# Patient Record
Sex: MALE | Race: WHITE | Employment: UNEMPLOYED | URBAN - METROPOLITAN AREA
[De-identification: names, ages, dates, MRNs, and addresses within clinical notes are randomized per-mention and may not be internally consistent; named-entity substitution may affect disease eponyms.]

---

## 2017-01-25 ENCOUNTER — HOSPITAL ENCOUNTER (OUTPATIENT)
Dept: CT IMAGING | Age: 57
Discharge: HOME | End: 2017-01-25
Attending: ORTHOPAEDIC SURGERY
Payer: COMMERCIAL

## 2017-01-25 DIAGNOSIS — S72.142A INTERTROCHANTERIC FRACTURE OF LEFT HIP (HCC): ICD-10-CM

## 2017-01-25 PROCEDURE — 72192 CT PELVIS W/O DYE: CPT

## 2018-05-03 ENCOUNTER — HOSPITAL ENCOUNTER (INPATIENT)
Age: 58
LOS: 1 days | Discharge: HOME | DRG: 280 | End: 2018-05-04
Attending: EMERGENCY MEDICINE | Admitting: INTERNAL MEDICINE
Payer: COMMERCIAL

## 2018-05-03 ENCOUNTER — APPOINTMENT (OUTPATIENT)
Dept: GENERAL RADIOLOGY | Age: 58
DRG: 280 | End: 2018-05-03
Attending: EMERGENCY MEDICINE
Payer: COMMERCIAL

## 2018-05-03 DIAGNOSIS — F19.10 SUBSTANCE ABUSE (HCC): ICD-10-CM

## 2018-05-03 DIAGNOSIS — I21.4 NSTEMI (NON-ST ELEVATED MYOCARDIAL INFARCTION) (HCC): Primary | ICD-10-CM

## 2018-05-03 PROBLEM — I10 HTN (HYPERTENSION): Status: ACTIVE | Noted: 2018-05-03

## 2018-05-03 PROBLEM — F14.90 COCAINE USE: Status: ACTIVE | Noted: 2018-05-03

## 2018-05-03 PROBLEM — Z72.0 TOBACCO USE: Status: ACTIVE | Noted: 2018-05-03

## 2018-05-03 PROBLEM — N17.9 AKI (ACUTE KIDNEY INJURY) (HCC): Status: ACTIVE | Noted: 2018-05-03

## 2018-05-03 LAB
ALBUMIN SERPL-MCNC: 2.8 G/DL (ref 3.5–5)
ALBUMIN/GLOB SERPL: 0.7 {RATIO} (ref 1.1–2.2)
ALP SERPL-CCNC: 60 U/L (ref 45–117)
ALT SERPL-CCNC: 16 U/L (ref 12–78)
AMPHET UR QL SCN: NEGATIVE
ANION GAP SERPL CALC-SCNC: 7 MMOL/L (ref 5–15)
APPEARANCE UR: CLEAR
APTT PPP: 27.4 SEC (ref 22.1–32)
AST SERPL-CCNC: 15 U/L (ref 15–37)
ATRIAL RATE: 91 BPM
BACTERIA URNS QL MICRO: NEGATIVE /HPF
BARBITURATES UR QL SCN: NEGATIVE
BASOPHILS # BLD: 0.1 K/UL (ref 0–0.1)
BASOPHILS NFR BLD: 1 % (ref 0–1)
BENZODIAZ UR QL: NEGATIVE
BILIRUB SERPL-MCNC: 0.8 MG/DL (ref 0.2–1)
BILIRUB UR QL: NEGATIVE
BNP SERPL-MCNC: 6036 PG/ML (ref 0–125)
BUN SERPL-MCNC: 9 MG/DL (ref 6–20)
BUN/CREAT SERPL: 7 (ref 12–20)
CALCIUM SERPL-MCNC: 8.2 MG/DL (ref 8.5–10.1)
CALCULATED P AXIS, ECG09: 32 DEGREES
CALCULATED R AXIS, ECG10: 18 DEGREES
CALCULATED T AXIS, ECG11: 166 DEGREES
CANNABINOIDS UR QL SCN: POSITIVE
CHLORIDE SERPL-SCNC: 106 MMOL/L (ref 97–108)
CHOLEST SERPL-MCNC: 204 MG/DL
CK SERPL-CCNC: 106 U/L (ref 39–308)
CO2 SERPL-SCNC: 26 MMOL/L (ref 21–32)
COCAINE UR QL SCN: POSITIVE
COLOR UR: NORMAL
CREAT SERPL-MCNC: 1.3 MG/DL (ref 0.7–1.3)
DIAGNOSIS, 93000: NORMAL
DIFFERENTIAL METHOD BLD: NORMAL
DRUG SCRN COMMENT,DRGCM: ABNORMAL
EOSINOPHIL # BLD: 0.4 K/UL (ref 0–0.4)
EOSINOPHIL NFR BLD: 5 % (ref 0–7)
EPITH CASTS URNS QL MICRO: NORMAL /LPF
ERYTHROCYTE [DISTWIDTH] IN BLOOD BY AUTOMATED COUNT: 13.3 % (ref 11.5–14.5)
EST. AVERAGE GLUCOSE BLD GHB EST-MCNC: NORMAL MG/DL
GLOBULIN SER CALC-MCNC: 4.1 G/DL (ref 2–4)
GLUCOSE SERPL-MCNC: 94 MG/DL (ref 65–100)
GLUCOSE UR STRIP.AUTO-MCNC: NEGATIVE MG/DL
HBA1C MFR BLD: 4.8 % (ref 4.2–6.3)
HCT VFR BLD AUTO: 38.8 % (ref 36.6–50.3)
HDLC SERPL-MCNC: 41 MG/DL
HDLC SERPL: 5 {RATIO} (ref 0–5)
HGB BLD-MCNC: 12.4 G/DL (ref 12.1–17)
HGB UR QL STRIP: NEGATIVE
HYALINE CASTS URNS QL MICRO: NORMAL /LPF (ref 0–5)
IMM GRANULOCYTES # BLD: 0 K/UL (ref 0–0.04)
IMM GRANULOCYTES NFR BLD AUTO: 0 % (ref 0–0.5)
INR PPP: 1.1 (ref 0.9–1.1)
KETONES UR QL STRIP.AUTO: NEGATIVE MG/DL
LDLC SERPL CALC-MCNC: 139.2 MG/DL (ref 0–100)
LEUKOCYTE ESTERASE UR QL STRIP.AUTO: NEGATIVE
LIPID PROFILE,FLP: ABNORMAL
LYMPHOCYTES # BLD: 1.3 K/UL (ref 0.8–3.5)
LYMPHOCYTES NFR BLD: 20 % (ref 12–49)
MCH RBC QN AUTO: 28.7 PG (ref 26–34)
MCHC RBC AUTO-ENTMCNC: 32 G/DL (ref 30–36.5)
MCV RBC AUTO: 89.8 FL (ref 80–99)
METHADONE UR QL: NEGATIVE
MONOCYTES # BLD: 0.6 K/UL (ref 0–1)
MONOCYTES NFR BLD: 9 % (ref 5–13)
NEUTS SEG # BLD: 4.5 K/UL (ref 1.8–8)
NEUTS SEG NFR BLD: 66 % (ref 32–75)
NITRITE UR QL STRIP.AUTO: NEGATIVE
NRBC # BLD: 0 K/UL (ref 0–0.01)
NRBC BLD-RTO: 0 PER 100 WBC
OPIATES UR QL: NEGATIVE
P-R INTERVAL, ECG05: 152 MS
PCP UR QL: NEGATIVE
PH UR STRIP: 5.5 [PH] (ref 5–8)
PLATELET # BLD AUTO: 243 K/UL (ref 150–400)
PMV BLD AUTO: 9.9 FL (ref 8.9–12.9)
POTASSIUM SERPL-SCNC: 4.1 MMOL/L (ref 3.5–5.1)
PROT SERPL-MCNC: 6.9 G/DL (ref 6.4–8.2)
PROT UR STRIP-MCNC: NEGATIVE MG/DL
PROTHROMBIN TIME: 10.6 SEC (ref 9–11.1)
Q-T INTERVAL, ECG07: 428 MS
QRS DURATION, ECG06: 102 MS
QTC CALCULATION (BEZET), ECG08: 500 MS
RBC # BLD AUTO: 4.32 M/UL (ref 4.1–5.7)
RBC #/AREA URNS HPF: NORMAL /HPF (ref 0–5)
SODIUM SERPL-SCNC: 139 MMOL/L (ref 136–145)
SP GR UR REFRACTOMETRY: 1.02 (ref 1–1.03)
THERAPEUTIC RANGE,PTTT: NORMAL SECS (ref 58–77)
TRIGL SERPL-MCNC: 119 MG/DL (ref ?–150)
TROPONIN I SERPL-MCNC: 0.37 NG/ML
TROPONIN I SERPL-MCNC: 0.71 NG/ML
UR CULT HOLD, URHOLD: NORMAL
UROBILINOGEN UR QL STRIP.AUTO: 1 EU/DL (ref 0.2–1)
VENTRICULAR RATE, ECG03: 82 BPM
VLDLC SERPL CALC-MCNC: 23.8 MG/DL
WBC # BLD AUTO: 6.9 K/UL (ref 4.1–11.1)
WBC URNS QL MICRO: NORMAL /HPF (ref 0–4)

## 2018-05-03 PROCEDURE — 93005 ELECTROCARDIOGRAM TRACING: CPT

## 2018-05-03 PROCEDURE — 65660000000 HC RM CCU STEPDOWN

## 2018-05-03 PROCEDURE — 71045 X-RAY EXAM CHEST 1 VIEW: CPT

## 2018-05-03 PROCEDURE — 80053 COMPREHEN METABOLIC PANEL: CPT | Performed by: EMERGENCY MEDICINE

## 2018-05-03 PROCEDURE — 96374 THER/PROPH/DIAG INJ IV PUSH: CPT

## 2018-05-03 PROCEDURE — 36415 COLL VENOUS BLD VENIPUNCTURE: CPT | Performed by: EMERGENCY MEDICINE

## 2018-05-03 PROCEDURE — 85730 THROMBOPLASTIN TIME PARTIAL: CPT | Performed by: EMERGENCY MEDICINE

## 2018-05-03 PROCEDURE — 85610 PROTHROMBIN TIME: CPT | Performed by: EMERGENCY MEDICINE

## 2018-05-03 PROCEDURE — 83880 ASSAY OF NATRIURETIC PEPTIDE: CPT | Performed by: EMERGENCY MEDICINE

## 2018-05-03 PROCEDURE — 96361 HYDRATE IV INFUSION ADD-ON: CPT

## 2018-05-03 PROCEDURE — 80307 DRUG TEST PRSMV CHEM ANLYZR: CPT | Performed by: EMERGENCY MEDICINE

## 2018-05-03 PROCEDURE — 84484 ASSAY OF TROPONIN QUANT: CPT | Performed by: EMERGENCY MEDICINE

## 2018-05-03 PROCEDURE — 83036 HEMOGLOBIN GLYCOSYLATED A1C: CPT | Performed by: INTERNAL MEDICINE

## 2018-05-03 PROCEDURE — 74011250637 HC RX REV CODE- 250/637: Performed by: INTERNAL MEDICINE

## 2018-05-03 PROCEDURE — 81001 URINALYSIS AUTO W/SCOPE: CPT | Performed by: EMERGENCY MEDICINE

## 2018-05-03 PROCEDURE — 74011250636 HC RX REV CODE- 250/636: Performed by: INTERNAL MEDICINE

## 2018-05-03 PROCEDURE — 80061 LIPID PANEL: CPT | Performed by: INTERNAL MEDICINE

## 2018-05-03 PROCEDURE — 85025 COMPLETE CBC W/AUTO DIFF WBC: CPT | Performed by: EMERGENCY MEDICINE

## 2018-05-03 PROCEDURE — 82550 ASSAY OF CK (CPK): CPT | Performed by: INTERNAL MEDICINE

## 2018-05-03 PROCEDURE — 93306 TTE W/DOPPLER COMPLETE: CPT

## 2018-05-03 PROCEDURE — 74011250636 HC RX REV CODE- 250/636: Performed by: EMERGENCY MEDICINE

## 2018-05-03 PROCEDURE — 99285 EMERGENCY DEPT VISIT HI MDM: CPT

## 2018-05-03 RX ORDER — SODIUM CHLORIDE 9 MG/ML
75 INJECTION, SOLUTION INTRAVENOUS CONTINUOUS
Status: DISCONTINUED | OUTPATIENT
Start: 2018-05-03 | End: 2018-05-04

## 2018-05-03 RX ORDER — AMLODIPINE BESYLATE 10 MG/1
10 TABLET ORAL DAILY
COMMUNITY
End: 2018-05-14

## 2018-05-03 RX ORDER — SODIUM CHLORIDE 0.9 % (FLUSH) 0.9 %
5-10 SYRINGE (ML) INJECTION AS NEEDED
Status: DISCONTINUED | OUTPATIENT
Start: 2018-05-03 | End: 2018-05-04 | Stop reason: HOSPADM

## 2018-05-03 RX ORDER — ACETAMINOPHEN 325 MG/1
650 TABLET ORAL
Status: DISCONTINUED | OUTPATIENT
Start: 2018-05-03 | End: 2018-05-04

## 2018-05-03 RX ORDER — ENOXAPARIN SODIUM 100 MG/ML
40 INJECTION SUBCUTANEOUS DAILY
Status: DISCONTINUED | OUTPATIENT
Start: 2018-05-03 | End: 2018-05-04

## 2018-05-03 RX ORDER — NICOTINE 7MG/24HR
1 PATCH, TRANSDERMAL 24 HOURS TRANSDERMAL
Status: DISCONTINUED | OUTPATIENT
Start: 2018-05-03 | End: 2018-05-04

## 2018-05-03 RX ORDER — ACETAMINOPHEN 325 MG/1
650 TABLET ORAL
Status: DISCONTINUED | OUTPATIENT
Start: 2018-05-03 | End: 2018-05-04 | Stop reason: HOSPADM

## 2018-05-03 RX ORDER — ONDANSETRON 2 MG/ML
4 INJECTION INTRAMUSCULAR; INTRAVENOUS
Status: DISCONTINUED | OUTPATIENT
Start: 2018-05-03 | End: 2018-05-04

## 2018-05-03 RX ORDER — GUAIFENESIN 100 MG/5ML
81 LIQUID (ML) ORAL DAILY
Status: DISCONTINUED | OUTPATIENT
Start: 2018-05-04 | End: 2018-05-04

## 2018-05-03 RX ORDER — LORAZEPAM 2 MG/ML
1 INJECTION INTRAMUSCULAR
Status: COMPLETED | OUTPATIENT
Start: 2018-05-03 | End: 2018-05-03

## 2018-05-03 RX ORDER — NALOXONE HYDROCHLORIDE 0.4 MG/ML
0.4 INJECTION, SOLUTION INTRAMUSCULAR; INTRAVENOUS; SUBCUTANEOUS AS NEEDED
Status: DISCONTINUED | OUTPATIENT
Start: 2018-05-03 | End: 2018-05-04 | Stop reason: HOSPADM

## 2018-05-03 RX ORDER — MORPHINE SULFATE 4 MG/ML
2 INJECTION INTRAVENOUS
Status: DISCONTINUED | OUTPATIENT
Start: 2018-05-03 | End: 2018-05-04

## 2018-05-03 RX ORDER — AMLODIPINE BESYLATE 5 MG/1
10 TABLET ORAL DAILY
Status: DISCONTINUED | OUTPATIENT
Start: 2018-05-04 | End: 2018-05-04

## 2018-05-03 RX ORDER — ZOLPIDEM TARTRATE 5 MG/1
5 TABLET ORAL
Status: DISCONTINUED | OUTPATIENT
Start: 2018-05-03 | End: 2018-05-04

## 2018-05-03 RX ORDER — SODIUM CHLORIDE 0.9 % (FLUSH) 0.9 %
5-10 SYRINGE (ML) INJECTION EVERY 8 HOURS
Status: DISCONTINUED | OUTPATIENT
Start: 2018-05-03 | End: 2018-05-04 | Stop reason: HOSPADM

## 2018-05-03 RX ORDER — ASPIRIN 325 MG
325 TABLET ORAL DAILY
Status: DISCONTINUED | OUTPATIENT
Start: 2018-05-04 | End: 2018-05-04 | Stop reason: HOSPADM

## 2018-05-03 RX ORDER — HYDROCODONE BITARTRATE AND ACETAMINOPHEN 5; 325 MG/1; MG/1
1 TABLET ORAL
Status: DISCONTINUED | OUTPATIENT
Start: 2018-05-03 | End: 2018-05-04 | Stop reason: SDUPTHER

## 2018-05-03 RX ORDER — BENZONATATE 100 MG/1
100 CAPSULE ORAL
Status: DISCONTINUED | OUTPATIENT
Start: 2018-05-03 | End: 2018-05-04

## 2018-05-03 RX ORDER — ASPIRIN 325 MG
325 TABLET ORAL
Status: ACTIVE | OUTPATIENT
Start: 2018-05-03 | End: 2018-05-04

## 2018-05-03 RX ADMIN — ZOLPIDEM TARTRATE 5 MG: 5 TABLET ORAL at 23:34

## 2018-05-03 RX ADMIN — NITROGLYCERIN 1 INCH: 20 OINTMENT TOPICAL at 23:40

## 2018-05-03 RX ADMIN — LORAZEPAM 1 MG: 2 INJECTION INTRAMUSCULAR; INTRAVENOUS at 13:32

## 2018-05-03 RX ADMIN — BENZONATATE 100 MG: 100 CAPSULE ORAL at 18:09

## 2018-05-03 RX ADMIN — SODIUM CHLORIDE 75 ML/HR: 900 INJECTION, SOLUTION INTRAVENOUS at 23:17

## 2018-05-03 RX ADMIN — Medication 10 ML: at 21:38

## 2018-05-03 RX ADMIN — SODIUM CHLORIDE 500 ML: 900 INJECTION, SOLUTION INTRAVENOUS at 13:29

## 2018-05-03 RX ADMIN — SODIUM CHLORIDE 75 ML/HR: 900 INJECTION, SOLUTION INTRAVENOUS at 15:45

## 2018-05-03 RX ADMIN — ENOXAPARIN SODIUM 40 MG: 40 INJECTION SUBCUTANEOUS at 16:26

## 2018-05-03 RX ADMIN — Medication 10 ML: at 15:45

## 2018-05-03 NOTE — CONSULTS
Connie Weinberg DO, [facility]  [address] [facility] [place], [address] [facility] [address]    Office [phone],W [phone]  Pager [phone]    Date of  Admission: 5/3/2018 12:49 PM  PCP- Latasha Segura MD    Luis Tse is a 61 y.o. male admitted for NSTEMI (non-ST elevated myocardial infarction) ([facility] [address] He is a 61 y.o.  [place]  male with past medical history significant for HTN and CHF who presents from home via EMS with chief complaint of CP. The pt reports that he started to have L sided CP that radiates to his LUE with SOB this morning at 0700 while driving. The pt reports that he used cocaine this morning prior to the onset of his CP. His daughter is unaware of his cocaine use. The pt has not been compliant with his blood pressure medications. The pt has a chronic cough. The pt denies prior cardiac intervention, hx of DM, fever, chills, and vomiting. There are no other acute medical concerns at this time. Consult requested by Rachelle Martínez MD    Assessment/Plan    1. Recent Non STEMI. 2. Severe LV systolic dysfunction (EF 90-68%) with apical akinesis and lateral hypokinesis. 3. Recent Cocaine use. 4. Tobacco abuse. 5. Hypertension. Plan:  1. Aspirin 325 mg po daily. 2. Atorvastatin 40 mg po daily. 3. Metoprolol 25 mg po BID. 4. Lisinopril 5 mg po daily. 5. Risk, benefits and alternatives to a cardiac catheterization and possible PTCA/stent have been discussed with Mr. [facility] and he agrees to proceed. This will be performed in the am.               I appreciate the opportunity to be involved in Mr. Thomas. See below note for details. Please do not hesitate to contact us with questions or concerns. Connie Weinberg DO    Cardiac Testing/ Procedures: A. Cardiac Cath/PCI:    B.ECHO/GHASSAN:  Left ventricle: Apical akinesis, Lateral hypokinesis, Normal septal motion. The ventricle was moderately to severely dilated.  Systolic function was  abnormal. Ejection fraction was estimated in the range of 30 % to 35 %. There was severe diffuse hypokinesis with regional variations. Left atrium: The atrium was moderately to severely dilated. Mitral valve: There was mild to moderate regurgitation. Tricuspid valve: There was moderate regurgitation. C.StressNuclear/Stress ECHO/Stress test:    D.Vascular:    E. EP:    F. Miscellaneous:    Care Plan discussed with: Patient, Family, Nursing Staff and Consultant/Specialist    Subjective:      History reviewed. No pertinent past medical history.    Past Surgical History:   Procedure Laterality Date    HX ORTHOPAEDIC      left hip surgery     No Known Allergies  Family History   Problem Relation Age of Onset    Diabetes Mother       Social History   Substance Use Topics    Smoking status: Former Smoker     Quit date: 5/3/2018    Smokeless tobacco: None    Alcohol use No          Medications:    (Not in a hospital admission)  Current Facility-Administered Medications   Medication Dose Route Frequency    sodium chloride (NS) flush 5-10 mL  5-10 mL IntraVENous Q8H    sodium chloride (NS) flush 5-10 mL  5-10 mL IntraVENous PRN    aspirin (ASPIRIN) tablet 325 mg  325 mg Oral NOW    sodium chloride (NS) flush 5-10 mL  5-10 mL IntraVENous Q8H    sodium chloride (NS) flush 5-10 mL  5-10 mL IntraVENous PRN    acetaminophen (TYLENOL) tablet 650 mg  650 mg Oral Q4H PRN    HYDROcodone-acetaminophen (NORCO) 5-325 mg per tablet 1 Tab  1 Tab Oral Q4H PRN    naloxone (NARCAN) injection 0.4 mg  0.4 mg IntraVENous PRN    ondansetron (ZOFRAN) injection 4 mg  4 mg IntraVENous Q4H PRN    morphine injection 2 mg  2 mg IntraVENous Q4H PRN    enoxaparin (LOVENOX) injection 40 mg  40 mg SubCUTAneous DAILY    [START ON 5/4/2018] aspirin chewable tablet 81 mg  81 mg Oral DAILY    0.9% sodium chloride infusion  75 mL/hr IntraVENous CONTINUOUS    nicotine (NICODERM CQ) 7 mg/24 hr patch 1 Patch  1 Patch TransDERmal DAILY PRN     Current Outpatient Prescriptions   Medication Sig    amLODIPine (NORVASC) 10 mg tablet Take 10 mg by mouth daily. Review of Systems:  (bold if positive, if negative)    Gen:  Eyes:  ENT:  CVS: Chest pain  Pulm: Cough, dyspneaGI:    :    MS:  Skin:  Psych:  Endo:    Hem:  Renal:    Neuro:        Physical Exam:  Visit Vitals    /76    Pulse 81    Temp 98.4 °F (36.9 °C)    Resp 21    Ht 5' 6\" (1.676 m)    Wt 84.8 kg (187 lb)    SpO2 95%    BMI 30.18 kg/m2         Telemetry: normal sinus rhythm    Gen: Well-developed, well-nourished, in no acute distress  HEENT:  Pink conjunctivae, hearing intact to voice, moist mucous membranes  Neck: Supple,No JVD, No Carotid Bruit, Thyroid- non tender  Resp: No accessory muscle use, Clear breath sounds, No rales or rhonchi  Card: Regular Rate,Rythm,Normal S1, S2, No murmurs, rubs or gallop. No thrills. Abd:  Soft, non-tender, non-distended, normoactive bowel sounds are present,   MSK: No cyanosis or clubbing  Skin: No rashes or ulcers  Neuro:  Cranial nerves are grossly intact, moving all four extremities, no focal deficit, follows commands appropriately  Psych:  Good insight, oriented to person, place and time, alert, Nml Affect  LE: No edema  Vascular:Distal Pulses 2+ and symmetric        EKG:  Unchanged from previous tracings, normal sinus rhythm, LVH, Inverted T waves in the anterior/lateral leads, prolonged QT interval..       Cxray:  Adaptive statistical iterative reconstruction (ASIR) was utilized. The cardiomediastinal contours are stable. The pulmonary vasculature is within  normal limits. The lungs and pleural spaces are clear. There is no pneumothorax.  The bones and  upper abdomen are stable.        Impression:           LABS:        Lab Results   Component Value Date/Time    WBC 6.9 05/03/2018 01:06 PM    HGB 12.4 05/03/2018 01:06 PM    HCT 38.8 05/03/2018 01:06 PM    PLATELET 862 92/91/2279 01:06 PM    MCV 89.8 05/03/2018 01:06 PM     Lab Results   Component Value Date/Time    Sodium 139 05/03/2018 01:06 PM    Potassium 4.1 05/03/2018 01:06 PM    Chloride 106 05/03/2018 01:06 PM    CO2 26 05/03/2018 01:06 PM    Anion gap 7 05/03/2018 01:06 PM    Glucose 94 05/03/2018 01:06 PM    BUN 9 05/03/2018 01:06 PM    Creatinine 1.30 05/03/2018 01:06 PM    BUN/Creatinine ratio 7 (L) 05/03/2018 01:06 PM    GFR est AA >60 05/03/2018 01:06 PM    GFR est non-AA 57 (L) 05/03/2018 01:06 PM    Calcium 8.2 (L) 05/03/2018 01:06 PM     Lab Results   Component Value Date/Time    Troponin-I, Qt. 0.37 (H) 05/03/2018 01:06 PM     Lab Results   Component Value Date/Time    aPTT 27.4 05/03/2018 01:06 PM     Lab Results   Component Value Date/Time    INR 1.1 05/03/2018 01:06 PM    INR 1.0 07/27/2010 06:56 AM    Prothrombin time 10.6 05/03/2018 01:06 PM    Prothrombin time 10.3 07/27/2010 06:56 AM     No results found for: BNP, BNPP, XBNPT      Moon Fields DO

## 2018-05-03 NOTE — H&P
[place] [address]  [phone]    Admission History and Physical      NAME:  Ez Paul   :   1960   MRN:  [id number]     PCP:  Francheska Santiago MD     Date/Time:  5/3/2018         Subjective:     CHIEF COMPLAINT: \"I have chest pain\"     HISTORY OF PRESENT ILLNESS:     Mr. Marcela Villa is a 61 y.o.  male with PMH of tobacco abuse (currently smokes 1 cig/day, remote h/o alcohol abuse and cocaine use) admitted for chest pain and an elevated troponin. Pt reports chest pain that started this AM. L sided. Associated with SOB. Non radiating. No exertional symptoms prior to this event. Does endorse cocaine use this AM.     PMH   HTN     Past Surgical History:   Procedure Laterality Date    HX ORTHOPAEDIC      left hip surgery       Social History   Substance Use Topics    Smoking status: Former Smoker     Quit date: 5/3/2018    Smokeless tobacco: Not on file    Alcohol use No      Mother => DM     No Known Allergies     Prior to Admission medications    Medication Sig Start Date End Date Taking? Authorizing Provider   amLODIPine (NORVASC) 10 mg tablet Take 10 mg by mouth daily.    Yes Historical Provider         Review of Systems:  (bold if positive, if negative)    Gen:  Eyes:  ENT:  CVS:  Pulm:  GI:    :    MS:  Skin:  Psych:  Endo:    Hem:  Renal:    Neuro:     Dyspnea, chest pain        Objective:      VITALS:    Vital signs reviewed; most recent are:    Visit Vitals    /76    Pulse 81    Temp 98.4 °F (36.9 °C)    Resp 21    Ht 5' 6\" (1.676 m)    Wt 84.8 kg (187 lb)    SpO2 95%    BMI 30.18 kg/m2     SpO2 Readings from Last 6 Encounters:   18 95%    O2 Flow Rate (L/min): 2 l/min   No intake or output data in the 24 hours ending 18 1440         Exam:     Physical Exam:    Gen:  Well-developed, well-nourished, in no acute distress  HEENT:  Pink conjunctivae, PERRL, hearing intact to voice, moist mucous membranes  Neck:  Supple, without masses, thyroid non-tender  Resp:  No accessory muscle use, clear breath sounds without wheezes rales or rhonchi  Card:  No murmurs, normal S1, S2 without thrills, bruits or peripheral edema  Abd:  Soft, non-tender, non-distended, normoactive bowel sounds are present, no palpable organomegaly  Lymph:  No cervical adenopathy  Musc:  No cyanosis or clubbing  Skin:  No rashes or ulcers, skin turgor is good  Neuro:  Cranial nerves 3-12 are grossly intact,  strength is 5/5 bilaterally, dorsi / plantarflexion strength is 5/5 bilaterally, follows commands appropriately  Psych:  Alert with good insight. Oriented to person, place, and time       Labs:    Recent Labs      05/03/18   1306   WBC  6.9   HGB  12.4   HCT  38.8   PLT  243     Recent Labs      05/03/18   1306   NA  139   K  4.1   CL  106   CO2  26   GLU  94   BUN  9   CREA  1.30   CA  8.2*   ALB  2.8*   SGOT  15   ALT  16     No components found for: GLPOC  No results for input(s): PH, PCO2, PO2, HCO3, FIO2 in the last 72 hours. Recent Labs      05/03/18   1306   INR  1.1     CXR => no acute process   EKG => T wave inversion in the lateral leads  Trop => 0.37     Assessment/Plan:    NSTEMI (non-ST elevated myocardial infarction) ([facility] [address] (5/3/2018) - likely from vasoconstriction from cocaine use though does have risk factors for CAD including tobacco abuse and HTN   -admit to tele   -trend CE's   -morphine PRN   -start O2   -nitrates PRN   -start ASA   -check lipid panel, A1c, TTE   -cardiology eval; may need stress   -if CE's uptrend significantly, will start therapeutic anticoagulation  -avoid beta blockers due to cocaine use       Cocaine use (5/3/2018) - daughter is not aware of this   -counseled on cessation       ELISEO (acute kidney injury) ([facility] [address] (5/3/2018) - unclear baseline.  ?mild CKD  -monitor with IVF's       Tobacco use (5/3/2018)  -counseled on cessation  -nicotine patch PRN       HTN (hypertension) (5/3/2018)  -continue amlodipine     Surrogate decision maker: Daughter.  Pt is FULL CODE    Total time spent with patient: [phone] [facility] discussed with: Patient and Family    Discussed:  Code Status, Care Plan and D/C Planning    Prophylaxis:  Lovenox    Probable Disposition:  Home w/Family           ___________________________________________________    Attending Physician: Corona Mai MD

## 2018-05-03 NOTE — ROUTINE PROCESS
Bedside and Verbal shift change report given to [address] [place], [place] [address]way (oncoming nurse) by Mindy CARLOS (offgoing nurse). Report included the following information SBAR, Kardex, Intake/Output, Recent Results and Cardiac Rhythm NSR.

## 2018-05-03 NOTE — ED NOTES
Report received from [facility]. Assumed care of pt. Bed locked and in low position with call bell within reach. Using AIDET-Introduced self as Primary RN and plan discussed with pt with understanding was verbalized. Pt denies additional complaints at this time. White board updated with this nurse's name. Patient advised that medical information will be discussed and it is their own responsibility to tell nurse if such conversation should not take place in the presence of visitors. Pt verbalizes understanding. Pt's daughter at bedside. On monitor x 3 with NAD and on oxygen @2L via NC. Pt resting at this time.

## 2018-05-03 NOTE — PROGRESS NOTES
[facility]: MED RECONCILIATION    Comments/Recommendations:    Patient alert and oriented for medication reconciliation but did know medications.  Per daughter and patient he only takes one medication for blood pressure but has not taken in at least one week. Per last fill date patient has not been compliant.  Pharmacist called [facility] pharmacy and patient last filled amlodipine 10mg on 11/21/17. There were no other prescriptions on file.  Confirmed NKDA, updated pharmacy. Medications added:     · amlodipine    Medications removed:    · none    Medications adjusted:    · none    Information obtained from: [facility] pharmacy, patient, daughter     Allergies: Review of patient's allergies indicates no known allergies. Prior to Admission Medications:     Medication Documentation Review Audit       Reviewed by Jeovanny Tomlinson (Pharmacist) on 05/03/18 at 1426         Medication Sig Documenting Provider Last Dose Status Taking? amLODIPine (NORVASC) 10 mg tablet Take 10 mg by mouth daily. Historical Provider 4/26/2018 Active Yes             Med Note (JEOVANNY TOMLINSON   Thu May 3, 2018  2:26 PM): Patient has not taken in at least one week.                      Thank Nieves Mack, PharmD  Contact: [phone]

## 2018-05-03 NOTE — ED TRIAGE NOTES
Pt arrived via EMS for CP that started this AM at 0700. Pt states it is midchest, nonradiating, not reproducable by palpation. +LBBB noted to EKG by EMS. VSS for EMS.

## 2018-05-03 NOTE — PROGRESS NOTES
Reason for Admission:  NSTEMI                   RRAT Score:  2                  Plan for utilizing home health:  Yes, the pt would benefit from home health. Likelihood of Readmission:  Low                          Transition of Care Plan:                     CM met with Pt. And his daughter at bedside in ED. Pt is  lives with his spouse Hany Skelton [phone]. Pt is currently not working,  And reports he is on his wife's [employer]. Pt has prescription coverage, Per Daughter Sanders, pt does not always take  his Blood pressure medications. Pt reports he \"does not like to go the doctor\" Pt and daugher given dispatch health information and explanation. Pt PCP Dr Antonia Fu, No NN  No DME, No Home health. Care Management Interventions  PCP Verified by CM:  Yes  Mode of Transport at Discharge: [address] (CM Consult): Discharge Planning  Physical Therapy Consult: No  Occupational Therapy Consult: No  Speech Therapy Consult: No  Current Support Network: Lives with Spouse  Confirm Follow Up Transport: Family  Plan discussed with Pt/Family/Caregiver: Yes  Discharge Location  Discharge Placement: Unable to determine at this time

## 2018-05-03 NOTE — ED NOTES
TRANSFER - OUT REPORT:    Verbal report given to Mary Uribe RN(name) on [facility]  being transferred to [facility](unit) for routine progression of care       Report consisted of patients Situation, Background, Assessment and   Recommendations(SBAR). Information from the following report(s) SBAR, ED Summary, [facility] and Recent Results was reviewed with the receiving nurse. Lines:   Peripheral IV 05/03/18 Left Antecubital (Active)   Site Assessment Clean, dry, & intact 5/3/2018  1:07 PM   Phlebitis Assessment 0 5/3/2018  1:07 PM   Infiltration Assessment 0 5/3/2018  1:07 PM   Dressing Status Clean, dry, & intact 5/3/2018  1:07 PM        Opportunity for questions and clarification was provided.       Patient transported with:   Monitor  O2 @ 2 liters  Tech (paramedic)

## 2018-05-03 NOTE — ED NOTES
Bedside shift change report given to silvestre herring (oncoming nurse) by silvestre agarwal (offgoing nurse). Report included the following information SBAR, Kardex and ED Summary.

## 2018-05-04 ENCOUNTER — APPOINTMENT (OUTPATIENT)
Dept: GENERAL RADIOLOGY | Age: 58
DRG: 215 | End: 2018-05-04
Attending: THORACIC SURGERY (CARDIOTHORACIC VASCULAR SURGERY)
Payer: COMMERCIAL

## 2018-05-04 ENCOUNTER — HOSPITAL ENCOUNTER (INPATIENT)
Age: 58
LOS: 10 days | Discharge: HOME HEALTH | DRG: 215 | End: 2018-05-14
Attending: THORACIC SURGERY (CARDIOTHORACIC VASCULAR SURGERY) | Admitting: THORACIC SURGERY (CARDIOTHORACIC VASCULAR SURGERY)
Payer: COMMERCIAL

## 2018-05-04 ENCOUNTER — APPOINTMENT (OUTPATIENT)
Dept: GENERAL RADIOLOGY | Age: 58
DRG: 215 | End: 2018-05-04
Attending: NURSE PRACTITIONER
Payer: COMMERCIAL

## 2018-05-04 ENCOUNTER — APPOINTMENT (OUTPATIENT)
Dept: GENERAL RADIOLOGY | Age: 58
DRG: 215 | End: 2018-05-04
Attending: ANESTHESIOLOGY
Payer: COMMERCIAL

## 2018-05-04 VITALS
SYSTOLIC BLOOD PRESSURE: 145 MMHG | DIASTOLIC BLOOD PRESSURE: 101 MMHG | RESPIRATION RATE: 22 BRPM | TEMPERATURE: 97.9 F | HEIGHT: 66 IN | BODY MASS INDEX: 30.05 KG/M2 | HEART RATE: 73 BPM | WEIGHT: 187 LBS | OXYGEN SATURATION: 97 %

## 2018-05-04 DIAGNOSIS — F14.90 COCAINE USE: Chronic | ICD-10-CM

## 2018-05-04 DIAGNOSIS — Z72.0 TOBACCO USE: Chronic | ICD-10-CM

## 2018-05-04 DIAGNOSIS — Z95.1 S/P CABG (CORONARY ARTERY BYPASS GRAFT): Primary | ICD-10-CM

## 2018-05-04 DIAGNOSIS — I25.5 ISCHEMIC CARDIOMYOPATHY: ICD-10-CM

## 2018-05-04 DIAGNOSIS — I21.4 NSTEMI (NON-ST ELEVATED MYOCARDIAL INFARCTION) (HCC): ICD-10-CM

## 2018-05-04 PROBLEM — I25.10 CAD (CORONARY ARTERY DISEASE): Status: ACTIVE | Noted: 2018-05-04

## 2018-05-04 PROBLEM — R79.89 ELEVATED SERUM CREATININE: Status: ACTIVE | Noted: 2018-05-04

## 2018-05-04 PROBLEM — I10 HTN (HYPERTENSION): Chronic | Status: ACTIVE | Noted: 2018-05-03

## 2018-05-04 LAB
ALBUMIN SERPL-MCNC: 3 G/DL (ref 3.5–5)
ALBUMIN/GLOB SERPL: 0.7 {RATIO} (ref 1.1–2.2)
ALP SERPL-CCNC: 60 U/L (ref 45–117)
ALT SERPL-CCNC: 15 U/L (ref 12–78)
ANION GAP SERPL CALC-SCNC: 10 MMOL/L (ref 5–15)
ANION GAP SERPL CALC-SCNC: 6 MMOL/L (ref 5–15)
ANION GAP SERPL CALC-SCNC: 7 MMOL/L (ref 5–15)
APTT PPP: 22.9 SEC (ref 22.1–32)
APTT PPP: 30.3 SEC (ref 22.1–32)
AST SERPL-CCNC: 19 U/L (ref 15–37)
ATRIAL RATE: 74 BPM
ATRIAL RATE: 80 BPM
BASOPHILS # BLD: 0 K/UL (ref 0–0.1)
BASOPHILS NFR BLD: 0 % (ref 0–1)
BASOPHILS NFR BLD: 0 % (ref 0–1)
BASOPHILS NFR BLD: 1 % (ref 0–1)
BILIRUB SERPL-MCNC: 0.7 MG/DL (ref 0.2–1)
BNP SERPL-MCNC: 6138 PG/ML (ref 0–125)
BUN SERPL-MCNC: 7 MG/DL (ref 6–20)
BUN SERPL-MCNC: 8 MG/DL (ref 6–20)
BUN SERPL-MCNC: 8 MG/DL (ref 6–20)
BUN/CREAT SERPL: 5 (ref 12–20)
BUN/CREAT SERPL: 6 (ref 12–20)
BUN/CREAT SERPL: 7 (ref 12–20)
CALCIUM SERPL-MCNC: 8.2 MG/DL (ref 8.5–10.1)
CALCIUM SERPL-MCNC: 8.4 MG/DL (ref 8.5–10.1)
CALCIUM SERPL-MCNC: 8.7 MG/DL (ref 8.5–10.1)
CALCULATED P AXIS, ECG09: 31 DEGREES
CALCULATED P AXIS, ECG09: 43 DEGREES
CALCULATED R AXIS, ECG10: 38 DEGREES
CALCULATED R AXIS, ECG10: 58 DEGREES
CALCULATED T AXIS, ECG11: -163 DEGREES
CALCULATED T AXIS, ECG11: 177 DEGREES
CHLORIDE SERPL-SCNC: 106 MMOL/L (ref 97–108)
CHLORIDE SERPL-SCNC: 108 MMOL/L (ref 97–108)
CHLORIDE SERPL-SCNC: 109 MMOL/L (ref 97–108)
CK MB CFR SERPL CALC: 3.5 % (ref 0–2.5)
CK MB CFR SERPL CALC: 4.2 % (ref 0–2.5)
CK MB SERPL-MCNC: 3 NG/ML (ref 5–25)
CK MB SERPL-MCNC: 3.7 NG/ML (ref 5–25)
CK SERPL-CCNC: 85 U/L (ref 39–308)
CK SERPL-CCNC: 85 U/L (ref 39–308)
CK SERPL-CCNC: 88 U/L (ref 39–308)
CO2 SERPL-SCNC: 23 MMOL/L (ref 21–32)
CO2 SERPL-SCNC: 24 MMOL/L (ref 21–32)
CO2 SERPL-SCNC: 25 MMOL/L (ref 21–32)
CREAT SERPL-MCNC: 1.17 MG/DL (ref 0.7–1.3)
CREAT SERPL-MCNC: 1.26 MG/DL (ref 0.7–1.3)
CREAT SERPL-MCNC: 1.39 MG/DL (ref 0.7–1.3)
DIAGNOSIS, 93000: NORMAL
DIAGNOSIS, 93000: NORMAL
DIFFERENTIAL METHOD BLD: NORMAL
EOSINOPHIL # BLD: 0.3 K/UL (ref 0–0.4)
EOSINOPHIL # BLD: 0.3 K/UL (ref 0–0.4)
EOSINOPHIL # BLD: 0.4 K/UL (ref 0–0.4)
EOSINOPHIL NFR BLD: 4 % (ref 0–7)
EOSINOPHIL NFR BLD: 5 % (ref 0–7)
EOSINOPHIL NFR BLD: 7 % (ref 0–7)
ERYTHROCYTE [DISTWIDTH] IN BLOOD BY AUTOMATED COUNT: 13.6 % (ref 11.5–14.5)
ERYTHROCYTE [DISTWIDTH] IN BLOOD BY AUTOMATED COUNT: 13.8 % (ref 11.5–14.5)
ERYTHROCYTE [DISTWIDTH] IN BLOOD BY AUTOMATED COUNT: 13.8 % (ref 11.5–14.5)
GLOBULIN SER CALC-MCNC: 4.3 G/DL (ref 2–4)
GLUCOSE SERPL-MCNC: 115 MG/DL (ref 65–100)
GLUCOSE SERPL-MCNC: 142 MG/DL (ref 65–100)
GLUCOSE SERPL-MCNC: 87 MG/DL (ref 65–100)
HCT VFR BLD AUTO: 38.7 % (ref 36.6–50.3)
HCT VFR BLD AUTO: 39.2 % (ref 36.6–50.3)
HCT VFR BLD AUTO: 40.7 % (ref 36.6–50.3)
HGB BLD-MCNC: 12.2 G/DL (ref 12.1–17)
HGB BLD-MCNC: 12.4 G/DL (ref 12.1–17)
HGB BLD-MCNC: 12.8 G/DL (ref 12.1–17)
IMM GRANULOCYTES # BLD: 0 K/UL (ref 0–0.04)
IMM GRANULOCYTES NFR BLD AUTO: 0 % (ref 0–0.5)
INR PPP: 1.1 (ref 0.9–1.1)
INR PPP: 1.1 (ref 0.9–1.1)
LYMPHOCYTES # BLD: 1 K/UL (ref 0.8–3.5)
LYMPHOCYTES # BLD: 1.4 K/UL (ref 0.8–3.5)
LYMPHOCYTES # BLD: 1.5 K/UL (ref 0.8–3.5)
LYMPHOCYTES NFR BLD: 19 % (ref 12–49)
LYMPHOCYTES NFR BLD: 21 % (ref 12–49)
LYMPHOCYTES NFR BLD: 26 % (ref 12–49)
MAGNESIUM SERPL-MCNC: 2 MG/DL (ref 1.6–2.4)
MAGNESIUM SERPL-MCNC: 2.3 MG/DL (ref 1.6–2.4)
MCH RBC QN AUTO: 28.3 PG (ref 26–34)
MCH RBC QN AUTO: 28.4 PG (ref 26–34)
MCH RBC QN AUTO: 29.1 PG (ref 26–34)
MCHC RBC AUTO-ENTMCNC: 31.4 G/DL (ref 30–36.5)
MCHC RBC AUTO-ENTMCNC: 31.5 G/DL (ref 30–36.5)
MCHC RBC AUTO-ENTMCNC: 31.6 G/DL (ref 30–36.5)
MCV RBC AUTO: 89.8 FL (ref 80–99)
MCV RBC AUTO: 90.2 FL (ref 80–99)
MCV RBC AUTO: 92 FL (ref 80–99)
MONOCYTES # BLD: 0.3 K/UL (ref 0–1)
MONOCYTES # BLD: 0.4 K/UL (ref 0–1)
MONOCYTES # BLD: 0.5 K/UL (ref 0–1)
MONOCYTES NFR BLD: 6 % (ref 5–13)
MONOCYTES NFR BLD: 7 % (ref 5–13)
MONOCYTES NFR BLD: 7 % (ref 5–13)
NEUTS SEG # BLD: 3.3 K/UL (ref 1.8–8)
NEUTS SEG # BLD: 3.4 K/UL (ref 1.8–8)
NEUTS SEG # BLD: 5.1 K/UL (ref 1.8–8)
NEUTS SEG NFR BLD: 59 % (ref 32–75)
NEUTS SEG NFR BLD: 68 % (ref 32–75)
NEUTS SEG NFR BLD: 69 % (ref 32–75)
NRBC # BLD: 0 K/UL (ref 0–0.01)
NRBC BLD-RTO: 0 PER 100 WBC
P-R INTERVAL, ECG05: 148 MS
P-R INTERVAL, ECG05: 150 MS
PLATELET # BLD AUTO: 250 K/UL (ref 150–400)
PLATELET # BLD AUTO: 257 K/UL (ref 150–400)
PLATELET # BLD AUTO: 261 K/UL (ref 150–400)
PMV BLD AUTO: 10.1 FL (ref 8.9–12.9)
PMV BLD AUTO: 9.9 FL (ref 8.9–12.9)
PMV BLD AUTO: 9.9 FL (ref 8.9–12.9)
POTASSIUM SERPL-SCNC: 4.5 MMOL/L (ref 3.5–5.1)
POTASSIUM SERPL-SCNC: 4.5 MMOL/L (ref 3.5–5.1)
POTASSIUM SERPL-SCNC: 4.6 MMOL/L (ref 3.5–5.1)
PROT SERPL-MCNC: 7.3 G/DL (ref 6.4–8.2)
PROTHROMBIN TIME: 10.8 SEC (ref 9–11.1)
PROTHROMBIN TIME: 11 SEC (ref 9–11.1)
Q-T INTERVAL, ECG07: 414 MS
Q-T INTERVAL, ECG07: 446 MS
QRS DURATION, ECG06: 104 MS
QRS DURATION, ECG06: 98 MS
QTC CALCULATION (BEZET), ECG08: 477 MS
QTC CALCULATION (BEZET), ECG08: 495 MS
RBC # BLD AUTO: 4.26 M/UL (ref 4.1–5.7)
RBC # BLD AUTO: 4.31 M/UL (ref 4.1–5.7)
RBC # BLD AUTO: 4.51 M/UL (ref 4.1–5.7)
SODIUM SERPL-SCNC: 139 MMOL/L (ref 136–145)
SODIUM SERPL-SCNC: 139 MMOL/L (ref 136–145)
SODIUM SERPL-SCNC: 140 MMOL/L (ref 136–145)
THERAPEUTIC RANGE,PTTT: NORMAL SECS (ref 58–77)
THERAPEUTIC RANGE,PTTT: NORMAL SECS (ref 58–77)
TROPONIN I SERPL-MCNC: 0.58 NG/ML
TROPONIN I SERPL-MCNC: 0.63 NG/ML
TROPONIN I SERPL-MCNC: 0.92 NG/ML
TSH SERPL DL<=0.05 MIU/L-ACNC: 1.19 UIU/ML (ref 0.36–3.74)
VENTRICULAR RATE, ECG03: 74 BPM
VENTRICULAR RATE, ECG03: 80 BPM
WBC # BLD AUTO: 5 K/UL (ref 4.1–11.1)
WBC # BLD AUTO: 5.6 K/UL (ref 4.1–11.1)
WBC # BLD AUTO: 7.3 K/UL (ref 4.1–11.1)

## 2018-05-04 PROCEDURE — 84443 ASSAY THYROID STIM HORMONE: CPT | Performed by: INTERNAL MEDICINE

## 2018-05-04 PROCEDURE — 36415 COLL VENOUS BLD VENIPUNCTURE: CPT | Performed by: INTERNAL MEDICINE

## 2018-05-04 PROCEDURE — 93880 EXTRACRANIAL BILAT STUDY: CPT

## 2018-05-04 PROCEDURE — 83735 ASSAY OF MAGNESIUM: CPT | Performed by: INTERNAL MEDICINE

## 2018-05-04 PROCEDURE — 74011250636 HC RX REV CODE- 250/636

## 2018-05-04 PROCEDURE — 74011250637 HC RX REV CODE- 250/637: Performed by: NURSE PRACTITIONER

## 2018-05-04 PROCEDURE — 85025 COMPLETE CBC W/AUTO DIFF WBC: CPT | Performed by: THORACIC SURGERY (CARDIOTHORACIC VASCULAR SURGERY)

## 2018-05-04 PROCEDURE — 74011636320 HC RX REV CODE- 636/320: Performed by: INTERNAL MEDICINE

## 2018-05-04 PROCEDURE — C1751 CATH, INF, PER/CENT/MIDLINE: HCPCS

## 2018-05-04 PROCEDURE — 80048 BASIC METABOLIC PNL TOTAL CA: CPT | Performed by: INTERNAL MEDICINE

## 2018-05-04 PROCEDURE — 77030004532 HC CATH ANGI DX IMP BSC -A

## 2018-05-04 PROCEDURE — 71045 X-RAY EXAM CHEST 1 VIEW: CPT

## 2018-05-04 PROCEDURE — 77030029065 HC DRSG HEMO QCLOT ZMED -B

## 2018-05-04 PROCEDURE — 4A023N7 MEASUREMENT OF CARDIAC SAMPLING AND PRESSURE, LEFT HEART, PERCUTANEOUS APPROACH: ICD-10-PCS | Performed by: INTERNAL MEDICINE

## 2018-05-04 PROCEDURE — B2151ZZ FLUOROSCOPY OF LEFT HEART USING LOW OSMOLAR CONTRAST: ICD-10-PCS | Performed by: INTERNAL MEDICINE

## 2018-05-04 PROCEDURE — C1760 CLOSURE DEV, VASC: HCPCS

## 2018-05-04 PROCEDURE — 80053 COMPREHEN METABOLIC PANEL: CPT | Performed by: INTERNAL MEDICINE

## 2018-05-04 PROCEDURE — 74011250637 HC RX REV CODE- 250/637: Performed by: INTERNAL MEDICINE

## 2018-05-04 PROCEDURE — 85730 THROMBOPLASTIN TIME PARTIAL: CPT

## 2018-05-04 PROCEDURE — 74011250636 HC RX REV CODE- 250/636: Performed by: INTERNAL MEDICINE

## 2018-05-04 PROCEDURE — 93922 UPR/L XTREMITY ART 2 LEVELS: CPT

## 2018-05-04 PROCEDURE — B2111ZZ FLUOROSCOPY OF MULTIPLE CORONARY ARTERIES USING LOW OSMOLAR CONTRAST: ICD-10-PCS | Performed by: INTERNAL MEDICINE

## 2018-05-04 PROCEDURE — 86923 COMPATIBILITY TEST ELECTRIC: CPT | Performed by: THORACIC SURGERY (CARDIOTHORACIC VASCULAR SURGERY)

## 2018-05-04 PROCEDURE — 85610 PROTHROMBIN TIME: CPT

## 2018-05-04 PROCEDURE — 77030013798 HC KT TRNSDUC PRSSR EDWD -B

## 2018-05-04 PROCEDURE — 74011250636 HC RX REV CODE- 250/636: Performed by: NURSE PRACTITIONER

## 2018-05-04 PROCEDURE — 99153 MOD SED SAME PHYS/QHP EA: CPT

## 2018-05-04 PROCEDURE — 84484 ASSAY OF TROPONIN QUANT: CPT | Performed by: INTERNAL MEDICINE

## 2018-05-04 PROCEDURE — C1894 INTRO/SHEATH, NON-LASER: HCPCS

## 2018-05-04 PROCEDURE — 82550 ASSAY OF CK (CPK): CPT | Performed by: INTERNAL MEDICINE

## 2018-05-04 PROCEDURE — 84484 ASSAY OF TROPONIN QUANT: CPT | Performed by: THORACIC SURGERY (CARDIOTHORACIC VASCULAR SURGERY)

## 2018-05-04 PROCEDURE — 86900 BLOOD TYPING SEROLOGIC ABO: CPT | Performed by: THORACIC SURGERY (CARDIOTHORACIC VASCULAR SURGERY)

## 2018-05-04 PROCEDURE — 85025 COMPLETE CBC W/AUTO DIFF WBC: CPT | Performed by: INTERNAL MEDICINE

## 2018-05-04 PROCEDURE — 93005 ELECTROCARDIOGRAM TRACING: CPT

## 2018-05-04 PROCEDURE — 83880 ASSAY OF NATRIURETIC PEPTIDE: CPT | Performed by: INTERNAL MEDICINE

## 2018-05-04 PROCEDURE — 85610 PROTHROMBIN TIME: CPT | Performed by: INTERNAL MEDICINE

## 2018-05-04 PROCEDURE — 74011000250 HC RX REV CODE- 250: Performed by: NURSE PRACTITIONER

## 2018-05-04 PROCEDURE — 65610000003 HC RM ICU SURGICAL

## 2018-05-04 PROCEDURE — 74011000250 HC RX REV CODE- 250: Performed by: INTERNAL MEDICINE

## 2018-05-04 PROCEDURE — 85730 THROMBOPLASTIN TIME PARTIAL: CPT | Performed by: INTERNAL MEDICINE

## 2018-05-04 RX ORDER — AMLODIPINE BESYLATE 5 MG/1
10 TABLET ORAL DAILY
Status: CANCELLED | OUTPATIENT
Start: 2018-05-05

## 2018-05-04 RX ORDER — SODIUM CHLORIDE 0.9 % (FLUSH) 0.9 %
5-10 SYRINGE (ML) INJECTION EVERY 8 HOURS
Status: DISCONTINUED | OUTPATIENT
Start: 2018-05-04 | End: 2018-05-04 | Stop reason: HOSPADM

## 2018-05-04 RX ORDER — ONDANSETRON 2 MG/ML
4 INJECTION INTRAMUSCULAR; INTRAVENOUS
Status: DISCONTINUED | OUTPATIENT
Start: 2018-05-04 | End: 2018-05-08

## 2018-05-04 RX ORDER — GUAIFENESIN 100 MG/5ML
81 LIQUID (ML) ORAL DAILY
Status: DISCONTINUED | OUTPATIENT
Start: 2018-05-05 | End: 2018-05-08

## 2018-05-04 RX ORDER — MORPHINE SULFATE 4 MG/ML
2 INJECTION INTRAVENOUS
Status: CANCELLED | OUTPATIENT
Start: 2018-05-04

## 2018-05-04 RX ORDER — ENOXAPARIN SODIUM 100 MG/ML
40 INJECTION SUBCUTANEOUS DAILY
Status: CANCELLED | OUTPATIENT
Start: 2018-05-05

## 2018-05-04 RX ORDER — ONDANSETRON 2 MG/ML
4 INJECTION INTRAMUSCULAR; INTRAVENOUS
Status: CANCELLED | OUTPATIENT
Start: 2018-05-04

## 2018-05-04 RX ORDER — MORPHINE SULFATE 4 MG/ML
4 INJECTION INTRAVENOUS
Status: DISCONTINUED | OUTPATIENT
Start: 2018-05-04 | End: 2018-05-04 | Stop reason: HOSPADM

## 2018-05-04 RX ORDER — ASPIRIN 325 MG
325 TABLET ORAL DAILY
Status: DISCONTINUED | OUTPATIENT
Start: 2018-05-04 | End: 2018-05-04 | Stop reason: HOSPADM

## 2018-05-04 RX ORDER — AMLODIPINE BESYLATE 5 MG/1
10 TABLET ORAL DAILY
Status: DISCONTINUED | OUTPATIENT
Start: 2018-05-05 | End: 2018-05-08

## 2018-05-04 RX ORDER — SODIUM CHLORIDE 9 MG/ML
12 INJECTION, SOLUTION INTRAVENOUS CONTINUOUS
Status: DISCONTINUED | OUTPATIENT
Start: 2018-05-04 | End: 2018-05-08

## 2018-05-04 RX ORDER — CARVEDILOL 6.25 MG/1
6.25 TABLET ORAL 2 TIMES DAILY WITH MEALS
Status: DISCONTINUED | OUTPATIENT
Start: 2018-05-04 | End: 2018-05-08

## 2018-05-04 RX ORDER — CHLORHEXIDINE GLUCONATE 1.2 MG/ML
15 RINSE ORAL EVERY 12 HOURS
Status: DISCONTINUED | OUTPATIENT
Start: 2018-05-04 | End: 2018-05-08

## 2018-05-04 RX ORDER — ACETAMINOPHEN 325 MG/1
650 TABLET ORAL
Status: DISCONTINUED | OUTPATIENT
Start: 2018-05-04 | End: 2018-05-08

## 2018-05-04 RX ORDER — BENZONATATE 100 MG/1
100 CAPSULE ORAL
Status: CANCELLED | OUTPATIENT
Start: 2018-05-04

## 2018-05-04 RX ORDER — OXYCODONE AND ACETAMINOPHEN 7.5; 325 MG/1; MG/1
1 TABLET ORAL
Status: DISCONTINUED | OUTPATIENT
Start: 2018-05-04 | End: 2018-05-04 | Stop reason: HOSPADM

## 2018-05-04 RX ORDER — FENTANYL CITRATE 50 UG/ML
25-200 INJECTION, SOLUTION INTRAMUSCULAR; INTRAVENOUS
Status: DISCONTINUED | OUTPATIENT
Start: 2018-05-04 | End: 2018-05-04 | Stop reason: HOSPADM

## 2018-05-04 RX ORDER — ACETAMINOPHEN 325 MG/1
650 TABLET ORAL
Status: CANCELLED | OUTPATIENT
Start: 2018-05-04

## 2018-05-04 RX ORDER — MIDAZOLAM HYDROCHLORIDE 1 MG/ML
.5-1 INJECTION, SOLUTION INTRAMUSCULAR; INTRAVENOUS
Status: DISCONTINUED | OUTPATIENT
Start: 2018-05-04 | End: 2018-05-04 | Stop reason: HOSPADM

## 2018-05-04 RX ORDER — SODIUM CHLORIDE 0.9 % (FLUSH) 0.9 %
5-10 SYRINGE (ML) INJECTION EVERY 8 HOURS
Status: DISCONTINUED | OUTPATIENT
Start: 2018-05-04 | End: 2018-05-08

## 2018-05-04 RX ORDER — ATORVASTATIN CALCIUM 10 MG/1
10 TABLET, FILM COATED ORAL
Status: DISCONTINUED | OUTPATIENT
Start: 2018-05-04 | End: 2018-05-08

## 2018-05-04 RX ORDER — LIDOCAINE HYDROCHLORIDE 10 MG/ML
5-20 INJECTION INFILTRATION; PERINEURAL
Status: DISCONTINUED | OUTPATIENT
Start: 2018-05-04 | End: 2018-05-04 | Stop reason: HOSPADM

## 2018-05-04 RX ORDER — HYDROMORPHONE HYDROCHLORIDE 2 MG/ML
0.4 INJECTION, SOLUTION INTRAMUSCULAR; INTRAVENOUS; SUBCUTANEOUS
Status: DISCONTINUED | OUTPATIENT
Start: 2018-05-04 | End: 2018-05-08

## 2018-05-04 RX ORDER — ACETAMINOPHEN 325 MG/1
650 TABLET ORAL
Status: DISCONTINUED | OUTPATIENT
Start: 2018-05-04 | End: 2018-05-04 | Stop reason: HOSPADM

## 2018-05-04 RX ORDER — CHLORHEXIDINE GLUCONATE 1.2 MG/ML
15 RINSE ORAL EVERY 12 HOURS
Status: CANCELLED | OUTPATIENT
Start: 2018-05-04

## 2018-05-04 RX ORDER — SODIUM CHLORIDE 0.9 % (FLUSH) 0.9 %
5-10 SYRINGE (ML) INJECTION AS NEEDED
Status: DISCONTINUED | OUTPATIENT
Start: 2018-05-04 | End: 2018-05-04 | Stop reason: HOSPADM

## 2018-05-04 RX ORDER — ZOLPIDEM TARTRATE 5 MG/1
5 TABLET ORAL
Status: DISCONTINUED | OUTPATIENT
Start: 2018-05-04 | End: 2018-05-08

## 2018-05-04 RX ORDER — ENOXAPARIN SODIUM 100 MG/ML
80 INJECTION SUBCUTANEOUS EVERY 12 HOURS
Status: DISCONTINUED | OUTPATIENT
Start: 2018-05-04 | End: 2018-05-06

## 2018-05-04 RX ORDER — SODIUM CHLORIDE 0.9 % (FLUSH) 0.9 %
5-10 SYRINGE (ML) INJECTION EVERY 8 HOURS
Status: CANCELLED | OUTPATIENT
Start: 2018-05-04

## 2018-05-04 RX ORDER — SODIUM CHLORIDE 0.9 % (FLUSH) 0.9 %
5-10 SYRINGE (ML) INJECTION AS NEEDED
Status: DISCONTINUED | OUTPATIENT
Start: 2018-05-04 | End: 2018-05-08

## 2018-05-04 RX ORDER — NITROGLYCERIN 20 MG/100ML
0-200 INJECTION INTRAVENOUS
Status: DISCONTINUED | OUTPATIENT
Start: 2018-05-04 | End: 2018-05-07

## 2018-05-04 RX ORDER — ALBUMIN HUMAN 50 G/1000ML
SOLUTION INTRAVENOUS
Status: DISPENSED
Start: 2018-05-04 | End: 2018-05-05

## 2018-05-04 RX ORDER — ENOXAPARIN SODIUM 100 MG/ML
40 INJECTION SUBCUTANEOUS DAILY
Status: DISCONTINUED | OUTPATIENT
Start: 2018-05-05 | End: 2018-05-04

## 2018-05-04 RX ORDER — MORPHINE SULFATE 4 MG/ML
2 INJECTION INTRAVENOUS
Status: DISCONTINUED | OUTPATIENT
Start: 2018-05-04 | End: 2018-05-04 | Stop reason: ALTCHOICE

## 2018-05-04 RX ORDER — ZOLPIDEM TARTRATE 5 MG/1
5 TABLET ORAL
Status: CANCELLED | OUTPATIENT
Start: 2018-05-04

## 2018-05-04 RX ORDER — NICOTINE 7MG/24HR
1 PATCH, TRANSDERMAL 24 HOURS TRANSDERMAL
Status: CANCELLED | OUTPATIENT
Start: 2018-05-04

## 2018-05-04 RX ORDER — MUPIROCIN 20 MG/G
OINTMENT TOPICAL 2 TIMES DAILY
Status: DISCONTINUED | OUTPATIENT
Start: 2018-05-04 | End: 2018-05-08

## 2018-05-04 RX ORDER — SODIUM CHLORIDE 0.9 % (FLUSH) 0.9 %
5-10 SYRINGE (ML) INJECTION AS NEEDED
Status: CANCELLED | OUTPATIENT
Start: 2018-05-04

## 2018-05-04 RX ORDER — AMIODARONE HYDROCHLORIDE 200 MG/1
400 TABLET ORAL EVERY 12 HOURS
Status: DISCONTINUED | OUTPATIENT
Start: 2018-05-04 | End: 2018-05-08

## 2018-05-04 RX ORDER — FENTANYL CITRATE 50 UG/ML
INJECTION, SOLUTION INTRAMUSCULAR; INTRAVENOUS
Status: COMPLETED
Start: 2018-05-04 | End: 2018-05-04

## 2018-05-04 RX ORDER — MIDAZOLAM HYDROCHLORIDE 1 MG/ML
INJECTION, SOLUTION INTRAMUSCULAR; INTRAVENOUS
Status: COMPLETED
Start: 2018-05-04 | End: 2018-05-04

## 2018-05-04 RX ORDER — AMIODARONE HYDROCHLORIDE 200 MG/1
400 TABLET ORAL EVERY 12 HOURS
Status: CANCELLED | OUTPATIENT
Start: 2018-05-04

## 2018-05-04 RX ORDER — HEPARIN SODIUM 200 [USP'U]/100ML
500 INJECTION, SOLUTION INTRAVENOUS ONCE
Status: COMPLETED | OUTPATIENT
Start: 2018-05-04 | End: 2018-05-04

## 2018-05-04 RX ORDER — MIDAZOLAM HYDROCHLORIDE 1 MG/ML
2 INJECTION, SOLUTION INTRAMUSCULAR; INTRAVENOUS ONCE
Status: COMPLETED | OUTPATIENT
Start: 2018-05-04 | End: 2018-05-04

## 2018-05-04 RX ORDER — BENZONATATE 100 MG/1
100 CAPSULE ORAL
Status: DISCONTINUED | OUTPATIENT
Start: 2018-05-04 | End: 2018-05-08

## 2018-05-04 RX ORDER — FENTANYL CITRATE 50 UG/ML
50 INJECTION, SOLUTION INTRAMUSCULAR; INTRAVENOUS ONCE
Status: COMPLETED | OUTPATIENT
Start: 2018-05-04 | End: 2018-05-04

## 2018-05-04 RX ORDER — NICOTINE 7MG/24HR
1 PATCH, TRANSDERMAL 24 HOURS TRANSDERMAL
Status: DISCONTINUED | OUTPATIENT
Start: 2018-05-04 | End: 2018-05-07

## 2018-05-04 RX ORDER — NALOXONE HYDROCHLORIDE 0.4 MG/ML
0.4 INJECTION, SOLUTION INTRAMUSCULAR; INTRAVENOUS; SUBCUTANEOUS AS NEEDED
Status: DISCONTINUED | OUTPATIENT
Start: 2018-05-04 | End: 2018-05-04 | Stop reason: HOSPADM

## 2018-05-04 RX ORDER — GUAIFENESIN 100 MG/5ML
81 LIQUID (ML) ORAL DAILY
Status: CANCELLED | OUTPATIENT
Start: 2018-05-05

## 2018-05-04 RX ADMIN — LIDOCAINE HYDROCHLORIDE 20 ML: 10 INJECTION, SOLUTION INFILTRATION; PERINEURAL at 08:33

## 2018-05-04 RX ADMIN — Medication 10 ML: at 06:16

## 2018-05-04 RX ADMIN — ENOXAPARIN SODIUM 80 MG: 80 INJECTION SUBCUTANEOUS at 16:26

## 2018-05-04 RX ADMIN — MUPIROCIN: 20 OINTMENT TOPICAL at 19:46

## 2018-05-04 RX ADMIN — FENTANYL CITRATE 50 MCG: 50 INJECTION, SOLUTION INTRAMUSCULAR; INTRAVENOUS at 15:34

## 2018-05-04 RX ADMIN — HEPARIN SODIUM 1000 UNITS: 200 INJECTION, SOLUTION INTRAVENOUS at 08:36

## 2018-05-04 RX ADMIN — CHLORHEXIDINE GLUCONATE 15 ML: 1.2 RINSE ORAL at 19:22

## 2018-05-04 RX ADMIN — AMLODIPINE BESYLATE 10 MG: 5 TABLET ORAL at 10:02

## 2018-05-04 RX ADMIN — ASPIRIN 325 MG: 325 TABLET ORAL at 10:02

## 2018-05-04 RX ADMIN — MIDAZOLAM HYDROCHLORIDE 2 MG: 1 INJECTION, SOLUTION INTRAMUSCULAR; INTRAVENOUS at 15:35

## 2018-05-04 RX ADMIN — SODIUM CHLORIDE 9 ML/HR: 900 INJECTION, SOLUTION INTRAVENOUS at 18:00

## 2018-05-04 RX ADMIN — FENTANYL CITRATE 50 MCG: 50 INJECTION, SOLUTION INTRAMUSCULAR; INTRAVENOUS at 08:49

## 2018-05-04 RX ADMIN — MIDAZOLAM 1 MG: 1 INJECTION INTRAMUSCULAR; INTRAVENOUS at 08:22

## 2018-05-04 RX ADMIN — BENZONATATE 100 MG: 100 CAPSULE ORAL at 04:49

## 2018-05-04 RX ADMIN — CARVEDILOL 6.25 MG: 6.25 TABLET, FILM COATED ORAL at 16:22

## 2018-05-04 RX ADMIN — AMIODARONE HYDROCHLORIDE 400 MG: 200 TABLET ORAL at 16:22

## 2018-05-04 RX ADMIN — NITROGLYCERIN 1 INCH: 20 OINTMENT TOPICAL at 06:11

## 2018-05-04 RX ADMIN — Medication 10 ML: at 21:06

## 2018-05-04 RX ADMIN — FENTANYL CITRATE 50 MCG: 50 INJECTION, SOLUTION INTRAMUSCULAR; INTRAVENOUS at 08:24

## 2018-05-04 RX ADMIN — ATORVASTATIN CALCIUM 10 MG: 10 TABLET, FILM COATED ORAL at 21:05

## 2018-05-04 RX ADMIN — IOPAMIDOL 130 ML: 755 INJECTION, SOLUTION INTRAVENOUS at 08:49

## 2018-05-04 RX ADMIN — ACETAMINOPHEN 650 MG: 325 TABLET, FILM COATED ORAL at 19:15

## 2018-05-04 RX ADMIN — AMIODARONE HYDROCHLORIDE 400 MG: 200 TABLET ORAL at 21:05

## 2018-05-04 RX ADMIN — NITROGLYCERIN 33.33 MCG/MIN: 20 INJECTION INTRAVENOUS at 17:39

## 2018-05-04 RX ADMIN — Medication 10 ML: at 16:24

## 2018-05-04 RX ADMIN — HYDROMORPHONE HYDROCHLORIDE 0.4 MG: 2 INJECTION INTRAMUSCULAR; INTRAVENOUS; SUBCUTANEOUS at 21:07

## 2018-05-04 NOTE — PROCEDURES
[address]  *** FINAL REPORT ***    Name: Carina Frankel  MRN: [id number]    Inpatient  : 16 Sep 1960  HIS Order #: [id number]  [id number] [facility] Visit #: [id number]  Date: 04 May 2018    TYPE OF TEST: Cerebrovascular Duplex    REASON FOR TEST  pre-op for cardiac surgery    Right Carotid:-             Proximal               Mid                 Distal  cm/s  Systolic  Diastolic  Systolic  Diastolic  Systolic  Diastolic  CCA:     57.6      19.0                            58.0      23.0  Bulb:  ECA:     32.0  ICA:     60.0      23.0                            54.0      26.0  ICA/CCA:  1.0       1.0    ICA Stenosis:    Right Vertebral:-  Finding: Antegrade  Sys:       28.0  Gwen:    Right Subclavian:    Left Carotid:-            Proximal                Mid                 Distal  cm/s  Systolic  Diastolic  Systolic  Diastolic  Systolic  Diastolic  CCA:     13.1      28.0                            47.0      22.0  Bulb:  ECA:     50.0  ICA:     66.0      33.0                            51.0      34.0  ICA/CCA:  1.4       1.5    ICA Stenosis:    Left Vertebral:-  Finding: Antegrade  Sys:       44.0  Gwen:    Left Subclavian:    INTERPRETATION/FINDINGS  PROCEDURE:  Color duplex ultrasound imaging of extracranial  cerebrovascular arteries. FINDINGS:       Right:  Internal carotid velocity is within normal limits. There   is narrowing of the internal carotid flow channel on color Doppler  imaging and mixed density plaque on B-mode imaging, consistent with  less than 50 percent stenosis. The common and external carotid  arteries are patent and without evidence of hemodynamically  significant stenosis. Left:  Internal carotid velocity is within normal limits. There  is narrowing of the internal carotid flow channel on color Doppler  imaging and mixed density plaque on B-mode imaging, consistent with  less than 50 percent stenosis.   The common and external carotid  arteries are patent and without evidence of hemodynamically  significant stenosis. IMPRESSION:  Consistent with less than 50% stenosis of the right  internal carotid and less than 50% stenosis (low end) of the left  internal carotid. Vertebrals are patent with antegrade flow. ADDITIONAL COMMENTS    I have personally reviewed the data relevant to the interpretation of  this  study.     TECHNOLOGIST: Baron Jaimee RVT  Signed: 05/04/2018 02:40 PM    PHYSICIAN: Sarbjit Gomez MD  Signed: 05/06/2018 04:38 PM

## 2018-05-04 NOTE — PROCEDURES
BRIEF PROCEDURE NOTE    Date of Procedure: 5/4/2018   Preoperative Diagnosis: Non STEMI, Cardiomyopathy  Postoperative Diagnosis: Same    Procedure: Left heart cath, LV angiography, coronary angiography  Interventional Cardiologist: Shauna Crooks DO  Anesthesia: local + IV sedation  Estimated Blood Loss: Minimal  Findings:   L Main: >50% osteal    LAD: 40-50% proximal, 70% after first diagonal, 80-90% mid lesion    LCflex: Diffuse mild disease, OM1 small vessel difffuse 70-90% disease. RCA: small dominant vessel, 50% proximal lesion, 70-90% tandem lesions proximal-md lesion, 80% distal lesion    LVEDP: 30 mHg    LVEF: 15% diffuse hypokinesis    No significant gradient across aortic valve. PCI: None    Plan: surgical consult      Specimens Removed : None    Closure Device: Angioseal        See full cath note.     Complications: none    Shauna Crooks DO

## 2018-05-04 NOTE — PROGRESS NOTES
Problem: Falls - Risk of  Goal: *Absence of Falls  Document César Fall Risk and appropriate interventions in the flowsheet.    Outcome: Progressing Towards Goal  Fall Risk Interventions:            Medication Interventions: Assess postural VS orthostatic hypotension, Bed/chair exit alarm, Patient to call before getting OOB

## 2018-05-04 NOTE — PROGRESS NOTES
1345- Patient arrived to ICU. Patient alert and oriented x4. Primary Nurse Brett Delatorre, RN and Raul Cain, RN performed a dual skin assessment on this patient No impairment noted  Celestine score is 20    1415- Ramirez NP at bedside. 65- Dr. Natalie Garcia at bedside placing right mac/ right swan. [place] waveform intermittently dampens. Adjusted by MD. Waveform improves then dampens intermittently. MD aware. Not wedging. 1600- Labs drawn. Daughter at bedside. Code given. Discussed visitation, plan for the day. 18- Dr. Maryan Lovelace updated on current hemodynamics. Telephone order to start patient on 10cc/hr of Nitro IV. Heidi Stokes at bedside to adjust PA line. PA waveform remains the same. After 10 min CI reading 0.8. Attempted to hard flush. No change noted. Will obtain X-Ray to confirm placement.

## 2018-05-04 NOTE — PROGRESS NOTES
Spiritual Care Assessment/Progress Note  [address]      NAME: Neha Galeano      MRN: [id number]  AGE: 61 y.o. SEX: male  Denominational Affiliation: Alevism   Language: English     5/4/2018     Total Time (in minutes): 7     Spiritual Assessment begun in SF 3 PRO CARE TELE 2 through conversation with:         []Patient        [] Family    [] Friend(s)        Reason for Consult: Palliative Care, Initial/Spiritual Assessment     Spiritual beliefs: (Please include comment if needed)     [x] Identifies with a france tradition:     [x] Supported by a france community:      [] Claims no spiritual orientation:      [] Seeking spiritual identity:           [] Adheres to an individual form of spirituality:      [] Not able to assess:                     Identified resources for coping:      [x] Prayer                               [] Music                  [] Guided Imagery     [x] Family/friends                 [] Pet visits     [] Devotional reading                         [] Unknown     [] Other:                                               Interventions offered during this visit: (See comments for more details)    Patient Interventions: Affirmation of emotions/emotional suffering, Affirmation of france, Catharsis/review of pertinent events in supportive environment, Coping skills reviewed/reinforced, Iconic (affirming the presence of God/Higher Power), Life review/legacy, Normalization of emotional/spiritual concerns, Prayer (actual), Prayer (assurance of), Denominational beliefs/image of God discussed     Family/Friend(s):  Affirmation of emotions/emotional suffering, Affirmation of france, Catharsis/review of pertinent events in supportive environment, Coping skills reviewed/reinforced, Iconic (affirming the presence of God/Higher Power), Life review/legacy, Normalization of emotional/spiritual concerns, Prayer (actual), Prayer (assurance of), Denominational beliefs/image of God discussed     Plan of Care:     [x] Support spiritual and/or cultural needs    [] Support AMD and/or advance care planning process      [] Support grieving process   [] Coordinate Rites and/or Rituals    [] Coordination with community clergy   [] No spiritual needs identified at this time   [] Detailed Plan of Care below (See Comments)  [] Make referral to Music Therapy  [] Make referral to Pet Therapy     [] Make referral to Addiction services  [] Make referral to [facility]  [] Make referral to Spiritual Care Partner  [] No future visits requested        [] Follow up visits as needed     Comments:  is visiting per staff referral with pt in room 326. Pt notes he is doing well and asserts God has been working within him and taking care of his ailments. He shared briefly about his france and notes he is coping well at the time. Pt seemed reserved in sharing about the specifics of what has been going on with him. Pt welcomed prayer and offered a prayer for the . Spiritual care will continue to follow as able and as needed.      85[address] Thuy Walker M.Div, M.S, Loy Prado9 available at [phone])

## 2018-05-04 NOTE — PROGRESS NOTES
Discussed patient's order for bedside PFT with TERRANCE Hernandez due to patient appearing too tired to perform. Informed patient was just given sedation and was not alert enough to perform test at this time. Test will need to be completed by floor RT when patient is more awake. Informed floor RT.

## 2018-05-04 NOTE — PROGRESS NOTES
1930  Bedside and Verbal shift change report given to Vannesa Ford (oncoming nurse) by Mindy CARLOS (offgoing nurse). Report included the following information SBAR, Kardex, Procedure Summary, Intake/Output, MAR, Accordion and Recent Results. Patient on bed eating ice pops. No complaints of pain. 65  Notified MD about patient's critical troponin. Patient is asymptomatic. Visit Vitals    BP (!) 130/97    Pulse 76    Temp 98.6 °F (37 °C)    Resp 19    Ht 5' 6\" (1.676 m)    Wt 84.8 kg (187 lb)    SpO2 96%    BMI 30.18 kg/m2     0655  Troponin is 0.92. MD notified. 0730  Bedside and Verbal shift change report given to Mindy CARLOS (oncoming nurse) by Marko Plunkett RN (offgoing nurse). Report included the following information SBAR, Kardex, Procedure Summary, Intake/Output, MAR and Accordion.

## 2018-05-04 NOTE — CONSULTS
[facility] Consult Note      DOS:   5/4/2018  NAME:  Karen Stubbs   MRN:   [id number]     REFERRING PROVIDER:  Dr. Delroy Longoria: Frank Archer MD        Chief Complaint:  SOB/Chest pain     HPI: 61y.o. year old male with a history of HTN who was admitted to [facility] with complaint of chest pain and SOB that started yesterday morning, he ruled in for NSTEMI and CSS was consulted for surgical intervention and he was transferred to [address] [place] [address]. He has a history of HTN but does not take any antihypertensives, he admits to doing cocaine the mornign his chest pain started and is currently smoker. He denies HA, N/V, edema or dizziness but states he continues to be SOB and has orthopnea. The [facility] has been consulted to comment on his acute cardiomyopathy and medical management. Impression / Plan:   Heart Failure Status: NYHA Class III    Acute HFeEF, ICM, EF 15%, 3V CAD   Start Coreg 6.25mg BID- non selective BBrx are preferred to use with patients with CAD and recent cocaine use. Cont ASA   Surgical plans per [facility]   Hold diuretics for now but monitor daily     HTN:   Cont norvasc   Start BBrx       History:  No past medical history on file. Past Surgical History:   Procedure Laterality Date    HX ORTHOPAEDIC      left hip surgery     Social History     Social History    Marital status:      Spouse name: N/A    Number of children: N/A    Years of education: N/A     Occupational History    Not on file.      Social History Main Topics    Smoking status: Former Smoker     Quit date: 5/3/2018    Smokeless tobacco: Not on file    Alcohol use No    Drug use: Not on file    Sexual activity: Not on file     Other Topics Concern    Not on file     Social History Narrative    No narrative on file     Family History   Problem Relation Age of Onset    Diabetes Mother        Current Medications:   Current Facility-Administered Medications   Medication Dose Route Frequency Provider Last Rate Last Dose    acetaminophen (TYLENOL) tablet 650 mg  650 mg Oral Q4H PRN Gretchen Thompson NP        [facility]dansetron [facility]) injection 4 mg  4 mg IntraVENous Q4H PRN Gretchen Thompson NP       [facility] [START ON 5/5/2018] amLODIPine (NORVASC) tablet 10 mg  10 mg Oral DAILY Gretchen Thompson NP        benzonatate (TESSALON) capsule 100 mg  100 mg Oral TID PRN Gretchen Thompson NP        mupirocin (BACTROBAN) 2 % ointment   Both Nostrils BID Gretchen Thompson NP        nicotine (NICODERM CQ) 7 mg/24 hr patch 1 Patch  1 Patch TransDERmal DAILY PRN Gretchen Thompson NP        zolpidem (AMBIEN) tablet 5 mg  5 mg Oral QHS PRN Gretchen Thompson NP        chlorhexidine (PERIDEX) 0.12 % mouthwash 15 mL  15 mL Oral Q12H Gretchen Thompson NP        amiodarone (CORDARONE) tablet 400 mg  400 mg Oral Q12H Gretchen Thompson NP        sodium chloride (NS) flush 5-10 mL  5-10 mL IntraVENous Q8H Gretchen Thompson NP        sodium chloride (NS) flush 5-10 mL  5-10 mL IntraVENous PRN Gretchen Thompson NP        sodium phosphate (FLEET'S) enema 118 mL  1 Enema Rectal PRN Gretchen Thompson NP        HYDROmorphone (DILAUDID) injection 0.4 mg  0.4 mg IntraVENous Q4H PRN Gretchen Thompson NP        [START ON 5/5/2018] aspirin chewable tablet 81 mg  81 mg Oral DAILY Letty Gavin NP        atorvastatin (LIPITOR) tablet 10 mg  10 mg Oral QHS Letty Gavin NP        enoxaparin (LOVENOX) injection 90 mg  1 mg/kg SubCUTAneous Q12H Letty Gavin NP        nitroglycerin (Tridil) 200 mcg/ml infusion  0-200 mcg/min IntraVENous TITRATE Letty Gavin NP           Allergies: No Known Allergies    ROS:    Review of Systems   Constitutional: Positive for malaise/fatigue. HENT: Negative. Respiratory: Positive for cough and shortness of breath. Negative for hemoptysis and sputum production. Cardiovascular: Positive for chest pain, orthopnea and PND. Negative for leg swelling. Gastrointestinal: Negative. Genitourinary: Negative. Musculoskeletal: Negative. Skin: Negative. Neurological: Positive for weakness. Physical Exam:   Physical Exam   Constitutional: He is oriented to person, place, and time. He appears well-developed and well-nourished. No distress. HENT:   Head: Normocephalic and atraumatic. Eyes: Pupils are equal, round, and reactive to light. Neck: Neck supple. No JVD present. Cardiovascular: Normal rate, S1 normal and S2 normal.  Exam reveals gallop and S4.    Pulses:       Dorsalis pedis pulses are 2+ on the right side, and 2+ on the left side. Posterior tibial pulses are 2+ on the right side, and 2+ on the left side. PACs   Pulmonary/Chest: He has wheezes. Abdominal: Soft. Bowel sounds are normal. He exhibits no distension. Musculoskeletal: Normal range of motion. He exhibits no edema. Neurological: He is alert and oriented to person, place, and time. Skin: Skin is warm and dry.        Vitals:   Visit Vitals    BP (!) 142/101 (BP 1 Location: Left arm, BP Patient Position: At rest;Post activity)    Pulse 80    Temp 98.5 °F (36.9 °C)    Resp (!) 32    Ht 5' 6\" (1.676 m)    Wt 188 lb 11.4 oz (85.6 kg)    SpO2 92%    BMI 30.46 kg/m2         Temp (24hrs), Av °F (36.7 °C), Min:97.5 °F (36.4 °C), Max:98.6 °F (37 °C)      Admission Weight: Last Weight   Weight: 188 lb 11.4 oz (85.6 kg) Weight: 188 lb 11.4 oz (85.6 kg)     Intake / Output / Drain:  Last 24 hrs.: No intake or output data in the 24 hours ending 18 1440    Oxygen Therapy:  Oxygen Therapy  O2 Sat (%): 92 % (18 1405)    Recent Labs:   Labs Latest Ref Rng & Units 2018 2018 5/3/2018   WBC 4.1 - 11.1 K/uL 5.0 5.6 6.9   RBC 4.10 - 5.70 M/uL 4.51 4.31 4.32   Hemoglobin 12.1 - 17.0 g/dL 12.8 12.2 12.4   Hematocrit 36.6 - 50.3 % 40.7 38.7 38.8   MCV 80.0 - 99.0 FL 90.2 89.8 89.8   Platelets 569 - 996 K/uL 257 261 243   Lymphocytes 12 - 49 % 21 26 20 Monocytes 5 - 13 % 6 7 9   Eosinophils 0 - 7 % 5 7 5   Basophils 0 - 1 % 0 1 1   Albumin 3.5 - 5.0 g/dL - - 2. 8(L)   Calcium 8.5 - 10.1 MG/DL 8.4(L) 8.2(L) 8.2(L)   SGOT 15 - 37 U/L - - 15   Glucose 65 - 100 mg/dL 142(H) 87 94   BUN 6 - 20 MG/DL 7 8 9   Creatinine 0.70 - 1.30 MG/DL 1.39(H) 1.26 1.30   Sodium 136 - 145 mmol/L 139 140 139   Potassium 3.5 - 5.1 mmol/L 4.5 4.5 4.1   Some recent data might be hidden     EKG:   EKG Results     Procedure 720 Value Units Date/Time    EKG, 12 LEAD, INITIAL [[id number]]     Order Status:  Sent         Echocardiogram:   5/3/18:  Left ventricle: Apical akinesis, Lateral hypokinesis, Normal septal motion  The ventricle was moderately to severely dilated. Systolic function was  normal. Ejection fraction was estimated in the range of 30 % to 35 %. There was severe diffuse hypokinesis with regional variations. Left atrium: The atrium was moderately to severely dilated. Mitral valve: There was mild to moderate regurgitation. Tricuspid valve: There was moderate regurgitation. Cardiac Catheterizations:   5/4: Cardiac catheterization 5/4/18:  L Main: >50% osteal      LAD: 40-50% proximal, 70% after first diagonal, 80-90% mid lesion      LCflex: Diffuse mild disease, OM1 small vessel difffuse 70-90% disease.      RCA: small dominant vessel, 50% proximal lesion, 70-90% tandem lesions proximal-md lesion, 80% distal lesion      LVEDP: 30 mHg      LVEF: 15% diffuse hypokinesis      No significant gradient across aortic valve.      PCI: None    Radiology (CXR, CT scans):   CXR Results  (Last 48 hours)               05/03/18 1317  XR CHEST PORT Final result    Impression:  IMPRESSION:       No acute process. Narrative:  EXAM:  XR CHEST PORT       INDICATION:  Acute mid chest pain today. COMPARISON: 7/27/2010       TECHNIQUE: PA and lateral chest views       FINDINGS: Adaptive statistical iterative reconstruction (ASIR) was utilized.  The   cardiomediastinal contours are stable. The pulmonary vasculature is within   normal limits. The lungs and pleural spaces are clear. There is no pneumothorax. The bones and   upper abdomen are stable.                       Vonnie Thorne, NP    [address], [address] [place] [address], [address] [place] [address] Pkwy  Office [phone]  Fax [phone]    73 hour VAD/HF Pager: [phone]         Pt seen and above consult confirmed  Plans discussed with pt   He will need pre op placement of Impella due to severe LV dysfunction   Indications and risk reviewed

## 2018-05-04 NOTE — PROCEDURES
Central Line Placement    Performed by: Sophia Morales  Authorized by: Sophia Morales     Indications: vascular access and central pressure monitoring  Preanesthetic Checklist: patient identified, risks and benefits discussed, anesthesia consent, site marked, patient being monitored and timeout performed      Pre-procedure: All elements of maximal sterile barrier technique followed?  Yes    2% Chlorhexidine for cutaneous antisepsis, Hand hygiene performed prior to catheter insertion and Ultrasound guidance    Sterile Ultrasound Technique followed?: Yes            Procedure:   Prep:  Chlorhexidine  Location:  Internal jugular  Orientation:  Right  Patient position:  Trendelenburg  Catheter type:  Double lumen  Catheter size:  14 G    Number of attempts:  1  Successful placement: Yes      Assessment:   Post-procedure:  Catheter secured, sterile dressing applied and sterile dressing with CHG applied  Assessment:  Blood return through all ports, guidewire removal verified, other, free fluid flow and placement verified by x-ray      PA catheter floated to 51cm

## 2018-05-04 NOTE — PROGRESS NOTES
5-4-2018 CASE MANAGEMENT NOTE:  I met with the pt and his daughter, Tyson Thompson ([phone]), to introduce myself and explain the role of case management. The pt lives with his wife, Eloise Ponce [phone]), in a 2-story house. The wife works and the pt is at home alone during the day but is independent with his ADL's, has no assistive devices and drives. His PCP is Dr. Sabi Olivas. He has prescription drug [place] and [place] [facility] The [facility] is for the pt to be [facility] for cardiac surgery and CM will follow there for discharge needs.  JO ANN Lyon, CM

## 2018-05-04 NOTE — PROGRESS NOTES
Carlos Lay clara [place] [address]  [address] [facility], [address] [place] [address]  [phone]      Medical Progress Note      NAME: Karen Stubbs   :  1960  MRM:  [id number]    Date/Time: 2018  8:22 AM         Subjective:     Chief Complaint:  SOB: all night, moderate, constant, associated with cough; denies any chest pain    ROS:  (bold if positive, if negative)       Cough         SOB/CONRAD                Objective:       Vitals:          Last 24hrs VS reviewed since prior progress note.  Most recent are:    Visit Vitals    BP (!) 130/97    Pulse 74    Temp 98.6 °F (37 °C)    Resp 19    Ht 5' 6\" (1.676 m)    Wt 84.8 kg (187 lb)    SpO2 96%    BMI 30.18 kg/m2     SpO2 Readings from Last 6 Encounters:   18 96%    O2 Flow Rate (L/min): 2 l/min     Intake/Output Summary (Last 24 hours) at 18 8106  Last data filed at 18 0455   Gross per 24 hour   Intake             1395 ml   Output              350 ml   Net             1045 ml          Exam:     Physical Exam:    Gen:  Well-developed, well-nourished, in no acute distress  HEENT:  Pink conjunctivae, PERRL, hearing intact to voice, moist mucous membranes  Neck:  Supple, without masses, thyroid non-tender  Resp:  No accessory muscle use, rales up 1/3 bilaterally  Card:  No murmurs, normal S1, S2 without thrills, bruits or peripheral edema  Abd:  Soft, non-tender, non-distended, normoactive bowel sounds are present, no palpable organomegaly and no detectable hernias  Lymph:  No cervical or inguinal adenopathy  Musc:  No cyanosis or clubbing  Skin:  No rashes or ulcers, skin turgor is good  Neuro:  Cranial nerves are grossly intact, no focal motor weakness, follows commands appropriately  Psych:  Good insight, oriented to person, place and time, alert       Telemetry reviewed:   normal sinus rhythm    Medications Reviewed: (see below)    Lab Data Reviewed: (see below)    ______________________________________________________________________    Medications:     Current Facility-Administered Medications   Medication Dose Route Frequency    fentaNYL citrate (PF) injection  mcg   mcg IntraVENous Multiple    heparinized saline 2 units/mL infusion 1,000 Units  500 mL IntraarTERial ONCE    heparinized saline 2 units/mL infusion 1,000 Units  500 mL Irrigation ONCE    iopamidol (ISOVUE-370) 76 % injection 100-200 mL  100-200 mL InterCATHeter Multiple    lidocaine (XYLOCAINE) 10 mg/mL (1 %) injection 5-20 mL  5-20 mL SubCUTAneous Multiple    midazolam (VERSED) injection 0.5-10 mg  0.5-10 mg IntraVENous Multiple    bivalirudin (ANGIOMAX) BOLUS 63.5 mg  0.75 mg/kg IntraVENous ONCE    Followed by   Bianca Ulloa bivalirudin (ANGIOMAX) 250 mg in 0.9% sodium chloride (MBP/ADV) 50 mL  1.75 mg/kg/hr IntraVENous CONTINUOUS    iopamidol (ISOVUE-370) 76 % injection 10-50 mL  10-50 mL IntraarTERial Multiple    nitroglycerin 100 mcg/ml compounded injection  1-10 mL IntraCORONary Multiple    sodium chloride (NS) flush 5-10 mL  5-10 mL IntraVENous Q8H    sodium chloride (NS) flush 5-10 mL  5-10 mL IntraVENous PRN    sodium chloride (NS) flush 5-10 mL  5-10 mL IntraVENous Q8H    sodium chloride (NS) flush 5-10 mL  5-10 mL IntraVENous PRN    acetaminophen (TYLENOL) tablet 650 mg  650 mg Oral Q4H PRN    HYDROcodone-acetaminophen (NORCO) 5-325 mg per tablet 1 Tab  1 Tab Oral Q4H PRN    naloxone (NARCAN) injection 0.4 mg  0.4 mg IntraVENous PRN    ondansetron (ZOFRAN) injection 4 mg  4 mg IntraVENous Q4H PRN    morphine injection 2 mg  2 mg IntraVENous Q4H PRN    enoxaparin (LOVENOX) injection 40 mg  40 mg SubCUTAneous DAILY    aspirin chewable tablet 81 mg  81 mg Oral DAILY    0.9% sodium chloride infusion  75 mL/hr IntraVENous CONTINUOUS    nicotine (NICODERM CQ) 7 mg/24 hr patch 1 Patch  1 Patch TransDERmal DAILY PRN    sodium chloride (NS) flush 5-10 mL  5-10 mL IntraVENous Q8H    sodium chloride (NS) flush 5-10 mL  5-10 mL IntraVENous PRN    aspirin (ASPIRIN) tablet 325 mg  325 mg Oral DAILY    nitroglycerin (NITROBID) 2 % ointment 1 Inch  1 Inch Topical Q6H    acetaminophen (TYLENOL) tablet 650 mg  650 mg Oral Q4H PRN    amLODIPine (NORVASC) tablet 10 mg  10 mg Oral DAILY    benzonatate (TESSALON) capsule 100 mg  100 mg Oral TID PRN    zolpidem (AMBIEN) tablet 5 mg  5 mg Oral QHS PRN            Lab Review:     Recent Labs      05/04/18 0315  05/03/18   1306   WBC  5.6  6.9   HGB  12.2  12.4   HCT  38.7  38.8   PLT  261  243     Recent Labs      05/04/18   0436  05/04/18 0315  05/03/18   1306   NA   --   140  139   K   --   4.5  4.1   CL   --   109*  106   CO2   --   24  26   GLU   --   87  94   BUN   --   8  9   CREA   --   1.26  1.30   CA   --   8.2*  8.2*   MG   --   2.0   --    ALB   --    --   2.8*   TBILI   --    --   0.8   SGOT   --    --   15   ALT   --    --   16   INR  1.1   --   1.1     Lab Results   Component Value Date/Time    Glucose (POC) 126 (H) 07/27/2010 06:57 AM     No results for input(s): PH, PCO2, PO2, HCO3, FIO2 in the last 72 hours.   Recent Labs      05/04/18   0436  05/03/18   1306   INR  1.1  1.1     Lab Results   Component Value Date/Time    Specimen Description: BLOOD 07/27/2010 08:00 AM     Lab Results   Component Value Date/Time    Culture result:  07/27/2010 08:00 AM     STAPHYLOCOCCUS SPECIES, COAGULASE NEGATIVE , GROWING FROM 1 OF 2 BOTTLES DRAWN  (RFA SITE)  STAPHYLOCOCCUS SPECIES, COAGULASE NEGATIVE (DIFFERENT COLONY TYPE/ STRAIN), ISOLATED FROM 2ND BOTTLE \"\"            Assessment:     Principal Problem:    NSTEMI (non-ST elevated myocardial infarction) ([facility] [address] (5/3/2018)    Active Problems:    Cocaine use (5/3/2018)      Tobacco use (5/3/2018)      HTN (hypertension) (5/3/2018)      Elevated serum creatinine (5/4/2018)           Plan:     Principal Problem:    NSTEMI (non-ST elevated myocardial infarction) ([facility] [address] (5/3/2018)   - had depressed EF of 30-35% on echocardiogram   - has evidence of volume overload this AM   - may have a component of acute systolic CHF with elevated BNP, stop IV fluids   - initial CXR without pulmonary edema, visualized by me    - may require diuresis post-cath   - I have discussed this with Cardiology   - to go for cath this AM    Active Problems:    Cocaine use (5/3/2018)   - counseled on cessation      Tobacco use (5/3/2018)   - counseled on cessation, spent 5 minutes (outside of the time documented for this note) solely focused on tobacco cessation counseling       HTN (hypertension) (5/3/2018)   - BP remains up some   - follow on current meds, suspect will need to adjust for better control      Elevated serum creatinine (5/4/2018)   - I think this may be his baseline creatinine, 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

## 2018-05-04 NOTE — PROGRESS NOTES
Spoke Eva at [facility] regarding needs for transfer. She will call back when a bed has been assigned.

## 2018-05-04 NOTE — IP AVS SNAPSHOT
1111 [facility] 13 
[phone] Patient: Lucho Montana MRN: [id number] About your hospitalization You were admitted on: May 4, 2018 You last received care in the:  [facility] 4 CV SERVICES UNIT You were discharged on:  May 14, 2018 Why you were hospitalized Your primary diagnosis was:  Not on File Your diagnoses also included:  Cad (Coronary Artery Disease) Follow-up Information Follow up With Details Comments Contact Info Additional Information [facility] CARDIAC WELLNESS  we will call in a week to enroll in Cardiac Rehab at [facility] [address] [facility] [address], [address]. Please arrive 15 minutes prior to your appointment time and you will register in the [place] [address], [address], on the first floor of the [address] Telephone: [phone] Fax: [phone] Driving directions To [facility]. Building: Driving WEST on [address], take exit [address] to [place]. Turn left onto [address], then turn right into [employer] Parking lot Driving EAST on [address], take exit [address] [facility]. Turn right at the end of the exit ramp. Turn left onto [address], then turn right into [employer] ID lot. Antoinette Sepulveda MD On 6/6/2018 0900 200 [facility] [address] [facility] 13 
[phone] Pinky Aj MD   [address] [place] [address] 13 
[phone] Your Scheduled Appointments Monday May 21, 2018 11:00 AM EDT  
POST OP with David Simpson MD  
[facility] - 1600 [facility] ([address]) 200 [facility] [address] [facility] [id number] [phone] Wednesday June 06, 2018  9:00 AM EDT Follow Up with DEMETRIO Bello  
 1229 [facility] ([address] [place] [address]) [place] [address] 13  
[phone] Monday June 18, 2018  1:15 PM EDT  
POST OP with Wilfrido Saleh MD  
[facility] - [facility] ([address] [place] [address]) [address] [place] [address] [address] [phone]  
[phone] Discharge Orders None A check cierra indicates which time of day the medication should be taken. My Medications START taking these medications Instructions Each Dose to Equal  
 Morning Noon Evening Bedtime  
 amiodarone 200 mg tablet Commonly known as:  CORDARONE Your last dose was: Your next dose is: Take 1 Tab by mouth every twelve (12) hours. 200 mg  
    
   
   
   
  
 aspirin 81 mg chewable tablet Start taking on:  5/15/2018 Your last dose was: Your next dose is: Take 1 Tab by mouth daily. 81 mg  
    
   
   
   
  
 atorvastatin 10 mg tablet Commonly known as:  LIPITOR Your last dose was: Your next dose is: Take 1 Tab by mouth nightly. 10 mg  
    
   
   
   
  
 ferrous sulfate 325 mg (65 mg iron) tablet Start taking on:  5/15/2018 Your last dose was: Your next dose is: Take 1 Tab by mouth daily (with breakfast). 325 mg  
    
   
   
   
  
 lisinopril 2.5 mg tablet Commonly known as:  Helon Estrin Start taking on:  5/15/2018 Your last dose was: Your next dose is: Take 1 Tab by mouth daily. 2.5 mg  
    
   
   
   
  
 metoprolol succinate 25 mg XL tablet Commonly known as:  TOPROL-XL Start taking on:  5/15/2018 Your last dose was: Your next dose is: Take 1 Tab by mouth daily. 25 mg  
    
   
   
   
  
 oxyCODONE-acetaminophen 5-325 mg per tablet Commonly known as:  PERCOCET Your last dose was: Your next dose is: Take 1-2 Tabs by mouth every four (4) hours as needed. Max Daily Amount: 12 Tabs. 1-2 Tab  
    
   
   
   
  
 polyethylene glycol 17 gram packet Commonly known as:  Catie Hussar Start taking on:  5/15/2018 Your last dose was: Your next dose is: Take 1 Packet by mouth daily. Take until having regular bowel movements. 17 g  
    
   
   
   
  
 senna-docusate 8.6-50 mg per tablet Commonly known as:  Zach Croft Your last dose was: Your next dose is: Take 1 Tab by mouth two (2) times a day. 1 Tab  
    
   
   
   
  
 spironolactone 25 mg tablet Commonly known as:  ALDACTONE Start taking on:  5/15/2018 Your last dose was: Your next dose is: Take 1 Tab by mouth daily. 25 mg  
    
   
   
   
  
  
STOP taking these medications   
 amLODIPine 10 mg tablet Commonly known as:  Stephani Quiroz Where to Get Your Medications These medications were sent to [address] [place] [address], [address] [place] [address], [facility] 355 [address] Phone:  [phone]  
  amiodarone 200 mg tablet  
 aspirin 81 mg chewable tablet  
 atorvastatin 10 mg tablet  
 ferrous sulfate 325 mg (65 mg iron) tablet  
 lisinopril 2.5 mg tablet  
 metoprolol succinate 25 mg XL tablet  
 polyethylene glycol 17 gram packet  
 senna-docusate 8.6-50 mg per tablet  
 spironolactone 25 mg tablet Information on where to get these meds will be given to you by the nurse or doctor. ! Ask your nurse or doctor about these medications  
  oxyCODONE-acetaminophen 5-325 mg per tablet Opioid Education Prescription Opioids: What You Need to Know: 
 
 
[address] [place] [address] [address] [facility] [place] [address]                 [address] [address] Office- [phone]  Fax- [phone]       Office- [phone]  Fax- [phone] 
_____________________________________________________________ Dr. Noreen Pettit ACNP-BC   ALEKSANDAR Pike PA-C Agapito Cairo PA-C Antionette Marks PA-C Jillyn Dalton Surgery & Date: Procedure(s): 
IMPELLA REMOVAL/ CVICU Discharge Date: 5/14/18 MEDICATIONS: 
Please refer to your After Visit Summary for your medication list. If you do not have a prescription for a new medication, you may purchase the medication over the counter. DO NOT TAKE ANY MEDICATIONS THAT ARE NOT ON THIS LIST INSTRUCTIONS: 
NO SMOKING OR TOBACCO PRODUCTS Follow all the instructions in your discharge book You may shower. Wash all incisions twice daily with mild soap and water. No lotions, ointments or powder. Call the office immediately for any redness, swelling, or drainage from your incision. Take your temperature daily and call for a temperature of 101 degrees or higher or for any symptoms that make you think you have and infection. Weigh yourself each morning. Call if you gain more than 5 pounds in 48 hours. Use the incentive spirometer 6-8 times a day-10 breaths each time. Use a pillow or your bear to splint your breastbone when coughing or sneezing. If you feel your breast bone clicking or popping, notify the office immediately. Walk several hundred feet several times daily. DIET Eat an American Heart Association diet. If you are having trouble with your appetite, eat what you can. Try eating small, frequent meals throughout the day. ACTIVITY NO DRIVINGyou will be evaluated to drive at your follow up visit. Increase your activity by walking several times a day. Stay out of bed most of the day. When sitting, keep your legs elevated. You may ride in a car, but you must get out every hour and walk around. If you ride in a car with an airbag that can not be switched off, put the seat ALL the way back or ride in the back seat. NO LIFTING MORE THAN 5 POUND FOR 1 MONTH, THEN YOU CAN INCREASE TO NO MORE THAN 10 LBS FOR THE 2nd MONTH AND NO MORE THAN 15 LBS FOR THE 3rd MONTH.  YOU WILL NO LONGER HAVE ANY LIFTING RESTRICTIONS AFTER 3 MONTHS. FOLLOW UP 
1. Your first follow up appointment will be on 5/21/18 at 1100am. Our office is located in [address] [place] [address] on floor G-5. Your second follow up appointment will be in four weeks, on 6/18/18 at 1:15pm. Please call our office at [phone] if you are unable to make either one of these appointments. 2. You will be receiving a call before your 5 day appointment to begin cardiac rehab. They are located in the [address] [place] [address] on [facility]. Their phone number is [phone]. Please call if you have not been contacted 2-3 weeks after discharge from the hospital. 
3. We will make an appointment with your cardiologist at your last appointment. 4. Consult you primary care physician regarding your influenza &  
pneumovax vaccines. 5.   Please bring all medications with you to your appointment. Signature:___________________________________________________ Smoking Cessation Program: This is a free, phone/text/email based, smoking cessation program. The program is individualized to meet each patient's needs. To enroll use the link [email or web address] or text Davidlene Popper to [phone] from any smart phone. MyChart Announcement  We are excited to announce that we are making your provider's discharge notes available to you in [employer]. You will see these notes when they are completed and signed by the physician that discharged you from your recent hospital stay. If you have any questions or concerns about any information you see in [employer], please call the Health Information Department where you were seen or reach out to your Primary Care Provider for more information about your plan of care. Introducing [facility]! Gomez Richard introduces [employer] patient portal. Now you can access parts of your medical record, email your doctor's office, and request medication refills online. 1. In your internet browser, go to [email or web address] 2. Click on the First Time User? Click Here link in the Sign In box. You will see the New Member Sign Up page. 3. Enter your [employer] Access Code exactly as it appears below. You will not need to use this code after youve completed the sign-up process. If you do not sign up before the expiration date, you must request a new code. · [employer] Access Code: [email or web address] Expires: 8/1/2018 12:55 PM 
 
4. Enter the last four digits of your Social Security Number (xxxx) and Date of Birth (mm/dd/yyyy) as indicated and click Submit. You will be taken to the next sign-up page. 5. Create a [employer] ID. This will be your [employer] login ID and cannot be changed, so think of one that is secure and easy to remember. 6. Create a [employer] password. You can change your password at any time. 7. Enter your Password Reset Question and Answer. This can be used at a later time if you forget your password. 8. Enter your e-mail address. You will receive e-mail notification when new information is available in [address] 9. Click Sign Up. You can now view and download portions of your medical record. 10. Click the Download Summary menu link to download a portable copy of your medical information. If you have questions, please visit the Frequently Asked Questions section of the MyChart website. Remember, [employer] is NOT to be used for urgent needs. For medical emergencies, dial 911. Now available from your iPhone and Android! Introducing Mateo Martino As a [employer] patient, I wanted to make you aware of our electronic visit tool called Mateo Martino. [employer] 24/7 allows you to connect within minutes with a medical provider 24 hours a day, seven days a week via a mobile device or tablet or logging into a secure website from your computer. You can access Mateo Martino from anywhere in the [place]. A virtual visit might be right for you when you have a simple condition and feel like you just dont want to get out of bed, or cant get away from work for an appointment, when your regular [employer] provider is not available (evenings, weekends or holidays), or when youre out of town and need minor care. Electronic visits cost only $49 and if the [employer] 24/7 provider determines a prescription is needed to treat your condition, one can be electronically transmitted to a nearby pharmacy*. Please take a moment to enroll today if you have not already done so. The enrollment process is free and takes just a few minutes. To enroll, please download the [employer] 24/7 gurpreet to your tablet or phone, or visit [email or web address] to enroll on your computer. And, as an [address] [place] [address] patient with a [facility] [facility] account, the results of your visits will be scanned into your electronic medical record and your primary care provider will be able to view the scanned results. We urge you to continue to see your regular [employer] provider for your ongoing medical care.   And while your primary care provider may not be the one available when you seek a Mateo Martino virtual visit, the peace of mind you get from getting a real diagnosis real time can be priceless. For more information on Mateo Martino, view our Frequently Asked Questions (FAQs) at [email or web address]. Sincerely, 
 
Dawn Capps MD 
Chief Medical Officer [address] [place] *:  certain medications cannot be prescribed via Mateo Martino Providers Seen During Your Hospitalization Provider Specialty Primary office phone Mando Millard MD Cardiothoracic Surgery [phone] Your Primary Care Physician (PCP) Primary Care Physician Office Phone Office Fax Alexandro Georgetown [phone] You are allergic to the following No active allergies Recent Documentation Height Weight BMI Smoking Status 1.676 m 83.3 kg 29.64 kg/m2 Former Smoker Emergency Contacts Name Discharge Info Relation Home Work Mobile [facility] DISCHARGE CAREGIVER [3] Spouse [3] [phone] [facility] DISCHARGE CAREGIVER [3] Daughter [21]   [phone] Patient Belongings The following personal items are in your possession at time of discharge: 
  Dental Appliances: None  Visual Aid: Glasses, With patient      Home Medications: None   Jewelry: With patient  Clothing: At bedside    Other Valuables: At bedside Discharge Instructions Attachments/References HEART FAILURE: AVOIDING TRIGGERS (ENGLISH) Patient Handouts Avoiding Triggers With Heart Failure: Care Instructions Your Care Instructions Triggers are anything that make your heart failure flare up. A flare-up is also called \"sudden heart failure\" or \"acute heart failure. \" When you have a flare-up, fluid builds up in your lungs, and you have problems breathing. You might need to go to the hospital. By watching for changes in your condition and avoiding triggers, you can prevent heart failure flare-ups. Follow-up care is a key part of your treatment and safety. Be sure to make and go to all appointments, and call your doctor if you are having problems. It's also a good idea to know your test results and keep a list of the medicines you take. How can you care for yourself at home? Watch for changes in your weight and condition · Weigh yourself without clothing at the same time each day. Record your weight. Call your doctor if you have sudden weight gain, such as more than 2 to 3 pounds in a day or 5 pounds in a week. (Your doctor may suggest a different range of weight gain.) A sudden weight gain may mean that your heart failure is getting worse. · Keep a daily record of your symptoms. Write down any changes in how you feel, such as new shortness of breath, cough, or problems eating. Also record if your ankles are more swollen than usual and if you feel more tired than usual. Note anything that you ate or did that could have triggered these changes. Limit sodium Sodium causes your body to hold on to extra water. This may cause your heart failure symptoms to get worse. People get most of their sodium from processed foods. Fast food and restaurant meals also tend to be very high in sodium. · Your doctor may suggest that you limit sodium to 2,000 milligrams (mg) a day or less. That is less than 1 teaspoon of salt a day, including all the salt you eat in cooking or in packaged foods. · Read food labels on cans and food packages. They tell you how much sodium you get in one serving. Check the serving size. If you eat more than one serving, you are getting more sodium. · Be aware that sodium can come in forms other than salt, including monosodium glutamate (MSG), sodium citrate, and sodium bicarbonate (baking soda). MSG is often added to Asian food. You can sometimes ask for food without MSG or salt. · Slowly reducing salt will help you adjust to the taste. Take the salt shaker off the table. · Flavor your food with garlic, lemon juice, onion, vinegar, herbs, and spices instead of salt. Do not use soy sauce, steak sauce, onion salt, garlic salt, mustard, or ketchup on your food, unless it is labeled \"low-sodium\" or \"low-salt. \" 
· Make your own salad dressings, sauces, and ketchup without adding salt. · Use fresh or frozen ingredients, instead of canned ones, whenever you can. Choose low-sodium canned goods. · Eat less processed food and food from restaurants, including fast food. Exercise as directed Moderate, regular exercise is very good for your heart. It improves your blood flow and helps control your weight. But too much exercise can stress your heart and cause a heart failure flare-up. · Check with your doctor before you start an exercise program. 
· Walking is an easy way to get exercise. Start out slowly. Gradually increase the length and pace of your walk. Swimming, riding a bike, and using a treadmill are also good forms of exercise. · When you exercise, watch for signs that your heart is working too hard. You are pushing yourself too hard if you cannot talk while you are exercising. If you become short of breath or dizzy or have chest pain, stop, sit down, and rest. 
· Do not exercise when you do not feel well. Take medicines correctly · Take your medicines exactly as prescribed. Call your doctor if you think you are having a problem with your medicine. · Make a list of all the medicines you take. Include those prescribed to you by other doctors and any over-the-counter medicines, vitamins, or supplements you take. Take this list with you when you go to any doctor. · Take your medicines at the same time every day. It may help you to post a list of all the medicines you take every day and what time of day you take them. · Make taking your medicine as simple as you can.  Plan times to take your medicines when you are doing other things, such as eating a meal or getting ready for bed. This will make it easier to remember to take your medicines. · Get organized. Use helpful tools, such as daily or weekly pill containers. When should you call for help? Call 911 if you have symptoms of sudden heart failure such as: 
? · You have severe trouble breathing. ? · You cough up pink, foamy mucus. ? · You have a new irregular or rapid heartbeat. ?Call your doctor now or seek immediate medical care if: 
? · You have new or increased shortness of breath. ? · You are dizzy or lightheaded, or you feel like you may faint. ? · You have sudden weight gain, such as more than 2 to 3 pounds in a day or 5 pounds in a week. (Your doctor may suggest a different range of weight gain.) ? · You have increased swelling in your legs, ankles, or feet. ? · You are suddenly so tired or weak that you cannot do your usual activities. ? Watch closely for changes in your health, and be sure to contact your doctor if you develop new symptoms. Where can you learn more? Go to http://megan-gonzalo.info/. Enter O012 in the search box to learn more about \"Avoiding Triggers With Heart Failure: Care Instructions. \" Current as of: September 21, 2016 Content Version: 11.4 © 2687-3506 [employer]. Care instructions adapted under license by [employer] (which disclaims liability or warranty for this information). If you have questions about a medical condition or this instruction, always ask your healthcare professional. [place] [address] any warranty or liability for your use of this information. Please provide this summary of care documentation to your next provider. Signatures-by signing, you are acknowledging that this After Visit Summary has been reviewed with you and you have received a copy. Patient Signature:  ____________________________________________________________ Date:  ____________________________________________________________  
  
Sigmund [place] Provider Signature:  ____________________________________________________________ Date:  ____________________________________________________________

## 2018-05-04 NOTE — CARDIO/PULMONARY
Cardiac Rehab: Received consult for cardiac education on MI & outpatient cardiac rehab program. 62 yo male admitted with Chest Pain following cocaine use today. Per Dr Otilia Chi, dx includes: NSTEMI. S/P cath with multivessel CAD (5/4). LVEF 30-35% by echo (5/3/18), with mod-severe LAE, mild-mod MR, and mod TR. Core Measures:  ACE/ARB - to be determined. BB - to be determined. Statin - to be determined. ASA - started 81 mg  Prior to admission meds also included: amlodipine. New PO Cardiac Medications: aspirin. Smoking History: Current smoker. 5/4/2018 Met with Audelia Mchugh on [facility]. Pt was resting with his eyes closed. Education provided to daughter Alejandro Butcher) at bedside. Provided MI education folder, with HF handouts. Dgt reported [place] with [place] in [place]. Pt is not currently employed; he has worked as a . Dgt reported that pt's wife is on a mission trip to [place] and she would be returning later today. Dgt indicated that pt will not decide on whether he wants to consider surgery until he speaks with his wife. Dgt stated, \"Our goal is for medical management. \"  At this point pt opened his eyes, and asked, \"What do you want? \" Explained purpose of visit. Pt stated, \"Leave the information and have a nice day. \" Acknowledged pt's wishes and departed.

## 2018-05-04 NOTE — IP AVS SNAPSHOT
[address] 
[phone] Patient: Jaycee Zuniga MRN: [id number] CCD:2/20/2164 A check cierra indicates which time of day the medication should be taken. My Medications START taking these medications Instructions Each Dose to Equal  
 Morning Noon Evening Bedtime  
 amiodarone 200 mg tablet Commonly known as:  CORDARONE Your last dose was: Your next dose is: Take 1 Tab by mouth every twelve (12) hours. 200 mg  
    
   
   
   
  
 aspirin 81 mg chewable tablet Start taking on:  5/15/2018 Your last dose was: Your next dose is: Take 1 Tab by mouth daily. 81 mg  
    
   
   
   
  
 atorvastatin 10 mg tablet Commonly known as:  LIPITOR Your last dose was: Your next dose is: Take 1 Tab by mouth nightly. 10 mg  
    
   
   
   
  
 ferrous sulfate 325 mg (65 mg iron) tablet Start taking on:  5/15/2018 Your last dose was: Your next dose is: Take 1 Tab by mouth daily (with breakfast). 325 mg  
    
   
   
   
  
 lisinopril 2.5 mg tablet Commonly known as:  Luz Elena Brant Start taking on:  5/15/2018 Your last dose was: Your next dose is: Take 1 Tab by mouth daily. 2.5 mg  
    
   
   
   
  
 metoprolol succinate 25 mg XL tablet Commonly known as:  TOPROL-XL Start taking on:  5/15/2018 Your last dose was: Your next dose is: Take 1 Tab by mouth daily. 25 mg  
    
   
   
   
  
 oxyCODONE-acetaminophen 5-325 mg per tablet Commonly known as:  PERCOCET Your last dose was: Your next dose is: Take 1-2 Tabs by mouth every four (4) hours as needed. Max Daily Amount: 12 Tabs. 1-2 Tab  
    
   
   
   
  
 polyethylene glycol 17 gram packet Commonly known as:  Mj [place] Start taking on:  5/15/2018 Your last dose was: Your next dose is: Take 1 Packet by mouth daily. Take until having regular bowel movements. 17 g  
    
   
   
   
  
 senna-docusate 8.6-50 mg per tablet Commonly known as:  Isabelle Woodall Your last dose was: Your next dose is: Take 1 Tab by mouth two (2) times a day. 1 Tab  
    
   
   
   
  
 spironolactone 25 mg tablet Commonly known as:  ALDACTONE Start taking on:  5/15/2018 Your last dose was: Your next dose is: Take 1 Tab by mouth daily. 25 mg  
    
   
   
   
  
  
STOP taking these medications   
 amLODIPine 10 mg tablet Commonly known as:  Giacomo Lute Where to Get Your Medications These medications were sent to [address] [place] [address], [address]  [address] [place] [address], [facility] [phone] Phone:  [phone]  
  amiodarone 200 mg tablet  
 aspirin 81 mg chewable tablet  
 atorvastatin 10 mg tablet  
 ferrous sulfate 325 mg (65 mg iron) tablet  
 lisinopril 2.5 mg tablet  
 metoprolol succinate 25 mg XL tablet  
 polyethylene glycol 17 gram packet  
 senna-docusate 8.6-50 mg per tablet  
 spironolactone 25 mg tablet Information on where to get these meds will be given to you by the nurse or doctor. ! Ask your nurse or doctor about these medications  
  oxyCODONE-acetaminophen 5-325 mg per tablet

## 2018-05-04 NOTE — DISCHARGE SUMMARY
Physician Discharge Summary     Patient ID:  Faviola Cabral  [id number]  72 y.o.  1960    Admit date: 5/3/2018    Discharge date: 5/4/2018    Admission Diagnoses: NSTEMI (non-ST elevated myocardial infarction) [facility])    Discharge Diagnoses:  Principal Diagnosis NSTEMI (non-ST elevated myocardial infarction) ([facility]                                            Principal Problem:    NSTEMI (non-ST elevated myocardial infarction) ([facility] (5/3/2018)    Active Problems:    Cocaine use (5/3/2018)      Tobacco use (5/3/2018)      HTN (hypertension) (5/3/2018)      Elevated serum creatinine (5/4/2018)         Resolved Problems:  Problem List as of 5/4/2018  Date Reviewed: 5/3/2018          Codes Class Noted - Resolved    Elevated serum creatinine ICD-10-CM: R79.89  ICD-9-CM: 790.99  5/4/2018 - Present        * (Principal)NSTEMI (non-ST elevated myocardial infarction) ([facility] ICD-10-CM: I21.4  ICD-9-CM: 410.70  5/3/2018 - Present        Cocaine use (Chronic) ICD-10-CM: F14.90  ICD-9-CM: 305.60  5/3/2018 - Present        Tobacco use (Chronic) ICD-10-CM: Z72.0  ICD-9-CM: 305.1  5/3/2018 - Present        HTN (hypertension) (Chronic) ICD-10-CM: I10  ICD-9-CM: 401.9  5/3/2018 - Present                Hospital Course:   Mr. Nakul Pierre was admitted to the Hospitalist Service on the 3rd floor for treatment of NSTEMI. He ruled in for NSTEMI by serial enzymes. He was evaluated by Cardiology and underwent cardiac catheterization which revealed multivessel CAD. He had no further chest pain    He was discharged directly to [facility] for Cardiac Surgery evaluation on 5/4/2018 in improved condition. PCP: Rachael Yates MD    Consults: Cardiology    Discharge Exam:  See my Progress Note from today. Disposition: acute care hospital    Patient Instructions:   Current Discharge Medication List      CONTINUE these medications which have NOT CHANGED    Details   amLODIPine (NORVASC) 10 mg tablet Take 10 mg by mouth daily. Activity: Activity as tolerated  Diet: Cardiac Diet  Wound Care: None needed    Follow-up Information     Follow up With Details Comments [address] Kassi Wiley III, MD   [address] [phone]            35 minutes were spent on this discharge.     Signed:  Linda Camacho MD  5/4/2018  12:03 PM

## 2018-05-04 NOTE — H&P
CSS   History and Physical    Subjective:      Neha Galeano is a 61 y.o. male who was referred for cardiac evaluation of coronary artery disease, referred by Dr. Walker Buchanan. Pt's PMH of HTN and active tobacco use. Pt presented to [facility] ER with c/o left sided chest pain that started in the morning of 5/3, that radiates to L arm while driving. Associated with shortness of breath. Does admit to cocaine use this morning. Pt has not been complaint wit his BP medications. Pt admits to chronic cough and SOB for the past couple of months. Pt denies edema, orthopnea, syncope, dizziness or PND. EKG in ER was unremarkable for ischemia. Positive troponin 0.37, pro-BNP 6036. Pt had cardiac cath this morning, results below. Pt currently uses tobacco 1PPD for 40 years,  Denies alcohol. Admits to cocaine use, drug screen in ER positive for cocaine and marijuana. Pt is . No known family cardiac history. Cardiac Testing    Cardiac catheterization 5/4/18:  L Main: >50% osteal     LAD: 40-50% proximal, 70% after first diagonal, 80-90% mid lesion     LCflex: Diffuse mild disease, OM1 small vessel difffuse 70-90% disease.     RCA: small dominant vessel, 50% proximal lesion, 70-90% tandem lesions proximal-md lesion, 80% distal lesion     LVEDP: 30 mHg     LVEF: 15% diffuse hypokinesis     No significant gradient across aortic valve.     PCI: None    ECHO 5/3/18:  Left ventricle: Apical akinesis, Lateral hypokinesis, Normal septal motion  The ventricle was moderately to severely dilated. Systolic function was  normal. Ejection fraction was estimated in the range of 30 % to 35 %. There was severe diffuse hypokinesis with regional variations. Left atrium: The atrium was moderately to severely dilated. Mitral valve: There was mild to moderate regurgitation. Tricuspid valve: There was moderate regurgitation. No past medical history on file.   Past Surgical History:   Procedure Laterality Date    HX ORTHOPAEDIC      left hip surgery      Social History   Substance Use Topics    Smoking status: Former Smoker     Quit date: 5/3/2018    Smokeless tobacco: Not on file    Alcohol use No      Family History   Problem Relation Age of Onset    Diabetes Mother      Prior to Admission medications    Medication Sig Start Date End Date Taking? Authorizing Provider   amLODIPine (NORVASC) 10 mg tablet Take 10 mg by mouth daily. Historical Provider       No Known Allergies    Review of Systems:   Consititutional: Denies fever or chills. Eyes:  Denies use of glasses or vision problems(cataracts). ENT:  Denies hearing or swallowing difficulty. CV: + CP, denies claudication, + HTN. Resp: + dyspnea, denies productive cough. : Denies dialysis or kidney problems. GI: Denies ulcers, esophageal strictures, liver problems. M/S: Denies joint or bone problems, or implanted artificial hardware. Skin: Denies varicose veins, edema. Neuro: Denies strokes, or TIAs. Psych: Denies anxiety or depression. Endocrine: Denies thyroid problems or diabetes. Heme/Lymphatic: Denies easy bruising or lymphedema. Objective:     VS:    Visit Vitals    BP (!) 142/101 (BP 1 Location: Left arm, BP Patient Position: At rest;Post activity)    Pulse 80    Temp 98.5 °F (36.9 °C)    Resp (!) 32    Ht 5' 6\" (1.676 m)    Wt 188 lb 11.4 oz (85.6 kg)    SpO2 92%    BMI 30.46 kg/m2       Physical Exam:    General appearance: alert, cooperative, no distress  Head: normocephalic, without obvious abnormality; atraumatic  Eyes: conjunctivae/corneas clear; EOM's intact. Nose: nares normal; no drainage. Neck: no carotid bruit and no JVD  Lungs: clear upper, coarse in bases to auscultation bilaterally  Heart: regular rate and rhythm; no murmur  Abdomen: soft, non-tender; bowel sounds normal  Extremities: moves all extremities; no weakness. Skin: Skin color normal; No varicose veins or edema.   Neurologic: Grossly normal      Labs: Recent Labs      05/04/18   1129  05/04/18   0436   WBC  5.0   --    HGB  12.8   --    HCT  40.7   --    PLT  257   --    NA  139   --    K  4.5   --    BUN  7   --    CREA  1.39*   --    GLU  142*   --    INR   --   1.1       Diagnostics:   PA and lateral: Pending     ABIs:  Pending     Carotid doppler: Pending     PFTS-FEV1: Pending     EKG: Pending   Assessment:     Active Problems:    CAD (coronary artery disease) (5/4/2018)        Plan:   The risk and benefit of surgery were reviewed with patient and family and all questions answered and the patient wishes to proceed. Risk include infection, bleeding, stroke, heart attack, irregular heart rhythm, kidney failure and death. STS Risk Calculator V2.81 - Discussed by surgeon with patient. Pending workup     Treatment Plan:    1. NSTEMI/CAD: Preop workup and education started. Trend troponin til peaked. Start asa, statin, lovenox, nitro gtt. Get PA line. 2. + cocaine use: counseled on cessation  3. Renal insufficency: Unknown baseline. 4. Tobacco use : Reinforce cessation. 5. HTN:  On norvasc.        Signed By: Pavan Moran NP     May 4, 2018

## 2018-05-04 NOTE — PROGRESS NOTES
0830: Clarified ASA order w/ Radha Garza NP. NP stated to give 325 mg dose. Pulled dose & noted pt is down in cath lab already. Notified cath lab RN pt did not receive this dose. NP aware pt also did not receive Norvasc.  0951: Pt returned to floor. Pt's daughter at bedside tearful that her father Valenzuela [place] to have a bypass. \" Daughter stated she would like to talk to MD further to discuss more options. Claudette Rule NP notified MD Josie Kumar who is to speak w/ her. 1115: Pt ambulated to BR & removed cath site dressing. Site appears dry & intact, left JOHN. 1148: TRANSFER - OUT REPORT:    Verbal report given to Sandra RN (name) on Harshad Stein  being transferred to [facility] CVICU bed 35 (unit) for ordered procedure       Report consisted of patients Situation, Background, Assessment and   Recommendations(SBAR). Information from the following report(s) SBAR, Kardex, Intake/Output, Recent Results and Cardiac Rhythm NSR PACs was reviewed with the receiving nurse. Lines:   Peripheral IV 05/03/18 Left Hand (Active)   Site Assessment Clean, dry, & intact 5/4/2018  9:52 AM   Phlebitis Assessment 0 5/4/2018  9:52 AM   Infiltration Assessment 0 5/4/2018  9:52 AM   Dressing Status Clean, dry, & intact 5/4/2018  9:52 AM   Dressing Type Transparent 5/4/2018  9:52 AM   Hub Color/Line Status Blue 5/4/2018  9:52 AM   Action Taken Open ports on tubing capped 5/4/2018  9:52 AM   Alcohol Cap Used Yes 5/4/2018  9:52 AM        Opportunity for questions and clarification was provided. Patient transported with:  Belongings  Daughter at bedside  [phone]: Notified MD Tatiana Pelayo of transfer to [facility] w/ accepting physician Alexi Buchanan. MD Tatiana Pelayo to complete EMTALA.

## 2018-05-04 NOTE — PROCEDURES
[facility]  *** FINAL REPORT ***    Name: Metro Score  MRN: [id number]    Inpatient  : 16 Sep 1960  HIS Order #: [id number]  [id number] [facility] Visit #: [id number]  Date: 04 May 2018    TYPE OF TEST: Peripheral Arterial Testing    REASON FOR TEST  Pre op heart    Right Leg  Segmentals: Normal                     mmHg  Brachial         140  High thigh  Low thigh  Calf  Posterior tibial 154  Dorsalis pedis   162  Peroneal  Metatarsal  Toe pressure  Doppler:  PVR:  Ankle/Brachial: 1.16    Left Leg  Segmentals: Normal                     mmHg  Brachial  High thigh  Low thigh  Calf  Posterior tibial 151  Dorsalis pedis   149  Peroneal  Metatarsal  Toe pressure  Doppler:  PVR:  Ankle/Brachial: 1.08    INTERPRETATION/FINDINGS  PROCEDURE:  Evaluation of lower extremity arteries with systolic blood   pressure measurement at the ankle and brachial level and calculation  of the ankle/brachial pressure index (NANCY). Unless otherwise  indicated, the exam also includes pulse volume recording (PVR)  plethysmography at the ankle level. FINDINGS:  NANCY is normal on the right and the left. PVR waveforms are   normal at the right and left ankle. IMPRESSION:  No evidence of hemodynamically significant lower  extremity arterial obstruction. ADDITIONAL COMMENTS    I have personally reviewed the data relevant to the interpretation of  this  study. TECHNOLOGIST: Remington Wharton  Signed: 2018 04:11 PM    PHYSICIAN: Mary Parrish MD  Signed: 2018 04:38 PM

## 2018-05-04 NOTE — PROGRESS NOTES
0900    TRANSFER - IN REPORT:    Verbal report received from Milady Arango RN(name) on Neha Galeano  being received from Cath(unit) for routine progression of care      Report consisted of patients Situation, Background, Assessment and   Recommendations(SBAR). Information from the following report(s)  was reviewed with the receiving nurse. Opportunity for questions and clarification was provided. Assessment completed upon patients arrival to unit and care assumed. Pt voices understanding of post procedure bedrest instructions. 0930    Patient HOB up 30 degrees. Right groin site dressing dry and intact. No bleeding or hematoma noted. Will continue to monitor. 0940      TRANSFER - OUT REPORT:    Verbal report given to [facility]) on Neha Sour  being transferred to [facility] (unit) for routine progression of care       Report consisted of patients Situation, Background, Assessment and   Recommendations(SBAR). Information from the following report(s) SBAR was reviewed with the receiving nurse. Lines:   Peripheral IV 05/03/18 Left Hand (Active)   Site Assessment Clean, dry, & intact 5/4/2018  4:33 AM   Phlebitis Assessment 0 5/4/2018  4:33 AM   Infiltration Assessment 0 5/4/2018  4:33 AM   Dressing Status Clean, dry, & intact 5/4/2018  4:33 AM   Dressing Type Tape;Transparent 5/4/2018  4:33 AM   Hub Color/Line Status Blue;Patent;Capped;Flushed 5/4/2018  4:33 AM   Action Taken Open ports on tubing capped 5/4/2018  4:33 AM   Alcohol Cap Used Yes 5/4/2018  4:33 AM        Opportunity for questions and clarification was provided.       Patient transported with:   Registered Nurse

## 2018-05-05 ENCOUNTER — APPOINTMENT (OUTPATIENT)
Dept: GENERAL RADIOLOGY | Age: 58
DRG: 215 | End: 2018-05-05
Attending: NURSE PRACTITIONER
Payer: COMMERCIAL

## 2018-05-05 LAB
ARTERIAL PATENCY WRIST A: ABNORMAL
BASE DEFICIT BLDV-SCNC: 1 MMOL/L
BDY SITE: ABNORMAL
GAS FLOW.O2 O2 DELIVERY SYS: ABNORMAL L/MIN
GAS FLOW.O2 SETTING OXYMISER: 6 L/M
HCO3 BLDV-SCNC: 25 MMOL/L (ref 23–28)
HCT VFR BLD AUTO: 38.1 % (ref 36.6–50.3)
HGB BLD-MCNC: 12 G/DL (ref 12.1–17)
PCO2 BLDV: 47.1 MMHG (ref 41–51)
PH BLDV: 7.33 [PH] (ref 7.32–7.42)
PO2 BLDV: 33 MMHG (ref 25–40)
SAO2 % BLDV: 59 % (ref 65–88)
SPECIMEN TYPE: ABNORMAL
TOTAL RESP. RATE, ITRR: 16
TROPONIN I SERPL-MCNC: 0.72 NG/ML

## 2018-05-05 PROCEDURE — 65610000003 HC RM ICU SURGICAL

## 2018-05-05 PROCEDURE — 71045 X-RAY EXAM CHEST 1 VIEW: CPT

## 2018-05-05 PROCEDURE — 74011250637 HC RX REV CODE- 250/637: Performed by: NURSE PRACTITIONER

## 2018-05-05 PROCEDURE — 36415 COLL VENOUS BLD VENIPUNCTURE: CPT | Performed by: NURSE PRACTITIONER

## 2018-05-05 PROCEDURE — 82803 BLOOD GASES ANY COMBINATION: CPT

## 2018-05-05 PROCEDURE — 85018 HEMOGLOBIN: CPT | Performed by: THORACIC SURGERY (CARDIOTHORACIC VASCULAR SURGERY)

## 2018-05-05 PROCEDURE — 74011250636 HC RX REV CODE- 250/636: Performed by: NURSE PRACTITIONER

## 2018-05-05 PROCEDURE — 84484 ASSAY OF TROPONIN QUANT: CPT | Performed by: NURSE PRACTITIONER

## 2018-05-05 PROCEDURE — 74011250637 HC RX REV CODE- 250/637: Performed by: PHYSICIAN ASSISTANT

## 2018-05-05 PROCEDURE — 74011000250 HC RX REV CODE- 250: Performed by: NURSE PRACTITIONER

## 2018-05-05 RX ORDER — OXYCODONE AND ACETAMINOPHEN 5; 325 MG/1; MG/1
2 TABLET ORAL
Status: DISCONTINUED | OUTPATIENT
Start: 2018-05-05 | End: 2018-05-08

## 2018-05-05 RX ORDER — OXYCODONE AND ACETAMINOPHEN 5; 325 MG/1; MG/1
1 TABLET ORAL
Status: DISCONTINUED | OUTPATIENT
Start: 2018-05-05 | End: 2018-05-08

## 2018-05-05 RX ADMIN — ENOXAPARIN SODIUM 80 MG: 80 INJECTION SUBCUTANEOUS at 02:45

## 2018-05-05 RX ADMIN — OXYCODONE HYDROCHLORIDE AND ACETAMINOPHEN 1 TABLET: 5; 325 TABLET ORAL at 14:58

## 2018-05-05 RX ADMIN — Medication 10 ML: at 14:00

## 2018-05-05 RX ADMIN — CARVEDILOL 6.25 MG: 6.25 TABLET, FILM COATED ORAL at 17:04

## 2018-05-05 RX ADMIN — AMLODIPINE BESYLATE 10 MG: 5 TABLET ORAL at 09:26

## 2018-05-05 RX ADMIN — MUPIROCIN: 20 OINTMENT TOPICAL at 09:00

## 2018-05-05 RX ADMIN — HYDROMORPHONE HYDROCHLORIDE 0.4 MG: 2 INJECTION INTRAMUSCULAR; INTRAVENOUS; SUBCUTANEOUS at 09:34

## 2018-05-05 RX ADMIN — ENOXAPARIN SODIUM 80 MG: 80 INJECTION SUBCUTANEOUS at 16:07

## 2018-05-05 RX ADMIN — AMIODARONE HYDROCHLORIDE 400 MG: 200 TABLET ORAL at 21:19

## 2018-05-05 RX ADMIN — CARVEDILOL 6.25 MG: 6.25 TABLET, FILM COATED ORAL at 09:26

## 2018-05-05 RX ADMIN — CHLORHEXIDINE GLUCONATE 15 ML: 1.2 RINSE ORAL at 16:07

## 2018-05-05 RX ADMIN — CHLORHEXIDINE GLUCONATE 15 ML: 1.2 RINSE ORAL at 02:47

## 2018-05-05 RX ADMIN — AMIODARONE HYDROCHLORIDE 400 MG: 200 TABLET ORAL at 09:26

## 2018-05-05 RX ADMIN — Medication 10 ML: at 16:08

## 2018-05-05 RX ADMIN — NITROGLYCERIN 16.67 MCG/MIN: 20 INJECTION INTRAVENOUS at 21:26

## 2018-05-05 RX ADMIN — Medication 10 ML: at 02:47

## 2018-05-05 RX ADMIN — Medication 10 ML: at 07:32

## 2018-05-05 RX ADMIN — HYDROMORPHONE HYDROCHLORIDE 0.4 MG: 2 INJECTION INTRAMUSCULAR; INTRAVENOUS; SUBCUTANEOUS at 02:45

## 2018-05-05 RX ADMIN — MUPIROCIN: 20 OINTMENT TOPICAL at 17:05

## 2018-05-05 RX ADMIN — ASPIRIN 81 MG 81 MG: 81 TABLET ORAL at 09:26

## 2018-05-05 RX ADMIN — Medication 10 ML: at 09:34

## 2018-05-05 RX ADMIN — OXYCODONE HYDROCHLORIDE AND ACETAMINOPHEN 1 TABLET: 5; 325 TABLET ORAL at 21:01

## 2018-05-05 RX ADMIN — ATORVASTATIN CALCIUM 10 MG: 10 TABLET, FILM COATED ORAL at 21:19

## 2018-05-05 NOTE — PROGRESS NOTES
[facility] ICU Progress Note    Admit Date: 2018    Procedure:       Preop CABG     Subjective:   Pt seen with Dr. Bre Valentin. No complaints this morning. On 6LNC. Objective:   Vitals:  Blood pressure 130/85, pulse 71, temperature 98.4 °F (36.9 °C), resp. rate 22, height 5' 6\" (1.676 m), weight 187 lb 2.7 oz (84.9 kg), SpO2 98 %. Temp (24hrs), Av.6 °F (36.4 °C), Min:97.1 °F (36.2 °C), Max:98.5 °F (36.9 °C)    Hemodynamics:   CO: CO (l/min): 5.7 l/min   CI: CI (l/min/m2): 2.4 l/min/m2   CVP: CVP (mmHg): 9 mmHg (18 09)   SVR: SVR (dyne*sec)/cm5: 1277 (dyne*sec)/cm5 (18 7062)   PAP Systolic: PAP Systolic: 58 (32/85/66 9670)   PAP Diastolic: PAP Diastolic: 31 (38/97/62 1307)   PVR:     SV02: SVO2 (%): 58 % (18)   SCV02:      EKG/Rhythm:    NSR    Oxygen Therapy:  Oxygen Therapy  O2 Sat (%): 98 % (18)  Pulse via Oximetry: 69 beats per minute (18)  O2 Device: Nasal cannula (18)  O2 Flow Rate (L/min): 6 l/min (18 07)    CXR:  The cardiac and mediastinal contours are stable. Pulmonary  edema has decreased slightly. There is no infiltrate. The bones and soft tissues  are grossly within normal limits. Admission Weight: Last Weight   Weight: 188 lb 11.4 oz (85.6 kg) Weight: 187 lb 2.7 oz (84.9 kg)     Intake / Output / Drain:  Current Shift:    Last 24 hrs.:   Intake/Output Summary (Last 24 hours) at 18 1003  Last data filed at 18 0700   Gross per 24 hour   Intake            790.5 ml   Output             1015 ml   Net           -224.5 ml       EXAM:  General:  NAD                                                                                            Lungs:   Clear to auscultation bilaterally. Incision:  No erythema, drainage or swelling. Heart:  Regular rate and rhythm, S1, S2 normal, no murmur, click, rub or gallop. Abdomen:   Soft, non-tender. Bowel sounds normal. No masses,  No organomegaly. Extremities:  No edema. PPP. Neurologic:  Gross motor and sensory apparatus intact. Labs: Recent Labs      18   0428  18   1846  18   1641   WBC   --   7.3   --    HGB  12.0*  12.4   --    HCT  38.1  39.2   --    PLT   --   250   --    NA   --    --   139   K   --    --   4.6   BUN   --    --   8   CREA   --    --   1.17   GLU   --    --   115*   INR   --    --   1.1        Assessment:     Active Problems:    CAD (coronary artery disease) (2018)         Plan/Recommendations/Medical Decision Makin. NSTEMI/CAD: Preop workup and education started. Trend troponin til peaked. On asa, statin, lovenox, nitro gtt. Dr. Katie Cage to determine need for preop IABP or impella. Tentative CABG for end of this coming week. 2. + cocaine use: counseled on cessation  3. Renal insufficency: Unknown baseline. 4. Tobacco use : Reinforce cessation. 5. HTN:  On norvasc.       Signed By: DIGNA Orozco Arm

## 2018-05-05 NOTE — PROGRESS NOTES
Problem: Falls - Risk of  Goal: *Absence of Falls  Document César Fall Risk and appropriate interventions in the flowsheet. Outcome: Progressing Towards Goal  Fall Risk Interventions:  Mobility Interventions: Patient to call before getting OOB         Medication Interventions: Assess postural VS orthostatic hypotension, Evaluate medications/consider consulting pharmacy, Patient to call before getting OOB    Elimination Interventions: Patient to call for help with toileting needs, Urinal in reach    History of Falls Interventions: Evaluate medications/consider consulting pharmacy        Problem: Pressure Injury - Risk of  Goal: *Prevention of pressure injury  Document Celestine Scale and appropriate interventions in the flowsheet. Outcome: Progressing Towards Goal  Pressure Injury Interventions:       Moisture Interventions: Absorbent underpads, Apply protective barrier, creams and emollients, Minimize layers    Activity Interventions: Chair cushion, Increase time out of bed, Pressure redistribution bed/mattress(bed type), PT/OT evaluation    Mobility Interventions: Chair cushion, Float heels, Pressure redistribution bed/mattress (bed type), PT/OT evaluation    Nutrition Interventions: Document food/fluid/supplement intake, Offer support with meals,snacks and hydration    Friction and Shear Interventions: Lift sheet               Problem: Unstable angina/NSTEMI: Day of Admission/Day 1  Goal: *Hemodynamically stable  Outcome: Progressing Towards Goal  PAP- 40/20's, CI 2-3's, SVO2 60's, SR/ Sinus arrhythmia w/ frequent PAC's. On NGT gtt  Goal: *Optimal pain control at patient's stated goal  Outcome: Progressing Towards Goal  Receiving IV dilaudid and po tylenol prn for adequate pain control   Goal: *Lungs clear or at baseline  Outcome: Not Progressing Towards Goal  Lungs coarse throughout, diminished on bases.   On 6 liter NC

## 2018-05-05 NOTE — PROGRESS NOTES
2000: report received from Tutor, gtts verified. 0800: Bedside and Verbal shift change report given to Anastasiia CARLOS (oncoming nurse) by Payton Brumfield RN (offgoing nurse). Report included the following information SBAR, Kardex, Procedure Summary, Intake/Output, MAR, Accordion, Recent Results, Med Rec Status and Cardiac Rhythm Sinus rhythm, Sinus Arrthymia w/ PVC's.

## 2018-05-06 ENCOUNTER — APPOINTMENT (OUTPATIENT)
Dept: GENERAL RADIOLOGY | Age: 58
DRG: 215 | End: 2018-05-06
Attending: THORACIC SURGERY (CARDIOTHORACIC VASCULAR SURGERY)
Payer: COMMERCIAL

## 2018-05-06 LAB
ALBUMIN SERPL-MCNC: 2.8 G/DL (ref 3.5–5)
ALBUMIN/GLOB SERPL: 0.7 {RATIO} (ref 1.1–2.2)
ALP SERPL-CCNC: 60 U/L (ref 45–117)
ALT SERPL-CCNC: 13 U/L (ref 12–78)
ANION GAP SERPL CALC-SCNC: 7 MMOL/L (ref 5–15)
APTT PPP: 31 SEC (ref 22.1–32)
ARTERIAL PATENCY WRIST A: ABNORMAL
AST SERPL-CCNC: 15 U/L (ref 15–37)
BASE EXCESS BLDV CALC-SCNC: 4 MMOL/L
BASOPHILS # BLD: 0 K/UL (ref 0–0.1)
BASOPHILS NFR BLD: 0 % (ref 0–1)
BDY SITE: ABNORMAL
BILIRUB SERPL-MCNC: 0.9 MG/DL (ref 0.2–1)
BUN SERPL-MCNC: 11 MG/DL (ref 6–20)
BUN/CREAT SERPL: 8 (ref 12–20)
CALCIUM SERPL-MCNC: 8.4 MG/DL (ref 8.5–10.1)
CHLORIDE SERPL-SCNC: 103 MMOL/L (ref 97–108)
CO2 SERPL-SCNC: 27 MMOL/L (ref 21–32)
CREAT SERPL-MCNC: 1.31 MG/DL (ref 0.7–1.3)
DIFFERENTIAL METHOD BLD: ABNORMAL
EOSINOPHIL # BLD: 0.3 K/UL (ref 0–0.4)
EOSINOPHIL NFR BLD: 5 % (ref 0–7)
ERYTHROCYTE [DISTWIDTH] IN BLOOD BY AUTOMATED COUNT: 13.3 % (ref 11.5–14.5)
GAS FLOW.O2 O2 DELIVERY SYS: ABNORMAL L/MIN
GAS FLOW.O2 SETTING OXYMISER: 6 L/M
GLOBULIN SER CALC-MCNC: 4.3 G/DL (ref 2–4)
GLUCOSE SERPL-MCNC: 163 MG/DL (ref 65–100)
HCO3 BLDV-SCNC: 29.4 MMOL/L (ref 23–28)
HCT VFR BLD AUTO: 37.1 % (ref 36.6–50.3)
HGB BLD-MCNC: 11.6 G/DL (ref 12.1–17)
IMM GRANULOCYTES # BLD: 0 K/UL (ref 0–0.04)
IMM GRANULOCYTES NFR BLD AUTO: 0 % (ref 0–0.5)
INR PPP: 1.1 (ref 0.9–1.1)
LYMPHOCYTES # BLD: 1.2 K/UL (ref 0.8–3.5)
LYMPHOCYTES NFR BLD: 18 % (ref 12–49)
MCH RBC QN AUTO: 28.6 PG (ref 26–34)
MCHC RBC AUTO-ENTMCNC: 31.3 G/DL (ref 30–36.5)
MCV RBC AUTO: 91.6 FL (ref 80–99)
MONOCYTES # BLD: 0.4 K/UL (ref 0–1)
MONOCYTES NFR BLD: 6 % (ref 5–13)
NEUTS SEG # BLD: 4.8 K/UL (ref 1.8–8)
NEUTS SEG NFR BLD: 71 % (ref 32–75)
NRBC # BLD: 0 K/UL (ref 0–0.01)
NRBC BLD-RTO: 0 PER 100 WBC
PCO2 BLDV: 52.3 MMHG (ref 41–51)
PH BLDV: 7.36 [PH] (ref 7.32–7.42)
PLATELET # BLD AUTO: 211 K/UL (ref 150–400)
PMV BLD AUTO: 10.5 FL (ref 8.9–12.9)
PO2 BLDV: 29 MMHG (ref 25–40)
POTASSIUM SERPL-SCNC: 4.3 MMOL/L (ref 3.5–5.1)
PROT SERPL-MCNC: 7.1 G/DL (ref 6.4–8.2)
PROTHROMBIN TIME: 10.9 SEC (ref 9–11.1)
RBC # BLD AUTO: 4.05 M/UL (ref 4.1–5.7)
SAO2 % BLDV: 51 % (ref 65–88)
SODIUM SERPL-SCNC: 137 MMOL/L (ref 136–145)
SPECIMEN TYPE: ABNORMAL
THERAPEUTIC RANGE,PTTT: NORMAL SECS (ref 58–77)
TOTAL RESP. RATE, ITRR: 18
WBC # BLD AUTO: 6.8 K/UL (ref 4.1–11.1)

## 2018-05-06 PROCEDURE — 74011250637 HC RX REV CODE- 250/637: Performed by: NURSE PRACTITIONER

## 2018-05-06 PROCEDURE — 85610 PROTHROMBIN TIME: CPT | Performed by: PHYSICIAN ASSISTANT

## 2018-05-06 PROCEDURE — 36415 COLL VENOUS BLD VENIPUNCTURE: CPT | Performed by: PHYSICIAN ASSISTANT

## 2018-05-06 PROCEDURE — 82803 BLOOD GASES ANY COMBINATION: CPT

## 2018-05-06 PROCEDURE — 65610000003 HC RM ICU SURGICAL

## 2018-05-06 PROCEDURE — 71045 X-RAY EXAM CHEST 1 VIEW: CPT

## 2018-05-06 PROCEDURE — 80053 COMPREHEN METABOLIC PANEL: CPT | Performed by: PHYSICIAN ASSISTANT

## 2018-05-06 PROCEDURE — 74011250637 HC RX REV CODE- 250/637: Performed by: PHYSICIAN ASSISTANT

## 2018-05-06 PROCEDURE — 74011250636 HC RX REV CODE- 250/636: Performed by: NURSE PRACTITIONER

## 2018-05-06 PROCEDURE — 85730 THROMBOPLASTIN TIME PARTIAL: CPT | Performed by: PHYSICIAN ASSISTANT

## 2018-05-06 PROCEDURE — 85025 COMPLETE CBC W/AUTO DIFF WBC: CPT | Performed by: PHYSICIAN ASSISTANT

## 2018-05-06 RX ORDER — POTASSIUM CHLORIDE 29.8 MG/ML
20 INJECTION INTRAVENOUS ONCE
Status: DISCONTINUED | OUTPATIENT
Start: 2018-05-07 | End: 2018-05-07

## 2018-05-06 RX ORDER — NITROGLYCERIN 20 MG/100ML
0-200 INJECTION INTRAVENOUS
Status: DISCONTINUED | OUTPATIENT
Start: 2018-05-07 | End: 2018-05-07

## 2018-05-06 RX ORDER — VANCOMYCIN 1.75 GRAM/500 ML IN 0.9 % SODIUM CHLORIDE INTRAVENOUS
1750 ONCE
Status: DISCONTINUED | OUTPATIENT
Start: 2018-05-07 | End: 2018-05-07

## 2018-05-06 RX ORDER — PROTAMINE SULFATE 10 MG/ML
500 INJECTION, SOLUTION INTRAVENOUS ONCE
Status: DISCONTINUED | OUTPATIENT
Start: 2018-05-07 | End: 2018-05-07

## 2018-05-06 RX ORDER — ALBUMIN HUMAN 50 G/1000ML
25 SOLUTION INTRAVENOUS ONCE
Status: DISCONTINUED | OUTPATIENT
Start: 2018-05-07 | End: 2018-05-07

## 2018-05-06 RX ORDER — HEPARIN SOD,PORCINE/0.9 % NACL 30K/1000ML
50-1000 INTRAVENOUS SOLUTION INTRAVENOUS AS NEEDED
Status: DISCONTINUED | OUTPATIENT
Start: 2018-05-07 | End: 2018-05-07

## 2018-05-06 RX ORDER — MAGNESIUM SULFATE HEPTAHYDRATE 40 MG/ML
2 INJECTION, SOLUTION INTRAVENOUS ONCE
Status: DISCONTINUED | OUTPATIENT
Start: 2018-05-07 | End: 2018-05-07

## 2018-05-06 RX ORDER — DOBUTAMINE HYDROCHLORIDE 400 MG/100ML
0-10 INJECTION INTRAVENOUS
Status: DISCONTINUED | OUTPATIENT
Start: 2018-05-07 | End: 2018-05-07

## 2018-05-06 RX ORDER — CEFAZOLIN SODIUM/WATER 2 G/20 ML
2 SYRINGE (ML) INTRAVENOUS
Status: COMPLETED | OUTPATIENT
Start: 2018-05-06 | End: 2018-05-07

## 2018-05-06 RX ADMIN — ZOLPIDEM TARTRATE 5 MG: 5 TABLET ORAL at 01:51

## 2018-05-06 RX ADMIN — MUPIROCIN: 20 OINTMENT TOPICAL at 08:14

## 2018-05-06 RX ADMIN — ENOXAPARIN SODIUM 80 MG: 80 INJECTION SUBCUTANEOUS at 06:38

## 2018-05-06 RX ADMIN — AMIODARONE HYDROCHLORIDE 400 MG: 200 TABLET ORAL at 20:52

## 2018-05-06 RX ADMIN — CARVEDILOL 6.25 MG: 6.25 TABLET, FILM COATED ORAL at 17:35

## 2018-05-06 RX ADMIN — CHLORHEXIDINE GLUCONATE 15 ML: 1.2 RINSE ORAL at 17:36

## 2018-05-06 RX ADMIN — OXYCODONE HYDROCHLORIDE AND ACETAMINOPHEN 1 TABLET: 5; 325 TABLET ORAL at 07:16

## 2018-05-06 RX ADMIN — ATORVASTATIN CALCIUM 10 MG: 10 TABLET, FILM COATED ORAL at 21:03

## 2018-05-06 RX ADMIN — Medication 10 ML: at 21:07

## 2018-05-06 RX ADMIN — ASPIRIN 81 MG 81 MG: 81 TABLET ORAL at 08:13

## 2018-05-06 RX ADMIN — AMLODIPINE BESYLATE 10 MG: 5 TABLET ORAL at 08:13

## 2018-05-06 RX ADMIN — CHLORHEXIDINE GLUCONATE 15 ML: 1.2 RINSE ORAL at 04:00

## 2018-05-06 RX ADMIN — AMIODARONE HYDROCHLORIDE 400 MG: 200 TABLET ORAL at 08:13

## 2018-05-06 RX ADMIN — CARVEDILOL 6.25 MG: 6.25 TABLET, FILM COATED ORAL at 08:13

## 2018-05-06 RX ADMIN — MUPIROCIN: 20 OINTMENT TOPICAL at 21:07

## 2018-05-06 RX ADMIN — OXYCODONE HYDROCHLORIDE AND ACETAMINOPHEN 1 TABLET: 5; 325 TABLET ORAL at 03:04

## 2018-05-06 RX ADMIN — OXYCODONE HYDROCHLORIDE AND ACETAMINOPHEN 1 TABLET: 5; 325 TABLET ORAL at 18:24

## 2018-05-06 NOTE — PROGRESS NOTES
Problem: Falls - Risk of  Goal: *Absence of Falls  Document César Fall Risk and appropriate interventions in the flowsheet. Outcome: Progressing Towards Goal  Fall Risk Interventions:  Mobility Interventions: Communicate number of staff needed for ambulation/transfer, Patient to call before getting OOB         Medication Interventions: Evaluate medications/consider consulting pharmacy, Patient to call before getting OOB, Teach patient to arise slowly    Elimination Interventions: Call light in reach, Patient to call for help with toileting needs, Toilet paper/wipes in reach, Toileting schedule/hourly rounds, Urinal in reach    History of Falls Interventions: Evaluate medications/consider consulting pharmacy        Problem: Pressure Injury - Risk of  Goal: *Prevention of pressure injury  Document Celestine Scale and appropriate interventions in the flowsheet. Outcome: Progressing Towards Goal  Pressure Injury Interventions:       Moisture Interventions: Assess need for specialty bed    Activity Interventions: Pressure redistribution bed/mattress(bed type)    Mobility Interventions: Pressure redistribution bed/mattress (bed type), Turn and reposition approx.  every two hours(pillow and wedges)    Nutrition Interventions: Document food/fluid/supplement intake    Friction and Shear Interventions: Lift sheet, Minimize layers               Problem: Unstable angina/NSTEMI: Day 2  Goal: Activity/Safety  Outcome: Progressing Towards Goal  Calls for assistance  Goal: Diagnostic Test/Procedures  Outcome: Progressing Towards Goal  Daily labs  Goal: Nutrition/Diet  Outcome: Progressing Towards Goal  On cardiac diet  Goal: Medications  Outcome: Progressing Towards Goal  Nitro gtt  Goal: Respiratory  Outcome: Progressing Towards Goal  On 6 L nasal cannula  Goal: Treatments/Interventions/Procedures  Outcome: Progressing Towards Goal  Continue cardiac monitoring, hourly vitals, strict I&Os, assessments q4h

## 2018-05-06 NOTE — PROGRESS NOTES
Gerald SAWYER not working well  I discussed plans for impella placement tmw  With likely CABG Tuesday

## 2018-05-06 NOTE — PROGRESS NOTES
Problem: Unstable Angina/NSTEMI: Discharge Outcomes  Goal: *Lungs clear or at baseline  Outcome: Not Progressing Towards Goal  Pt smoker.  IS education given and encouraged

## 2018-05-06 NOTE — PROGRESS NOTES
2000- bedside report and drip verified. Patient in bed eating a snack. 2110- talked to patient regarding feelings of upcoming surgery. He stated they have talked to him about it and does not want surgery. Inquired on reasoning and he responded with he just does not want it. Will inform team in the morning. 2130- multiple attempts by several staff members to troubleshoot harvey monitor. Unable to get CI or CO, only able to get SVO2.    0355- SVO2 reading 28. Called RT to draw mixed venous and recalibrate. 7851- mixed venous 51.    0630- while patient was bathing self, approached topic of upcoming surgery. He stated again that he does not want surgery. Asked if he was nervous or had questions, he only said he does not want to be \"'cut open\". Did not want to talk about it further. Will pass on to day nurse, and inform team.    0800- Bedside and Verbal shift change report given to Bruna Knight RN (oncoming nurse) by Vonda Sethi RN (offgoing nurse). Report included the following information SBAR, Kardex, Recent Results and Cardiac Rhythm NSR.

## 2018-05-06 NOTE — PROGRESS NOTES
[facility] ICU Progress Note    Admit Date: 2018    Procedure:       Preop CABG     Subjective:   Pt seen with Dr. Ross Chauhan. No complaints this morning. On 6LNC. Objective:   Vitals:  Blood pressure 104/66, pulse 60, temperature 98.4 °F (36.9 °C), resp. rate 15, height 5' 6\" (1.676 m), weight 188 lb 7.9 oz (85.5 kg), SpO2 97 %. Temp (24hrs), Av.1 °F (36.7 °C), Min:97.5 °F (36.4 °C), Max:98.9 °F (37.2 °C)    Hemodynamics:   CO: CO (l/min):  (unable to obtain)   CI: CI (l/min/m2):  (unable to obtain)   CVP: CVP (mmHg): 10 mmHg (18)   SVR: SVR (dyne*sec)/cm5:  (unable to obtain) (18 9115)   PAP Systolic: PAP Systolic: 41 (00 5585)   PAP Diastolic: PAP Diastolic: 33 (97/73/38 1034)   PVR:     SV02: SVO2 (%): 52 % (18)   SCV02:      EKG/Rhythm:    NSR    Oxygen Therapy:  Oxygen Therapy  O2 Sat (%): 97 % (18)  Pulse via Oximetry: 50 beats per minute (18 09)  O2 Device: Nasal cannula (18 08)  O2 Flow Rate (L/min): 6 l/min (18 08)    CXR:  The cardiac and mediastinal contours are stable. Pulmonary  edema has decreased slightly. There is no infiltrate. The bones and soft tissues  are grossly within normal limits. Admission Weight: Last Weight   Weight: 188 lb 11.4 oz (85.6 kg) Weight: 188 lb 7.9 oz (85.5 kg)     Intake / Output / Drain:  Current Shift: 701 -  1900  In: 28 [I.V.:28]  Out: -   Last 24 hrs.:     Intake/Output Summary (Last 24 hours) at 18 1006  Last data filed at 18 0800   Gross per 24 hour   Intake           852.92 ml   Output              525 ml   Net           327.92 ml       EXAM:  General:  NAD                                                                                            Lungs:   Clear to auscultation bilaterally. Incision:  No erythema, drainage or swelling. Heart:  Regular rate and rhythm, S1, S2 normal, no murmur, click, rub or gallop. Abdomen:   Soft, non-tender.  Bowel sounds normal. No masses,  No organomegaly. Extremities:  No edema. PPP. Neurologic:  Gross motor and sensory apparatus intact. Labs:   Recent Labs      18   0428  18   1846  18   1641   WBC   --   7.3   --    HGB  12.0*  12.4   --    HCT  38.1  39.2   --    PLT   --   250   --    NA   --    --   139   K   --    --   4.6   BUN   --    --   8   CREA   --    --   1.17   GLU   --    --   115*   INR   --    --   1.1        Assessment:     Active Problems:    CAD (coronary artery disease) (2018)         Plan/Recommendations/Medical Decision Makin. NSTEMI/CAD: Preop workup and education started. Troponin peaked 0.92. On asa, statin, lovenox, nitro gtt. Plan for impella 5.0 tomorrow. Plan for CABG Tuesday. 2. + cocaine use: counseled on cessation  3. Renal insufficency: Unknown baseline. 4. Tobacco use : Reinforce cessation. 5. HTN:  On norvasc.       Signed By: DIGNA Young

## 2018-05-07 ENCOUNTER — ANESTHESIA EVENT (OUTPATIENT)
Dept: CARDIOTHORACIC SURGERY | Age: 58
DRG: 215 | End: 2018-05-07
Payer: COMMERCIAL

## 2018-05-07 ENCOUNTER — APPOINTMENT (OUTPATIENT)
Dept: GENERAL RADIOLOGY | Age: 58
DRG: 215 | End: 2018-05-07
Attending: PHYSICIAN ASSISTANT
Payer: COMMERCIAL

## 2018-05-07 ENCOUNTER — ANESTHESIA (OUTPATIENT)
Dept: CARDIOTHORACIC SURGERY | Age: 58
DRG: 215 | End: 2018-05-07
Payer: COMMERCIAL

## 2018-05-07 ENCOUNTER — APPOINTMENT (OUTPATIENT)
Dept: GENERAL RADIOLOGY | Age: 58
DRG: 215 | End: 2018-05-07
Attending: THORACIC SURGERY (CARDIOTHORACIC VASCULAR SURGERY)
Payer: COMMERCIAL

## 2018-05-07 LAB
ALBUMIN SERPL-MCNC: 2.7 G/DL (ref 3.5–5)
ALBUMIN SERPL-MCNC: 3.4 G/DL (ref 3.5–5)
ALBUMIN/GLOB SERPL: 0.6 {RATIO} (ref 1.1–2.2)
ALBUMIN/GLOB SERPL: 1 {RATIO} (ref 1.1–2.2)
ALP SERPL-CCNC: 48 U/L (ref 45–117)
ALP SERPL-CCNC: 53 U/L (ref 45–117)
ALT SERPL-CCNC: 14 U/L (ref 12–78)
ALT SERPL-CCNC: 19 U/L (ref 12–78)
ANION GAP SERPL CALC-SCNC: 6 MMOL/L (ref 5–15)
ANION GAP SERPL CALC-SCNC: 8 MMOL/L (ref 5–15)
APTT PPP: 39.2 SEC (ref 22.1–32)
ARTERIAL PATENCY WRIST A: ABNORMAL
ARTERIAL PATENCY WRIST A: YES
AST SERPL-CCNC: 15 U/L (ref 15–37)
AST SERPL-CCNC: 32 U/L (ref 15–37)
BASE DEFICIT BLD-SCNC: 2 MMOL/L
BASE EXCESS BLD CALC-SCNC: 1 MMOL/L
BASOPHILS # BLD: 0 K/UL (ref 0–0.1)
BASOPHILS NFR BLD: 0 % (ref 0–1)
BDY SITE: ABNORMAL
BDY SITE: ABNORMAL
BILIRUB SERPL-MCNC: 0.8 MG/DL (ref 0.2–1)
BILIRUB SERPL-MCNC: 1.3 MG/DL (ref 0.2–1)
BUN SERPL-MCNC: 12 MG/DL (ref 6–20)
BUN SERPL-MCNC: 15 MG/DL (ref 6–20)
BUN/CREAT SERPL: 10 (ref 12–20)
BUN/CREAT SERPL: 11 (ref 12–20)
CALCIUM SERPL-MCNC: 7.9 MG/DL (ref 8.5–10.1)
CALCIUM SERPL-MCNC: 8.4 MG/DL (ref 8.5–10.1)
CHLORIDE SERPL-SCNC: 105 MMOL/L (ref 97–108)
CHLORIDE SERPL-SCNC: 106 MMOL/L (ref 97–108)
CO2 SERPL-SCNC: 25 MMOL/L (ref 21–32)
CO2 SERPL-SCNC: 28 MMOL/L (ref 21–32)
CREAT SERPL-MCNC: 1.18 MG/DL (ref 0.7–1.3)
CREAT SERPL-MCNC: 1.35 MG/DL (ref 0.7–1.3)
DIFFERENTIAL METHOD BLD: ABNORMAL
EOSINOPHIL # BLD: 0.4 K/UL (ref 0–0.4)
EOSINOPHIL NFR BLD: 5 % (ref 0–7)
ERYTHROCYTE [DISTWIDTH] IN BLOOD BY AUTOMATED COUNT: 13 % (ref 11.5–14.5)
ERYTHROCYTE [DISTWIDTH] IN BLOOD BY AUTOMATED COUNT: 13.3 % (ref 11.5–14.5)
GAS FLOW.O2 O2 DELIVERY SYS: ABNORMAL L/MIN
GAS FLOW.O2 O2 DELIVERY SYS: ABNORMAL L/MIN
GAS FLOW.O2 SETTING OXYMISER: 14 BPM
GLOBULIN SER CALC-MCNC: 3.3 G/DL (ref 2–4)
GLOBULIN SER CALC-MCNC: 4.2 G/DL (ref 2–4)
GLUCOSE BLD STRIP.AUTO-MCNC: 101 MG/DL (ref 65–100)
GLUCOSE BLD STRIP.AUTO-MCNC: 118 MG/DL (ref 65–100)
GLUCOSE SERPL-MCNC: 111 MG/DL (ref 65–100)
GLUCOSE SERPL-MCNC: 97 MG/DL (ref 65–100)
HCO3 BLD-SCNC: 23.2 MMOL/L (ref 22–26)
HCO3 BLD-SCNC: 25.1 MMOL/L (ref 22–26)
HCT VFR BLD AUTO: 30.9 % (ref 36.6–50.3)
HCT VFR BLD AUTO: 35.3 % (ref 36.6–50.3)
HGB BLD-MCNC: 11.2 G/DL (ref 12.1–17)
HGB BLD-MCNC: 9.8 G/DL (ref 12.1–17)
IMM GRANULOCYTES # BLD: 0 K/UL (ref 0–0.04)
IMM GRANULOCYTES NFR BLD AUTO: 0 % (ref 0–0.5)
INR PPP: 1.1 (ref 0.9–1.1)
LYMPHOCYTES # BLD: 1.2 K/UL (ref 0.8–3.5)
LYMPHOCYTES NFR BLD: 17 % (ref 12–49)
MAGNESIUM SERPL-MCNC: 1.8 MG/DL (ref 1.6–2.4)
MAGNESIUM SERPL-MCNC: 2 MG/DL (ref 1.6–2.4)
MCH RBC QN AUTO: 28.6 PG (ref 26–34)
MCH RBC QN AUTO: 29.1 PG (ref 26–34)
MCHC RBC AUTO-ENTMCNC: 31.7 G/DL (ref 30–36.5)
MCHC RBC AUTO-ENTMCNC: 31.7 G/DL (ref 30–36.5)
MCV RBC AUTO: 90.1 FL (ref 80–99)
MCV RBC AUTO: 91.7 FL (ref 80–99)
MONOCYTES # BLD: 0.6 K/UL (ref 0–1)
MONOCYTES NFR BLD: 9 % (ref 5–13)
NEUTS SEG # BLD: 4.5 K/UL (ref 1.8–8)
NEUTS SEG NFR BLD: 68 % (ref 32–75)
NRBC # BLD: 0 K/UL (ref 0–0.01)
NRBC # BLD: 0 K/UL (ref 0–0.01)
NRBC BLD-RTO: 0 PER 100 WBC
NRBC BLD-RTO: 0 PER 100 WBC
O2/TOTAL GAS SETTING VFR VENT: 80 %
PCO2 BLD: 38.7 MMHG (ref 35–45)
PCO2 BLD: 42.3 MMHG (ref 35–45)
PEEP RESPIRATORY: 5 CMH2O
PH BLD: 7.35 [PH] (ref 7.35–7.45)
PH BLD: 7.42 [PH] (ref 7.35–7.45)
PLATELET # BLD AUTO: 162 K/UL (ref 150–400)
PLATELET # BLD AUTO: 180 K/UL (ref 150–400)
PMV BLD AUTO: 10.3 FL (ref 8.9–12.9)
PMV BLD AUTO: 10.7 FL (ref 8.9–12.9)
PO2 BLD: 56 MMHG (ref 80–100)
PO2 BLD: 76 MMHG (ref 80–100)
POTASSIUM SERPL-SCNC: 4.4 MMOL/L (ref 3.5–5.1)
POTASSIUM SERPL-SCNC: 4.6 MMOL/L (ref 3.5–5.1)
PRESSURE SUPPORT SETTING VENT: 10 CMH2O
PROT SERPL-MCNC: 6.7 G/DL (ref 6.4–8.2)
PROT SERPL-MCNC: 6.9 G/DL (ref 6.4–8.2)
PROTHROMBIN TIME: 11.7 SEC (ref 9–11.1)
RBC # BLD AUTO: 3.43 M/UL (ref 4.1–5.7)
RBC # BLD AUTO: 3.85 M/UL (ref 4.1–5.7)
SAO2 % BLD: 89 % (ref 92–97)
SAO2 % BLD: 94 % (ref 92–97)
SERVICE CMNT-IMP: ABNORMAL
SERVICE CMNT-IMP: ABNORMAL
SODIUM SERPL-SCNC: 139 MMOL/L (ref 136–145)
SODIUM SERPL-SCNC: 139 MMOL/L (ref 136–145)
SPECIMEN TYPE: ABNORMAL
SPECIMEN TYPE: ABNORMAL
THERAPEUTIC RANGE,PTTT: ABNORMAL SECS (ref 58–77)
VENTILATION MODE VENT: ABNORMAL
VOLUME CONTROL IVLC: YES
VT SETTING VENT: 500 ML
WBC # BLD AUTO: 6.7 K/UL (ref 4.1–11.1)
WBC # BLD AUTO: 6.7 K/UL (ref 4.1–11.1)

## 2018-05-07 PROCEDURE — C1768 GRAFT, VASCULAR: HCPCS | Performed by: THORACIC SURGERY (CARDIOTHORACIC VASCULAR SURGERY)

## 2018-05-07 PROCEDURE — C1887 CATHETER, GUIDING: HCPCS | Performed by: THORACIC SURGERY (CARDIOTHORACIC VASCULAR SURGERY)

## 2018-05-07 PROCEDURE — 74011250636 HC RX REV CODE- 250/636: Performed by: PHYSICIAN ASSISTANT

## 2018-05-07 PROCEDURE — 74011250637 HC RX REV CODE- 250/637: Performed by: NURSE PRACTITIONER

## 2018-05-07 PROCEDURE — 85027 COMPLETE CBC AUTOMATED: CPT | Performed by: THORACIC SURGERY (CARDIOTHORACIC VASCULAR SURGERY)

## 2018-05-07 PROCEDURE — 82962 GLUCOSE BLOOD TEST: CPT

## 2018-05-07 PROCEDURE — 77030002986 HC SUT PROL J&J -A: Performed by: THORACIC SURGERY (CARDIOTHORACIC VASCULAR SURGERY)

## 2018-05-07 PROCEDURE — 71045 X-RAY EXAM CHEST 1 VIEW: CPT

## 2018-05-07 PROCEDURE — 74011250636 HC RX REV CODE- 250/636

## 2018-05-07 PROCEDURE — 65610000003 HC RM ICU SURGICAL

## 2018-05-07 PROCEDURE — 85025 COMPLETE CBC W/AUTO DIFF WBC: CPT | Performed by: THORACIC SURGERY (CARDIOTHORACIC VASCULAR SURGERY)

## 2018-05-07 PROCEDURE — 77030004532 HC CATH ANGI DX IMP BSC -A: Performed by: THORACIC SURGERY (CARDIOTHORACIC VASCULAR SURGERY)

## 2018-05-07 PROCEDURE — 74011000250 HC RX REV CODE- 250: Performed by: THORACIC SURGERY (CARDIOTHORACIC VASCULAR SURGERY)

## 2018-05-07 PROCEDURE — 74011250637 HC RX REV CODE- 250/637: Performed by: PHYSICIAN ASSISTANT

## 2018-05-07 PROCEDURE — 77030002524 HC INSTR CLMP FGRTY EDWD -B: Performed by: THORACIC SURGERY (CARDIOTHORACIC VASCULAR SURGERY)

## 2018-05-07 PROCEDURE — 94640 AIRWAY INHALATION TREATMENT: CPT

## 2018-05-07 PROCEDURE — 77030010938 HC CLP LIG TELE -A: Performed by: THORACIC SURGERY (CARDIOTHORACIC VASCULAR SURGERY)

## 2018-05-07 PROCEDURE — 77030002996 HC SUT SLK J&J -A: Performed by: THORACIC SURGERY (CARDIOTHORACIC VASCULAR SURGERY)

## 2018-05-07 PROCEDURE — 77030026438 HC STYL ET INTUB CARD -A: Performed by: ANESTHESIOLOGY

## 2018-05-07 PROCEDURE — 74011000250 HC RX REV CODE- 250: Performed by: PHYSICIAN ASSISTANT

## 2018-05-07 PROCEDURE — 82803 BLOOD GASES ANY COMBINATION: CPT

## 2018-05-07 PROCEDURE — 83735 ASSAY OF MAGNESIUM: CPT | Performed by: THORACIC SURGERY (CARDIOTHORACIC VASCULAR SURGERY)

## 2018-05-07 PROCEDURE — 99233 SBSQ HOSP IP/OBS HIGH 50: CPT | Performed by: INTERNAL MEDICINE

## 2018-05-07 PROCEDURE — 77030008684 HC TU ET CUF COVD -B: Performed by: ANESTHESIOLOGY

## 2018-05-07 PROCEDURE — C1769 GUIDE WIRE: HCPCS | Performed by: THORACIC SURGERY (CARDIOTHORACIC VASCULAR SURGERY)

## 2018-05-07 PROCEDURE — 77030018846 HC SOL IRR STRL H20 ICUM -A: Performed by: THORACIC SURGERY (CARDIOTHORACIC VASCULAR SURGERY)

## 2018-05-07 PROCEDURE — 77030010516 HC APPL HEMA CLP TELE -B: Performed by: THORACIC SURGERY (CARDIOTHORACIC VASCULAR SURGERY)

## 2018-05-07 PROCEDURE — 85610 PROTHROMBIN TIME: CPT | Performed by: THORACIC SURGERY (CARDIOTHORACIC VASCULAR SURGERY)

## 2018-05-07 PROCEDURE — 36415 COLL VENOUS BLD VENIPUNCTURE: CPT | Performed by: THORACIC SURGERY (CARDIOTHORACIC VASCULAR SURGERY)

## 2018-05-07 PROCEDURE — 74011000250 HC RX REV CODE- 250

## 2018-05-07 PROCEDURE — 77030026906 HC PMP CARD IMPELLA 5 ABIM -L: Performed by: THORACIC SURGERY (CARDIOTHORACIC VASCULAR SURGERY)

## 2018-05-07 PROCEDURE — 36600 WITHDRAWAL OF ARTERIAL BLOOD: CPT

## 2018-05-07 PROCEDURE — 94010 BREATHING CAPACITY TEST: CPT

## 2018-05-07 PROCEDURE — 74011250636 HC RX REV CODE- 250/636: Performed by: THORACIC SURGERY (CARDIOTHORACIC VASCULAR SURGERY)

## 2018-05-07 PROCEDURE — 5A0221D ASSISTANCE WITH CARDIAC OUTPUT USING IMPELLER PUMP, CONTINUOUS: ICD-10-PCS | Performed by: THORACIC SURGERY (CARDIOTHORACIC VASCULAR SURGERY)

## 2018-05-07 PROCEDURE — 77030011255 HC DSG AQUACEL AG BMS -A: Performed by: THORACIC SURGERY (CARDIOTHORACIC VASCULAR SURGERY)

## 2018-05-07 PROCEDURE — 77030011220 HC DEV ELECSURG COVD -B: Performed by: THORACIC SURGERY (CARDIOTHORACIC VASCULAR SURGERY)

## 2018-05-07 PROCEDURE — 76001 XR FLUOROSCOPY OVER 60 MINUTES: CPT

## 2018-05-07 PROCEDURE — 77030003029 HC SUT VCRL J&J -B: Performed by: THORACIC SURGERY (CARDIOTHORACIC VASCULAR SURGERY)

## 2018-05-07 PROCEDURE — 74011250636 HC RX REV CODE- 250/636: Performed by: NURSE PRACTITIONER

## 2018-05-07 PROCEDURE — 76060000036 HC ANESTHESIA 2.5 TO 3 HR: Performed by: THORACIC SURGERY (CARDIOTHORACIC VASCULAR SURGERY)

## 2018-05-07 PROCEDURE — 77030005402 HC CATH RAD ART LN KT TELE -B

## 2018-05-07 PROCEDURE — 74011000250 HC RX REV CODE- 250: Performed by: NURSE PRACTITIONER

## 2018-05-07 PROCEDURE — 80053 COMPREHEN METABOLIC PANEL: CPT | Performed by: THORACIC SURGERY (CARDIOTHORACIC VASCULAR SURGERY)

## 2018-05-07 PROCEDURE — 77030008467 HC STPLR SKN COVD -B: Performed by: THORACIC SURGERY (CARDIOTHORACIC VASCULAR SURGERY)

## 2018-05-07 PROCEDURE — 77030010505 HC ADH BIOGLU CRYO -F: Performed by: THORACIC SURGERY (CARDIOTHORACIC VASCULAR SURGERY)

## 2018-05-07 PROCEDURE — 02HA3RZ INSERTION OF SHORT-TERM EXTERNAL HEART ASSIST SYSTEM INTO HEART, PERCUTANEOUS APPROACH: ICD-10-PCS | Performed by: THORACIC SURGERY (CARDIOTHORACIC VASCULAR SURGERY)

## 2018-05-07 PROCEDURE — B24BZZ4 ULTRASONOGRAPHY OF HEART WITH AORTA, TRANSESOPHAGEAL: ICD-10-PCS | Performed by: ANESTHESIOLOGY

## 2018-05-07 PROCEDURE — P9045 ALBUMIN (HUMAN), 5%, 250 ML: HCPCS

## 2018-05-07 PROCEDURE — 77030018836 HC SOL IRR NACL ICUM -A: Performed by: THORACIC SURGERY (CARDIOTHORACIC VASCULAR SURGERY)

## 2018-05-07 PROCEDURE — 77030020256 HC SOL INJ NACL 0.9%  500ML: Performed by: THORACIC SURGERY (CARDIOTHORACIC VASCULAR SURGERY)

## 2018-05-07 PROCEDURE — 03U50JZ SUPPLEMENT RIGHT AXILLARY ARTERY WITH SYNTHETIC SUBSTITUTE, OPEN APPROACH: ICD-10-PCS | Performed by: THORACIC SURGERY (CARDIOTHORACIC VASCULAR SURGERY)

## 2018-05-07 PROCEDURE — 76010000109 HC CV SURG 2.5 TO 3 HR: Performed by: THORACIC SURGERY (CARDIOTHORACIC VASCULAR SURGERY)

## 2018-05-07 PROCEDURE — 85730 THROMBOPLASTIN TIME PARTIAL: CPT | Performed by: THORACIC SURGERY (CARDIOTHORACIC VASCULAR SURGERY)

## 2018-05-07 PROCEDURE — 77030011640 HC PAD GRND REM COVD -A: Performed by: THORACIC SURGERY (CARDIOTHORACIC VASCULAR SURGERY)

## 2018-05-07 DEVICE — GRAFT HEMSHLD WVN STR 8MMX30CM -- HEMASHIELD PLATINUM: Type: IMPLANTABLE DEVICE | Site: CHEST | Status: FUNCTIONAL

## 2018-05-07 RX ORDER — SODIUM CHLORIDE, SODIUM LACTATE, POTASSIUM CHLORIDE, CALCIUM CHLORIDE 600; 310; 30; 20 MG/100ML; MG/100ML; MG/100ML; MG/100ML
100 INJECTION, SOLUTION INTRAVENOUS CONTINUOUS
Status: CANCELLED | OUTPATIENT
Start: 2018-05-07

## 2018-05-07 RX ORDER — DOBUTAMINE HYDROCHLORIDE 200 MG/100ML
0-10 INJECTION INTRAVENOUS
Status: DISCONTINUED | OUTPATIENT
Start: 2018-05-08 | End: 2018-05-08 | Stop reason: SDUPTHER

## 2018-05-07 RX ORDER — HYDROMORPHONE HYDROCHLORIDE 1 MG/ML
0.2 INJECTION, SOLUTION INTRAMUSCULAR; INTRAVENOUS; SUBCUTANEOUS
Status: CANCELLED | OUTPATIENT
Start: 2018-05-07

## 2018-05-07 RX ORDER — HYDROMORPHONE HYDROCHLORIDE 2 MG/ML
1 INJECTION, SOLUTION INTRAMUSCULAR; INTRAVENOUS; SUBCUTANEOUS ONCE
Status: COMPLETED | OUTPATIENT
Start: 2018-05-07 | End: 2018-05-07

## 2018-05-07 RX ORDER — ALBUMIN HUMAN 50 G/1000ML
25 SOLUTION INTRAVENOUS ONCE
Status: DISCONTINUED | OUTPATIENT
Start: 2018-05-08 | End: 2018-05-07

## 2018-05-07 RX ORDER — BUMETANIDE 0.25 MG/ML
2 INJECTION INTRAMUSCULAR; INTRAVENOUS ONCE
Status: COMPLETED | OUTPATIENT
Start: 2018-05-07 | End: 2018-05-07

## 2018-05-07 RX ORDER — MIDAZOLAM HYDROCHLORIDE 1 MG/ML
INJECTION, SOLUTION INTRAMUSCULAR; INTRAVENOUS
Status: COMPLETED
Start: 2018-05-07 | End: 2018-05-07

## 2018-05-07 RX ORDER — LIDOCAINE HYDROCHLORIDE 20 MG/ML
INJECTION, SOLUTION EPIDURAL; INFILTRATION; INTRACAUDAL; PERINEURAL AS NEEDED
Status: DISCONTINUED | OUTPATIENT
Start: 2018-05-07 | End: 2018-05-07 | Stop reason: HOSPADM

## 2018-05-07 RX ORDER — IPRATROPIUM BROMIDE AND ALBUTEROL SULFATE 2.5; .5 MG/3ML; MG/3ML
3 SOLUTION RESPIRATORY (INHALATION)
Status: DISCONTINUED | OUTPATIENT
Start: 2018-05-07 | End: 2018-05-08

## 2018-05-07 RX ORDER — HEPARIN SODIUM 1000 [USP'U]/ML
INJECTION, SOLUTION INTRAVENOUS; SUBCUTANEOUS AS NEEDED
Status: DISCONTINUED | OUTPATIENT
Start: 2018-05-07 | End: 2018-05-07 | Stop reason: HOSPADM

## 2018-05-07 RX ORDER — MIDAZOLAM HYDROCHLORIDE 1 MG/ML
1 INJECTION, SOLUTION INTRAMUSCULAR; INTRAVENOUS AS NEEDED
Status: CANCELLED | OUTPATIENT
Start: 2018-05-07

## 2018-05-07 RX ORDER — MAGNESIUM SULFATE HEPTAHYDRATE 40 MG/ML
2 INJECTION, SOLUTION INTRAVENOUS ONCE
Status: DISCONTINUED | OUTPATIENT
Start: 2018-05-08 | End: 2018-05-08

## 2018-05-07 RX ORDER — PROPOFOL 10 MG/ML
INJECTION, EMULSION INTRAVENOUS
Status: DISPENSED
Start: 2018-05-07 | End: 2018-05-08

## 2018-05-07 RX ORDER — SODIUM CHLORIDE 9 MG/ML
INJECTION, SOLUTION INTRAVENOUS
Status: DISCONTINUED | OUTPATIENT
Start: 2018-05-07 | End: 2018-05-07 | Stop reason: HOSPADM

## 2018-05-07 RX ORDER — DIPHENHYDRAMINE HYDROCHLORIDE 50 MG/ML
12.5 INJECTION, SOLUTION INTRAMUSCULAR; INTRAVENOUS AS NEEDED
Status: CANCELLED | OUTPATIENT
Start: 2018-05-07 | End: 2018-05-07

## 2018-05-07 RX ORDER — FENTANYL CITRATE 50 UG/ML
25 INJECTION, SOLUTION INTRAMUSCULAR; INTRAVENOUS
Status: CANCELLED | OUTPATIENT
Start: 2018-05-07

## 2018-05-07 RX ORDER — DOPAMINE HYDROCHLORIDE 320 MG/100ML
5-20 INJECTION, SOLUTION INTRAVENOUS
Status: DISCONTINUED | OUTPATIENT
Start: 2018-05-08 | End: 2018-05-08

## 2018-05-07 RX ORDER — PHENYLEPHRINE HCL IN 0.9% NACL 30MG/250ML
10-100 PLASTIC BAG, INJECTION (ML) INTRAVENOUS
Status: DISCONTINUED | OUTPATIENT
Start: 2018-05-07 | End: 2018-05-12

## 2018-05-07 RX ORDER — OXYCODONE HYDROCHLORIDE 5 MG/1
5 TABLET ORAL AS NEEDED
Status: CANCELLED | OUTPATIENT
Start: 2018-05-07

## 2018-05-07 RX ORDER — HEPARIN SODIUM 1000 [USP'U]/ML
2000 INJECTION, SOLUTION INTRAVENOUS; SUBCUTANEOUS ONCE
Status: COMPLETED | OUTPATIENT
Start: 2018-05-07 | End: 2018-05-07

## 2018-05-07 RX ORDER — NEOSTIGMINE METHYLSULFATE 1 MG/ML
INJECTION INTRAVENOUS AS NEEDED
Status: DISCONTINUED | OUTPATIENT
Start: 2018-05-07 | End: 2018-05-07 | Stop reason: HOSPADM

## 2018-05-07 RX ORDER — DOBUTAMINE HYDROCHLORIDE 200 MG/100ML
0-10 INJECTION INTRAVENOUS
Status: DISCONTINUED | OUTPATIENT
Start: 2018-05-07 | End: 2018-05-07 | Stop reason: SDUPTHER

## 2018-05-07 RX ORDER — PHENYLEPHRINE HCL IN 0.9% NACL 0.4MG/10ML
SYRINGE (ML) INTRAVENOUS AS NEEDED
Status: DISCONTINUED | OUTPATIENT
Start: 2018-05-07 | End: 2018-05-07 | Stop reason: HOSPADM

## 2018-05-07 RX ORDER — SODIUM CHLORIDE, SODIUM LACTATE, POTASSIUM CHLORIDE, CALCIUM CHLORIDE 600; 310; 30; 20 MG/100ML; MG/100ML; MG/100ML; MG/100ML
25 INJECTION, SOLUTION INTRAVENOUS CONTINUOUS
Status: CANCELLED | OUTPATIENT
Start: 2018-05-07 | End: 2018-05-08

## 2018-05-07 RX ORDER — ALBUMIN HUMAN 50 G/1000ML
25 SOLUTION INTRAVENOUS AS NEEDED
Status: DISCONTINUED | OUTPATIENT
Start: 2018-05-07 | End: 2018-05-08

## 2018-05-07 RX ORDER — FENTANYL CITRATE 50 UG/ML
INJECTION, SOLUTION INTRAMUSCULAR; INTRAVENOUS AS NEEDED
Status: DISCONTINUED | OUTPATIENT
Start: 2018-05-07 | End: 2018-05-07 | Stop reason: HOSPADM

## 2018-05-07 RX ORDER — IPRATROPIUM BROMIDE AND ALBUTEROL SULFATE 2.5; .5 MG/3ML; MG/3ML
3 SOLUTION RESPIRATORY (INHALATION)
Status: DISCONTINUED | OUTPATIENT
Start: 2018-05-07 | End: 2018-05-07

## 2018-05-07 RX ORDER — ROPIVACAINE HYDROCHLORIDE 5 MG/ML
30 INJECTION, SOLUTION EPIDURAL; INFILTRATION; PERINEURAL AS NEEDED
Status: CANCELLED | OUTPATIENT
Start: 2018-05-07

## 2018-05-07 RX ORDER — BUPIVACAINE HYDROCHLORIDE 5 MG/ML
INJECTION, SOLUTION EPIDURAL; INTRACAUDAL AS NEEDED
Status: DISCONTINUED | OUTPATIENT
Start: 2018-05-07 | End: 2018-05-07 | Stop reason: HOSPADM

## 2018-05-07 RX ORDER — SODIUM CHLORIDE 0.9 % (FLUSH) 0.9 %
5-10 SYRINGE (ML) INJECTION EVERY 8 HOURS
Status: CANCELLED | OUTPATIENT
Start: 2018-05-07

## 2018-05-07 RX ORDER — SODIUM CHLORIDE, SODIUM LACTATE, POTASSIUM CHLORIDE, CALCIUM CHLORIDE 600; 310; 30; 20 MG/100ML; MG/100ML; MG/100ML; MG/100ML
INJECTION, SOLUTION INTRAVENOUS
Status: DISCONTINUED | OUTPATIENT
Start: 2018-05-07 | End: 2018-05-07 | Stop reason: HOSPADM

## 2018-05-07 RX ORDER — NITROGLYCERIN 20 MG/100ML
16.5 INJECTION INTRAVENOUS CONTINUOUS
Status: DISCONTINUED | OUTPATIENT
Start: 2018-05-08 | End: 2018-05-08

## 2018-05-07 RX ORDER — PROTAMINE SULFATE 10 MG/ML
500 INJECTION, SOLUTION INTRAVENOUS ONCE
Status: DISCONTINUED | OUTPATIENT
Start: 2018-05-08 | End: 2018-05-08

## 2018-05-07 RX ORDER — HEPARIN SOD,PORCINE/0.9 % NACL 30K/1000ML
50-1000 INTRAVENOUS SOLUTION INTRAVENOUS AS NEEDED
Status: DISCONTINUED | OUTPATIENT
Start: 2018-05-08 | End: 2018-05-08

## 2018-05-07 RX ORDER — FENTANYL CITRATE 50 UG/ML
50 INJECTION, SOLUTION INTRAMUSCULAR; INTRAVENOUS AS NEEDED
Status: CANCELLED | OUTPATIENT
Start: 2018-05-07

## 2018-05-07 RX ORDER — SODIUM CHLORIDE 450 MG/100ML
25 INJECTION, SOLUTION INTRAVENOUS CONTINUOUS
Status: DISCONTINUED | OUTPATIENT
Start: 2018-05-07 | End: 2018-05-08

## 2018-05-07 RX ORDER — ROCURONIUM BROMIDE 10 MG/ML
INJECTION, SOLUTION INTRAVENOUS AS NEEDED
Status: DISCONTINUED | OUTPATIENT
Start: 2018-05-07 | End: 2018-05-07 | Stop reason: HOSPADM

## 2018-05-07 RX ORDER — PROPOFOL 10 MG/ML
INJECTION, EMULSION INTRAVENOUS AS NEEDED
Status: DISCONTINUED | OUTPATIENT
Start: 2018-05-07 | End: 2018-05-07 | Stop reason: HOSPADM

## 2018-05-07 RX ORDER — POTASSIUM CHLORIDE 29.8 MG/ML
20 INJECTION INTRAVENOUS ONCE
Status: DISCONTINUED | OUTPATIENT
Start: 2018-05-08 | End: 2018-05-08

## 2018-05-07 RX ORDER — MIDAZOLAM HYDROCHLORIDE 1 MG/ML
0.5 INJECTION, SOLUTION INTRAMUSCULAR; INTRAVENOUS
Status: CANCELLED | OUTPATIENT
Start: 2018-05-07

## 2018-05-07 RX ORDER — ALBUMIN HUMAN 50 G/1000ML
SOLUTION INTRAVENOUS
Status: DISPENSED
Start: 2018-05-07 | End: 2018-05-08

## 2018-05-07 RX ORDER — PROPOFOL 10 MG/ML
0-50 VIAL (ML) INTRAVENOUS
Status: DISCONTINUED | OUTPATIENT
Start: 2018-05-07 | End: 2018-05-08

## 2018-05-07 RX ORDER — SODIUM CHLORIDE 0.9 % (FLUSH) 0.9 %
5-10 SYRINGE (ML) INJECTION AS NEEDED
Status: CANCELLED | OUTPATIENT
Start: 2018-05-07

## 2018-05-07 RX ORDER — ALBUMIN HUMAN 50 G/1000ML
SOLUTION INTRAVENOUS AS NEEDED
Status: DISCONTINUED | OUTPATIENT
Start: 2018-05-07 | End: 2018-05-07 | Stop reason: HOSPADM

## 2018-05-07 RX ORDER — LIDOCAINE HYDROCHLORIDE 10 MG/ML
0.1 INJECTION, SOLUTION EPIDURAL; INFILTRATION; INTRACAUDAL; PERINEURAL AS NEEDED
Status: CANCELLED | OUTPATIENT
Start: 2018-05-07

## 2018-05-07 RX ORDER — ONDANSETRON 2 MG/ML
4 INJECTION INTRAMUSCULAR; INTRAVENOUS AS NEEDED
Status: CANCELLED | OUTPATIENT
Start: 2018-05-07

## 2018-05-07 RX ORDER — GLYCOPYRROLATE 0.2 MG/ML
INJECTION INTRAMUSCULAR; INTRAVENOUS AS NEEDED
Status: DISCONTINUED | OUTPATIENT
Start: 2018-05-07 | End: 2018-05-07 | Stop reason: HOSPADM

## 2018-05-07 RX ORDER — SODIUM CHLORIDE 9 MG/ML
25 INJECTION, SOLUTION INTRAVENOUS CONTINUOUS
Status: CANCELLED | OUTPATIENT
Start: 2018-05-07 | End: 2018-05-08

## 2018-05-07 RX ORDER — DOPAMINE HYDROCHLORIDE 320 MG/100ML
3 INJECTION, SOLUTION INTRAVENOUS CONTINUOUS
Status: DISCONTINUED | OUTPATIENT
Start: 2018-05-07 | End: 2018-05-08

## 2018-05-07 RX ADMIN — Medication 10 ML: at 10:12

## 2018-05-07 RX ADMIN — HYDROMORPHONE HYDROCHLORIDE 1 MG: 2 INJECTION, SOLUTION INTRAMUSCULAR; INTRAVENOUS; SUBCUTANEOUS at 19:00

## 2018-05-07 RX ADMIN — MIDAZOLAM HYDROCHLORIDE 2 MG: 1 INJECTION, SOLUTION INTRAMUSCULAR; INTRAVENOUS at 22:26

## 2018-05-07 RX ADMIN — MUPIROCIN: 20 OINTMENT TOPICAL at 10:20

## 2018-05-07 RX ADMIN — PROPOFOL 70 MG: 10 INJECTION, EMULSION INTRAVENOUS at 16:45

## 2018-05-07 RX ADMIN — GLYCOPYRROLATE 0.6 MG: 0.2 INJECTION INTRAMUSCULAR; INTRAVENOUS at 17:03

## 2018-05-07 RX ADMIN — OXYCODONE HYDROCHLORIDE AND ACETAMINOPHEN 1 TABLET: 5; 325 TABLET ORAL at 04:17

## 2018-05-07 RX ADMIN — ASPIRIN 81 MG 81 MG: 81 TABLET ORAL at 10:06

## 2018-05-07 RX ADMIN — PROPOFOL 50 MG: 10 INJECTION, EMULSION INTRAVENOUS at 14:06

## 2018-05-07 RX ADMIN — AMLODIPINE BESYLATE 10 MG: 5 TABLET ORAL at 10:06

## 2018-05-07 RX ADMIN — ZOLPIDEM TARTRATE 5 MG: 5 TABLET ORAL at 00:03

## 2018-05-07 RX ADMIN — NEOSTIGMINE METHYLSULFATE 4 MG: 1 INJECTION INTRAVENOUS at 17:03

## 2018-05-07 RX ADMIN — CHLORHEXIDINE GLUCONATE 15 ML: 1.2 RINSE ORAL at 03:14

## 2018-05-07 RX ADMIN — SODIUM CHLORIDE: 9 INJECTION, SOLUTION INTRAVENOUS at 13:59

## 2018-05-07 RX ADMIN — Medication 80 MCG: at 14:10

## 2018-05-07 RX ADMIN — ALBUMIN HUMAN 250 ML: 50 SOLUTION INTRAVENOUS at 16:13

## 2018-05-07 RX ADMIN — MIDAZOLAM HYDROCHLORIDE 2 MG: 1 INJECTION, SOLUTION INTRAMUSCULAR; INTRAVENOUS at 17:30

## 2018-05-07 RX ADMIN — Medication 10 ML: at 05:33

## 2018-05-07 RX ADMIN — ROCURONIUM BROMIDE 20 MG: 10 INJECTION, SOLUTION INTRAVENOUS at 15:31

## 2018-05-07 RX ADMIN — FENTANYL CITRATE 50 MCG: 50 INJECTION, SOLUTION INTRAMUSCULAR; INTRAVENOUS at 16:45

## 2018-05-07 RX ADMIN — LIDOCAINE HYDROCHLORIDE 60 MG: 20 INJECTION, SOLUTION EPIDURAL; INFILTRATION; INTRACAUDAL; PERINEURAL at 14:06

## 2018-05-07 RX ADMIN — AMIODARONE HYDROCHLORIDE 400 MG: 200 TABLET ORAL at 10:06

## 2018-05-07 RX ADMIN — HEPARIN SODIUM 5000 UNITS: 1000 INJECTION, SOLUTION INTRAVENOUS; SUBCUTANEOUS at 15:17

## 2018-05-07 RX ADMIN — Medication 2 G: at 14:38

## 2018-05-07 RX ADMIN — PROPOFOL 50 MCG/KG/MIN: 10 INJECTION, EMULSION INTRAVENOUS at 22:32

## 2018-05-07 RX ADMIN — Medication 120 MCG: at 14:09

## 2018-05-07 RX ADMIN — Medication 30 MCG/MIN: at 19:15

## 2018-05-07 RX ADMIN — BIVALIRUDIN 15 ML/HR: 250 INJECTION, POWDER, LYOPHILIZED, FOR SOLUTION INTRAVENOUS at 20:41

## 2018-05-07 RX ADMIN — SODIUM CHLORIDE, SODIUM LACTATE, POTASSIUM CHLORIDE, CALCIUM CHLORIDE: 600; 310; 30; 20 INJECTION, SOLUTION INTRAVENOUS at 13:52

## 2018-05-07 RX ADMIN — HEPARIN SODIUM 1000 UNITS: 1000 INJECTION, SOLUTION INTRAVENOUS; SUBCUTANEOUS at 15:26

## 2018-05-07 RX ADMIN — BUMETANIDE 2 MG: 0.25 INJECTION INTRAMUSCULAR; INTRAVENOUS at 10:11

## 2018-05-07 RX ADMIN — CARVEDILOL 6.25 MG: 6.25 TABLET, FILM COATED ORAL at 10:06

## 2018-05-07 RX ADMIN — HYDROMORPHONE HYDROCHLORIDE 0.4 MG: 2 INJECTION INTRAMUSCULAR; INTRAVENOUS; SUBCUTANEOUS at 22:25

## 2018-05-07 RX ADMIN — FENTANYL CITRATE 50 MCG: 50 INJECTION, SOLUTION INTRAMUSCULAR; INTRAVENOUS at 14:06

## 2018-05-07 RX ADMIN — Medication 100 MCG: at 14:07

## 2018-05-07 RX ADMIN — Medication 10 ML: at 23:24

## 2018-05-07 RX ADMIN — PROPOFOL 50 MG: 10 INJECTION, EMULSION INTRAVENOUS at 14:07

## 2018-05-07 RX ADMIN — ROCURONIUM BROMIDE 50 MG: 10 INJECTION, SOLUTION INTRAVENOUS at 14:06

## 2018-05-07 RX ADMIN — Medication 200 MCG: at 14:11

## 2018-05-07 RX ADMIN — IPRATROPIUM BROMIDE AND ALBUTEROL SULFATE 3 ML: .5; 3 SOLUTION RESPIRATORY (INHALATION) at 20:14

## 2018-05-07 RX ADMIN — EPINEPHRINE 2 MCG/MIN: 1 INJECTION INTRAMUSCULAR; INTRAVENOUS; SUBCUTANEOUS at 19:49

## 2018-05-07 RX ADMIN — DOPAMINE HYDROCHLORIDE 3 MCG/KG/MIN: 320 INJECTION, SOLUTION INTRAVENOUS at 18:55

## 2018-05-07 RX ADMIN — Medication 100 MCG: at 14:08

## 2018-05-07 RX ADMIN — MIDAZOLAM HYDROCHLORIDE 2 MG: 1 INJECTION, SOLUTION INTRAMUSCULAR; INTRAVENOUS at 17:50

## 2018-05-07 RX ADMIN — Medication 200 MCG: at 14:06

## 2018-05-07 NOTE — ANESTHESIA PROCEDURE NOTES
Arterial Line Placement    Start time: 5/7/2018 12:45 PM  End time: 5/7/2018 12:51 PM  Performed by: Papito Scott  Authorized by: Giuliana Jung     Pre-Procedure  Indications:  Arterial pressure monitoring  Preanesthetic Checklist: patient identified, risks and benefits discussed, site marked, patient being monitored, timeout performed and patient being monitored      Procedure:   Prep:  Chlorhexidine  Seldinger Technique?: Yes    Orientation:  Right  Location:  Radial artery  Catheter size:  20 G  Number of attempts:  1  Cont Cardiac Output Sensor: No      Assessment:   Post-procedure:  Line secured and sterile dressing applied  Patient Tolerance:  Patient tolerated the procedure well with no immediate complications

## 2018-05-07 NOTE — ANESTHESIA PREPROCEDURE EVALUATION
Anesthetic History   No history of anesthetic complications            Review of Systems / Medical History  Patient summary reviewed, nursing notes reviewed and pertinent labs reviewed    Pulmonary          Smoker         Neuro/Psych   Within defined limits           Cardiovascular    Hypertension      CHF    Past MI and CAD      Comments: EF 30%   GI/Hepatic/Renal  Within defined limits              Endo/Other  Within defined limits           Other Findings   Comments: H/o cocaine abuse           Physical Exam    Airway  Mallampati: II  TM Distance: 4 - 6 cm  Neck ROM: normal range of motion   Mouth opening: Normal     Cardiovascular    Rhythm: regular  Rate: normal         Dental    Dentition: Full upper dentures and Poor dentition  Comments: Multiple loose lower teeth.  Pt understand the risk of dislodging his lower teeth and agrees to proceed   Pulmonary  Breath sounds clear to auscultation               Abdominal  GI exam deferred       Other Findings            Anesthetic Plan    ASA: 4  Anesthesia type: general    Monitoring Plan: Arterial line, Caldwell-Ervin, GHASSAN and CVP    Post procedure ventilation   Induction: Intravenous  Anesthetic plan and risks discussed with: Patient

## 2018-05-07 NOTE — PROGRESS NOTES
Cardiac Surgery Care Coordinator- Met with Corby Wesley wife and daughter, reviewed plan of care and offered emotional support. Will continue to follow and update.  Amadeo Gan RN

## 2018-05-07 NOTE — PROGRESS NOTES
[facility] ICU Progress Note    Admit Date: 2018    Procedure:       Preop CABG     Subjective:   Pt seen with Dr. David Perez On 6LNC. Tmax 100.4. For impella placement today. Objective:   Vitals:  Blood pressure 116/86, pulse (!) 56, temperature 97.9 °F (36.6 °C), resp. rate 22, height 5' 6\" (1.676 m), weight 188 lb 4.4 oz (85.4 kg), SpO2 93 %. Temp (24hrs), Av.5 °F (36.9 °C), Min:97.6 °F (36.4 °C), Max:100.4 °F (38 °C)    Hemodynamics:   CO: CO (l/min):  (unable to obtain)   CI: CI (l/min/m2):  (unable to obtain)   CVP: CVP (mmHg): 13 mmHg (18 1100)   SVR: SVR (dyne*sec)/cm5:  (unable to obtain) (18 3092)   PAP Systolic: PAP Systolic: 41 (58/64/85 3970)   PAP Diastolic: PAP Diastolic: 33 (/ 8051)   PVR:     SV02: SVO2 (%): 52 % (18 0900)   SCV02:      EKG/Rhythm:    Sinus terri 50s     Oxygen Therapy:  Oxygen Therapy  O2 Sat (%): 93 % (18 0800)  Pulse via Oximetry: 48 beats per minute (18 1000)  O2 Device: Nasal cannula (18 0400)  O2 Flow Rate (L/min): 6 l/min (18 0400)    CXR: worsening pulm edema        Admission Weight: Last Weight   Weight: 188 lb 11.4 oz (85.6 kg) Weight: 188 lb 4.4 oz (85.4 kg)     Intake / Output / Drain:  Current Shift:    Last 24 hrs.:     Intake/Output Summary (Last 24 hours) at 18 0856  Last data filed at 18 0700   Gross per 24 hour   Intake              684 ml   Output             1300 ml   Net             -616 ml       EXAM:  General:  NAD                                                                                            Lungs:   Clear to auscultation bilaterally. Incision:  No erythema, drainage or swelling. Heart:  Regular rate and rhythm, S1, S2 normal, no murmur, click, rub or gallop. Abdomen:   Soft, non-tender. Bowel sounds normal. No masses,  No organomegaly. Extremities:  No edema. PPP. Neurologic:  Gross motor and sensory apparatus intact.      Labs:   Recent Labs      18 1025  18   1809   WBC  6.7  6.8   HGB  11.2*  11.6*   HCT  35.3*  37.1   PLT  180  211   NA  139  137   K  4.4  4.3   BUN  12  11   CREA  1.18  1.31*   GLU  97  163*   INR   --   1.1        Assessment:     Active Problems:    CAD (coronary artery disease) (2018)         Plan/Recommendations/Medical Decision Makin. NSTEMI/CAD: Preop workup and education started. Troponin peaked 0.92. On asa, statin, coreg, nitro gtt. Plan for impella 5.0 today. Plan for CABG Tuesday. Needs PFTs. 2. + cocaine use: counseled on cessation  3. Renal insufficency: Unknown baseline. Creat/BUN stable. Diuresis now. 4. Tobacco use : Reinforce cessation. 5. HTN:  On coreg, norvasc. No ACE d/t surgery. 6. Acute systolic CHF (NYHA class III on adm): Last LVEF 30-35%. On coreg. No ACE/ARB d/t surgery. Bumex now. 7. Pulmonary edema; On 6L NC. Bumex 2 mg IV now. Increase IS and activity as tolerated. 8. Dispo: Remain in CVI.      STS RISK CALCULATOR -- Procedure: CAB Only    Risk of Mortality: 4.448%  Morbidity or Mortality: 58.503%  Long Length of Stay: 18.609%  Short Length of Stay: 13.373%  Permanent Stroke: 1.793%  Prolonged Ventilation: 56.115%  DSW Infection: 0.554%  Renal Failure: 10.305%  Reoperation: 12.838%        Signed By: Cecile Wellington NP

## 2018-05-07 NOTE — PROGRESS NOTES
Bedside shift change report given to Pau Rios RN (oncoming nurse) by Yomaira Sosa (offgoing nurse). Report included the following information SBAR, Kardex, Procedure Summary, Intake/Output, MAR and Recent Results.

## 2018-05-07 NOTE — PERIOP NOTES
Patient interviewed in Main Operating Room/Cardiac Surgery, Pre-op Holding. Patient identifiers verified with name and date of birth. Procedure verified with patient. Consent forms verified. Allergies verified. Patient informed of procedure and plan of care. Questions answered with review. Patient voiced understanding of procedure and plan of care. Patient arrived to the operating room via stretcher. All wheels locked and patient transferred to the OR table with the assistance of four people. MTRE warming wrap present on OR table. Temp set at 37 C and monitored by perfusion. Unit # [id number]. After the induction of anesthesia and intubation, a 16 fr temp sensing flores catheter was placed without difficulty. 10 ml of sterile water was used to inflate the balloon. Clear, yellow urine return noted. Urine output monitored by anesthesia. Fluids:   0.9 % Sodium Chloride:   PRN irrigation    Sterile water:   1000 ml in splash basin for instrument care.

## 2018-05-07 NOTE — PROGRESS NOTES
Problem: Falls - Risk of  Goal: *Absence of Falls  Document César Fall Risk and appropriate interventions in the flowsheet. Outcome: Progressing Towards Goal  Fall Risk Interventions:  Mobility Interventions: Communicate number of staff needed for ambulation/transfer         Medication Interventions: Evaluate medications/consider consulting pharmacy    Elimination Interventions: Call light in reach, Patient to call for help with toileting needs, Toileting schedule/hourly rounds    History of Falls Interventions: Evaluate medications/consider consulting pharmacy        Problem: Pressure Injury - Risk of  Goal: *Prevention of pressure injury  Document Celestine Scale and appropriate interventions in the flowsheet.    Outcome: Progressing Towards Goal  Pressure Injury Interventions:       Moisture Interventions: Apply protective barrier, creams and emollients, Limit adult briefs    Activity Interventions: Pressure redistribution bed/mattress(bed type)    Mobility Interventions: Pressure redistribution bed/mattress (bed type)    Nutrition Interventions: Document food/fluid/supplement intake, Discuss nutritional consult with provider, Offer support with meals,snacks and hydration    Friction and Shear Interventions: Lift sheet, Apply protective barrier, creams and emollients               Problem: Unstable angina/NSTEMI: Day 2  Goal: Activity/Safety  Outcome: Progressing Towards Goal  Pt ambulating with assistance in room and within unit  Goal: Nutrition/Diet  Outcome: Progressing Towards Goal  Pt tolerating regular cardiac diet, NPO after midnight for 5/7 Impella placement  Goal: Medications  Outcome: Progressing Towards Goal  Pt on continued IV nitroglycerin gtt  Goal: Respiratory  Outcome: Progressing Towards Goal  Pt O2 sat >95% on 6L NC  Goal: *Hemodynamically stable  Outcome: Progressing Towards Goal  Pt MAP >65, on nitroglycerin gtt

## 2018-05-07 NOTE — PROGRESS NOTES
[facility] Consult Note      DOS:   5/7/2018  NAME:  Luis Tse   MRN:   [id number]     REFERRING PROVIDER:  Dr. Pulliam Milford: Latasha Segura MD        Chief Complaint:  SOB/Chest pain     HPI: 61y.o. year old male with a history of HTN who was admitted to [facility] with complaint of chest pain and SOB that started yesterday morning, he ruled in for NSTEMI and CSS was consulted for surgical intervention and he was transferred to [facility]. He has a history of HTN but does not take any antihypertensives, he admits to doing cocaine the mornign his chest pain started and is currently smoker. He denies HA, N/V, edema or dizziness but states he continues to be SOB and has orthopnea. The [facility] has been consulted to comment on his acute cardiomyopathy and medical management. 24Hr events:  No acute overnight events  Remains on nitro gtt    Impression / Plan:   Heart Failure Status: NYHA Class III    Acute HFeEF, ICM, EF 15%, 3V CAD   Cont Coreg 6.25mg BID- hold if HR < 50   Non selective BBrx are preferred to use with patients with CAD and recent cocaine use. Cont ASA   Surgical plans per [facility]- impella today and CABG tomorrow. Not a candidate for durable MCS due to recent cocaine use, would need to abstain for at least 6 months. Hold diuretics for now but monitor daily     HTN:   Cont norvasc   Cont Coreg for now      History:  No past medical history on file. Past Surgical History:   Procedure Laterality Date    HX ORTHOPAEDIC      left hip surgery     Social History     Social History    Marital status:      Spouse name: N/A    Number of children: N/A    Years of education: N/A     Occupational History    Not on file.      Social History Main Topics    Smoking status: Former Smoker     Quit date: 5/3/2018    Smokeless tobacco: Not on file    Alcohol use No    Drug use: Not on file    Sexual activity: Not on file     Other Topics Concern    Not on file     Social History Narrative    No narrative on file     Family History   Problem Relation Age of Onset    Diabetes Mother        Current Medications:   Current Facility-Administered Medications   Medication Dose Route Frequency Provider Last Rate Last Dose    bumetanide (BUMEX) injection 2 mg  2 mg IntraVENous ONCE Sheba Hargrove NP        ceFAZolin (ANCEF) 2 g/20 mL in sterile water IV syringe  2 g IntraVENous ON CALL DIGNA Jeffrey        albumin human 5% (BUMINATE) solution 25 g  25 g IntraVENous James Mitchell MD        DOBUTamine (DOBUTREX) 1,000 mg/250 mL (4,000 mcg/mL) infusion  0-10 mcg/kg/min IntraVENous TITRATE Andrea Sahni MD        heparin (porcine) in 0.9% NaCl 30,000 unit/1,000 mL perfusion irrigation 50-1,000 mL  50-1,000 mL Other PRN Andrea Sahni MD        magnesium sulfate 2 g/50 ml IVPB (premix or compounded)  2 g IntraVENous ONCE Andrea Sahni MD        nitroglycerin (Tridil) 200 mcg/ml infusion  0-200 mcg/min IntraVENous TITRATE Andrea Sahni MD        potassium chloride 20 mEq in 50 ml IVPB  20 mEq IntraVENous James Mitchell MD        aminocaproic acid (AMICAR) 15 g in 0.9% sodium chloride 150 mL infusion  1 g/hr IntraVENous CONTINUOUS Andrea Sahni MD        amiodarone (CORDARONE) 900 mg/250 ml D5W infusion  0.5 mg/min IntraVENous CONTINUOUS Andrea Sahni MD        dexmedeTOMidine (PRECEDEX) 400 mcg in 0.9% sodium chloride 100 mL infusion  0.1-0.9 mcg/kg/hr IntraVENous TITRATE Andrea Sahni MD        DOPamine (INTROPIN) 1,600 mg in dextrose 5% 250 mL infusion  5-20 mcg/kg/min IntraVENous TITRATE Andrea Sahni MD        EPINEPHrine (ADRENALIN) 10 mg in 0.9% sodium chloride 250 mL infusion  0-10 mcg/min IntraVENous TITRATE Andrea Sahni MD        insulin regular (NOVOLIN R, HUMULIN R) 100 Units in 0.9% sodium chloride 100 mL infusion  1-50 Units/hr IntraVENous TITRATE Andrea Sahni MD        niCARdipine (CARDENE) 50 mg in 0.9% sodium chloride 100 mL infusion  5-15 mg/hr IntraVENous TITRATE Aamir Meals, MD        NOREPINephrine (LEVOPHED) 32 mg in dextrose 5% 250 mL infusion  2-16 mcg/min IntraVENous TITRATE Aamir Meals, MD        PHENYLephrine (SRUTHI-SYNEPHRINE) 10 mg in 0.9% sodium chloride 250 mL infusion   mcg/min IntraVENous TITRATE Aamir Meals, MD        PHENYLephrine (SRUTHI-SYNEPHRINE) 20 mg in 0.9% sodium chloride 250 mL infusion   mcg/min IntraVENous TITRATE Aamir Meals, MD        PHENYLephrine (SRUTHI-SYNEPHRINE) 100 mg in 0.9% sodium chloride 250 mL infusion   mcg/min IntraVENous TITRATE Aamir Meals, MD        vancomycin (VANCOCIN) 1750 mg in  ml infusion  1,750 mg IntraVENous ONCE Aamir Meals, MD        vancomycin irrigation 1 g/500 mL 0.9% sodium chloride   Irrigation ONCE Aamir Meals, MD        vasopressin (VASOSTRICT) 20 Units in 0.9% sodium chloride 100 mL infusion  0-0.04 Units/min IntraVENous TITRATE Aamir Meals, MD        bacitracin (BACIIM) 50,000/1000ml NS irrigation bottle  50,000 Units Irrigation ONCE Aamir Meals, MD        amiodarone (CORDARONE) 150 mg in dextrose 5% 100 mL bolus infusion  150 mg IntraVENous ONCE Aamir Meals, MD        protamine injection 500 mg  500 mg IntraVENous ONCE Aamir Meals, MD        oxyCODONE-acetaminophen (PERCOCET) 5-325 mg per tablet 1 Tab  1 Tab Oral Q4H PRN DIGNA Cohen   1 Tab at 05/07/18 0417    oxyCODONE-acetaminophen (PERCOCET) 5-325 mg per tablet 2 Tab  2 Tab Oral Q4H PRN DIGNA Cohen        acetaminophen (TYLENOL) tablet 650 mg  650 mg Oral Q4H PRN Aline Cuba, NP   650 mg at 05/04/18 1915    ondansetron (ZOFRAN) injection 4 mg  4 mg IntraVENous Q4H PRN Aline Cuba, NP        amLODIPine (NORVASC) tablet 10 mg  10 mg Oral DAILY Aline Cuba, NP   10 mg at 05/07/18 1006    benzonatate (TESSALON) capsule 100 mg  100 mg Oral TID PRN Kesha Cisneros NP        mupirocin (BACTROBAN) 2 % ointment   Both Nostrils BID Kesha Cisneros NP        nicotine (NICODERM CQ) 7 mg/24 hr patch 1 Patch  1 Patch TransDERmal DAILY PRN Kesha Cisneros NP        zolpidem (AMBIEN) tablet 5 mg  5 mg Oral QHS PRN Kesha Cisneros NP   5 mg at 05/07/18 0003    chlorhexidine (PERIDEX) 0.12 % mouthwash 15 mL  15 mL Oral Q12H Kesha Cisneros, NP   15 mL at 05/07/18 0314    amiodarone (CORDARONE) tablet 400 mg  400 mg Oral Q12H Kesha Cisneros NP   400 mg at 05/07/18 1006    sodium chloride (NS) flush 5-10 mL  5-10 mL IntraVENous Q8H Kesha Cisneros, NP   10 mL at 05/07/18 0533    sodium chloride (NS) flush 5-10 mL  5-10 mL IntraVENous PRN Kesha Cisneros NP   10 mL at 05/05/18 1608    sodium phosphate (FLEET'S) enema 118 mL  1 Enema Rectal PRN Kesha Cisneros NP        HYDROmorphone (DILAUDID) injection 0.4 mg  0.4 mg IntraVENous Q4H PRN Kesha Cisneros NP   0.4 mg at 05/05/18 8353    aspirin chewable tablet 81 mg  81 mg Oral DAILY Magnolia Hampton NP   81 mg at 05/07/18 1006    atorvastatin (LIPITOR) tablet 10 mg  10 mg Oral QHS Magnolia Hampton NP   10 mg at 05/06/18 2103    nitroglycerin (Tridil) 200 mcg/ml infusion  0-200 mcg/min IntraVENous TITRATE Magnolia Hampton NP 5 mL/hr at 05/06/18 2040 16.67 mcg/min at 05/06/18 2040    carvedilol (COREG) tablet 6.25 mg  6.25 mg Oral BID WITH MEALS Livia Motta NP   6.25 mg at 05/07/18 1006    0.9% sodium chloride infusion  9 mL/hr IntraVENous CONTINUOUS Carlos Stratton MD 9 mL/hr at 05/04/18 1800 9 mL/hr at 05/04/18 1800       Allergies: No Known Allergies    ROS:    Review of Systems   Constitutional: Positive for fatigue  HENT: Negative. Respiratory: Positive for cough and shortness of breath. Negative for hemoptysis and sputum production. Cardiovascular: negative for chest pain, positive for orthopnea and PND. Negative for leg swelling. Gastrointestinal: Negative. Genitourinary: Negative. Musculoskeletal: Negative. Skin: Negative. Neurological: Positive for weakness. Physical Exam:   Constitutional: He is oriented to person, place, and time. He appears well-developed and well-nourished. No distress. HENT:   Head: Normocephalic and atraumatic. Eyes: Pupils are equal, round, and reactive to light. Neck: Neck supple. No JVD present. Cardiovascular: Normal rate, S1 normal and S2 normal.  Exam reveals gallop and S4.    Pulses: + pulses , PACs    Pulmonary/Chest: diminished BLL, no cough  Abdominal: Soft. Bowel sounds are normal. He exhibits no distension. Musculoskeletal: Normal range of motion. He exhibits no edema. Neurological: He is alert and oriented to person, place, and time. Skin: Skin is warm and dry.        Vitals:   Visit Vitals    /83 (BP 1 Location: Right arm, BP Patient Position: At rest)    Pulse (!) 54    Temp 98.3 °F (36.8 °C)    Resp 19    Ht 5' 6\" (1.676 m)    Wt 188 lb 4.4 oz (85.4 kg)    SpO2 96%    BMI 30.39 kg/m2         Temp (24hrs), Av.4 °F (36.9 °C), Min:97.6 °F (36.4 °C), Max:100.4 °F (38 °C)      Admission Weight: Last Weight   Weight: 188 lb 11.4 oz (85.6 kg) Weight: 188 lb 4.4 oz (85.4 kg)     Intake / Output / Drain:  Last 24 hrs.:     Intake/Output Summary (Last 24 hours) at 18 1009  Last data filed at 18 0700   Gross per 24 hour   Intake              548 ml   Output             1300 ml   Net             -752 ml       Oxygen Therapy:  Oxygen Therapy  O2 Sat (%): 96 % (18 0956)  Pulse via Oximetry: 48 beats per minute (18 1000)  O2 Device: Nasal cannula (18 1000)  O2 Flow Rate (L/min): 6 l/min (18 0400)    Recent Labs:   Labs Latest Ref Rng & Units 20182018 2018 2018   WBC 4.1 - 11.1 K/uL 6.7 6.8 - 7.3 - - 5.0   RBC 4.10 - 5.70 M/uL 3.85(L) 4.05(L) - 4.26 - - 4.51 Hemoglobin 12.1 - 17.0 g/dL 11. 2(L) 11. 6(L) 12. 0(L) 12.4 - - 12.8   Hematocrit 36.6 - 50.3 % 35. 3(L) 37.1 38.1 39.2 - - 40.7   MCV 80.0 - 99.0 FL 91.7 91.6 - 92.0 - - 90.2   Platelets 543 - 459 K/uL 180 211 - 250 - - 257   Lymphocytes 12 - 49 % 17 18 - 19 - - 21   Monocytes 5 - 13 % 9 6 - 7 - - 6   Eosinophils 0 - 7 % 5 5 - 4 - - 5   Basophils 0 - 1 % 0 0 - 0 - - 0   Albumin 3.5 - 5.0 g/dL 2. 7(L) 2. 8(L) - - - 3. 0(L) -   Calcium 8.5 - 10.1 MG/DL 8.4(L) 8.4(L) - - - 8.7 8.4(L)   SGOT 15 - 37 U/L 15 15 - - - 19 -   Glucose 65 - 100 mg/dL 97 163(H) - - - 115(H) 142(H)   BUN 6 - 20 MG/DL 12 11 - - - 8 7   Creatinine 0.70 - 1.30 MG/DL 1.18 1.31(H) - - - 1.17 1.39(H)   Sodium 136 - 145 mmol/L 139 137 - - - 139 139   Potassium 3.5 - 5.1 mmol/L 4.4 4.3 - - - 4.6 4.5   TSH 0.36 - 3.74 uIU/mL - - - - 1.19 - -   Some recent data might be hidden     EKG:   EKG Results     Procedure 720 Value Units Date/Time    SCANNED CARDIAC RHYTHM STRIP [[id number]] Collected:  05/07/18 2638    Order Status:  Completed Updated:  05/07/18 0640    SCANNED CARDIAC RHYTHM STRIP [[id number]] Collected:  05/07/18 0602    Order Status:  Completed Updated:  05/07/18 0604    SCANNED CARDIAC RHYTHM STRIP [[id number]] Collected:  05/05/18 0629    Order Status:  Completed Updated:  05/05/18 0631    EKG, 12 LEAD, INITIAL [[id number]]     Order Status:  Sent         Echocardiogram:   5/3/18:  Left ventricle: Apical akinesis, Lateral hypokinesis, Normal septal motion  The ventricle was moderately to severely dilated. Systolic function was  normal. Ejection fraction was estimated in the range of 30 % to 35 %. There was severe diffuse hypokinesis with regional variations. Left atrium: The atrium was moderately to severely dilated. Mitral valve: There was mild to moderate regurgitation. Tricuspid valve: There was moderate regurgitation.     Cardiac Catheterizations:   5/4: Cardiac catheterization 5/4/18:  L Main: >50% osteal      LAD: 40-50% proximal, 70% after first diagonal, 80-90% mid lesion      LCflex: Diffuse mild disease, OM1 small vessel difffuse 70-90% disease.      RCA: small dominant vessel, 50% proximal lesion, 70-90% tandem lesions proximal-md lesion, 80% distal lesion      LVEDP: 30 mHg      LVEF: 15% diffuse hypokinesis      No significant gradient across aortic valve.      PCI: None    Radiology (CXR, CT scans):   CXR Results  (Last 48 hours)               05/07/18 0443  XR CHEST PORT Final result    Impression:  IMPRESSION:   1. Moderate pulmonary edema has increased. .       Narrative:  EXAM:  XR CHEST PORT       INDICATION:  Coronary artery disease, workup for heart surgery       COMPARISON:  5/6/2018       FINDINGS: A portable AP radiograph of the chest was obtained at 0407 hours. The   patient is on a cardiac monitor. Right IJ cordis catheter overlies the SVC. There is no change in cardiomegaly. Moderate bilateral pulmonary edema and   vascular congestion persists this has slightly increased especially in the right   perihilar region. Small right pleural effusion is present. Sclerotic densities in the region of left humeral head could be related to   avascular necrosis or posttraumatic.           05/06/18 0423  XR CHEST PORT Final result    Impression:  IMPRESSION:   Worsening interstitial edema with possible early pulmonary edema       Narrative:  INDICATION: Coronary artery disease       COMPARISON: previous day's exam       A single frontal view was obtained. The time of this study is 0405 hours. Catheter position is unchanged. There is developing and worsening interstitial   edema possibly with early pulmonary edema. Cardiomegaly noted. Gilbert Barajas NP  [address], [address] [place] [address], [address] [place] [address]  Office [phone]  Fax [phone]  24 hour VAD/HF Pager: [phone]           Patient seen and examined.  Data and note reviewed. I have discussed and agree with the plans as noted. 61year old male with a history of NSTEMI, cocaine abuse, who was transferred to [facility] for high risk CABG. Plan for Impella supported CABG today. He is not a candidate for durable LVAD due to ongoing cocaine use. Thank you for allowing us to participate in your patient's care. Danni Wilhelm MD, [place] [id number] Director    [address] [place] [address], [address] [place] [address] [phone]  Fax [phone]

## 2018-05-07 NOTE — PROGRESS NOTES
Bedside shift change report given to Chuyita Segura (oncoming nurse) by Ericka Gan, RN (offgoing nurse). Report included the following information SBAR, Kardex, Intake/Output, MAR and Recent Results.

## 2018-05-07 NOTE — PROGRESS NOTES
1847 Pt's MAPs began trending down. Pt is currently on Dopamine as ordered by Dr. Rivera Argueta turned off now propofol is infusing. Albumin hung to infuse while Dobutamine drip is being ordered. OR concentration of Epinephrine 2mg/250 hung at 4 mg./min and Dr. Radha Black  here and ordered DOpamine off and Dobutamine on at 3 MCG/kg/min. Lenny drip started at 30 mcg/min briefly. 8878 Pt's systolic now improved. Dr. Radha Black here updated. Bedside and Verbal shift change report given to Satish Adams RN  (oncoming nurse) by Micah Guerrero RN  (offgoing nurse).  Report included the following information SBAR, Procedure Summary, Recent Results, Med Rec Status and Cardiac Rhythm SB.

## 2018-05-07 NOTE — BRIEF OP NOTE
BRIEF OP NOTE  Pre-Op Diagnosis:   Ischemic Cardiomyopathy with Acute Systolic & Diastolic Heart Failure  3 Vessel CAD    Post-Op Diagnosis:   Ischemic Cardiomyopathy with Acute Systolic & Diastolic Heart Failure   3 Vessel CAD    Procedure:  Right Axillary Cutdown with Placement of 8mm Hemashield Graft  Insertion of 5.0 Impella    Surgeon: Vianey Dale MD    Assistant(s): Tho Shah PA-C, TERRY Dunbra PA-C    Anesthesia: General     Infusions: Amiodarone, precedex, insulin, chetan    Estimated Blood Loss: 50cc    Cell Saver: N/A    Specimens: * No specimens in log *    Drains and pacing wires: None    Complications: None    Findings: LV Insufficiency, EF 15% by GHASSAN    Implants:   Implant Name Type Inv.  Item Serial No.  Lot No. LRB No. Used Action   BIOGLNLP [employer] SURGICAL ADHESIVE   N/A [employer] [id number] Right 1 Implanted   GRAFT [facility] W[place]N STR [id number] -- HEMASHIELD PLATINUM - [id number]   GRAFT HEMSHLD W[place]N STR [id number] -- HEMASHIELD PLATINUM [id number] [employer]ET CARDIOVASCLR [id number] Right 1 Implanted

## 2018-05-07 NOTE — PROGRESS NOTES
1930 - Bedside and Verbal shift change report given to Margy Doshi RN (oncoming nurse) by Bhavin Garrett RN (offgoing nurse). Report included the following information SBAR, Kardex, Intake/Output, MAR, Recent Results, Cardiac Rhythm Sinus Rhythm and Alarm Parameters . 2042 Cedric Goodson RN at bedside. Pt signed consents for blood product transfusion, impella placement, CABG procedure, Arterial line placement. Opportunity for questions and concerns provided. 0400 - Radiology at bedside to perform CXR.  0417 - Pt pain 6/10 right neck pain. 1 tab percocet given  0600 - Pt CHG bath performed. 0730 - Bedside and Verbal shift change report given to TERRANCE Prince (oncoming nurse) by Margy Doshi RN (offgoing nurse). Report included the following information SBAR, Kardex, Intake/Output, MAR, Recent Results, Cardiac Rhythm Sinus Rhythm and Alarm Parameters .

## 2018-05-08 ENCOUNTER — APPOINTMENT (OUTPATIENT)
Dept: GENERAL RADIOLOGY | Age: 58
DRG: 215 | End: 2018-05-08
Attending: THORACIC SURGERY (CARDIOTHORACIC VASCULAR SURGERY)
Payer: COMMERCIAL

## 2018-05-08 ENCOUNTER — ANESTHESIA (OUTPATIENT)
Dept: CARDIOTHORACIC SURGERY | Age: 58
DRG: 215 | End: 2018-05-08
Payer: COMMERCIAL

## 2018-05-08 LAB
ABO + RH BLD: NORMAL
ADMINISTERED INITIALS, ADMINIT: NORMAL
ALBUMIN SERPL-MCNC: 3 G/DL (ref 3.5–5)
ALBUMIN SERPL-MCNC: 3.5 G/DL (ref 3.5–5)
ALBUMIN/GLOB SERPL: 0.9 {RATIO} (ref 1.1–2.2)
ALBUMIN/GLOB SERPL: 1.5 {RATIO} (ref 1.1–2.2)
ALP SERPL-CCNC: 38 U/L (ref 45–117)
ALP SERPL-CCNC: 46 U/L (ref 45–117)
ALT SERPL-CCNC: 16 U/L (ref 12–78)
ALT SERPL-CCNC: 17 U/L (ref 12–78)
ANION GAP SERPL CALC-SCNC: 6 MMOL/L (ref 5–15)
ANION GAP SERPL CALC-SCNC: 7 MMOL/L (ref 5–15)
APTT PPP: 28.2 SEC (ref 22.1–32)
APTT PPP: 29.7 SEC (ref 22.1–32)
APTT PPP: 55.5 SEC (ref 22.1–32)
ARTERIAL PATENCY WRIST A: ABNORMAL
AST SERPL-CCNC: 31 U/L (ref 15–37)
AST SERPL-CCNC: 50 U/L (ref 15–37)
BASE EXCESS BLD CALC-SCNC: 3 MMOL/L
BASE EXCESS BLD CALC-SCNC: 5 MMOL/L
BASE EXCESS BLDV CALC-SCNC: 4 MMOL/L
BASE EXCESS BLDV CALC-SCNC: 4 MMOL/L
BDY SITE: ABNORMAL
BILIRUB SERPL-MCNC: 1.7 MG/DL (ref 0.2–1)
BILIRUB SERPL-MCNC: 1.7 MG/DL (ref 0.2–1)
BLD PROD TYP BPU: NORMAL
BLD PROD TYP BPU: NORMAL
BLOOD GROUP ANTIBODIES SERPL: NORMAL
BPU ID: NORMAL
BPU ID: NORMAL
BUN SERPL-MCNC: 15 MG/DL (ref 6–20)
BUN SERPL-MCNC: 15 MG/DL (ref 6–20)
BUN/CREAT SERPL: 12 (ref 12–20)
BUN/CREAT SERPL: 12 (ref 12–20)
CALCIUM SERPL-MCNC: 8.1 MG/DL (ref 8.5–10.1)
CALCIUM SERPL-MCNC: 8.2 MG/DL (ref 8.5–10.1)
CHLORIDE SERPL-SCNC: 107 MMOL/L (ref 97–108)
CHLORIDE SERPL-SCNC: 108 MMOL/L (ref 97–108)
CO2 SERPL-SCNC: 26 MMOL/L (ref 21–32)
CO2 SERPL-SCNC: 28 MMOL/L (ref 21–32)
CREAT SERPL-MCNC: 1.25 MG/DL (ref 0.7–1.3)
CREAT SERPL-MCNC: 1.29 MG/DL (ref 0.7–1.3)
CROSSMATCH RESULT,%XM: NORMAL
CROSSMATCH RESULT,%XM: NORMAL
D50 ADMINISTERED, D50ADM: 0 ML
D50 ORDER, D50ORD: 0 ML
ERYTHROCYTE [DISTWIDTH] IN BLOOD BY AUTOMATED COUNT: 13.1 % (ref 11.5–14.5)
ERYTHROCYTE [DISTWIDTH] IN BLOOD BY AUTOMATED COUNT: 13.2 % (ref 11.5–14.5)
FIBRINOGEN PPP-MCNC: 297 MG/DL (ref 200–475)
GAS FLOW.O2 O2 DELIVERY SYS: ABNORMAL L/MIN
GAS FLOW.O2 SETTING OXYMISER: 14 BPM
GLOBULIN SER CALC-MCNC: 2.4 G/DL (ref 2–4)
GLOBULIN SER CALC-MCNC: 3.4 G/DL (ref 2–4)
GLSCOM COMMENTS: NORMAL
GLUCOSE BLD STRIP.AUTO-MCNC: 126 MG/DL (ref 65–100)
GLUCOSE BLD STRIP.AUTO-MCNC: 136 MG/DL (ref 65–100)
GLUCOSE BLD STRIP.AUTO-MCNC: 150 MG/DL (ref 65–100)
GLUCOSE BLD STRIP.AUTO-MCNC: 154 MG/DL (ref 65–100)
GLUCOSE SERPL-MCNC: 134 MG/DL (ref 65–100)
GLUCOSE SERPL-MCNC: 96 MG/DL (ref 65–100)
GLUCOSE, GLC: 118 MG/DL
GLUCOSE, GLC: 122 MG/DL
GLUCOSE, GLC: 126 MG/DL
GLUCOSE, GLC: 132 MG/DL
GLUCOSE, GLC: 132 MG/DL
GLUCOSE, GLC: 136 MG/DL
GLUCOSE, GLC: 150 MG/DL
GLUCOSE, GLC: 154 MG/DL
HCO3 BLD-SCNC: 26.7 MMOL/L (ref 22–26)
HCO3 BLD-SCNC: 29.1 MMOL/L (ref 22–26)
HCO3 BLDV-SCNC: 28.4 MMOL/L (ref 23–28)
HCO3 BLDV-SCNC: 28.6 MMOL/L (ref 23–28)
HCT VFR BLD AUTO: 24.8 % (ref 36.6–50.3)
HCT VFR BLD AUTO: 30.4 % (ref 36.6–50.3)
HGB BLD-MCNC: 7.8 G/DL (ref 12.1–17)
HGB BLD-MCNC: 9.6 G/DL (ref 12.1–17)
HIGH TARGET, HITG: 130 MG/DL
HIGH TARGET, HITG: 140 MG/DL
INR PPP: 1.1 (ref 0.9–1.1)
INR PPP: 1.1 (ref 0.9–1.1)
INR PPP: 1.3 (ref 0.9–1.1)
INSULIN ADMINSTERED, INSADM: 1.7 UNITS/HOUR
INSULIN ADMINSTERED, INSADM: 1.9 UNITS/HOUR
INSULIN ADMINSTERED, INSADM: 2 UNITS/HOUR
INSULIN ADMINSTERED, INSADM: 2.2 UNITS/HOUR
INSULIN ADMINSTERED, INSADM: 2.2 UNITS/HOUR
INSULIN ADMINSTERED, INSADM: 2.7 UNITS/HOUR
INSULIN ADMINSTERED, INSADM: 3 UNITS/HOUR
INSULIN ADMINSTERED, INSADM: 4.7 UNITS/HOUR
INSULIN ORDER, INSORD: 1.7 UNITS/HOUR
INSULIN ORDER, INSORD: 1.9 UNITS/HOUR
INSULIN ORDER, INSORD: 2 UNITS/HOUR
INSULIN ORDER, INSORD: 2.2 UNITS/HOUR
INSULIN ORDER, INSORD: 2.2 UNITS/HOUR
INSULIN ORDER, INSORD: 2.7 UNITS/HOUR
INSULIN ORDER, INSORD: 3 UNITS/HOUR
INSULIN ORDER, INSORD: 4.7 UNITS/HOUR
LACTATE SERPL-SCNC: 0.6 MMOL/L (ref 0.4–2)
LDH SERPL L TO P-CCNC: 333 U/L (ref 85–241)
LOW TARGET, LOT: 100 MG/DL
LOW TARGET, LOT: 95 MG/DL
MAGNESIUM SERPL-MCNC: 1.9 MG/DL (ref 1.6–2.4)
MAGNESIUM SERPL-MCNC: 3.3 MG/DL (ref 1.6–2.4)
MCH RBC QN AUTO: 28.7 PG (ref 26–34)
MCH RBC QN AUTO: 28.7 PG (ref 26–34)
MCHC RBC AUTO-ENTMCNC: 31.5 G/DL (ref 30–36.5)
MCHC RBC AUTO-ENTMCNC: 31.6 G/DL (ref 30–36.5)
MCV RBC AUTO: 90.7 FL (ref 80–99)
MCV RBC AUTO: 91.2 FL (ref 80–99)
MINUTES UNTIL NEXT BG, NBG: 60 MIN
MULTIPLIER, MUL: 0.03
MULTIPLIER, MUL: 0.04
MULTIPLIER, MUL: 0.05
NRBC # BLD: 0 K/UL (ref 0–0.01)
NRBC # BLD: 0 K/UL (ref 0–0.01)
NRBC BLD-RTO: 0 PER 100 WBC
NRBC BLD-RTO: 0 PER 100 WBC
O2/TOTAL GAS SETTING VFR VENT: 0.6 %
O2/TOTAL GAS SETTING VFR VENT: 0.6 %
O2/TOTAL GAS SETTING VFR VENT: 100 %
O2/TOTAL GAS SETTING VFR VENT: 100 %
ORDER INITIALS, ORDINIT: NORMAL
PCO2 BLD: 37.3 MMHG (ref 35–45)
PCO2 BLD: 40.4 MMHG (ref 35–45)
PCO2 BLDV: 43.7 MMHG (ref 41–51)
PCO2 BLDV: 46.4 MMHG (ref 41–51)
PEEP RESPIRATORY: 5 CMH2O
PH BLD: 7.46 [PH] (ref 7.35–7.45)
PH BLD: 7.46 [PH] (ref 7.35–7.45)
PH BLDV: 7.4 [PH] (ref 7.32–7.42)
PH BLDV: 7.42 [PH] (ref 7.32–7.42)
PLATELET # BLD AUTO: 101 K/UL (ref 150–400)
PLATELET # BLD AUTO: 150 K/UL (ref 150–400)
PLATELET # BLD AUTO: 81 K/UL (ref 150–400)
PMV BLD AUTO: 10 FL (ref 8.9–12.9)
PMV BLD AUTO: 10.4 FL (ref 8.9–12.9)
PO2 BLD: 86 MMHG (ref 80–100)
PO2 BLD: 93 MMHG (ref 80–100)
PO2 BLDV: 36 MMHG (ref 25–40)
PO2 BLDV: 37 MMHG (ref 25–40)
POTASSIUM SERPL-SCNC: 3.8 MMOL/L (ref 3.5–5.1)
POTASSIUM SERPL-SCNC: 4.1 MMOL/L (ref 3.5–5.1)
PROT SERPL-MCNC: 5.9 G/DL (ref 6.4–8.2)
PROT SERPL-MCNC: 6.4 G/DL (ref 6.4–8.2)
PROTHROMBIN TIME: 11.2 SEC (ref 9–11.1)
PROTHROMBIN TIME: 11.5 SEC (ref 9–11.1)
PROTHROMBIN TIME: 13.3 SEC (ref 9–11.1)
RBC # BLD AUTO: 2.72 M/UL (ref 4.1–5.7)
RBC # BLD AUTO: 3.35 M/UL (ref 4.1–5.7)
SAO2 % BLD: 97 % (ref 92–97)
SAO2 % BLD: 98 % (ref 92–97)
SAO2 % BLDV: 69 % (ref 65–88)
SAO2 % BLDV: 69 % (ref 65–88)
SERVICE CMNT-IMP: ABNORMAL
SODIUM SERPL-SCNC: 140 MMOL/L (ref 136–145)
SODIUM SERPL-SCNC: 142 MMOL/L (ref 136–145)
SPECIMEN EXP DATE BLD: NORMAL
SPECIMEN TYPE: ABNORMAL
STATUS OF UNIT,%ST: NORMAL
STATUS OF UNIT,%ST: NORMAL
THERAPEUTIC RANGE,PTTT: ABNORMAL SECS (ref 58–77)
THERAPEUTIC RANGE,PTTT: ABNORMAL SECS (ref 58–77)
THERAPEUTIC RANGE,PTTT: NORMAL SECS (ref 58–77)
UNIT DIVISION, %UDIV: 0
UNIT DIVISION, %UDIV: 0
VENTILATION MODE VENT: ABNORMAL
VOLUME CONTROL IVLC: YES
VT SETTING VENT: 500 ML
WBC # BLD AUTO: 5.4 K/UL (ref 4.1–11.1)
WBC # BLD AUTO: 5.6 K/UL (ref 4.1–11.1)

## 2018-05-08 PROCEDURE — 85049 AUTOMATED PLATELET COUNT: CPT | Performed by: THORACIC SURGERY (CARDIOTHORACIC VASCULAR SURGERY)

## 2018-05-08 PROCEDURE — 83605 ASSAY OF LACTIC ACID: CPT | Performed by: THORACIC SURGERY (CARDIOTHORACIC VASCULAR SURGERY)

## 2018-05-08 PROCEDURE — 77030019908 HC STETH ESOPH SIMS -A: Performed by: ANESTHESIOLOGY

## 2018-05-08 PROCEDURE — 76010000118 HC CV SURG 6 TO 6.5 HR: Performed by: THORACIC SURGERY (CARDIOTHORACIC VASCULAR SURGERY)

## 2018-05-08 PROCEDURE — 83735 ASSAY OF MAGNESIUM: CPT | Performed by: THORACIC SURGERY (CARDIOTHORACIC VASCULAR SURGERY)

## 2018-05-08 PROCEDURE — 77030006247 HC LD PCMKR MYOCRD BPLR TEMP MEDT -B: Performed by: THORACIC SURGERY (CARDIOTHORACIC VASCULAR SURGERY)

## 2018-05-08 PROCEDURE — 77030020256 HC SOL INJ NACL 0.9%  500ML: Performed by: THORACIC SURGERY (CARDIOTHORACIC VASCULAR SURGERY)

## 2018-05-08 PROCEDURE — 5A1221Z PERFORMANCE OF CARDIAC OUTPUT, CONTINUOUS: ICD-10-PCS | Performed by: THORACIC SURGERY (CARDIOTHORACIC VASCULAR SURGERY)

## 2018-05-08 PROCEDURE — 74011000258 HC RX REV CODE- 258

## 2018-05-08 PROCEDURE — 80053 COMPREHEN METABOLIC PANEL: CPT | Performed by: THORACIC SURGERY (CARDIOTHORACIC VASCULAR SURGERY)

## 2018-05-08 PROCEDURE — 65610000003 HC RM ICU SURGICAL

## 2018-05-08 PROCEDURE — 77030010818: Performed by: THORACIC SURGERY (CARDIOTHORACIC VASCULAR SURGERY)

## 2018-05-08 PROCEDURE — 74011000250 HC RX REV CODE- 250: Performed by: NURSE PRACTITIONER

## 2018-05-08 PROCEDURE — 74011250636 HC RX REV CODE- 250/636

## 2018-05-08 PROCEDURE — 94640 AIRWAY INHALATION TREATMENT: CPT

## 2018-05-08 PROCEDURE — 74011000272 HC RX REV CODE- 272: Performed by: THORACIC SURGERY (CARDIOTHORACIC VASCULAR SURGERY)

## 2018-05-08 PROCEDURE — 83615 LACTATE (LD) (LDH) ENZYME: CPT | Performed by: THORACIC SURGERY (CARDIOTHORACIC VASCULAR SURGERY)

## 2018-05-08 PROCEDURE — 74011636637 HC RX REV CODE- 636/637

## 2018-05-08 PROCEDURE — P9059 PLASMA, FRZ BETWEEN 8-24HOUR: HCPCS | Performed by: THORACIC SURGERY (CARDIOTHORACIC VASCULAR SURGERY)

## 2018-05-08 PROCEDURE — 77030013861 HC PNCH AORT CLNCUT QUES -B: Performed by: THORACIC SURGERY (CARDIOTHORACIC VASCULAR SURGERY)

## 2018-05-08 PROCEDURE — 74011000250 HC RX REV CODE- 250: Performed by: THORACIC SURGERY (CARDIOTHORACIC VASCULAR SURGERY)

## 2018-05-08 PROCEDURE — 77030002933 HC SUT MCRYL J&J -A: Performed by: THORACIC SURGERY (CARDIOTHORACIC VASCULAR SURGERY)

## 2018-05-08 PROCEDURE — 77030018729 HC ELECTRD DEFIB PAD CARD -B

## 2018-05-08 PROCEDURE — 77030018719 HC DRSG PTCH ANTIMIC J&J -A

## 2018-05-08 PROCEDURE — 74011250636 HC RX REV CODE- 250/636: Performed by: NURSE PRACTITIONER

## 2018-05-08 PROCEDURE — 77030020747 HC TU INSUF ENDOSC TELE -A: Performed by: THORACIC SURGERY (CARDIOTHORACIC VASCULAR SURGERY)

## 2018-05-08 PROCEDURE — 77030002973 HC SUT PLEDG CV SFT OVL TELE -B: Performed by: THORACIC SURGERY (CARDIOTHORACIC VASCULAR SURGERY)

## 2018-05-08 PROCEDURE — 02100Z9 BYPASS CORONARY ARTERY, ONE ARTERY FROM LEFT INTERNAL MAMMARY, OPEN APPROACH: ICD-10-PCS | Performed by: THORACIC SURGERY (CARDIOTHORACIC VASCULAR SURGERY)

## 2018-05-08 PROCEDURE — 85027 COMPLETE CBC AUTOMATED: CPT | Performed by: THORACIC SURGERY (CARDIOTHORACIC VASCULAR SURGERY)

## 2018-05-08 PROCEDURE — 77030018846 HC SOL IRR STRL H20 ICUM -A: Performed by: THORACIC SURGERY (CARDIOTHORACIC VASCULAR SURGERY)

## 2018-05-08 PROCEDURE — 77030002524 HC INSTR CLMP FGRTY EDWD -B: Performed by: THORACIC SURGERY (CARDIOTHORACIC VASCULAR SURGERY)

## 2018-05-08 PROCEDURE — 6A550Z3 PHERESIS OF PLASMA, SINGLE: ICD-10-PCS | Performed by: THORACIC SURGERY (CARDIOTHORACIC VASCULAR SURGERY)

## 2018-05-08 PROCEDURE — 77030010389 HC WRE ATR PACE AEMC -B: Performed by: THORACIC SURGERY (CARDIOTHORACIC VASCULAR SURGERY)

## 2018-05-08 PROCEDURE — 30233N1 TRANSFUSION OF NONAUTOLOGOUS RED BLOOD CELLS INTO PERIPHERAL VEIN, PERCUTANEOUS APPROACH: ICD-10-PCS | Performed by: THORACIC SURGERY (CARDIOTHORACIC VASCULAR SURGERY)

## 2018-05-08 PROCEDURE — 85730 THROMBOPLASTIN TIME PARTIAL: CPT | Performed by: THORACIC SURGERY (CARDIOTHORACIC VASCULAR SURGERY)

## 2018-05-08 PROCEDURE — 74011000250 HC RX REV CODE- 250: Performed by: PHYSICIAN ASSISTANT

## 2018-05-08 PROCEDURE — 77030018315 HC SEAL FBRN TISSL10ML BAXT -F: Performed by: THORACIC SURGERY (CARDIOTHORACIC VASCULAR SURGERY)

## 2018-05-08 PROCEDURE — 71045 X-RAY EXAM CHEST 1 VIEW: CPT

## 2018-05-08 PROCEDURE — 30233K1 TRANSFUSION OF NONAUTOLOGOUS FROZEN PLASMA INTO PERIPHERAL VEIN, PERCUTANEOUS APPROACH: ICD-10-PCS | Performed by: THORACIC SURGERY (CARDIOTHORACIC VASCULAR SURGERY)

## 2018-05-08 PROCEDURE — 77030018836 HC SOL IRR NACL ICUM -A: Performed by: THORACIC SURGERY (CARDIOTHORACIC VASCULAR SURGERY)

## 2018-05-08 PROCEDURE — 94003 VENT MGMT INPAT SUBQ DAY: CPT

## 2018-05-08 PROCEDURE — 74011250636 HC RX REV CODE- 250/636: Performed by: INTERNAL MEDICINE

## 2018-05-08 PROCEDURE — 86923 COMPATIBILITY TEST ELECTRIC: CPT | Performed by: THORACIC SURGERY (CARDIOTHORACIC VASCULAR SURGERY)

## 2018-05-08 PROCEDURE — 77030018835 HC SOL IRR LR ICUM -A: Performed by: THORACIC SURGERY (CARDIOTHORACIC VASCULAR SURGERY)

## 2018-05-08 PROCEDURE — 74011250636 HC RX REV CODE- 250/636: Performed by: THORACIC SURGERY (CARDIOTHORACIC VASCULAR SURGERY)

## 2018-05-08 PROCEDURE — 74011250637 HC RX REV CODE- 250/637: Performed by: NURSE PRACTITIONER

## 2018-05-08 PROCEDURE — 82962 GLUCOSE BLOOD TEST: CPT

## 2018-05-08 PROCEDURE — 76060000043 HC ANESTHESIA 6 TO 6.5 HR: Performed by: THORACIC SURGERY (CARDIOTHORACIC VASCULAR SURGERY)

## 2018-05-08 PROCEDURE — 74011000258 HC RX REV CODE- 258: Performed by: THORACIC SURGERY (CARDIOTHORACIC VASCULAR SURGERY)

## 2018-05-08 PROCEDURE — 77030020061 HC IV BLD WRMR ADMIN SET 3M -B: Performed by: ANESTHESIOLOGY

## 2018-05-08 PROCEDURE — 74011250637 HC RX REV CODE- 250/637

## 2018-05-08 PROCEDURE — 021109W BYPASS CORONARY ARTERY, TWO ARTERIES FROM AORTA WITH AUTOLOGOUS VENOUS TISSUE, OPEN APPROACH: ICD-10-PCS | Performed by: THORACIC SURGERY (CARDIOTHORACIC VASCULAR SURGERY)

## 2018-05-08 PROCEDURE — P9047 ALBUMIN (HUMAN), 25%, 50ML: HCPCS

## 2018-05-08 PROCEDURE — 86900 BLOOD TYPING SEROLOGIC ABO: CPT | Performed by: THORACIC SURGERY (CARDIOTHORACIC VASCULAR SURGERY)

## 2018-05-08 PROCEDURE — 74011000258 HC RX REV CODE- 258: Performed by: NURSE PRACTITIONER

## 2018-05-08 PROCEDURE — 77030013798 HC KT TRNSDUC PRSSR EDWD -B: Performed by: THORACIC SURGERY (CARDIOTHORACIC VASCULAR SURGERY)

## 2018-05-08 PROCEDURE — 85025 COMPLETE CBC W/AUTO DIFF WBC: CPT | Performed by: NURSE PRACTITIONER

## 2018-05-08 PROCEDURE — 77030002987 HC SUT PROL J&J -B: Performed by: THORACIC SURGERY (CARDIOTHORACIC VASCULAR SURGERY)

## 2018-05-08 PROCEDURE — 83735 ASSAY OF MAGNESIUM: CPT | Performed by: NURSE PRACTITIONER

## 2018-05-08 PROCEDURE — 77030020263 HC SOL INJ SOD CL0.9% LFCR 1000ML: Performed by: THORACIC SURGERY (CARDIOTHORACIC VASCULAR SURGERY)

## 2018-05-08 PROCEDURE — 77030034420 HC BN PTTY HEMOSORB ABRY -B: Performed by: THORACIC SURGERY (CARDIOTHORACIC VASCULAR SURGERY)

## 2018-05-08 PROCEDURE — 77030010796: Performed by: THORACIC SURGERY (CARDIOTHORACIC VASCULAR SURGERY)

## 2018-05-08 PROCEDURE — 06BQ4ZZ EXCISION OF LEFT SAPHENOUS VEIN, PERCUTANEOUS ENDOSCOPIC APPROACH: ICD-10-PCS | Performed by: THORACIC SURGERY (CARDIOTHORACIC VASCULAR SURGERY)

## 2018-05-08 PROCEDURE — 77030019579 HC CBL PACE DISP REMG -B: Performed by: THORACIC SURGERY (CARDIOTHORACIC VASCULAR SURGERY)

## 2018-05-08 PROCEDURE — 77030011640 HC PAD GRND REM COVD -A: Performed by: THORACIC SURGERY (CARDIOTHORACIC VASCULAR SURGERY)

## 2018-05-08 PROCEDURE — 77030003010 HC SUT SURG STL J&J -B: Performed by: THORACIC SURGERY (CARDIOTHORACIC VASCULAR SURGERY)

## 2018-05-08 PROCEDURE — 36415 COLL VENOUS BLD VENIPUNCTURE: CPT | Performed by: THORACIC SURGERY (CARDIOTHORACIC VASCULAR SURGERY)

## 2018-05-08 PROCEDURE — 99233 SBSQ HOSP IP/OBS HIGH 50: CPT | Performed by: INTERNAL MEDICINE

## 2018-05-08 PROCEDURE — 77030006920 HC BLD STRNL SAW STRY -B: Performed by: THORACIC SURGERY (CARDIOTHORACIC VASCULAR SURGERY)

## 2018-05-08 PROCEDURE — 74011000250 HC RX REV CODE- 250

## 2018-05-08 PROCEDURE — 77030013798 HC KT TRNSDUC PRSSR EDWD -B: Performed by: ANESTHESIOLOGY

## 2018-05-08 PROCEDURE — 77030022199 HC SYS HARV VESL GTNG -G1: Performed by: THORACIC SURGERY (CARDIOTHORACIC VASCULAR SURGERY)

## 2018-05-08 PROCEDURE — 77030012407 HC DRN WND BARD -B: Performed by: THORACIC SURGERY (CARDIOTHORACIC VASCULAR SURGERY)

## 2018-05-08 PROCEDURE — 77030005401 HC CATH RAD ARRO -A: Performed by: ANESTHESIOLOGY

## 2018-05-08 PROCEDURE — 77030010938 HC CLP LIG TELE -A: Performed by: THORACIC SURGERY (CARDIOTHORACIC VASCULAR SURGERY)

## 2018-05-08 PROCEDURE — 80053 COMPREHEN METABOLIC PANEL: CPT | Performed by: NURSE PRACTITIONER

## 2018-05-08 PROCEDURE — 77030039266 HC ADH SKN EXOFIN S2SG -A: Performed by: THORACIC SURGERY (CARDIOTHORACIC VASCULAR SURGERY)

## 2018-05-08 PROCEDURE — P9035 PLATELET PHERES LEUKOREDUCED: HCPCS | Performed by: THORACIC SURGERY (CARDIOTHORACIC VASCULAR SURGERY)

## 2018-05-08 PROCEDURE — 74011000272 HC RX REV CODE- 272

## 2018-05-08 PROCEDURE — 82803 BLOOD GASES ANY COMBINATION: CPT

## 2018-05-08 PROCEDURE — 85610 PROTHROMBIN TIME: CPT | Performed by: THORACIC SURGERY (CARDIOTHORACIC VASCULAR SURGERY)

## 2018-05-08 PROCEDURE — 77030010605 HC BLWR MR MAL MEDT -B: Performed by: THORACIC SURGERY (CARDIOTHORACIC VASCULAR SURGERY)

## 2018-05-08 PROCEDURE — 77030011255 HC DSG AQUACEL AG BMS -A: Performed by: THORACIC SURGERY (CARDIOTHORACIC VASCULAR SURGERY)

## 2018-05-08 PROCEDURE — P9045 ALBUMIN (HUMAN), 5%, 250 ML: HCPCS

## 2018-05-08 PROCEDURE — 77030002986 HC SUT PROL J&J -A: Performed by: THORACIC SURGERY (CARDIOTHORACIC VASCULAR SURGERY)

## 2018-05-08 PROCEDURE — 77030006689 HC BLD OPHTH BVR BD -A: Performed by: THORACIC SURGERY (CARDIOTHORACIC VASCULAR SURGERY)

## 2018-05-08 PROCEDURE — C1751 CATH, INF, PER/CENT/MIDLINE: HCPCS | Performed by: ANESTHESIOLOGY

## 2018-05-08 PROCEDURE — 74011250637 HC RX REV CODE- 250/637: Performed by: THORACIC SURGERY (CARDIOTHORACIC VASCULAR SURGERY)

## 2018-05-08 PROCEDURE — 77030014491 HC PLEDG PTFE BARD -A: Performed by: THORACIC SURGERY (CARDIOTHORACIC VASCULAR SURGERY)

## 2018-05-08 PROCEDURE — 77030012390 HC DRN CHST BTL GTNG -B: Performed by: THORACIC SURGERY (CARDIOTHORACIC VASCULAR SURGERY)

## 2018-05-08 DEVICE — PUTTY BONE HEMSTAT ABSORB MTRX 2.0GRAM: Type: IMPLANTABLE DEVICE | Status: FUNCTIONAL

## 2018-05-08 RX ORDER — SODIUM CHLORIDE 9 MG/ML
250 INJECTION, SOLUTION INTRAVENOUS AS NEEDED
Status: DISCONTINUED | OUTPATIENT
Start: 2018-05-08 | End: 2018-05-08 | Stop reason: SDUPTHER

## 2018-05-08 RX ORDER — DEXTROSE 50 % IN WATER (D50W) INTRAVENOUS SYRINGE
12.5-25 AS NEEDED
Status: DISCONTINUED | OUTPATIENT
Start: 2018-05-08 | End: 2018-05-12

## 2018-05-08 RX ORDER — INSULIN LISPRO 100 [IU]/ML
INJECTION, SOLUTION INTRAVENOUS; SUBCUTANEOUS
Status: DISCONTINUED | OUTPATIENT
Start: 2018-05-08 | End: 2018-05-12

## 2018-05-08 RX ORDER — INSULIN GLARGINE 100 [IU]/ML
1-50 INJECTION, SOLUTION SUBCUTANEOUS
Status: DISCONTINUED | OUTPATIENT
Start: 2018-05-08 | End: 2018-05-12

## 2018-05-08 RX ORDER — SODIUM CHLORIDE 0.9 % (FLUSH) 0.9 %
10 SYRINGE (ML) INJECTION EVERY 8 HOURS
Status: DISCONTINUED | OUTPATIENT
Start: 2018-05-08 | End: 2018-05-12

## 2018-05-08 RX ORDER — MAGNESIUM SULFATE 1 G/100ML
1 INJECTION INTRAVENOUS AS NEEDED
Status: DISCONTINUED | OUTPATIENT
Start: 2018-05-08 | End: 2018-05-12

## 2018-05-08 RX ORDER — MAGNESIUM SULFATE HEPTAHYDRATE 40 MG/ML
INJECTION, SOLUTION INTRAVENOUS AS NEEDED
Status: DISCONTINUED | OUTPATIENT
Start: 2018-05-08 | End: 2018-05-08 | Stop reason: HOSPADM

## 2018-05-08 RX ORDER — LANOLIN ALCOHOL/MO/W.PET/CERES
400 CREAM (GRAM) TOPICAL 2 TIMES DAILY
Status: DISCONTINUED | OUTPATIENT
Start: 2018-05-09 | End: 2018-05-14 | Stop reason: HOSPADM

## 2018-05-08 RX ORDER — SODIUM CHLORIDE 0.9 % (FLUSH) 0.9 %
5-10 SYRINGE (ML) INJECTION AS NEEDED
Status: DISCONTINUED | OUTPATIENT
Start: 2018-05-08 | End: 2018-05-12

## 2018-05-08 RX ORDER — DESMOPRESSIN ACETATE 4 UG/ML
INJECTION, SOLUTION INTRAVENOUS; SUBCUTANEOUS AS NEEDED
Status: DISCONTINUED | OUTPATIENT
Start: 2018-05-08 | End: 2018-05-08 | Stop reason: HOSPADM

## 2018-05-08 RX ORDER — POLYETHYLENE GLYCOL 3350 17 G/17G
17 POWDER, FOR SOLUTION ORAL DAILY
Status: DISCONTINUED | OUTPATIENT
Start: 2018-05-09 | End: 2018-05-14 | Stop reason: HOSPADM

## 2018-05-08 RX ORDER — BACITRACIN 500 UNIT/G
1 PACKET (EA) TOPICAL AS NEEDED
Status: DISCONTINUED | OUTPATIENT
Start: 2018-05-08 | End: 2018-05-14 | Stop reason: HOSPADM

## 2018-05-08 RX ORDER — HYDROMORPHONE HYDROCHLORIDE 1 MG/ML
0.5 INJECTION, SOLUTION INTRAMUSCULAR; INTRAVENOUS; SUBCUTANEOUS
Status: DISCONTINUED | OUTPATIENT
Start: 2018-05-08 | End: 2018-05-12

## 2018-05-08 RX ORDER — ROCURONIUM BROMIDE 10 MG/ML
INJECTION, SOLUTION INTRAVENOUS AS NEEDED
Status: DISCONTINUED | OUTPATIENT
Start: 2018-05-08 | End: 2018-05-08 | Stop reason: HOSPADM

## 2018-05-08 RX ORDER — DOBUTAMINE HYDROCHLORIDE 200 MG/100ML
INJECTION INTRAVENOUS
Status: DISCONTINUED | OUTPATIENT
Start: 2018-05-08 | End: 2018-05-08

## 2018-05-08 RX ORDER — SUFENTANIL CITRATE 50 UG/ML
INJECTION EPIDURAL; INTRAVENOUS
Status: DISCONTINUED | OUTPATIENT
Start: 2018-05-08 | End: 2018-05-08 | Stop reason: HOSPADM

## 2018-05-08 RX ORDER — CEFAZOLIN SODIUM 1 G/3ML
INJECTION, POWDER, FOR SOLUTION INTRAMUSCULAR; INTRAVENOUS AS NEEDED
Status: DISCONTINUED | OUTPATIENT
Start: 2018-05-08 | End: 2018-05-08 | Stop reason: HOSPADM

## 2018-05-08 RX ORDER — ALBUMIN HUMAN 50 G/1000ML
SOLUTION INTRAVENOUS AS NEEDED
Status: DISCONTINUED | OUTPATIENT
Start: 2018-05-08 | End: 2018-05-08 | Stop reason: HOSPADM

## 2018-05-08 RX ORDER — CALCIUM CHLORIDE INJECTION 100 MG/ML
INJECTION, SOLUTION INTRAVENOUS AS NEEDED
Status: DISCONTINUED | OUTPATIENT
Start: 2018-05-08 | End: 2018-05-08 | Stop reason: HOSPADM

## 2018-05-08 RX ORDER — MIDAZOLAM HYDROCHLORIDE 1 MG/ML
2 INJECTION, SOLUTION INTRAMUSCULAR; INTRAVENOUS AS NEEDED
Status: DISCONTINUED | OUTPATIENT
Start: 2018-05-08 | End: 2018-05-08

## 2018-05-08 RX ORDER — SODIUM CHLORIDE 9 MG/ML
9 INJECTION, SOLUTION INTRAVENOUS CONTINUOUS
Status: DISCONTINUED | OUTPATIENT
Start: 2018-05-08 | End: 2018-05-12

## 2018-05-08 RX ORDER — HEPARIN SODIUM 1000 [USP'U]/ML
INJECTION, SOLUTION INTRAVENOUS; SUBCUTANEOUS AS NEEDED
Status: DISCONTINUED | OUTPATIENT
Start: 2018-05-08 | End: 2018-05-08 | Stop reason: HOSPADM

## 2018-05-08 RX ORDER — PROTAMINE SULFATE 10 MG/ML
INJECTION, SOLUTION INTRAVENOUS AS NEEDED
Status: DISCONTINUED | OUTPATIENT
Start: 2018-05-08 | End: 2018-05-08 | Stop reason: HOSPADM

## 2018-05-08 RX ORDER — SUFENTANIL CITRATE 50 UG/ML
INJECTION EPIDURAL; INTRAVENOUS AS NEEDED
Status: DISCONTINUED | OUTPATIENT
Start: 2018-05-08 | End: 2018-05-08 | Stop reason: HOSPADM

## 2018-05-08 RX ORDER — ALBUTEROL SULFATE 0.83 MG/ML
2.5 SOLUTION RESPIRATORY (INHALATION)
Status: DISCONTINUED | OUTPATIENT
Start: 2018-05-08 | End: 2018-05-14 | Stop reason: HOSPADM

## 2018-05-08 RX ORDER — INSULIN LISPRO 100 [IU]/ML
INJECTION, SOLUTION INTRAVENOUS; SUBCUTANEOUS
Status: DISCONTINUED | OUTPATIENT
Start: 2018-05-09 | End: 2018-05-12

## 2018-05-08 RX ORDER — OXYCODONE AND ACETAMINOPHEN 5; 325 MG/1; MG/1
1 TABLET ORAL
Status: DISCONTINUED | OUTPATIENT
Start: 2018-05-08 | End: 2018-05-14 | Stop reason: HOSPADM

## 2018-05-08 RX ORDER — SODIUM CHLORIDE 0.9 % (FLUSH) 0.9 %
5-10 SYRINGE (ML) INJECTION EVERY 8 HOURS
Status: DISCONTINUED | OUTPATIENT
Start: 2018-05-08 | End: 2018-05-12

## 2018-05-08 RX ORDER — SODIUM CHLORIDE 9 MG/ML
250 INJECTION, SOLUTION INTRAVENOUS AS NEEDED
Status: DISCONTINUED | OUTPATIENT
Start: 2018-05-08 | End: 2018-05-12

## 2018-05-08 RX ORDER — HYDROMORPHONE HYDROCHLORIDE 1 MG/ML
1 INJECTION, SOLUTION INTRAMUSCULAR; INTRAVENOUS; SUBCUTANEOUS
Status: DISCONTINUED | OUTPATIENT
Start: 2018-05-08 | End: 2018-05-12

## 2018-05-08 RX ORDER — DEXMEDETOMIDINE HYDROCHLORIDE 4 UG/ML
INJECTION, SOLUTION INTRAVENOUS
Status: DISCONTINUED | OUTPATIENT
Start: 2018-05-08 | End: 2018-05-08 | Stop reason: HOSPADM

## 2018-05-08 RX ORDER — ALBUMIN HUMAN 50 G/1000ML
12.5 SOLUTION INTRAVENOUS
Status: DISCONTINUED | OUTPATIENT
Start: 2018-05-08 | End: 2018-05-12

## 2018-05-08 RX ORDER — SODIUM CHLORIDE 450 MG/100ML
10 INJECTION, SOLUTION INTRAVENOUS CONTINUOUS
Status: DISCONTINUED | OUTPATIENT
Start: 2018-05-08 | End: 2018-05-12

## 2018-05-08 RX ORDER — SODIUM CHLORIDE, SODIUM LACTATE, POTASSIUM CHLORIDE, CALCIUM CHLORIDE 600; 310; 30; 20 MG/100ML; MG/100ML; MG/100ML; MG/100ML
INJECTION, SOLUTION INTRAVENOUS
Status: DISCONTINUED | OUTPATIENT
Start: 2018-05-08 | End: 2018-05-08 | Stop reason: HOSPADM

## 2018-05-08 RX ORDER — PROPOFOL 10 MG/ML
INJECTION, EMULSION INTRAVENOUS
Status: DISCONTINUED | OUTPATIENT
Start: 2018-05-08 | End: 2018-05-08 | Stop reason: HOSPADM

## 2018-05-08 RX ORDER — MAGNESIUM SULFATE 100 %
4 CRYSTALS MISCELLANEOUS AS NEEDED
Status: DISCONTINUED | OUTPATIENT
Start: 2018-05-08 | End: 2018-05-14 | Stop reason: HOSPADM

## 2018-05-08 RX ORDER — SODIUM CHLORIDE 0.9 % (FLUSH) 0.9 %
10 SYRINGE (ML) INJECTION AS NEEDED
Status: DISCONTINUED | OUTPATIENT
Start: 2018-05-08 | End: 2018-05-12

## 2018-05-08 RX ORDER — FAMOTIDINE 20 MG/1
20 TABLET, FILM COATED ORAL EVERY 12 HOURS
Status: DISCONTINUED | OUTPATIENT
Start: 2018-05-09 | End: 2018-05-14 | Stop reason: HOSPADM

## 2018-05-08 RX ORDER — SODIUM CHLORIDE 0.9 % (FLUSH) 0.9 %
10 SYRINGE (ML) INJECTION AS NEEDED
Status: DISCONTINUED | OUTPATIENT
Start: 2018-05-08 | End: 2018-05-08 | Stop reason: SDUPTHER

## 2018-05-08 RX ORDER — ONDANSETRON 2 MG/ML
4 INJECTION INTRAMUSCULAR; INTRAVENOUS
Status: DISCONTINUED | OUTPATIENT
Start: 2018-05-08 | End: 2018-05-14 | Stop reason: HOSPADM

## 2018-05-08 RX ORDER — MIDAZOLAM HYDROCHLORIDE 1 MG/ML
INJECTION, SOLUTION INTRAMUSCULAR; INTRAVENOUS AS NEEDED
Status: DISCONTINUED | OUTPATIENT
Start: 2018-05-08 | End: 2018-05-08 | Stop reason: HOSPADM

## 2018-05-08 RX ORDER — SODIUM CHLORIDE 9 MG/ML
INJECTION, SOLUTION INTRAVENOUS
Status: DISCONTINUED | OUTPATIENT
Start: 2018-05-08 | End: 2018-05-08 | Stop reason: HOSPADM

## 2018-05-08 RX ORDER — PROPOFOL 10 MG/ML
0-50 VIAL (ML) INTRAVENOUS
Status: DISCONTINUED | OUTPATIENT
Start: 2018-05-08 | End: 2018-05-11

## 2018-05-08 RX ORDER — CHLORHEXIDINE GLUCONATE 1.2 MG/ML
10 RINSE ORAL 2 TIMES DAILY
Status: DISCONTINUED | OUTPATIENT
Start: 2018-05-08 | End: 2018-05-14 | Stop reason: HOSPADM

## 2018-05-08 RX ORDER — SODIUM CHLORIDE 0.9 % (FLUSH) 0.9 %
20 SYRINGE (ML) INJECTION AS NEEDED
Status: DISCONTINUED | OUTPATIENT
Start: 2018-05-08 | End: 2018-05-08 | Stop reason: SDUPTHER

## 2018-05-08 RX ORDER — SODIUM CHLORIDE 0.9 % (FLUSH) 0.9 %
10 SYRINGE (ML) INJECTION EVERY 24 HOURS
Status: DISCONTINUED | OUTPATIENT
Start: 2018-05-08 | End: 2018-05-12

## 2018-05-08 RX ORDER — ACETAMINOPHEN 325 MG/1
650 TABLET ORAL EVERY 4 HOURS
Status: ACTIVE | OUTPATIENT
Start: 2018-05-08 | End: 2018-05-10

## 2018-05-08 RX ORDER — AMOXICILLIN 250 MG
1 CAPSULE ORAL 2 TIMES DAILY
Status: DISCONTINUED | OUTPATIENT
Start: 2018-05-09 | End: 2018-05-14 | Stop reason: HOSPADM

## 2018-05-08 RX ORDER — NALOXONE HYDROCHLORIDE 0.4 MG/ML
0.4 INJECTION, SOLUTION INTRAMUSCULAR; INTRAVENOUS; SUBCUTANEOUS AS NEEDED
Status: DISCONTINUED | OUTPATIENT
Start: 2018-05-08 | End: 2018-05-14 | Stop reason: HOSPADM

## 2018-05-08 RX ORDER — MUPIROCIN 20 MG/G
OINTMENT TOPICAL 2 TIMES DAILY
Status: ACTIVE | OUTPATIENT
Start: 2018-05-08 | End: 2018-05-13

## 2018-05-08 RX ORDER — NOREPINEPHRINE BITARTRATE/D5W 8 MG/250ML
2-16 PLASTIC BAG, INJECTION (ML) INTRAVENOUS
Status: DISCONTINUED | OUTPATIENT
Start: 2018-05-08 | End: 2018-05-12

## 2018-05-08 RX ORDER — MIDAZOLAM HYDROCHLORIDE 1 MG/ML
1 INJECTION, SOLUTION INTRAMUSCULAR; INTRAVENOUS
Status: DISCONTINUED | OUTPATIENT
Start: 2018-05-08 | End: 2018-05-12

## 2018-05-08 RX ORDER — OXYCODONE AND ACETAMINOPHEN 5; 325 MG/1; MG/1
2 TABLET ORAL
Status: DISCONTINUED | OUTPATIENT
Start: 2018-05-08 | End: 2018-05-14 | Stop reason: HOSPADM

## 2018-05-08 RX ORDER — MIDAZOLAM HYDROCHLORIDE 1 MG/ML
INJECTION, SOLUTION INTRAMUSCULAR; INTRAVENOUS
Status: COMPLETED
Start: 2018-05-08 | End: 2018-05-08

## 2018-05-08 RX ORDER — BACITRACIN 500 UNIT/G
1 PACKET (EA) TOPICAL AS NEEDED
Status: DISCONTINUED | OUTPATIENT
Start: 2018-05-08 | End: 2018-05-08 | Stop reason: SDUPTHER

## 2018-05-08 RX ORDER — SODIUM CHLORIDE 0.9 % (FLUSH) 0.9 %
20 SYRINGE (ML) INJECTION AS NEEDED
Status: DISCONTINUED | OUTPATIENT
Start: 2018-05-08 | End: 2018-05-12

## 2018-05-08 RX ORDER — PAPAVERINE HYDROCHLORIDE 30 MG/ML
INJECTION INTRAMUSCULAR; INTRAVENOUS AS NEEDED
Status: DISCONTINUED | OUTPATIENT
Start: 2018-05-08 | End: 2018-05-08 | Stop reason: HOSPADM

## 2018-05-08 RX ORDER — POTASSIUM CHLORIDE 29.8 MG/ML
20 INJECTION INTRAVENOUS
Status: ACTIVE | OUTPATIENT
Start: 2018-05-08 | End: 2018-05-09

## 2018-05-08 RX ORDER — DEXTROSE MONOHYDRATE 50 MG/ML
4-20 INJECTION, SOLUTION INTRAVENOUS
Status: DISCONTINUED | OUTPATIENT
Start: 2018-05-08 | End: 2018-05-11

## 2018-05-08 RX ORDER — AMIODARONE HYDROCHLORIDE 200 MG/1
400 TABLET ORAL EVERY 12 HOURS
Status: DISCONTINUED | OUTPATIENT
Start: 2018-05-09 | End: 2018-05-11

## 2018-05-08 RX ORDER — GUAIFENESIN 100 MG/5ML
81 LIQUID (ML) ORAL DAILY
Status: DISCONTINUED | OUTPATIENT
Start: 2018-05-09 | End: 2018-05-14 | Stop reason: HOSPADM

## 2018-05-08 RX ORDER — FACIAL-BODY WIPES
10 EACH TOPICAL DAILY PRN
Status: DISCONTINUED | OUTPATIENT
Start: 2018-05-08 | End: 2018-05-14 | Stop reason: HOSPADM

## 2018-05-08 RX ORDER — CEFAZOLIN SODIUM/WATER 2 G/20 ML
2 SYRINGE (ML) INTRAVENOUS EVERY 6 HOURS
Status: COMPLETED | OUTPATIENT
Start: 2018-05-08 | End: 2018-05-10

## 2018-05-08 RX ADMIN — IPRATROPIUM BROMIDE AND ALBUTEROL SULFATE 3 ML: .5; 3 SOLUTION RESPIRATORY (INHALATION) at 19:36

## 2018-05-08 RX ADMIN — MIDAZOLAM HYDROCHLORIDE 2 MG: 1 INJECTION, SOLUTION INTRAMUSCULAR; INTRAVENOUS at 09:45

## 2018-05-08 RX ADMIN — ROCURONIUM BROMIDE 20 MG: 10 INJECTION, SOLUTION INTRAVENOUS at 18:13

## 2018-05-08 RX ADMIN — Medication 10 ML: at 21:23

## 2018-05-08 RX ADMIN — SODIUM CHLORIDE, SODIUM LACTATE, POTASSIUM CHLORIDE, CALCIUM CHLORIDE: 600; 310; 30; 20 INJECTION, SOLUTION INTRAVENOUS at 13:04

## 2018-05-08 RX ADMIN — Medication 2 G: at 19:57

## 2018-05-08 RX ADMIN — CEFAZOLIN SODIUM 2 G: 1 INJECTION, POWDER, FOR SOLUTION INTRAMUSCULAR; INTRAVENOUS at 16:27

## 2018-05-08 RX ADMIN — CALCIUM CHLORIDE INJECTION 500 MG: 100 INJECTION, SOLUTION INTRAVENOUS at 17:37

## 2018-05-08 RX ADMIN — NOREPINEPHRINE BITARTRATE 5 MCG/MIN: 1 INJECTION, SOLUTION, CONCENTRATE INTRAVENOUS at 16:37

## 2018-05-08 RX ADMIN — MUPIROCIN: 20 OINTMENT TOPICAL at 10:35

## 2018-05-08 RX ADMIN — CHLORHEXIDINE GLUCONATE 10 ML: 1.2 RINSE ORAL at 21:22

## 2018-05-08 RX ADMIN — CEFAZOLIN SODIUM 2 G: 1 INJECTION, POWDER, FOR SOLUTION INTRAMUSCULAR; INTRAVENOUS at 13:29

## 2018-05-08 RX ADMIN — DEXMEDETOMIDINE HYDROCHLORIDE 0.2 MCG/KG/HR: 4 INJECTION, SOLUTION INTRAVENOUS at 16:26

## 2018-05-08 RX ADMIN — ROCURONIUM BROMIDE 100 MG: 10 INJECTION, SOLUTION INTRAVENOUS at 13:04

## 2018-05-08 RX ADMIN — HEPARIN SODIUM 25000 UNITS: 1000 INJECTION, SOLUTION INTRAVENOUS; SUBCUTANEOUS at 14:36

## 2018-05-08 RX ADMIN — IPRATROPIUM BROMIDE AND ALBUTEROL SULFATE 3 ML: .5; 3 SOLUTION RESPIRATORY (INHALATION) at 11:25

## 2018-05-08 RX ADMIN — Medication 10 ML: at 21:22

## 2018-05-08 RX ADMIN — SUFENTANIL CITRATE 20 MCG: 50 INJECTION EPIDURAL; INTRAVENOUS at 13:56

## 2018-05-08 RX ADMIN — DEXTROSE MONOHYDRATE 5 MCG/KG/MIN: 5 INJECTION, SOLUTION INTRAVENOUS at 23:00

## 2018-05-08 RX ADMIN — SODIUM CHLORIDE 20 MG: 9 INJECTION INTRAMUSCULAR; INTRAVENOUS; SUBCUTANEOUS at 20:28

## 2018-05-08 RX ADMIN — HYDROMORPHONE HYDROCHLORIDE 1 MG: 1 INJECTION, SOLUTION INTRAMUSCULAR; INTRAVENOUS; SUBCUTANEOUS at 21:15

## 2018-05-08 RX ADMIN — PROPOFOL 25 MCG/KG/MIN: 10 INJECTION, EMULSION INTRAVENOUS at 17:53

## 2018-05-08 RX ADMIN — PROPOFOL 50 MCG/KG/MIN: 10 INJECTION, EMULSION INTRAVENOUS at 21:22

## 2018-05-08 RX ADMIN — DESMOPRESSIN ACETATE 26 MCG: 4 INJECTION, SOLUTION INTRAVENOUS; SUBCUTANEOUS at 17:10

## 2018-05-08 RX ADMIN — MAGNESIUM SULFATE HEPTAHYDRATE 2 G: 40 INJECTION, SOLUTION INTRAVENOUS at 17:58

## 2018-05-08 RX ADMIN — PROPOFOL 50 MCG/KG/MIN: 10 INJECTION, EMULSION INTRAVENOUS at 02:41

## 2018-05-08 RX ADMIN — IPRATROPIUM BROMIDE AND ALBUTEROL SULFATE 3 ML: .5; 3 SOLUTION RESPIRATORY (INHALATION) at 08:14

## 2018-05-08 RX ADMIN — ALBUMIN HUMAN 250 ML: 50 SOLUTION INTRAVENOUS at 17:15

## 2018-05-08 RX ADMIN — ROCURONIUM BROMIDE 30 MG: 10 INJECTION, SOLUTION INTRAVENOUS at 14:42

## 2018-05-08 RX ADMIN — MUPIROCIN: 20 OINTMENT TOPICAL at 21:22

## 2018-05-08 RX ADMIN — SUFENTANIL CITRATE 10 MCG: 50 INJECTION EPIDURAL; INTRAVENOUS at 17:39

## 2018-05-08 RX ADMIN — AMIODARONE HYDROCHLORIDE 1 MG/MIN: 50 INJECTION, SOLUTION INTRAVENOUS at 22:46

## 2018-05-08 RX ADMIN — NOREPINEPHRINE BITARTRATE 2 MCG/MIN: 1 INJECTION, SOLUTION, CONCENTRATE INTRAVENOUS at 17:06

## 2018-05-08 RX ADMIN — IPRATROPIUM BROMIDE AND ALBUTEROL SULFATE 3 ML: .5; 3 SOLUTION RESPIRATORY (INHALATION) at 00:19

## 2018-05-08 RX ADMIN — IPRATROPIUM BROMIDE AND ALBUTEROL SULFATE 3 ML: .5; 3 SOLUTION RESPIRATORY (INHALATION) at 04:35

## 2018-05-08 RX ADMIN — PROTAMINE SULFATE 200 MG: 10 INJECTION, SOLUTION INTRAVENOUS at 17:10

## 2018-05-08 RX ADMIN — MIDAZOLAM HYDROCHLORIDE 1 MG: 1 INJECTION, SOLUTION INTRAMUSCULAR; INTRAVENOUS at 17:37

## 2018-05-08 RX ADMIN — SODIUM CHLORIDE: 9 INJECTION, SOLUTION INTRAVENOUS at 13:04

## 2018-05-08 RX ADMIN — PROPOFOL 50 MCG/KG/MIN: 10 INJECTION, EMULSION INTRAVENOUS at 10:32

## 2018-05-08 RX ADMIN — SODIUM CHLORIDE 9 ML/HR: 900 INJECTION, SOLUTION INTRAVENOUS at 07:54

## 2018-05-08 RX ADMIN — HYDROMORPHONE HYDROCHLORIDE 2 MG: 2 INJECTION INTRAMUSCULAR; INTRAVENOUS; SUBCUTANEOUS at 11:08

## 2018-05-08 RX ADMIN — Medication 10 ML: at 05:05

## 2018-05-08 RX ADMIN — SUFENTANIL CITRATE 0.3 MCG/KG/HR: 50 INJECTION EPIDURAL; INTRAVENOUS at 13:21

## 2018-05-08 RX ADMIN — ALBUMIN HUMAN 250 ML: 50 SOLUTION INTRAVENOUS at 18:10

## 2018-05-08 RX ADMIN — HYDROMORPHONE HYDROCHLORIDE 0.4 MG: 2 INJECTION INTRAMUSCULAR; INTRAVENOUS; SUBCUTANEOUS at 07:21

## 2018-05-08 RX ADMIN — Medication 20 ML: at 11:15

## 2018-05-08 RX ADMIN — CHLORHEXIDINE GLUCONATE 15 ML: 1.2 RINSE ORAL at 02:42

## 2018-05-08 RX ADMIN — PROPOFOL 50 MCG/KG/MIN: 10 INJECTION, EMULSION INTRAVENOUS at 06:43

## 2018-05-08 RX ADMIN — HYDROMORPHONE HYDROCHLORIDE 0.4 MG: 2 INJECTION INTRAMUSCULAR; INTRAVENOUS; SUBCUTANEOUS at 03:10

## 2018-05-08 RX ADMIN — EPINEPHRINE 1 MCG/MIN: 1 INJECTION PARENTERAL at 13:04

## 2018-05-08 NOTE — BRIEF OP NOTE
BRIEF OP NOTE      Pre-Op Diagnosis: CORONARY ARTERY DISEASE    Post-Op Diagnosis: CORONARY ARTERY DISEASE      Procedure: Procedure(s):  ON PUMP CORONARY ARTERY BYPASS GRAFT X 3 WITH LEFT LEG ENDOVASCULAR VEIN HARVEST/ LEFT INTERNAL MAMMARY ARTERY HARVEST. TRANSESOPHAGEAL ECHOCARDIOGRAM AND EPI-AORTIC ULTRASOUND BY DR. Liliam Okeefe.  UTILIZING GREATER SAPHENOUS VEIN    Surgeon: Ellis Vargas MD    Assistant(s): Dalton Masterson Christean Skeens, [place]    Anesthesia: General     Infusions: Amiodarone, precedex, insulin, Dobt, Levo, EPI, Impella P9    Aortic cross clamp time:77    Perfusion time:      Estimated Blood Loss: 1800 cc    Cell Saver: 675    Blood products: FFP, platelets    Specimens: * No specimens in log *    Drains and pacing wires: 2 atrial wires, 1 bipolar ventricular wire, 4 marina drains    Complications: none    Findings: CORONARY ARTERY DISEASE      Implants: * No implants in log *

## 2018-05-08 NOTE — OP NOTES
[address] [place] [address] REPORT    Chanel Rudd  MR#: [id number]  : 1960  ACCOUNT #: [de-identified]   DATE OF SERVICE: 2018    PREOPERATIVE DIAGNOSES:  1.  Status post subendocardial myocardial infarction. 2.  Severe ischemic cardiomyopathy. 3.  Acute on chronic systolic congestive heart failure. POSTOPERATIVE DIAGNOSES:   1.  Status post subendocardial myocardial infarction. 2.  Severe ischemic cardiomyopathy. 3.  Acute on chronic systolic congestive heart failure. PROCEDURES PERFORMED:  Placement of a 5.0 Impella left ventricular assist device via right axillary artery cutdown. SURGEON:  Sandra March MD     ASSISTANT:  Dai Arnold    ANESTHESIA:  General.    ESTIMATED BLOOD LOSS:  50 mL. SPECIMENS REMOVED:  None. COMPLICATIONS:  None. IMPLANTS:  Impella left ventricular assist device. INDICATIONS:  This is a 59-year-old male with the above diagnosis who had severe multivessel disease and ejection fraction approximately 10-15%, anticipating the need for ventricular support following revascularization. He underwent placement of an Impella device. PROCEDURE:  The patient was taken to the operating room. A surgical timeout was completed. The transesophageal echocardiographic findings reviewed. The patient underwent sterile prep and drape of the right chest.  The axillary artery was exposed through an infraclavicular incision. The patient heparinized. An 8 mm Hemashield graft was secured end-to-side to the axillary artery. The Impella device was then inserted under echocardiographic and fluoroscopic guidance. Position confirmed by transesophageal echo and good hemodynamic readings obtained. The wound was then reapproximated in layers and the Impella secured to the skin with interrupted sutures. MD LAZARUS Juárez / JC  D: 2018 17:41     T: 2018 05:59  JOB #: [id number]

## 2018-05-08 NOTE — PROGRESS NOTES
TRANSFER - IN REPORT:    Verbal report received from MD Brian, Jonathon Sanchez MD, and Stanley Jorge (name) on Martha Sos  being received from OR (unit) for routine post - op      Report consisted of patients Situation, Background, Assessment and   Recommendations(SBAR). Information from the following report(s) SBAR, OR Summary, Intake/Output, MAR and Recent Results was reviewed with the receiving nurse. Opportunity for questions and clarification was provided. Assessment completed upon patients arrival to unit and care assumed.

## 2018-05-08 NOTE — PROGRESS NOTES
1330- Cardiac Surgery Care Coordinator- Met with the family of Martha Camacho, introduced role of the Cardiac Surgery Co- Nurse. Reviewed plan of care and day of surgery expectations. Provided family with a contact number and an update from OR. Encouraged family to verbalize and emotional support given. Will continue to update throughout the day. 1400- Provided update from the [address]- Met with family of Martha Camacho, provided family with update. Family without questions or concerns at this time. Will continue to follow for educational and emotional needs. 1800- Met with Kerline ferreira and Dr. Александр Hussein. Update given, Encouraged family to verbalize and offered emotional support. Family will be leaving property for dinner and will return later for a visit. Bedside nurse aware.  Naseem Rose RN

## 2018-05-08 NOTE — ANESTHESIA PREPROCEDURE EVALUATION
Anesthetic History   No history of anesthetic complications            Review of Systems / Medical History  Patient summary reviewed, nursing notes reviewed and pertinent labs reviewed    Pulmonary          Smoker         Neuro/Psych   Within defined limits           Cardiovascular    Hypertension      CHF    Past MI and CAD      Comments: EF 15-20% s/p preop impella for support for CABG    GI/Hepatic/Renal  Within defined limits              Endo/Other  Within defined limits           Other Findings   Comments: H/o cocaine abuse           Physical Exam    Airway  Mallampati: II  TM Distance: 4 - 6 cm  Neck ROM: normal range of motion   Mouth opening: Normal  Intubated   Cardiovascular    Rhythm: regular  Rate: normal         Dental    Dentition: Full upper dentures and Poor dentition  Comments: Multiple loose lower teeth.  Pt understand the risk of dislodging his lower teeth and agrees to proceed   Pulmonary  Breath sounds clear to auscultation               Abdominal  GI exam deferred       Other Findings            Anesthetic Plan    ASA: 4  Anesthesia type: general    Monitoring Plan: Arterial line, [place], GHASSAN and CVP    Post procedure ventilation   Induction: Intravenous  Anesthetic plan and risks discussed with: Patient

## 2018-05-08 NOTE — PROGRESS NOTES
Critical Care Note     ACTIVE MEDICAL PROBLEMS    Acute systolic CHF  Severe ischemic cardiomyopathy   Acute respiratory failure  encephalopathy likely secondary to hx of drug us    IMPRESSION     Stable overnight on low dose epi and dobutamine   For CABG today      Patient Vitals for the past 4 hrs:   Temp Pulse Resp SpO2   05/08/18 0700 96.4 °F (35.8 °C) (!) 42 14 99 %   05/08/18 0600 - (!) 42 14 100 %   05/08/18 0500 - (!) 42 15 99 %   05/08/18 0435 - (!) 41 14 100 %   05/08/18 0400 96.2 °F (35.7 °C) (!) 43 14 98 %         Intake/Output Summary (Last 24 hours) at 05/08/18 0756  Last data filed at 05/08/18 0700   Gross per 24 hour   Intake          2114.66 ml   Output             1770 ml   Net           344.66 ml     Hemodynamics:   CO: CO (l/min): 5.4 l/min   CI: CI (l/min/m2): 2.8 l/min/m2   CVP: CVP (mmHg): 12 mmHg (05/08/18 0700)   SVR: SVR (dyne*sec)/cm5: 1246 (dyne*sec)/cm5 (05/08/18 0994)   PAP Systolic: PAP Systolic: 45 (75/43/51 7779)   PAP Diastolic: PAP Diastolic: 18 (42/45/81 4484)   PVR:     SV02: SVO2 (%): 70 % (05/08/18 0700)   SCV02:      Gen Sedated on dipravan   Chest clear  Cardiac sinus terri no murmur        Chest xrays reviewed     HCT   Date/Time Value Ref Range Status   05/08/2018 03:48 AM 30.4 (L) 36.6 - 50.3 % Final     WBC   Date/Time Value Ref Range Status   05/08/2018 03:48 AM 5.6 4.1 - 11.1 K/uL Final     Comment:     Due to mathematical rounding between the [address], and the new [employer] [facility] analyzers, the reported automated differential may vary by up to +/- 0.5% per cell line. This finding may produce a result that is 100% +/- 3%, which is clinically insignificant.          Labs reviewed for last 24 hours         Plan For CABG today      Critical care time 30 minutes    CPT code 32347

## 2018-05-08 NOTE — PERIOP NOTES
PATIENT IS INTUBATED AND SEDATED IN CVICU. WIFE AND DAUGHTER AT BEDSIDE. FAMILY MEMBERS VERIFIED PATIENTS IDENTITY. ALSO VERIFIED SURGICAL CONSENT. TIME GIVEN TO FAMILY TO ADDRESS ANY QUESTIONS OR CONCERNS. THEY STATED THAT DR. Pj Yang had spoken to them earlier and addressed their questions.

## 2018-05-08 NOTE — PROGRESS NOTES
[facility] Progress Note      DOS:   5/8/2018  NAME:  Dario Anguiano   MRN:   [id number]     REFERRING PROVIDER:  Dr. Sachin Murray: Sergio Arnold MD        Chief Complaint:  SOB/Chest pain     HPI: 61y.o. year old male with a history of HTN who was admitted to [facility] with complaint of chest pain and SOB that started yesterday morning, he ruled in for NSTEMI and CSS was consulted for surgical intervention and he was transferred to [facility]. He has a history of HTN but does not take any antihypertensives, he admits to doing cocaine the mornign his chest pain started and is currently smoker. He denies HA, N/V, edema or dizziness but states he continues to be SOB and has orthopnea. [facility] has been consulted to comment on his acute cardiomyopathy and medical management. 24Hr events:  S/p impella placement  Hypotensive/bradycardia  Cont Epi and Dobutamine. Impression / Plan:   Heart Failure Status: NYHA Class III    Acute HFeEF, ICM, EF 15%, 3V CAD   Hold coreg due to bradycardia    Cont ASA   Surgical plans per CSS- s/p impella yesterday, CABG today    Hold diuretics for now but monitor daily   Cont Epi and Dobutamine pre op. Not a candidate for durable MCS due to recent cocaine use, would need to abstain for at least 6 months. HTN:   Hold for hypotension s/p impella placement. Hold BBrx for bradycardia       History:  History reviewed. No pertinent past medical history. Past Surgical History:   Procedure Laterality Date    HX ORTHOPAEDIC      left hip surgery     Social History     Social History    Marital status:      Spouse name: N/A    Number of children: N/A    Years of education: N/A     Occupational History    Not on file.      Social History Main Topics    Smoking status: Former Smoker     Quit date: 5/3/2018    Smokeless tobacco: Not on file    Alcohol use No    Drug use: Not on file    Sexual activity: Not on file     Other Topics Concern    Not on file     Social History Narrative     Family History   Problem Relation Age of Onset    Diabetes Mother        Current Medications:   Current Facility-Administered Medications   Medication Dose Route Frequency Provider Last Rate Last Dose    midazolam (VERSED) injection 2 mg  2 mg IntraVENous PRN Stacia Moran MD   2 mg at 05/08/18 0945    DOBUTamine (DOBUTREX) 500 mg/250 mL (2,000 mcg/mL) infusion  0-10 mcg/kg/min IntraVENous TITRATE Stacia Moran MD        DOPamine (INTROPIN) 800 mg in dextrose 5% 250 mL infusion  5-20 mcg/kg/min IntraVENous TITRATE Stacia Moran MD 4.8 mL/hr at 05/07/18 1855 3 mcg/kg/min at 05/07/18 1855    magnesium sulfate 2 g/50 ml IVPB (premix or compounded)  2 g IntraVENous ONCE Staciajulito Moran MD        nitroglycerin (Tridil) 200 mcg/ml infusion  16.5 mcg/min IntraVENous CONTINUOUS Staciajulito Moran MD        potassium chloride 20 mEq in 50 ml IVPB  20 mEq IntraVENous Jaylin Lynch MD        heparin (porcine) in 0.9% NaCl 30,000 unit/1,000 mL perfusion irrigation 50-1,000 mL  50-1,000 mL Other PRN Stacia Moran MD        aminocaproic acid (AMICAR) 15 g in 0.9% sodium chloride 150 mL infusion  1 g/hr IntraVENous Jaylin Lynch MD        dexmedeTOMidine (PRECEDEX) 400 mcg in 0.9% sodium chloride 100 mL infusion  0.1-0.9 mcg/kg/hr IntraVENous TITRATE Stacia Moran MD        insulin regular ([place] Gal[place] R, HUMULIN R) 100 Units in 0.9% sodium chloride 100 mL infusion  1-50 Units/hr IntraVENous TITRATE Stacia Moran MD        PHENYLephrine (SRUTHI-SYNEPHRINE) 10 mg in 0.9% sodium chloride 250 mL infusion   mcg/min IntraVENous TITRATE Stacia Moran MD        PHENYLephrine (SRUTHI-SYNEPHRINE) 20 mg in 0.9% sodium chloride 250 mL infusion   mcg/min IntraVENous TITRATE Stacia Moran MD        PHENYLephrine (SRUTHI-SYNEPHRINE) 30 mg in 0.9% sodium chloride 250 mL infusion   mcg/min IntraVENous TITRATE Simone Thomason MD        niCARdipine (CARDENE) 50 mg in 0.9% sodium chloride 100 mL infusion  5-15 mg/hr IntraVENous TITRATE Simone Thomason MD        amiodarone (CORDARONE) 375 mg/250 mL D5W infusion  0.5-1 mg/min IntraVENous CONTINUOUS Simone Thomason MD        amiodarone (CORDARONE) 150 mg in dextrose 5% 100 mL bolus infusion  150 mg IntraVENous ONCE Simone Thomason MD        EPINEPHrine (ADRENALIN) 2 mg in 0.9% sodium chloride 250 mL infusion  1-10 mcg/min IntraVENous TITRATE Simone Thomason MD        NOREPINephrine (LEVOPHED) 4 mg in dextrose 5% 250 mL infusion  2-16 mcg/min IntraVENous TITRATE Simone Thomason MD        protamine injection 500 mg  500 mg IntraVENous ONCE Simone Thomason MD        cardioplegia infusion   IntraVENous ONCE Simone Thomason MD        cardioplegia infusion   IntraVENous ONCE Simone Thomason MD        bivalirudin (ANGIOMAX) 500 mg in dextrose 5% 500 mL infusion  4-20 mL/hr Other TITRATE Lucio Peter NP 13.5 mL/hr at 05/08/18 0750 13.5 mL/hr at 05/08/18 0750    albuterol-ipratropium (DUO-NEB) 2.5 MG-0.5 MG/3 ML  3 mL Nebulization Q4H RT DIGNA Lynch   3 mL at 05/08/18 0814    propofol (DIPRIVAN) infusion  0-50 mcg/kg/min IntraVENous TITRATE Simone Thomason MD 25.6 mL/hr at 05/08/18 0750 50 mcg/kg/min at 05/08/18 0750    DOPamine (INTROPIN) 800 mg in dextrose 5% 250 mL infusion  3 mcg/kg/min IntraVENous CONTINUOUS Simone Thomason MD   Stopped at 05/07/18 1900    bacitracin (BACIIM) 50,000/1000ml NS irrigation bottle  50,000 Units Irrigation ONCE Simone Thomason MD        PHENYLephrine (PF)(SRUTHI-SYNEPHRINE) 30 mg in 0.9% sodium chloride 250 mL infusion   mcg/min IntraVENous TITRATE Simone Thomason MD   Stopped at 05/07/18 2010    EPINEPHrine (ADRENALIN) 5 mg in 0.9% sodium chloride 250 mL infusion  1-10 mcg/min IntraVENous TITRATE Tima Leary MD 3 mL/hr at 05/08/18 0749 1 mcg/min at 05/08/18 0749    DOBUTamine (DOBUTREX) 500 MG/250 mL (2000 mcg/mL) in D5W infusion  0-10 mcg/kg/min IntraVENous TITRATE Laverne Swanson MD 5.1 mL/hr at 05/08/18 0749 2 mcg/kg/min at 05/08/18 0749    albumin human 5% (BUMINATE) solution 25 g  25 g IntraVENous PRN Vamsi Chaves MD        0.45% sodium chloride infusion  25 mL/hr IntraVENous CONTINUOUS Vamsi Chaves MD 25 mL/hr at 05/08/18 0750 25 mL/hr at 05/08/18 0750    oxyCODONE-acetaminophen (PERCOCET) 5-325 mg per tablet 1 Tab  1 Tab Oral Q4H PRN DIGNA Mahajan   1 Tab at 05/07/18 0417    oxyCODONE-acetaminophen (PERCOCET) 5-325 mg per tablet 2 Tab  2 Tab Oral Q4H PRN DIGNA Mahajan        acetaminophen (TYLENOL) tablet 650 mg  650 mg Oral Q4H PRN Evan Cisneros, NP   650 mg at 05/04/18 1915    ondansetron (ZOFRAN) injection 4 mg  4 mg IntraVENous Q4H PRN Evan Cisneros, NP        amLODIPine (NORVASC) tablet 10 mg  10 mg Oral DAILY Evan Cisneros, NP   10 mg at 05/07/18 1006    benzonatate (TESSALON) capsule 100 mg  100 mg Oral TID PRN Evan Cisneros, NP        mupirocin (BACTROBAN) 2 % ointment   Both Nostrils BID Evan Cisneros, NP        zolpidem (AMBIEN) tablet 5 mg  5 mg Oral QHS PRN Evan Cisneros, NP   5 mg at 05/07/18 0003    chlorhexidine (PERIDEX) 0.12 % mouthwash 15 mL  15 mL Oral Q12H Evan Cisneros, NP   15 mL at 05/08/18 0242    sodium chloride (NS) flush 5-10 mL  5-10 mL IntraVENous Q8H Evan Cisneros, NP   10 mL at 05/08/18 0505    sodium chloride (NS) flush 5-10 mL  5-10 mL IntraVENous PRN Evan Cisneros, NP   10 mL at 05/07/18 1012    sodium phosphate (FLEET'S) enema 118 mL  1 Enema Rectal PRN Evan Cisneros, NP        HYDROmorphone (DILAUDID) injection 0.4 mg  0.4 mg IntraVENous Q4H PRN Evan Cisneros, NP   0.4 mg at 05/08/18 9328    aspirin chewable tablet 81 mg  81 mg Oral DAILY Alejandrina Quintero, NP   81 mg at 05/07/18 1006    atorvastatin (LIPITOR) tablet 10 mg  10 mg Oral QHS Roxanne Sandoval NP   10 mg at 18    0.9% sodium chloride infusion  12 mL/hr IntraVENous CONTINUOUS Tianna Calhoun MD 9 mL/hr at 18 0754 9 mL/hr at 18 0754       Allergies: No Known Allergies    ROS:    CESAR due to intubation and sedation. Physical Exam:   Constitutional: sedated, responds to pain. HENT:   Head: Normocephalic and atraumatic. Eyes: Pupils are equal, round, and reactive to light. Neck: Neck supple. No JVD present. Cardiovascular: Normal rate, S1 normal and S2 normal.  Exam reveals gallop and S4.    Pulses: + pulses , PACs    Pulmonary/Chest: diminished BLL, no cough  Abdominal: Soft. Bowel sounds are normal. He exhibits no distension. Musculoskeletal: Normal range of motion. He exhibits no edema. Neurological: sedated   Skin: Skin is warm and dry.        Vitals:   Visit Vitals    BP 99/74    Pulse (!) 46    Temp 97.3 °F (36.3 °C)    Resp 14    Ht 5' 6\" (1.676 m)    Wt 191 lb 5.8 oz (86.8 kg)    SpO2 99%    BMI 30.89 kg/m2         Temp (24hrs), Av.4 °F (36.3 °C), Min:96.2 °F (35.7 °C), Max:98.5 °F (36.9 °C)      Admission Weight: Last Weight   Weight: 188 lb 11.4 oz (85.6 kg) Weight: 191 lb 5.8 oz (86.8 kg)     Intake / Output / Drain:  Last 24 hrs.:     Intake/Output Summary (Last 24 hours) at 18 1009  Last data filed at 18 0700   Gross per 24 hour   Intake          2114.66 ml   Output             1770 ml   Net           344.66 ml       Oxygen Therapy:  Oxygen Therapy  O2 Sat (%): 99 % (18)  Pulse via Oximetry: 46 beats per minute (18)  O2 Device: Endotracheal tube (18 0814)  O2 Flow Rate (L/min): 6 l/min (18 1244)  FIO2 (%): 60 % (18 0815)    Recent Labs:   Labs Latest Ref Rng & Units 2018   WBC 4.1 - 11.1 K/uL 5.6 6.7 6.7 6.8 - 7.3 -   RBC 4.10 - 5.70 M/uL 3.35(L) 3.43(L) 3.85(L) 4.05(L) - 4.26 - Hemoglobin 12.1 - 17.0 g/dL 9.6(L) 9.8(L) 11. 2(L) 11. 6(L) 12. 0(L) 12.4 -   Hematocrit 36.6 - 50.3 % 30. 4(L) 30. 9(L) 35. 3(L) 37.1 38.1 39.2 -   MCV 80.0 - 99.0 FL 90.7 90.1 91.7 91.6 - 92.0 -   Platelets 523 - 793 K/uL 150 162 180 211 - 250 -   Lymphocytes 12 - 49 % - - 17 18 - 19 -   Monocytes 5 - 13 % - - 9 6 - 7 -   Eosinophils 0 - 7 % - - 5 5 - 4 -   Basophils 0 - 1 % - - 0 0 - 0 -   Albumin 3.5 - 5.0 g/dL 3. 0(L) 3.4(L) 2. 7(L) 2. 8(L) - - -   Calcium 8.5 - 10.1 MG/DL 8. 1(L) 7. 9(L) 8.4(L) 8.4(L) - - -   SGOT 15 - 37 U/L 31 32 15 15 - - -   Glucose 65 - 100 mg/dL 96 111(H) 97 163(H) - - -   BUN 6 - 20 MG/DL 15 15 12 11 - - -   Creatinine 0.70 - 1.30 MG/DL 1.25 1.35(H) 1.18 1.31(H) - - -   Sodium 136 - 145 mmol/L 140 139 139 137 - - -   Potassium 3.5 - 5.1 mmol/L 3.8 4.6 4.4 4.3 - - -   TSH 0.36 - 3.74 uIU/mL - - - - - - 1.19   LDH 85 - 241 U/L 333(H) - - - - - -   Some recent data might be hidden     EKG:   EKG Results     Procedure 720 Value Units Date/Time    SCANNED CARDIAC RHYTHM STRIP [[id number]] Collected:  05/07/18 6279    Order Status:  Completed Updated:  05/07/18 0640    SCANNED CARDIAC RHYTHM STRIP [[id number]] Collected:  05/07/18 0602    Order Status:  Completed Updated:  05/07/18 0604    SCANNED CARDIAC RHYTHM STRIP [[id number]] Collected:  05/05/18 0629    Order Status:  Completed Updated:  05/05/18 0631    EKG, 12 LEAD, INITIAL [[id number]]     Order Status:  Completed         Echocardiogram:   5/3/18:  Left ventricle: Apical akinesis, Lateral hypokinesis, Normal septal motion  The ventricle was moderately to severely dilated. Systolic function was  normal. Ejection fraction was estimated in the range of 30 % to 35 %. There was severe diffuse hypokinesis with regional variations. Left atrium: The atrium was moderately to severely dilated. Mitral valve: There was mild to moderate regurgitation. Tricuspid valve: There was moderate regurgitation.     Cardiac Catheterizations:   5/4: Cardiac catheterization 5/4/18:  L Main: >50% osteal      LAD: 40-50% proximal, 70% after first diagonal, 80-90% mid lesion      LCflex: Diffuse mild disease, OM1 small vessel difffuse 70-90% disease.      RCA: small dominant vessel, 50% proximal lesion, 70-90% tandem lesions proximal-md lesion, 80% distal lesion      LVEDP: 30 mHg      LVEF: 15% diffuse hypokinesis      No significant gradient across aortic valve.      PCI: None    Radiology (CXR, CT scans):   CXR Results  (Last 48 hours)               05/08/18 0511  XR CHEST PORT Final result    Impression:  IMPRESSION:   1. Increase in left lower lobe atelectasis. 2. No change pulmonary edema   3. The ET tube is 6 cm above the jake could be advanced 1 cm. Narrative:  EXAM:  XR CHEST PORT       INDICATION:  Impella, cardiac assist device, coronary artery disease       COMPARISON:  5/7/2018       FINDINGS: A portable AP radiograph of the chest was obtained at 0409 hours. The   patient is on a cardiac monitor. The ET tube is 6 cm above the jake. Brunswick-Ervin   catheter overlies the right main pulmonary artery. Cardiac assist device is   noted. There is persistent mild pulmonary edema which has not changed significantly. There is increasing opacities in left lower lobe consistent with atelectasis. There is improved aeration of the right lower lobe. 05/07/18 1732  XR CHEST PORT Final result    Impression:  IMPRESSION: New Impella device with decreased pulmonary edema. Endotracheal tube   and right jugular Brunswick-Ervin catheter in satisfactory position. Narrative:  EXAM:  XR CHEST PORT. INDICATION: Post op impella placement. COMPARISON: 5/7/2018 at 0407 hours. FINDINGS:    A portable AP radiograph of the chest was obtained at 1752 hours. There is a new   right subclavian Impella device overlying the heart. There are surgical clips   and skin staples in the right infraclavicular region. Lines and tubes:  The patient is on a cardiac monitor. There is a new right   jugular [place]-Ervin catheter with tip over the interpericardial portion of the   right pulmonary artery and there is an endotracheal tube in satisfactory   position 4 to 5 cm above the jake. Lungs: The vascular congestion and edema throughout the lungs have decreased. Pleura: There is no pneumothorax or pleural effusion. Mediastinum: The cardiac silhouette is enlarged. Bones and soft tissues: The bones and soft tissues are grossly within normal   limits. 05/07/18 0443  XR CHEST PORT Final result    Impression:  IMPRESSION:   1. Moderate pulmonary edema has increased. .       Narrative:  EXAM:  XR CHEST PORT       INDICATION:  Coronary artery disease, workup for heart surgery       COMPARISON:  5/6/2018       FINDINGS: A portable AP radiograph of the chest was obtained at 0407 hours. The   patient is on a cardiac monitor. Right IJ cordis catheter overlies the SVC. There is no change in cardiomegaly. Moderate bilateral pulmonary edema and   vascular congestion persists this has slightly increased especially in the right   perihilar region. Small right pleural effusion is present. Sclerotic densities in the region of left humeral head could be related to   avascular necrosis or posttraumatic. Dk Khan, NP  [address] [facility]  [address] [facility], [facility] [address], [facility]  Office [phone]  Fax [phone]  24 hour VAD/HF Pager: [phone]       Patient seen and examined. Data and note reviewed. I have discussed and agree with the plans as noted. 61year old male with a history of HTN, NSTEMI, ICM, cocaine abuse, who underwent Impella placement prior to high risk CABG. He is scheduled for CABG today. Will continue to monitor his hemodynamics post op. Thank you for allowing us to participate in your patient's care. Danni Long MD, [facility], [place] [address] Director    [address]  [address] [facility], [address] [place] [address], [address] [place] [address]  Office [phone]  Fax [phone]

## 2018-05-08 NOTE — NURSE NAVIGATOR
Chart reviewed by Heart Failure Nurse Navigator. Heart Failure database completed. EF: 15%    ACEi/ARB: **    BB: coreg 6.25 mg twice daily    Aldosterone Antagonist: **    CRT **. NYHA Functional Class III    Heart Failure Teach Back in Patient Education. Heart Failure Avoiding Triggers on Discharge Instructions.       Cardiologist: Dr. Bola Sin [facility])    Luiz James then CABG

## 2018-05-08 NOTE — PERIOP NOTES
Patient arrived to the operating room via ICU bed accompanied by CRNA and Perfusion. Patient is intubated sedated . Patient has an Impella in place, as well as a 16 fr. Liu catheter placed on 5/7.,  All wheels locked and patient transferred to the OR table with assistance of four staff members. Norberto Tablegard warming mattress on OR table. Unit #[id number]. Pressure set according to patient's weight. Temperature set at 39 C and adjusted as needed. Safety strap across upper body until time of positioning and after drapes removed prior to transfer. Urine output and temperature monitored during case by all providers. Patient positioned with assistance of all providers. Head resting in proper alignment on gel doughnut. Gel Shoulder roll placed. Arms tucked as sides. Each arm was wrapped in one step arm protectors. All pressure points/IV lines/stopcocks padded with foam. Thumps in proper alignment and ulnar padding in place. Picket fence foot positioner used during prep.

## 2018-05-08 NOTE — PROCEDURES
[address] [facility]  GHASSAN Bach  MR#: [id number]  : 1960  ACCOUNT #: [de-identified]   DATE OF SERVICE: 2018    ATTENDING SURGEON:  Duarte Puente MD    ATTENDING ANESTHESIOLOGIST:  Marimar Tavera DO    PROCEDURE PERFORMED:  Diagnostic intraoperative GHASSAN. SURGICAL PROCEDURE:  Impella placement. MODALITIES PERFORMED:  1.  2D GHASSAN. 2.  Color flow Doppler. 3.  Pulse wave Doppler. 4.  Continuous wave Doppler. DESCRIPTION OF PROCEDURE:  The patient is a 58-year-old male with a history of drug abuse and heart failure who presents today for the above procedure. After induction of general anesthesia, a multiplane GHASSAN probe was inserted without difficulty. AORTA:  ASCENDING AORTA:  The ascending aorta was normal in size with no dissection. There was intimal thickening. AORTIC ARCH:  The aortic arch is normal in size with no dissection. There is a less than 3 mm plaque that is nonmobile. DESCENDING AORTA:  The descending aorta was normal in size with no dissection. There is a less than 3 mm plaque that is nonmobile. VALVES:  AORTIC VALVE:  The aortic annulus was normal with no stenosis. There was 1+ central aortic insufficiency. The leaflets were normal with normal leaflet motion. MITRAL VALVE:  The mitral annulus was normal with no stenosis. There was 1+ mitral insufficiency. The leaflets were normal with normal leaflet motion. TRICUSPID VALVE:  The tricuspid annulus was normal with no stenosis. There is 1-2+ tricuspid insufficiency. The leaflets are normal with normal leaflet motion. ATRIA:  RIGHT ATRIUM:  The right atrium was normal in size with no smoke, thrombus, or tumor. Catheter was visualized in the right atrium as well as spontaneous echo contrast.      LEFT ATRIUM:  The left atrium is normal in size with no smoke, thrombus, or tumor.   There was no clot in the left atrial appendage, but reduced velocities are seen.    INTERATRIAL SEPTUM:  There is a small PFO with left to right flow. INTERVENTRICULAR SEPTUM:  Normal.    VENTRICLES:  RIGHT VENTRICLE:  The right ventricle was normal in size with no hypertrophy or thrombus. The global function of the right ventricle was normal.      LEFT VENTRICLE:  The left ventricle was dilated with no hypertrophy or thrombus. The estimated ejection fraction was 10-15%. There is akinesis of the majority of the wall segments with hypokinesis of the basal anteroseptal and inferoseptal walls. There was restrictive diastolic dysfunction. PERICARDIUM:  Normal.    PLEURA:  There is a small left-sided pleural effusion. POST-PROCEDURE GHASSAN:  The postprocedure exam was performed with the patient on no inotropes. The Impella is visualized passing across the aortic valve and into the left ventricle approximately 3.5 cm. There was laminar flow within the Impella. Both the inflow and outflow are visualized. All other findings are unchanged from previous. All findings were discussed in detail with Dr. Tapia.       DO OMID Garcia / CECILIA  D: 05/07/2018 16:23     T: 05/08/2018 04:34  JOB #: [id number]

## 2018-05-09 ENCOUNTER — APPOINTMENT (OUTPATIENT)
Dept: GENERAL RADIOLOGY | Age: 58
DRG: 215 | End: 2018-05-09
Attending: NURSE PRACTITIONER
Payer: COMMERCIAL

## 2018-05-09 LAB
ADMINISTERED INITIALS, ADMINIT: NORMAL
ALBUMIN SERPL-MCNC: 3 G/DL (ref 3.5–5)
ALBUMIN SERPL-MCNC: 3.2 G/DL (ref 3.5–5)
ALBUMIN/GLOB SERPL: 1.2 {RATIO} (ref 1.1–2.2)
ALBUMIN/GLOB SERPL: 1.3 {RATIO} (ref 1.1–2.2)
ALP SERPL-CCNC: 33 U/L (ref 45–117)
ALP SERPL-CCNC: 37 U/L (ref 45–117)
ALT SERPL-CCNC: 15 U/L (ref 12–78)
ALT SERPL-CCNC: 18 U/L (ref 12–78)
ANION GAP SERPL CALC-SCNC: 8 MMOL/L (ref 5–15)
ANION GAP SERPL CALC-SCNC: 9 MMOL/L (ref 5–15)
APTT PPP: 27.4 SEC (ref 22.1–32)
ARTERIAL PATENCY WRIST A: ABNORMAL
ARTERIAL PATENCY WRIST A: NORMAL
ARTERIAL PATENCY WRIST A: NORMAL
AST SERPL-CCNC: 60 U/L (ref 15–37)
AST SERPL-CCNC: 62 U/L (ref 15–37)
ATRIAL RATE: 69 BPM
BASE DEFICIT BLDV-SCNC: 2 MMOL/L
BASE EXCESS BLD CALC-SCNC: 1 MMOL/L
BASE EXCESS BLD CALC-SCNC: 5 MMOL/L
BASE EXCESS BLD CALC-SCNC: 5 MMOL/L
BASE EXCESS BLD CALC-SCNC: 6 MMOL/L
BASE EXCESS BLDV CALC-SCNC: 2 MMOL/L
BASOPHILS # BLD: 0 K/UL (ref 0–0.1)
BASOPHILS NFR BLD: 0 % (ref 0–1)
BDY SITE: ABNORMAL
BDY SITE: NORMAL
BDY SITE: NORMAL
BILIRUB SERPL-MCNC: 1.5 MG/DL (ref 0.2–1)
BILIRUB SERPL-MCNC: 1.5 MG/DL (ref 0.2–1)
BLD PROD TYP BPU: NORMAL
BPU ID: NORMAL
BUN SERPL-MCNC: 16 MG/DL (ref 6–20)
BUN SERPL-MCNC: 17 MG/DL (ref 6–20)
BUN/CREAT SERPL: 12 (ref 12–20)
BUN/CREAT SERPL: 13 (ref 12–20)
CALCIUM SERPL-MCNC: 7.8 MG/DL (ref 8.5–10.1)
CALCIUM SERPL-MCNC: 7.9 MG/DL (ref 8.5–10.1)
CALCULATED P AXIS, ECG09: 27 DEGREES
CALCULATED R AXIS, ECG10: 38 DEGREES
CALCULATED T AXIS, ECG11: 154 DEGREES
CHLORIDE SERPL-SCNC: 106 MMOL/L (ref 97–108)
CHLORIDE SERPL-SCNC: 107 MMOL/L (ref 97–108)
CO2 SERPL-SCNC: 25 MMOL/L (ref 21–32)
CO2 SERPL-SCNC: 28 MMOL/L (ref 21–32)
CREAT SERPL-MCNC: 1.35 MG/DL (ref 0.7–1.3)
CREAT SERPL-MCNC: 1.36 MG/DL (ref 0.7–1.3)
D50 ADMINISTERED, D50ADM: 0 ML
D50 ORDER, D50ORD: 0 ML
DIAGNOSIS, 93000: NORMAL
DIFFERENTIAL METHOD BLD: ABNORMAL
EOSINOPHIL # BLD: 0 K/UL (ref 0–0.4)
EOSINOPHIL NFR BLD: 0 % (ref 0–7)
ERYTHROCYTE [DISTWIDTH] IN BLOOD BY AUTOMATED COUNT: 13.1 % (ref 11.5–14.5)
ERYTHROCYTE [DISTWIDTH] IN BLOOD BY AUTOMATED COUNT: 13.2 % (ref 11.5–14.5)
GAS FLOW.O2 O2 DELIVERY SYS: ABNORMAL L/MIN
GAS FLOW.O2 O2 DELIVERY SYS: NORMAL L/MIN
GAS FLOW.O2 O2 DELIVERY SYS: NORMAL L/MIN
GAS FLOW.O2 SETTING OXYMISER: 12 BPM
GAS FLOW.O2 SETTING OXYMISER: 14 BPM
GAS FLOW.O2 SETTING OXYMISER: 7 BPM
GLOBULIN SER CALC-MCNC: 2.4 G/DL (ref 2–4)
GLOBULIN SER CALC-MCNC: 2.6 G/DL (ref 2–4)
GLSCOM COMMENTS: NORMAL
GLUCOSE BLD STRIP.AUTO-MCNC: 101 MG/DL (ref 65–100)
GLUCOSE BLD STRIP.AUTO-MCNC: 101 MG/DL (ref 65–100)
GLUCOSE BLD STRIP.AUTO-MCNC: 102 MG/DL (ref 65–100)
GLUCOSE BLD STRIP.AUTO-MCNC: 103 MG/DL (ref 65–100)
GLUCOSE BLD STRIP.AUTO-MCNC: 106 MG/DL (ref 65–100)
GLUCOSE BLD STRIP.AUTO-MCNC: 108 MG/DL (ref 65–100)
GLUCOSE BLD STRIP.AUTO-MCNC: 110 MG/DL (ref 65–100)
GLUCOSE BLD STRIP.AUTO-MCNC: 111 MG/DL (ref 65–100)
GLUCOSE BLD STRIP.AUTO-MCNC: 113 MG/DL (ref 65–100)
GLUCOSE BLD STRIP.AUTO-MCNC: 113 MG/DL (ref 65–100)
GLUCOSE BLD STRIP.AUTO-MCNC: 115 MG/DL (ref 65–100)
GLUCOSE BLD STRIP.AUTO-MCNC: 116 MG/DL (ref 65–100)
GLUCOSE BLD STRIP.AUTO-MCNC: 120 MG/DL (ref 65–100)
GLUCOSE BLD STRIP.AUTO-MCNC: 121 MG/DL (ref 65–100)
GLUCOSE BLD STRIP.AUTO-MCNC: 86 MG/DL (ref 65–100)
GLUCOSE BLD STRIP.AUTO-MCNC: 89 MG/DL (ref 65–100)
GLUCOSE BLD STRIP.AUTO-MCNC: 92 MG/DL (ref 65–100)
GLUCOSE BLD STRIP.AUTO-MCNC: 94 MG/DL (ref 65–100)
GLUCOSE BLD STRIP.AUTO-MCNC: 98 MG/DL (ref 65–100)
GLUCOSE BLD STRIP.AUTO-MCNC: 99 MG/DL (ref 65–100)
GLUCOSE SERPL-MCNC: 117 MG/DL (ref 65–100)
GLUCOSE SERPL-MCNC: 141 MG/DL (ref 65–100)
GLUCOSE, GLC: 101 MG/DL
GLUCOSE, GLC: 101 MG/DL
GLUCOSE, GLC: 102 MG/DL
GLUCOSE, GLC: 103 MG/DL
GLUCOSE, GLC: 106 MG/DL
GLUCOSE, GLC: 108 MG/DL
GLUCOSE, GLC: 110 MG/DL
GLUCOSE, GLC: 111 MG/DL
GLUCOSE, GLC: 113 MG/DL
GLUCOSE, GLC: 113 MG/DL
GLUCOSE, GLC: 115 MG/DL
GLUCOSE, GLC: 116 MG/DL
GLUCOSE, GLC: 120 MG/DL
GLUCOSE, GLC: 121 MG/DL
GLUCOSE, GLC: 86 MG/DL
GLUCOSE, GLC: 89 MG/DL
GLUCOSE, GLC: 92 MG/DL
GLUCOSE, GLC: 94 MG/DL
GLUCOSE, GLC: 98 MG/DL
GLUCOSE, GLC: 99 MG/DL
HCO3 BLD-SCNC: 25.1 MMOL/L (ref 22–26)
HCO3 BLD-SCNC: 28.7 MMOL/L (ref 22–26)
HCO3 BLD-SCNC: 28.8 MMOL/L (ref 22–26)
HCO3 BLD-SCNC: 28.9 MMOL/L (ref 22–26)
HCO3 BLDV-SCNC: 23.2 MMOL/L (ref 23–28)
HCO3 BLDV-SCNC: 26.6 MMOL/L (ref 23–28)
HCT VFR BLD AUTO: 19.9 % (ref 36.6–50.3)
HCT VFR BLD AUTO: 20.9 % (ref 36.6–50.3)
HCT VFR BLD AUTO: 21.6 % (ref 36.6–50.3)
HCT VFR BLD AUTO: 22.8 % (ref 36.6–50.3)
HGB BLD-MCNC: 6.3 G/DL (ref 12.1–17)
HGB BLD-MCNC: 6.8 G/DL (ref 12.1–17)
HGB BLD-MCNC: 7.1 G/DL (ref 12.1–17)
HGB BLD-MCNC: 7.2 G/DL (ref 12.1–17)
HIGH TARGET, HITG: 130 MG/DL
IMM GRANULOCYTES # BLD: 0 K/UL
IMM GRANULOCYTES NFR BLD AUTO: 0 %
INR PPP: 1 (ref 0.9–1.1)
INSULIN ADMINSTERED, INSADM: 0.3 UNITS/HOUR
INSULIN ADMINSTERED, INSADM: 0.4 UNITS/HOUR
INSULIN ADMINSTERED, INSADM: 0.5 UNITS/HOUR
INSULIN ADMINSTERED, INSADM: 0.5 UNITS/HOUR
INSULIN ADMINSTERED, INSADM: 0.6 UNITS/HOUR
INSULIN ADMINSTERED, INSADM: 0.6 UNITS/HOUR
INSULIN ADMINSTERED, INSADM: 0.9 UNITS/HOUR
INSULIN ADMINSTERED, INSADM: 1.4 UNITS/HOUR
INSULIN ADMINSTERED, INSADM: 1.5 UNITS/HOUR
INSULIN ADMINSTERED, INSADM: 1.6 UNITS/HOUR
INSULIN ADMINSTERED, INSADM: 1.6 UNITS/HOUR
INSULIN ADMINSTERED, INSADM: 1.8 UNITS/HOUR
INSULIN ADMINSTERED, INSADM: 2.1 UNITS/HOUR
INSULIN ADMINSTERED, INSADM: 2.4 UNITS/HOUR
INSULIN ADMINSTERED, INSADM: 2.6 UNITS/HOUR
INSULIN ADMINSTERED, INSADM: 2.7 UNITS/HOUR
INSULIN ADMINSTERED, INSADM: 3 UNITS/HOUR
INSULIN ADMINSTERED, INSADM: 3.1 UNITS/HOUR
INSULIN ORDER, INSORD: 0.3 UNITS/HOUR
INSULIN ORDER, INSORD: 0.4 UNITS/HOUR
INSULIN ORDER, INSORD: 0.5 UNITS/HOUR
INSULIN ORDER, INSORD: 0.5 UNITS/HOUR
INSULIN ORDER, INSORD: 0.6 UNITS/HOUR
INSULIN ORDER, INSORD: 0.6 UNITS/HOUR
INSULIN ORDER, INSORD: 0.9 UNITS/HOUR
INSULIN ORDER, INSORD: 1.4 UNITS/HOUR
INSULIN ORDER, INSORD: 1.5 UNITS/HOUR
INSULIN ORDER, INSORD: 1.6 UNITS/HOUR
INSULIN ORDER, INSORD: 1.6 UNITS/HOUR
INSULIN ORDER, INSORD: 1.8 UNITS/HOUR
INSULIN ORDER, INSORD: 2.1 UNITS/HOUR
INSULIN ORDER, INSORD: 2.4 UNITS/HOUR
INSULIN ORDER, INSORD: 2.6 UNITS/HOUR
INSULIN ORDER, INSORD: 2.7 UNITS/HOUR
INSULIN ORDER, INSORD: 3 UNITS/HOUR
INSULIN ORDER, INSORD: 3.1 UNITS/HOUR
LACTATE SERPL-SCNC: 1 MMOL/L (ref 0.4–2)
LDH SERPL L TO P-CCNC: 478 U/L (ref 85–241)
LOW TARGET, LOT: 95 MG/DL
LYMPHOCYTES # BLD: 0.3 K/UL (ref 0.8–3.5)
LYMPHOCYTES NFR BLD: 4 % (ref 12–49)
MAGNESIUM SERPL-MCNC: 2.8 MG/DL (ref 1.6–2.4)
MAGNESIUM SERPL-MCNC: 2.9 MG/DL (ref 1.6–2.4)
MCH RBC QN AUTO: 28.8 PG (ref 26–34)
MCH RBC QN AUTO: 28.8 PG (ref 26–34)
MCHC RBC AUTO-ENTMCNC: 31.6 G/DL (ref 30–36.5)
MCHC RBC AUTO-ENTMCNC: 31.7 G/DL (ref 30–36.5)
MCV RBC AUTO: 90.9 FL (ref 80–99)
MCV RBC AUTO: 91.2 FL (ref 80–99)
MINUTES UNTIL NEXT BG, NBG: 120 MIN
MINUTES UNTIL NEXT BG, NBG: 120 MIN
MINUTES UNTIL NEXT BG, NBG: 60 MIN
MONOCYTES # BLD: 0.1 K/UL (ref 0–1)
MONOCYTES NFR BLD: 1 % (ref 5–13)
MULTIPLIER, MUL: 0.01
MULTIPLIER, MUL: 0.02
MULTIPLIER, MUL: 0.03
MULTIPLIER, MUL: 0.04
MULTIPLIER, MUL: 0.05
NEUTS BAND NFR BLD MANUAL: 4 % (ref 0–6)
NEUTS SEG # BLD: 7.5 K/UL (ref 1.8–8)
NEUTS SEG NFR BLD: 91 % (ref 32–75)
NRBC # BLD: 0 K/UL (ref 0–0.01)
NRBC # BLD: 0.02 K/UL (ref 0–0.01)
NRBC BLD-RTO: 0 PER 100 WBC
NRBC BLD-RTO: 0.3 PER 100 WBC
O2/TOTAL GAS SETTING VFR VENT: 0.8 %
O2/TOTAL GAS SETTING VFR VENT: 0.8 %
O2/TOTAL GAS SETTING VFR VENT: 5 %
O2/TOTAL GAS SETTING VFR VENT: 50 %
O2/TOTAL GAS SETTING VFR VENT: 50 %
O2/TOTAL GAS SETTING VFR VENT: 80 %
ORDER INITIALS, ORDINIT: NORMAL
P-R INTERVAL, ECG05: 146 MS
PCO2 BLD: 36.9 MMHG (ref 35–45)
PCO2 BLD: 37.8 MMHG (ref 35–45)
PCO2 BLD: 40.1 MMHG (ref 35–45)
PCO2 BLD: 40.1 MMHG (ref 35–45)
PCO2 BLDV: 41.1 MMHG (ref 41–51)
PCO2 BLDV: 44.1 MMHG (ref 41–51)
PEEP RESPIRATORY: 5 CMH2O
PH BLD: 7.43 [PH] (ref 7.35–7.45)
PH BLD: 7.46 [PH] (ref 7.35–7.45)
PH BLD: 7.46 [PH] (ref 7.35–7.45)
PH BLD: 7.5 [PH] (ref 7.35–7.45)
PH BLDV: 7.36 [PH] (ref 7.32–7.42)
PH BLDV: 7.39 [PH] (ref 7.32–7.42)
PLATELET # BLD AUTO: 116 K/UL (ref 150–400)
PLATELET # BLD AUTO: 125 K/UL (ref 150–400)
PMV BLD AUTO: 10.4 FL (ref 8.9–12.9)
PMV BLD AUTO: 10.8 FL (ref 8.9–12.9)
PO2 BLD: 105 MMHG (ref 80–100)
PO2 BLD: 118 MMHG (ref 80–100)
PO2 BLD: 88 MMHG (ref 80–100)
PO2 BLD: 88 MMHG (ref 80–100)
PO2 BLDV: 33 MMHG (ref 25–40)
PO2 BLDV: 37 MMHG (ref 25–40)
POTASSIUM SERPL-SCNC: 4.2 MMOL/L (ref 3.5–5.1)
POTASSIUM SERPL-SCNC: 4.3 MMOL/L (ref 3.5–5.1)
PRESSURE SUPPORT SETTING VENT: 10 CMH2O
PRESSURE SUPPORT SETTING VENT: 5 CMH2O
PRESSURE SUPPORT SETTING VENT: 5 CMH2O
PROT SERPL-MCNC: 5.6 G/DL (ref 6.4–8.2)
PROT SERPL-MCNC: 5.6 G/DL (ref 6.4–8.2)
PROTHROMBIN TIME: 10.7 SEC (ref 9–11.1)
Q-T INTERVAL, ECG07: 456 MS
QRS DURATION, ECG06: 106 MS
QTC CALCULATION (BEZET), ECG08: 488 MS
RBC # BLD AUTO: 2.19 M/UL (ref 4.1–5.7)
RBC # BLD AUTO: 2.5 M/UL (ref 4.1–5.7)
RBC MORPH BLD: ABNORMAL
SAO2 % BLD: 97 % (ref 92–97)
SAO2 % BLD: 98 % (ref 92–97)
SAO2 % BLD: 98 % (ref 92–97)
SAO2 % BLD: 99 % (ref 92–97)
SAO2 % BLDV: 62 % (ref 65–88)
SAO2 % BLDV: 68 % (ref 65–88)
SERVICE CMNT-IMP: ABNORMAL
SERVICE CMNT-IMP: NORMAL
SODIUM SERPL-SCNC: 140 MMOL/L (ref 136–145)
SODIUM SERPL-SCNC: 143 MMOL/L (ref 136–145)
SPECIMEN TYPE: ABNORMAL
SPECIMEN TYPE: NORMAL
SPECIMEN TYPE: NORMAL
STATUS OF UNIT,%ST: NORMAL
THERAPEUTIC RANGE,PTTT: NORMAL SECS (ref 58–77)
TOTAL RESP. RATE, ITRR: 18
TOTAL RESP. RATE, ITRR: 18
TOTAL RESP. RATE, ITRR: 23
UNIT DIVISION, %UDIV: 0
VENTILATION MODE VENT: ABNORMAL
VENTILATION MODE VENT: NORMAL
VENTILATION MODE VENT: NORMAL
VENTRICULAR RATE, ECG03: 69 BPM
VOLUME CONTROL IVLC: YES
VT SETTING VENT: 500 ML
WBC # BLD AUTO: 6.9 K/UL (ref 4.1–11.1)
WBC # BLD AUTO: 7.9 K/UL (ref 4.1–11.1)

## 2018-05-09 PROCEDURE — 74011250636 HC RX REV CODE- 250/636: Performed by: THORACIC SURGERY (CARDIOTHORACIC VASCULAR SURGERY)

## 2018-05-09 PROCEDURE — 71045 X-RAY EXAM CHEST 1 VIEW: CPT

## 2018-05-09 PROCEDURE — 74011250636 HC RX REV CODE- 250/636: Performed by: NURSE PRACTITIONER

## 2018-05-09 PROCEDURE — 93005 ELECTROCARDIOGRAM TRACING: CPT

## 2018-05-09 PROCEDURE — 74011250637 HC RX REV CODE- 250/637: Performed by: NURSE PRACTITIONER

## 2018-05-09 PROCEDURE — 85018 HEMOGLOBIN: CPT | Performed by: NURSE PRACTITIONER

## 2018-05-09 PROCEDURE — 36430 TRANSFUSION BLD/BLD COMPNT: CPT

## 2018-05-09 PROCEDURE — 65610000003 HC RM ICU SURGICAL

## 2018-05-09 PROCEDURE — 74011000250 HC RX REV CODE- 250: Performed by: THORACIC SURGERY (CARDIOTHORACIC VASCULAR SURGERY)

## 2018-05-09 PROCEDURE — 74011636637 HC RX REV CODE- 636/637: Performed by: THORACIC SURGERY (CARDIOTHORACIC VASCULAR SURGERY)

## 2018-05-09 PROCEDURE — 74011000258 HC RX REV CODE- 258: Performed by: NURSE PRACTITIONER

## 2018-05-09 PROCEDURE — 36415 COLL VENOUS BLD VENIPUNCTURE: CPT | Performed by: NURSE PRACTITIONER

## 2018-05-09 PROCEDURE — 74011000250 HC RX REV CODE- 250: Performed by: NURSE PRACTITIONER

## 2018-05-09 PROCEDURE — 80053 COMPREHEN METABOLIC PANEL: CPT | Performed by: NURSE PRACTITIONER

## 2018-05-09 PROCEDURE — 94003 VENT MGMT INPAT SUBQ DAY: CPT

## 2018-05-09 PROCEDURE — 74011000258 HC RX REV CODE- 258: Performed by: THORACIC SURGERY (CARDIOTHORACIC VASCULAR SURGERY)

## 2018-05-09 PROCEDURE — 85730 THROMBOPLASTIN TIME PARTIAL: CPT | Performed by: NURSE PRACTITIONER

## 2018-05-09 PROCEDURE — 85610 PROTHROMBIN TIME: CPT | Performed by: NURSE PRACTITIONER

## 2018-05-09 PROCEDURE — 82962 GLUCOSE BLOOD TEST: CPT

## 2018-05-09 PROCEDURE — 83605 ASSAY OF LACTIC ACID: CPT | Performed by: THORACIC SURGERY (CARDIOTHORACIC VASCULAR SURGERY)

## 2018-05-09 PROCEDURE — 83615 LACTATE (LD) (LDH) ENZYME: CPT | Performed by: THORACIC SURGERY (CARDIOTHORACIC VASCULAR SURGERY)

## 2018-05-09 PROCEDURE — P9045 ALBUMIN (HUMAN), 5%, 250 ML: HCPCS | Performed by: NURSE PRACTITIONER

## 2018-05-09 PROCEDURE — 85027 COMPLETE CBC AUTOMATED: CPT | Performed by: NURSE PRACTITIONER

## 2018-05-09 PROCEDURE — P9016 RBC LEUKOCYTES REDUCED: HCPCS | Performed by: THORACIC SURGERY (CARDIOTHORACIC VASCULAR SURGERY)

## 2018-05-09 PROCEDURE — 83735 ASSAY OF MAGNESIUM: CPT | Performed by: NURSE PRACTITIONER

## 2018-05-09 RX ORDER — ATORVASTATIN CALCIUM 10 MG/1
10 TABLET, FILM COATED ORAL
Status: DISCONTINUED | OUTPATIENT
Start: 2018-05-09 | End: 2018-05-14 | Stop reason: HOSPADM

## 2018-05-09 RX ORDER — SODIUM CHLORIDE 9 MG/ML
250 INJECTION, SOLUTION INTRAVENOUS AS NEEDED
Status: DISCONTINUED | OUTPATIENT
Start: 2018-05-09 | End: 2018-05-12

## 2018-05-09 RX ORDER — BUMETANIDE 0.25 MG/ML
1 INJECTION INTRAMUSCULAR; INTRAVENOUS ONCE
Status: COMPLETED | OUTPATIENT
Start: 2018-05-09 | End: 2018-05-09

## 2018-05-09 RX ADMIN — Medication 10 ML: at 07:17

## 2018-05-09 RX ADMIN — CHLORHEXIDINE GLUCONATE 10 ML: 1.2 RINSE ORAL at 09:17

## 2018-05-09 RX ADMIN — AMIODARONE HYDROCHLORIDE 1 MG/MIN: 50 INJECTION, SOLUTION INTRAVENOUS at 13:05

## 2018-05-09 RX ADMIN — SODIUM CHLORIDE 0.5 UNITS/HR: 900 INJECTION, SOLUTION INTRAVENOUS at 19:11

## 2018-05-09 RX ADMIN — SODIUM CHLORIDE 12 ML/HR: 900 INJECTION, SOLUTION INTRAVENOUS at 18:41

## 2018-05-09 RX ADMIN — HYDROMORPHONE HYDROCHLORIDE 1 MG: 1 INJECTION, SOLUTION INTRAMUSCULAR; INTRAVENOUS; SUBCUTANEOUS at 10:35

## 2018-05-09 RX ADMIN — Medication 10 ML: at 07:16

## 2018-05-09 RX ADMIN — OXYCODONE HYDROCHLORIDE AND ACETAMINOPHEN 2 TABLET: 5; 325 TABLET ORAL at 19:24

## 2018-05-09 RX ADMIN — BUMETANIDE 1 MG: 0.25 INJECTION INTRAMUSCULAR; INTRAVENOUS at 12:28

## 2018-05-09 RX ADMIN — Medication 30 MCG/MIN: at 19:07

## 2018-05-09 RX ADMIN — AMIODARONE HYDROCHLORIDE 1 MG/MIN: 50 INJECTION, SOLUTION INTRAVENOUS at 05:20

## 2018-05-09 RX ADMIN — AMIODARONE HYDROCHLORIDE 400 MG: 200 TABLET ORAL at 19:24

## 2018-05-09 RX ADMIN — HYDROMORPHONE HYDROCHLORIDE 1 MG: 1 INJECTION, SOLUTION INTRAMUSCULAR; INTRAVENOUS; SUBCUTANEOUS at 02:15

## 2018-05-09 RX ADMIN — SODIUM CHLORIDE 0.4 MCG/KG/HR: 900 INJECTION, SOLUTION INTRAVENOUS at 09:34

## 2018-05-09 RX ADMIN — BUMETANIDE 1 MG: 0.25 INJECTION INTRAMUSCULAR; INTRAVENOUS at 15:49

## 2018-05-09 RX ADMIN — Medication 10 ML: at 21:18

## 2018-05-09 RX ADMIN — ATORVASTATIN CALCIUM 10 MG: 10 TABLET, FILM COATED ORAL at 21:17

## 2018-05-09 RX ADMIN — MUPIROCIN: 20 OINTMENT TOPICAL at 09:17

## 2018-05-09 RX ADMIN — Medication 10 ML: at 15:50

## 2018-05-09 RX ADMIN — HYDROMORPHONE HYDROCHLORIDE 1 MG: 1 INJECTION, SOLUTION INTRAMUSCULAR; INTRAVENOUS; SUBCUTANEOUS at 20:05

## 2018-05-09 RX ADMIN — MUPIROCIN: 20 OINTMENT TOPICAL at 21:36

## 2018-05-09 RX ADMIN — Medication 10 ML: at 12:00

## 2018-05-09 RX ADMIN — Medication 2 G: at 09:16

## 2018-05-09 RX ADMIN — Medication 10 ML: at 12:30

## 2018-05-09 RX ADMIN — PROPOFOL 50 MCG/KG/MIN: 10 INJECTION, EMULSION INTRAVENOUS at 09:34

## 2018-05-09 RX ADMIN — SODIUM CHLORIDE 10 ML/HR: 450 INJECTION, SOLUTION INTRAVENOUS at 18:41

## 2018-05-09 RX ADMIN — FAMOTIDINE 20 MG: 20 TABLET ORAL at 21:17

## 2018-05-09 RX ADMIN — STANDARDIZED SENNA CONCENTRATE AND DOCUSATE SODIUM 1 TABLET: 8.6; 5 TABLET, FILM COATED ORAL at 19:24

## 2018-05-09 RX ADMIN — Medication 2 G: at 02:30

## 2018-05-09 RX ADMIN — HYDROMORPHONE HYDROCHLORIDE 1 MG: 1 INJECTION, SOLUTION INTRAMUSCULAR; INTRAVENOUS; SUBCUTANEOUS at 17:04

## 2018-05-09 RX ADMIN — Medication 400 MG: at 19:24

## 2018-05-09 RX ADMIN — SODIUM CHLORIDE 20 MG: 9 INJECTION INTRAMUSCULAR; INTRAVENOUS; SUBCUTANEOUS at 09:16

## 2018-05-09 RX ADMIN — ALBUMIN (HUMAN) 12.5 G: 12.5 INJECTION, SOLUTION INTRAVENOUS at 00:11

## 2018-05-09 RX ADMIN — Medication 2 G: at 19:43

## 2018-05-09 RX ADMIN — AMIODARONE HYDROCHLORIDE 1 MG/MIN: 50 INJECTION, SOLUTION INTRAVENOUS at 19:00

## 2018-05-09 RX ADMIN — ASPIRIN 81 MG 81 MG: 81 TABLET ORAL at 09:17

## 2018-05-09 RX ADMIN — Medication 10 ML: at 21:36

## 2018-05-09 RX ADMIN — BIVALIRUDIN 15.1 ML/HR: 250 INJECTION, POWDER, LYOPHILIZED, FOR SOLUTION INTRAVENOUS at 21:44

## 2018-05-09 RX ADMIN — HYDROMORPHONE HYDROCHLORIDE 1 MG: 1 INJECTION, SOLUTION INTRAMUSCULAR; INTRAVENOUS; SUBCUTANEOUS at 14:05

## 2018-05-09 RX ADMIN — PROPOFOL 50 MCG/KG/MIN: 10 INJECTION, EMULSION INTRAVENOUS at 05:14

## 2018-05-09 RX ADMIN — ALTEPLASE 1 MG: 2.2 INJECTION, POWDER, LYOPHILIZED, FOR SOLUTION INTRAVENOUS at 16:03

## 2018-05-09 RX ADMIN — CHLORHEXIDINE GLUCONATE 10 ML: 1.2 RINSE ORAL at 21:36

## 2018-05-09 RX ADMIN — Medication 2 G: at 14:05

## 2018-05-09 RX ADMIN — DEXTROSE MONOHYDRATE 17.4 ML/HR: 5 INJECTION, SOLUTION INTRAVENOUS at 19:14

## 2018-05-09 RX ADMIN — ALBUMIN (HUMAN) 12.5 G: 12.5 INJECTION, SOLUTION INTRAVENOUS at 02:00

## 2018-05-09 NOTE — OP NOTES
[address] [place] [address] REPORT    Carrillo Bustamante  MR#: [id number]  : 1960  ACCOUNT #: [de-identified]   DATE OF SERVICE: 2018    PREOPERATIVE DIAGNOSES:  1. Severe ischemic cardiomyopathy. 2.  Status post subendocardial myocardial infarction. 3.  Class 4 chronic systolic congestive heart failure. 4.  History of drug abuse. POSTOPERATIVE DIAGNOSES:  1. Severe ischemic cardiomyopathy. 2.  Status post subendocardial myocardial infarction. 3.  Class 4 chronic systolic congestive heart failure. 4.  History of drug abuse     PROCEDURES PERFORMED:  Three-vessel coronary artery bypass grafting using the left internal mammary to the left anterior descending, saphenous vein graft from the aorta to the diagonalE with a Y graft to the circumflex marginal, endoscopic vein harvest, epiaortic ultrasound. SURGEON:  Sadaf Tucker MD    ASSISTANT:  DIGNA Castañeda    ANESTHESIA:  General     ESTIMATED BLOOD LOSS:  See perfusion record    SPECIMENS REMOVED:  none    COMPLICATIONS:  none    IMPLANTS:  none    INDICATIONS:  This is a 62-year-old male with the above diagnosis who was taken for surgical revascularization. He had had a previous Impella placed for severe ischemic cardiomyopathy. PROCEDURE:  The patient was taken to the operating room and following induction of endotracheal anesthesia, the anterior chest, abdomen and both lower extremities were prepped and draped in a sterile manner, surgical timeout completed, the transesophageal echocardiographic findings reviewed. A median sternotomy was made, the pericardium incised and suspended, the left internal mammary artery harvested from the chest wall. A segment of greater saphenous vein was harvested using endoscopic technique, the patient fully heparinized and cannulated for bypass in the ascending aorta and right atrium.   A retrograde cardioplegia cannula was placed, the aorta cross-clamped, cardioplegia infused, repeat doses given intermittently. Vein grafts were constructed to the diagonal and circumflex marginal.  The left internal mammary artery was anastomosed to the left anterior descending coronary artery. The patient rewarmed. A warm dose of cardioplegia administered. The proximal anastomoses were constructed as indicated above. The patient was weaned from bypass with Impella support. Protamine administered. Cannulas removed and cannula sites reinforced with Prolene suture. Two mediastinal drainage tubes placed. Hemostasis assured. The sternum reapproximated with interrupted stainless steel wire and running Vicryl suture. MD Tasha Alejo / Jeimy Nowak  D: 05/09/2018 08:10     T: 05/09/2018 09:50  JOB #: [id number]

## 2018-05-09 NOTE — DIABETES MGMT
[facility] Cardiac Surgery Progress Note     Recommendations/ Comments:Recommendations/ Comments:  Pt arrived to CVICU at 1928 on 5-8-18. Consider continuing insulin gtt for at least 48hrs post-op and eating 50% solid foods then,  1) transition off gtt per [employer] Protocol   2) continue accu-checks and humalog correctional insulin ac & hs   3) ADA/AHA diet as diet advanced    Insulin gtt should not be stopped until after 1928 on 5-10-18 to complete 48hr post-op time frame. Currently on insulin gtt. Current drip rate is 1.5 units per hour and blood sugar at 1234 was 98 mg/dl. Pt has required 10.9 units over the last 6 hours. Chart reviewed on [facility]. Patient is 61 y.o. male s/p Cardiac surgery  POD 1. No history of diabetes. A1c:   Lab Results   Component Value Date/Time    Hemoglobin A1c 4.8 05/03/2018 01:06 PM         Recent Glucose Results: Lab Results   Component Value Date/Time     (H) 05/09/2018 04:02 AM     (H) 05/08/2018 11:39 PM     (H) 05/08/2018 07:45 PM    GLUCPOC 98 05/09/2018 12:34 PM    GLUCPOC 101 (H) 05/09/2018 11:32 AM    GLUCPOC 99 05/09/2018 10:27 AM        Lab Results   Component Value Date/Time    Creatinine 1.35 (H) 05/09/2018 04:02 AM     Estimated Creatinine Clearance: 61.9 mL/min (based on Cr of 1.35). Active Orders   Diet    DIET NPO        PO intake: Patient Vitals for the past 72 hrs:   % Diet Eaten   05/06/18 1822 50 %       Will continue to follow as needed. Thank you.   Nava Garrido, RD, CDE

## 2018-05-09 NOTE — ANESTHESIA POSTPROCEDURE EVALUATION
Post-Anesthesia Evaluation and Assessment    Patient: Ruddy Dominguez MRN: [id number]  SSN: [id number]    YOB: 1960  Age: 61 y.o. Sex: male       Cardiovascular Function/Vital Signs  Visit Vitals    /70    Pulse 80    Temp 37.4 °C (99.4 °F)    Resp 23    Ht 5' 6\" (1.676 m)    Wt 87.8 kg (193 lb 9 oz)    SpO2 97%    BMI 31.24 kg/m2       Patient is status post general anesthesia for Procedure(s):  ON PUMP CORONARY ARTERY BYPASS GRAFT X 3 WITH LEFT LEG ENDOVASCULAR VEIN HARVEST/ LEFT INTERNAL MAMMARY ARTERY HARVEST. TRANSESOPHAGEAL ECHOCARDIOGRAM AND EPI-AORTIC ULTRASOUND BY DR. Ben Roberts. .    Nausea/Vomiting: None    Postoperative hydration getting PRBCs today. Pain:  Pain Scale 1: Adult Nonverbal Pain Scale (05/09/18 1400)  Pain Intensity 1: 4 (05/09/18 1400)   Managed    Neurological Status:   Neuro  Neurologic State: Eyes open to voice (05/09/18 0800)  Orientation Level: Unable to verbalize (05/09/18 0800)  Cognition: Follows commands (05/09/18 0800)  Speech: Intubated (05/09/18 0800)  Assessment L Pupil: Brisk;Round (05/09/18 0800)  Size L Pupil (mm): 2 (05/09/18 0800)  Assessment R Pupil: Brisk;Round (05/09/18 0800)  Size R Pupil (mm): 2 (05/09/18 0800)  LUE Motor Response: Purposeful (05/09/18 0800)  LLE Motor Response: Purposeful (05/09/18 0800)  RUE Motor Response: Purposeful (05/09/18 0800)  RLE Motor Response: Purposeful (05/09/18 0800)   sedated    Mental Status and Level of Consciousness: Sedated    Pulmonary Status:   O2 Device: Nasal cannula (05/09/18 1353)   Adequate oxygenation and airway patent    Complications related to anesthesia: None    Post-anesthesia assessment completed.  Remains critically ill on mechanical impella support    Signed By: Lupe Navarro MD     May 9, 2018

## 2018-05-09 NOTE — PROGRESS NOTES
Occupational Therapy Note 0815 -   5. 9.2018    Orders acknowledged and chart reviewed. Patient received sedated and on vent. Per ABCDE protocol, will work with patient when PEEP is 7.5 or less, FIO2 50% or less, has passed a spontaneous awake trial (SAT), and patient is following basic commands. Will follow patient peripherally. Recommend nursing to complete with patient, as able, in order to promote cardiopulmonary systems, maintain strength, endurance and independence:   -bed in chair position with foot board on 3x/day ~30-60 mins each  -passive ROM during bathing B UEs and LEs  -positioning to prevent contractures and edema. Thank you for your assistance. Long Rdz MS, OTR/L

## 2018-05-09 NOTE — PROGRESS NOTES
Physical Therapy Note    Chart reviewed. Patient intubated and sedated. Per ABCDE protocol, will work with patient when PEEP is 7.5 or less, FiO2 50% or less, has passed a spontaneous awake trial (SAT), and patient is following basic commands. Per RN plan is to extubate today, time TBD. Will continue to follow peripherally.      Recommendation for Nursing:  - Encourage AROM of UEs/LEs during sedation vacation  - Encourage PROM of L UE/LE  - Bed in chair position/modified chair with foot board on 3x/day (30-60 min each)  - Pillow placement for L UE/LE to prevent contractures/edema  - Apply prevalon boots to B LEs to prevent skin breakdown on heels    Peewee Perez PT, DPT

## 2018-05-09 NOTE — PROGRESS NOTES
[facility] Progress Note      DOS:   5/9/2018  NAME:  Efrain Morales   MRN:   [id number]     REFERRING PROVIDER:  Dr. Pallavi Kong: Aparna Reinoso MD    Chief Complaint:  SOB/Chest pain     HPI: 61y.o. year old male with a history of HTN who was admitted to [facility] with complaint of chest pain and SOB that started yesterday morning, he ruled in for NSTEMI and CSS was consulted for surgical intervention and he was transferred to [facility]. He has a history of HTN but does not take any antihypertensives, he admits to doing cocaine the mornign his chest pain started and is currently smoker. He denies HA, N/V, edema or dizziness but states he continues to be SOB and has orthopnea. [facility] has been consulted to comment on his acute cardiomyopathy and medical management. 24Hr events:  2L, 2 lbs +  Hemodynamically stable on dobut, Impella     Impression / Plan:   Heart Failure Status: NYHA Class III    Acute HFeEF, ICM, EF 15%, 3V CAD   POD 1 CABG   Hold coreg due to bradycardia    Cont ASA   1 mg IV Bumex BID today    Cont Epi and Dobutamine pre op. Not a candidate for durable MCS due to recent cocaine use, would need to abstain for at least 6 months. HTN:   Hold for hypotension s/p impella placement. Hold BBrx for bradycardia       History:  History reviewed. No pertinent past medical history. Past Surgical History:   Procedure Laterality Date    HX ORTHOPAEDIC      left hip surgery     Social History     Social History    Marital status:      Spouse name: N/A    Number of children: N/A    Years of education: N/A     Occupational History    Not on file.      Social History Main Topics    Smoking status: Former Smoker     Quit date: 5/3/2018    Smokeless tobacco: Not on file    Alcohol use No    Drug use: Not on file    Sexual activity: Not on file     Other Topics Concern    Not on file     Social History Narrative     Family History   Problem Relation Age of Onset    Diabetes Mother        Current Medications:   Current Facility-Administered Medications   Medication Dose Route Frequency Provider Last Rate Last Dose    0.9% sodium chloride infusion 250 mL  250 mL IntraVENous PRN Whit Louis MD        atorvastatin (LIPITOR) tablet 10 mg  10 mg Oral QHS Juan F Tony, NP        bumetanide (BUMEX) injection 1 mg  1 mg IntraVENous ONCE Catherine Carrillo, NP        sodium chloride (NS) flush 5-10 mL  5-10 mL IntraVENous Q8H Red Ree, NP   10 mL at 05/09/18 0717    sodium chloride (NS) flush 5-10 mL  5-10 mL IntraVENous PRN Red Ree, NP        albumin human 5% (BUMINATE) solution 12.5 g  12.5 g IntraVENous Q2H PRN Red Ree, NP   12.5 g at 05/09/18 0200    0.45% sodium chloride infusion  10 mL/hr IntraVENous CONTINUOUS Red Ree, NP 10 mL/hr at 05/09/18 0805 10 mL/hr at 05/09/18 0805    0.9% sodium chloride infusion  9 mL/hr IntraVENous CONTINUOUS Red Ree, NP 12 mL/hr at 05/08/18 1930 12 mL/hr at 05/08/18 1930    acetaminophen (TYLENOL) tablet 650 mg  650 mg Oral Q4H Red Ree, NP        oxyCODONE-acetaminophen (PERCOCET) 5-325 mg per tablet 1 Tab  1 Tab Oral Q4H PRN Red Ree, NP        oxyCODONE-acetaminophen (PERCOCET) 5-325 mg per tablet 2 Tab  2 Tab Oral Q4H PRN Red Ree, NP        naloxone [facility]) injection 0.4 mg  0.4 mg IntraVENous PRN Red Ree, NP        mupirocin (BACTROBAN) 2 % ointment   Both Nostrils BID Red Ree, NP        ceFAZolin (ANCEF) 2 g/20 mL in sterile water IV syringe  2 g IntraVENous Q6H Red Ree, NP   2 g at 05/09/18 0916    amiodarone (CORDARONE) tablet 400 mg  400 mg Oral Q12H Red Ree, NP        ondansetron [facility]) injection 4 mg  4 mg IntraVENous Q4H PRN Red Ree, NP        albuterol (PROVENTIL VENTOLIN) nebulizer solution 2.5 mg  2.5 mg Nebulization Q4H PRN Red Ree, NP  aspirin chewable tablet 81 mg  81 mg Oral DAILY Deborah Rolling, NP   81 mg at 05/09/18 0419    midazolam (VERSED) injection 1 mg  1 mg IntraVENous Q1H PRN Deborah Rolling, NP        chlorhexidine (PERIDEX) 0.12 % mouthwash 10 mL  10 mL Oral BID Deborah Rolling, NP   10 mL at 05/09/18 8289    famotidine (PEPCID) tablet 20 mg  20 mg Oral Q12H Deborah Rolling, NP        magnesium oxide (MAG-OX) tablet 400 mg  400 mg Oral BID Deborah Rolling, NP        calcium chloride 1 g in 0.9% sodium chloride 50 mL IVPB  1 g IntraVENous PRN Deborah Rolling, NP        bisacodyl (DULCOLAX) suppository 10 mg  10 mg Rectal DAILY PRN Deborah Rolling, NP        senna-docusate (PERICOLACE) 8.6-50 mg per tablet 1 Tab  1 Tab Oral BID Deborah Rolling, NP        polyethylene glycol (MIRALAX) packet 17 g  17 g Oral DAILY Deborah Rolling, NP        ELECTROLYTE REPLACEMENT PROTOCOL  1 Each Other PRN Deborah Rolling, NP        magnesium sulfate 1 g/100 ml IVPB (premix or compounded)  1 g IntraVENous PRN Deborah Rolling, NP        glucose chewable tablet 16 g  4 Tab Oral PRN Deborah Rolling, NP        dextrose (D50W) injection syrg 12.5-25 g  12.5-25 g IntraVENous PRN Deborah Rolling, NP        glucagon (GLUCAGEN) injection 1 mg  1 mg IntraMUSCular PRN Deborah Rolling, NP        insulin lispro (HUMALOG) injection   SubCUTAneous TIDAC Deborah Rolling, NP        insulin lispro (HUMALOG) injection   SubCUTAneous AC&HS Deborah Rolling, NP        insulin glargine (LANTUS) injection 1-50 Units  1-50 Units SubCUTAneous ONCE PRN Deborah Rolling, NP        sodium chloride (NS) flush 20 mL  20 mL InterCATHeter PRN Deborah Rolling, NP        sodium chloride (NS) flush 10 mL  10 mL InterCATHeter Q24H Deborah Rolling, NP   10 mL at 05/08/18 2122    sodium chloride (NS) flush 10 mL  10 mL InterCATHeter PRN Deborah Rolling, NP        sodium chloride (NS) flush 10 mL  10 mL InterCATHeter Q8H Shawn Sherman NP   10 mL at 05/09/18 0717    alteplase (CATHFLO) 1 mg in sterile water (preservative free) 1 mL injection  1 mg InterCATHeter PRN Shawn Sherman NP        bacitracin 500 unit/gram packet 1 Packet  1 Packet Topical PRN Shawn Sherman NP        HYDROmorphone (PF) (DILAUDID) injection 1 mg  1 mg IntraVENous Q3H PRN Shawn Sherman, TUCKER   1 mg at 05/09/18 0215    potassium chloride 20 mEq in 50 ml IVPB  20 mEq IntraVENous Q2H PRN Shawn Sherman, NP        potassium chloride 20 mEq in 50 ml IVPB  20 mEq IntraVENous Q2H PRN Shawn Sherman NP        HYDROmorphone (PF) (DILAUDID) injection 0.5 mg  0.5 mg IntraVENous Q3H PRN Shawn Sherman, NP        sodium chloride (NS) flush 10 mL  10 mL InterCATHeter Q24H Abby Perez MD        sodium chloride (NS) flush 10 mL  10 mL Chino Mcallister MD   10 mL at 05/09/18 0716    0.9% sodium chloride infusion 250 mL  250 mL IntraVENous PRN Abby Perez MD        EPINEPHrine (ADRENALIN) 5 mg in 0.9% sodium chloride 250 mL infusion  1-10 mcg/min IntraVENous TITRATE Abby Perez MD   Stopped at 05/09/18 0622    NOREPINephrine (LEVOPHED) 8 mg in 5% dextrose 250mL infusion  2-16 mcg/min IntraVENous TITRATE Abby Perez MD   Stopped at 05/09/18 0420    propofol (DIPRIVAN) infusion  0-50 mcg/kg/min IntraVENous TITRATE Abby Perez MD 13 mL/hr at 05/09/18 0945 25 mcg/kg/min at 05/09/18 0945    dextrose 5% infusion  4-20 mL/hr IntraVENous TITRATE Abby Perez MD 15.6 mL/hr at 05/09/18 0806 15.6 mL/hr at 05/09/18 0806    DOBUTamine (DOBUTREX) 500 MG/250 mL (2000 mcg/mL) in D5W infusion  0-10 mcg/kg/min (Order-Specific) IntraVENous TITRATE Shawn Sherman NP 7.7 mL/hr at 05/09/18 0804 3 mcg/kg/min at 05/09/18 0804    dexmedeTOMidine (PRECEDEX) 400 mcg in 0.9% sodium chloride 100 mL infusion  0.1-0.9 mcg/kg/hr IntraVENous TITRATE Abby Perez MD 14.9 mL/hr at 18 0946 0.7 mcg/kg/hr at 18 0946    insulin regular (NOVOLIN R, HUMULIN R) 100 Units in 0.9% sodium chloride 100 mL infusion  1-50 Units/hr IntraVENous TITRATE Nohemi Herron MD 1.6 mL/hr at 18 1028 1.6 Units/hr at 18 1028    niCARdipine (CARDENE) 50 mg in 0.9% sodium chloride 100 mL infusion  5-15 mg/hr IntraVENous TITRATE Nohemi Herron MD   Stopped at 18 0830    amiodarone (CORDARONE) 375 mg/250 mL D5W infusion  0.5-1 mg/min IntraVENous CONTINUOUS Nohemi Herron MD 40 mL/hr at 18 0804 1 mg/min at 18 0804    bivalirudin (ANGIOMAX) 500 mg in dextrose 5% 500 mL infusion  4-20 mL/hr Other TITRATE Gretchen Thompson NP 13.5 mL/hr at 18 0750 13.5 mL/hr at 18 0750    PHENYLephrine (PF)(SRUTHI-SYNEPHRINE) 30 mg in 0.9% sodium chloride 250 mL infusion   mcg/min IntraVENous TITRATE Nohemi Herron MD   Stopped at 18       Allergies: No Known Allergies    ROS:    CESAR due to intubation and sedation. Physical Exam:   Constitutional: sedated, responds to pain. HENT:   Head: Normocephalic and atraumatic. Eyes: Pupils are equal, round, and reactive to light. Neck: Neck supple. No JVD present. Cardiovascular: Normal rate, S1 normal and S2 normal.  Exam reveals gallop and S4.    Pulses: + pulses , PACs    Pulmonary/Chest: diminished BLL, no cough  Abdominal: Soft. Bowel sounds are normal. He exhibits no distension. Musculoskeletal: Normal range of motion. He exhibits no edema. Neurological: sedated   Skin: Skin is warm and dry. Surgical dressings intact.        Vitals:   Visit Vitals    /70    Pulse 78    Temp 98.7 °F (37.1 °C)    Resp 21    Ht 5' 6\" (1.676 m)    Wt 193 lb 9 oz (87.8 kg)    SpO2 100%    BMI 31.24 kg/m2         Temp (24hrs), Av.1 °F (36.7 °C), Min:96.7 °F (35.9 °C), Max:99.4 °F (37.4 °C)      Admission Weight: Last Weight   Weight: 188 lb 11.4 oz (85.6 kg) Weight: 193 lb 9 oz (87.8 kg)     Intake / Output / Drain:  Last 24 hrs.:     Intake/Output Summary (Last 24 hours) at 05/09/18 1036  Last data filed at 05/09/18 0900   Gross per 24 hour   Intake          4472.12 ml   Output             2274 ml   Net          2198.12 ml       Oxygen Therapy:  Oxygen Therapy  O2 Sat (%): 100 % (05/09/18 0900)  Pulse via Oximetry: 74 beats per minute (05/09/18 0900)  O2 Device: Ventilator (05/09/18 0400)  O2 Flow Rate (L/min): 6 l/min (05/07/18 1244)  FIO2 (%): 60 % (05/09/18 0944)    Recent Labs:   Labs Latest Ref Rng & Units 5/9/2018 5/8/2018 5/8/2018 5/8/2018 5/8/2018 5/7/2018 5/7/2018   WBC 4.1 - 11.1 K/uL 6.9 7.9 5.4 - 5.6 6.7 6.7   RBC 4.10 - 5.70 M/uL 2.19(L) 2.50(L) 2.72(L) - 3.35(L) 3.43(L) 3.85(L)   Hemoglobin 12.1 - 17.0 g/dL 6. 3(L) 7. 2(L) 7. 8(L) - 9. 6(L) 9.8(L) 11. 2(L)   Hematocrit 36.6 - 50.3 % 19. 9(L) 22. 8(L) 24. 8(L) - 30. 4(L) 30. 9(L) 35. 3(L)   MCV 80.0 - 99.0 FL 90.9 91.2 91.2 - 90.7 90.1 91.7   Platelets 407 - 541 K/uL 116(L) 125(L) 101(L) 81(L) 150 162 180   Lymphocytes 12 - 49 % - 4(L) - - - - 17   Monocytes 5 - 13 % - 1(L) - - - - 9   Eosinophils 0 - 7 % - 0 - - - - 5   Basophils 0 - 1 % - 0 - - - - 0   Bands 0 - 6 % - 4 - - - - -   Albumin 3.5 - 5.0 g/dL 3.2(L) 3.0(L) 3.5 - 3. 0(L) 3.4(L) 2. 7(L)   Calcium 8.5 - 10.1 MG/DL 7. 9(L) 7. 8(L) 8.2(L) - 8. 1(L) 7. 9(L) 8.4(L)   SGOT 15 - 37 U/L 62(H) 60(H) 50(H) - 31 32 15   Glucose 65 - 100 mg/dL 117(H) 141(H) 134(H) - 96 111(H) 97   BUN 6 - 20 MG/DL 17 16 15 - 15 15 12   Creatinine 0.70 - 1.30 MG/DL 1.35(H) 1.36(H) 1.29 - 1.25 1.35(H) 1.18   Sodium 136 - 145 mmol/L 140 143 142 - 140 139 139   Potassium 3.5 - 5.1 mmol/L 4.3 4.2 4.1 - 3.8 4.6 4.4   TSH 0.36 - 3.74 uIU/mL - - - - - - -   LDH 85 - 241 U/L 478(H) - - - 333(H) - -   Some recent data might be hidden     EKG:   EKG Results     Procedure 720 Value Units Date/Time    SCANNED CARDIAC RHYTHM STRIP [[id number]] Collected:  05/09/18 0422    Order Status:  Completed Updated:  05/09/18 6363 EKG, 12 LEAD, INITIAL [[id number]] Collected:  05/09/18 0345    Order Status:  Completed Updated:  05/09/18 0348     Ventricular Rate 69 BPM      Atrial Rate 69 BPM      P-R Interval 146 ms      QRS Duration 106 ms      Q-T Interval 456 ms      QTC Calculation (Bezet) 488 ms      Calculated P Axis 27 degrees      Calculated R Axis 38 degrees      Calculated T Axis 154 degrees      Diagnosis --     Normal sinus rhythm  Incomplete left bundle branch block  ST & T wave abnormality, consider anterolateral ischemia  Prolonged QT  When compared with ECG of 04-MAY-2018 09:15,  ST now depressed in Anterior leads  T wave inversion more evident in Anterior leads      [address] [place] [address] [[id number]] Collected:  05/07/18 7578    Order Status:  Completed Updated:  05/07/18 0640    SCANNED CARDIAC RHYTHM STRIP [[id number]] Collected:  05/07/18 0602    Order Status:  Completed Updated:  05/07/18 0604    SCANNED CARDIAC RHYTHM STRIP [[id number]] Collected:  05/05/18 0629    Order Status:  Completed Updated:  05/05/18 0631    EKG, 12 LEAD, INITIAL [[id number]]     Order Status:  Completed         Echocardiogram:   5/3/18:  Left ventricle: Apical akinesis, Lateral hypokinesis, Normal septal motion  The ventricle was moderately to severely dilated. Systolic function was  normal. Ejection fraction was estimated in the range of 30 % to 35 %. There was severe diffuse hypokinesis with regional variations. Left atrium: The atrium was moderately to severely dilated. Mitral valve: There was mild to moderate regurgitation. Tricuspid valve: There was moderate regurgitation.     Cardiac Catheterizations:   5/4: Cardiac catheterization 5/4/18:  L Main: >50% osteal      LAD: 40-50% proximal, 70% after first diagonal, 80-90% mid lesion      LCflex: Diffuse mild disease, OM1 small vessel difffuse 70-90% disease.      RCA: small dominant vessel, 50% proximal lesion, 70-90% tandem lesions proximal-md lesion, 80% distal lesion      LVEDP: 30 mHg      LVEF: 15% diffuse hypokinesis      No significant gradient across aortic valve.      PCI: None    Radiology (CXR, CT scans):   CXR Results  (Last 48 hours)               05/09/18 0618  XR CHEST PORT Final result    Impression:  IMPRESSION: Interval increase in left retrocardiac opacity likely affecting   atelectasis with pulmonary vascular congestion. Numerous tubes and lines in   stable and expected positions as above. Narrative:  EXAM:  XR CHEST PORT       INDICATION:  postop heart       COMPARISON:  Chest x-ray 5/8/2018. FINDINGS: A portable AP radiograph of the chest was obtained at 04:37 hours. The   patient is on a cardiac monitor. Endotracheal tube projects in stable position   over the tracheal lucency with the tip approximately 7 cm above the jake. Enteric tube traverses expected course to below the diaphragm into the left   upper quadrant. Right [place]-Ervin catheter is in position through a right internal   jugular vein introducer catheter with the tip projecting at expected location of   the main pulmonary artery. Right subclavian intra-aortic balloon pump appears in   stable position. Left and right pleural and mediastinal drains remain in stable   position. There is no pneumothorax. There is been interval increase in   retrocardiac left airspace opacity. There is pulmonary vascular congestion with   otherwise clear lungs. There are median sternotomy wires and surgical clips   compatible with prior CABG. The cardiac silhouette remains enlarged. Metastatic   contours are otherwise unremarkable. Chest wall structures and visualized upper   abdomen appear stable with no acute interval change. 05/08/18 1958  XR CHEST PORT Final result    Impression:  IMPRESSION: Interval median sternotomy with findings as above.                Narrative:  EXAM:  XR CHEST PORT       INDICATION:  Post CABG       COMPARISON:  0406 hours       FINDINGS: A portable AP radiograph of the chest was obtained at 1953 hours. Median sternotomy wires are demonstrated in the interval. An endotracheal tube   is again shown terminating in the thoracic inlet. A transesophageal tube placed   in the interval extending to the stomach. A right IJ line is again extending to   the main pulmonary artery. There are interval bilateral chest tubes. There is a   transsubclavian intra-aortic balloon pump again shown. There is mild   interstitial pulmonary edema again noted. There is no pneumothorax or effusion. Moderate cardiac contour enlargement is unchanged. There may be a pericardial   air present in the interval along the cardiac base and apex of this might be   artifactual in nature. A follow-up chest x-ray should be able to further assess   this. Mediastinal and hilar contours are stable. The bones and soft tissues are   grossly within normal limits. 05/08/18 0511  XR CHEST PORT Final result    Impression:  IMPRESSION:   1. Increase in left lower lobe atelectasis. 2. No change pulmonary edema   3. The ET tube is 6 cm above the jake could be advanced 1 cm. Narrative:  EXAM:  XR CHEST PORT       INDICATION:  Impella, cardiac assist device, coronary artery disease       COMPARISON:  5/7/2018       FINDINGS: A portable AP radiograph of the chest was obtained at 0409 hours. The   patient is on a cardiac monitor. The ET tube is 6 cm above the jake. [place]-Ervin   catheter overlies the right main pulmonary artery. Cardiac assist device is   noted. There is persistent mild pulmonary edema which has not changed significantly. There is increasing opacities in left lower lobe consistent with atelectasis. There is improved aeration of the right lower lobe. 05/07/18 1732  XR CHEST PORT Final result    Impression:  IMPRESSION: New Impella device with decreased pulmonary edema. Endotracheal tube   and right jugular [place]-Ervin catheter in satisfactory position. Narrative:  EXAM:  XR CHEST PORT. INDICATION: Post op impella placement. COMPARISON: 5/7/2018 at 0407 hours. FINDINGS:    A portable AP radiograph of the chest was obtained at 1752 hours. There is a new   right subclavian Impella device overlying the heart. There are surgical clips   and skin staples in the right infraclavicular region. Lines and tubes: The patient is on a cardiac monitor. There is a new right   jugular [place]-Ervin catheter with tip over the interpericardial portion of the   right pulmonary artery and there is an endotracheal tube in satisfactory   position 4 to 5 cm above the jake. Lungs: The vascular congestion and edema throughout the lungs have decreased. Pleura: There is no pneumothorax or pleural effusion. Mediastinum: The cardiac silhouette is enlarged. Bones and soft tissues: The bones and soft tissues are grossly within normal   limits.                       Pardeep Juárez NP  [address] [facility] [place]  [address]  Office [phone]  Fax [phone]  24 hour VAD/HF Pager: [phone]

## 2018-05-09 NOTE — PROGRESS NOTES
Physical Therapy Note     Chart reviewed and discussed with RN. Per RN patient was recently extubated however requested to hold PT services for the afternoon with her plan to attempt to have patient transfer to chair later this evening. PT will follow up tomorrow for evaluation as able and appropriate.     Bacilio Cope PT, DPT

## 2018-05-09 NOTE — PROGRESS NOTES
1930: TRANSFER - IN REPORT:  Verbal report received from OR team(name) on Martha Camacho  being received from OR(unit) for routine post - op  Report consisted of patients Situation, Background, Assessment and   Recommendations(SBAR). Information from the following report(s) SBAR, Kardex, OR Summary, Procedure Summary, Intake/Output, MAR, Recent Results and Cardiac Rhythm Paced was reviewed with the receiving nurse. Opportunity for questions and clarification was provided. Assessment completed upon patients arrival to unit and care assumed. Pt sedated and intubated. Per Stanley Dodd and Dr. Melody Palomo, keep intubated overnight. Keep SPB<120; MAP>65. Wean pressors as tolerated. SVO2 66 and programmed into monitor. 2000: chest xray complete. Fiser at bedside to interpret. PA cath could be inserted further but good readings at this time so keep as is. 2015: Impella rep on the phone for pt update. 2030:family and  at bedside for post op visit and family prayer over pt.      0500: pt bathed with chlorhexidine. 0630: Dr. Giovanni Duncan at bedside. 1 unit PRBC ordered for Hgb 6.3.    0800: Bedside shift change report given to Ramin (oncoming nurse) by [address] (offgoing nurse). Report included the following information SBAR, Kardex, ED Summary, OR Summary, Procedure Summary, Intake/Output, MAR, Recent Results and Cardiac Rhythm Paced. Problem: Falls - Risk of  Goal: *Absence of Falls  Document César Fall Risk and appropriate interventions in the flowsheet. Outcome: Progressing Towards Goal  Fall Risk Interventions:  Mobility Interventions: Communicate number of staff needed for ambulation/transfer  Medication Interventions: Evaluate medications/consider consulting pharmacy  Elimination Interventions:  Toileting schedule/hourly rounds  History of Falls Interventions: Evaluate medications/consider consulting pharmacy  Problem: Pressure Injury - Risk of  Goal: *Prevention of pressure injury  Document Celestine Scale and appropriate interventions in the flowsheet. Outcome: Progressing Towards Goal  Pressure Injury Interventions:  Sensory Interventions: Monitor skin under medical devices  Moisture Interventions: Absorbent underpads  Activity Interventions: Pressure redistribution bed/mattress(bed type)  Mobility Interventions: Pressure redistribution bed/mattress (bed type)  Nutrition Interventions: Document food/fluid/supplement intake, Discuss nutritional consult with provider  Friction and Shear Interventions: Lift sheet  Problem: Unstable Angina/NSTEMI: Discharge Outcomes  Goal: *Hemodynamically stable  Outcome: Progressing Towards Goal  Weaning pressors and inotropes  Goal: *Stable cardiac rhythm  Outcome: Progressing Towards Goal  Temporarily paced  Goal: *Optimal pain control at patient's stated goal  Outcome: Progressing Towards Goal  Adult non-verbal while intubated  Problem: Non-Violent Restraints  Goal: *Removal from restraints as soon as assessed to be safe  Outcome: Progressing Towards Goal  Restrained at this time;sedated; family aware  Problem: Ventilator Management  Goal: *Adequate oxygenation and ventilation  Outcome: Progressing Towards Goal  Weaning O2; leave intubated overnight  Problem: CABG: Day of Surgery (Initiate SCIP measures for post-op care)  Goal: Medications  Outcome: Progressing Towards Goal  Weaning pressors and inotrpoes  Goal: Respiratory  Outcome: Progressing Towards Goal  Intubated overnight  Goal: *Stable cardiac rhythm  Outcome: Progressing Towards Goal  Paced with temp pacer    Problem: Heart Failure: Discharge Outcomes  Goal: *Left ventricular function assessment completed prior to or during stay, or planned for post-discharge  Outcome: Progressing Towards Goal  Impella 5.0 in place.  EF 10-15%

## 2018-05-09 NOTE — PROGRESS NOTES
0800: Report received from [place], [facility]. Dr. Gabrielle Segura at bedside. Orders received to wean vent to extubate per protocol. Goal SBP less than 140 mmHg. 1245: Patient arousable, follows commands. SIMV initiated. 1300: Rojo Burner updated on purge flows and patient status. Orders received to recheck H&H. Continue to monitor. 1315: CPAP initiated. 1345: ABG drawn 7.464/40.1/105/28.8/98%  1348: Patient extubated to 4 L/min via nasal cannula. 1540: Rojo Burner updated on patient status. Purge flows down on Impella. Orders received to run TPA through impella, then restart angiomax through impella. 1720: Dr. Gabrielle Segura at bedside. Updated on CT output, chest x-ray, impella. Orders received to repeat H&H and transfuse 1 unit PRBCs if any lower than previous results. Impella down to P7.   2000: Bedside and Verbal shift change report given to TERRANCE Chowdhury (oncoming nurse) by Duncan Landau, RN (offgoing nurse). Report included the following information SBAR, OR Summary, Procedure Summary, Intake/Output, MAR, Recent Results and Cardiac Rhythm paced.

## 2018-05-09 NOTE — PROGRESS NOTES
Cardiac Surgery Care Coordinator- Met with Mrs Maninder Brody, reviewed plan of care and offered emotional support. She is without questions at this time. Will continue to follow for educational and emotional support.  Rex Villavicencio RN

## 2018-05-09 NOTE — PROGRESS NOTES
[facility] ICU Progress Note    Admit Date: 2018  POD:  1 Day Post-Op    Procedure:  Procedure(s):  ON PUMP CORONARY ARTERY BYPASS GRAFT X 3 WITH LEFT LEG ENDOVASCULAR VEIN HARVEST/ LEFT INTERNAL MAMMARY ARTERY HARVEST. TRANSESOPHAGEAL ECHOCARDIOGRAM AND EPI-AORTIC ULTRASOUND BY DR. Jcarlos August. Subjective:   Pt seen with Dr. Shi Higgins. Tmax 99.4, on 70% fiO2. Dobut 4, amio 1, insulin, propofol 50 gtts. Impella level P9. Objective:   Vitals:  Blood pressure 111/70, pulse 78, temperature 98.7 °F (37.1 °C), resp. rate 20, height 5' 6\" (1.676 m), weight 193 lb 9 oz (87.8 kg), SpO2 100 %. Temp (24hrs), Av °F (36.7 °C), Min:96.7 °F (35.9 °C), Max:99.4 °F (37.4 °C)    Hemodynamics:   CO: CO (l/min): 7.8 l/min   CI: CI (l/min/m2): 4 l/min/m2   CVP: CVP (mmHg): 10 mmHg (18)   SVR: SVR (dyne*sec)/cm5: 872 (dyne*sec)/cm5 (18 5703)   PAP Systolic: PAP Systolic: 47 (87/25/04 9713)   PAP Diastolic: PAP Diastolic: 22 (33/40/25 8710)   PVR:     SV02: SVO2 (%): 65 % (18)   SCV02:      EKG/Rhythm:  Paced     CT Output: 544 ml    Ventilator:  Ventilator Volumes  Vt Set (ml): 500 ml (18)  Vt Exhaled (Machine Breath) (ml): 624 ml (18)  Vt Spont (ml): 7.14 ml (18)  Ve Observed (l/min): 13.5 l/min (18)    Oxygen Therapy:  Oxygen Therapy  O2 Sat (%): 100 % (Simultaneous filing. User may not have seen previous data.) (18)  Pulse via Oximetry: 78 beats per minute (18 0800)  O2 Device: Ventilator (18 0400)  O2 Flow Rate (L/min): 6 l/min (18 1244)  FIO2 (%): 80 % (18 08)    CXR: IMPRESSION: Interval increase in left retrocardiac opacity likely affecting  atelectasis with pulmonary vascular congestion. Numerous tubes and lines in  stable and expected positions as above.     Admission Weight: Last Weight   Weight: 188 lb 11.4 oz (85.6 kg) Weight: 193 lb 9 oz (87.8 kg)     Intake / Output / Drain:  Current Shift:  07 -  1900  In: 110   Out: -   Last 24 hrs.:   Intake/Output Summary (Last 24 hours) at 18 0856  Last data filed at 18 0800   Gross per 24 hour   Intake          4390.74 ml   Output             2219 ml   Net          2171.74 ml       EXAM:  General:  Sedated, NAD                                                                                            Lungs:   Clear upper, diminished bases to auscultation bilaterally. Incision:  No erythema, drainage or swelling. Heart:  Regular rate and rhythm, S1, S2 normal, no murmur, click, rub or gallop. Abdomen:   Soft, non-tender. Bowel sounds hypoactive. No masses,  No organomegaly. Extremities:  No edema. PPP. Neurologic:  Gross motor and sensory apparatus intact. Labs: Recent Labs      18   0820   18   0402   WBC   --    --   6.9   HGB   --    --   6.3*   HCT   --    --   19.9*   PLT   --    --   116*   NA   --    --   140   K   --    --   4.3   BUN   --    --   17   CREA   --    --   1.35*   GLU   --    --   117*   GLUCPOC  113*   < >   --    INR   --    --   1.0    < > = values in this interval not displayed. Assessment:     Active Problems:    CAD (coronary artery disease) (2018)         Plan/Recommendations/Medical Decision Makin. CAD s/p CABG: On ASA, statin, no BB until appropriate  2. Acute systolic heart failure (NYHA class III on admission): EF 10-15%. On impella level P9. Cont dobut gtt. No BB, ACE/ARB until appropriate. Diuretics per AHF. 3. Acute hypoxic respiratory failure: Wean vent today. Diurese  4. Post op anemia st acute blood loss: Hgb 6.3, 1 unit PRBC this morning. Monitor H&H and CT output, no anticaogulation for impella until later today per Dr. Constanza Flores. 5. Thrombocytopenia: Plt 116, monitor  6. HTN: Wean cardene gtt for SBP <140  7. Renal insufficiency: Monitor U/O and creatinine  8. Elevated liver enzymes: monitor  9. + cocaine use:  on cessation when appropriate  10.  Tobacco use: reinforce cessation  11. Dispo: Cont ICU, wean vent to extubate today.     Signed By: Birgit Ortega NP

## 2018-05-10 ENCOUNTER — ANESTHESIA EVENT (OUTPATIENT)
Dept: CARDIOTHORACIC SURGERY | Age: 58
DRG: 215 | End: 2018-05-10
Payer: COMMERCIAL

## 2018-05-10 ENCOUNTER — APPOINTMENT (OUTPATIENT)
Dept: GENERAL RADIOLOGY | Age: 58
DRG: 215 | End: 2018-05-10
Attending: NURSE PRACTITIONER
Payer: COMMERCIAL

## 2018-05-10 ENCOUNTER — ANESTHESIA (OUTPATIENT)
Dept: CARDIOTHORACIC SURGERY | Age: 58
DRG: 215 | End: 2018-05-10
Payer: COMMERCIAL

## 2018-05-10 LAB
ABO + RH BLD: NORMAL
ADMINISTERED INITIALS, ADMINIT: NORMAL
ALBUMIN SERPL-MCNC: 3 G/DL (ref 3.5–5)
ALBUMIN/GLOB SERPL: 1 {RATIO} (ref 1.1–2.2)
ALP SERPL-CCNC: 38 U/L (ref 45–117)
ALT SERPL-CCNC: 13 U/L (ref 12–78)
ANION GAP SERPL CALC-SCNC: 6 MMOL/L (ref 5–15)
APTT PPP: 51.7 SEC (ref 22.1–32)
ARTERIAL PATENCY WRIST A: ABNORMAL
AST SERPL-CCNC: 54 U/L (ref 15–37)
BASE DEFICIT BLDV-SCNC: 1 MMOL/L
BDY SITE: ABNORMAL
BILIRUB SERPL-MCNC: 1 MG/DL (ref 0.2–1)
BLD PROD TYP BPU: NORMAL
BLD PROD TYP BPU: NORMAL
BLOOD GROUP ANTIBODIES SERPL: NORMAL
BPU ID: NORMAL
BPU ID: NORMAL
BUN SERPL-MCNC: 16 MG/DL (ref 6–20)
BUN/CREAT SERPL: 13 (ref 12–20)
CALCIUM SERPL-MCNC: 8.1 MG/DL (ref 8.5–10.1)
CARB RATIO, CHOR: 15
CARBOHYDRATE EATEN, CHO: 30 G
CHLORIDE SERPL-SCNC: 104 MMOL/L (ref 97–108)
CO2 SERPL-SCNC: 28 MMOL/L (ref 21–32)
CREAT SERPL-MCNC: 1.22 MG/DL (ref 0.7–1.3)
CROSSMATCH RESULT,%XM: NORMAL
CROSSMATCH RESULT,%XM: NORMAL
D50 ADMINISTERED, D50ADM: 0 ML
D50 ADMINISTERED, D50ADM: 8 ML
D50 ORDER, D50ORD: 0 ML
D50 ORDER, D50ORD: 8 ML
ERYTHROCYTE [DISTWIDTH] IN BLOOD BY AUTOMATED COUNT: 15 % (ref 11.5–14.5)
GAS FLOW.O2 O2 DELIVERY SYS: ABNORMAL L/MIN
GAS FLOW.O2 SETTING OXYMISER: 5 L/M
GLOBULIN SER CALC-MCNC: 2.9 G/DL (ref 2–4)
GLSCOM COMMENTS: NORMAL
GLUCOSE BLD STRIP.AUTO-MCNC: 101 MG/DL (ref 65–100)
GLUCOSE BLD STRIP.AUTO-MCNC: 103 MG/DL (ref 65–100)
GLUCOSE BLD STRIP.AUTO-MCNC: 104 MG/DL (ref 65–100)
GLUCOSE BLD STRIP.AUTO-MCNC: 105 MG/DL (ref 65–100)
GLUCOSE BLD STRIP.AUTO-MCNC: 105 MG/DL (ref 65–100)
GLUCOSE BLD STRIP.AUTO-MCNC: 118 MG/DL (ref 65–100)
GLUCOSE BLD STRIP.AUTO-MCNC: 118 MG/DL (ref 65–100)
GLUCOSE BLD STRIP.AUTO-MCNC: 119 MG/DL (ref 65–100)
GLUCOSE BLD STRIP.AUTO-MCNC: 132 MG/DL (ref 65–100)
GLUCOSE BLD STRIP.AUTO-MCNC: 135 MG/DL (ref 65–100)
GLUCOSE BLD STRIP.AUTO-MCNC: 138 MG/DL (ref 65–100)
GLUCOSE BLD STRIP.AUTO-MCNC: 155 MG/DL (ref 65–100)
GLUCOSE BLD STRIP.AUTO-MCNC: 171 MG/DL (ref 65–100)
GLUCOSE BLD STRIP.AUTO-MCNC: 80 MG/DL (ref 65–100)
GLUCOSE BLD STRIP.AUTO-MCNC: 91 MG/DL (ref 65–100)
GLUCOSE BLD STRIP.AUTO-MCNC: 93 MG/DL (ref 65–100)
GLUCOSE BLD STRIP.AUTO-MCNC: 95 MG/DL (ref 65–100)
GLUCOSE BLD STRIP.AUTO-MCNC: 96 MG/DL (ref 65–100)
GLUCOSE BLD STRIP.AUTO-MCNC: 99 MG/DL (ref 65–100)
GLUCOSE SERPL-MCNC: 113 MG/DL (ref 65–100)
GLUCOSE, GLC: 101 MG/DL
GLUCOSE, GLC: 103 MG/DL
GLUCOSE, GLC: 104 MG/DL
GLUCOSE, GLC: 105 MG/DL
GLUCOSE, GLC: 105 MG/DL
GLUCOSE, GLC: 118 MG/DL
GLUCOSE, GLC: 118 MG/DL
GLUCOSE, GLC: 119 MG/DL
GLUCOSE, GLC: 132 MG/DL
GLUCOSE, GLC: 135 MG/DL
GLUCOSE, GLC: 138 MG/DL
GLUCOSE, GLC: 155 MG/DL
GLUCOSE, GLC: 171 MG/DL
GLUCOSE, GLC: 80 MG/DL
GLUCOSE, GLC: 91 MG/DL
GLUCOSE, GLC: 93 MG/DL
GLUCOSE, GLC: 95 MG/DL
GLUCOSE, GLC: 96 MG/DL
GLUCOSE, GLC: 99 MG/DL
HCO3 BLDV-SCNC: 24 MMOL/L (ref 23–28)
HCT VFR BLD AUTO: 21.9 % (ref 36.6–50.3)
HGB BLD-MCNC: 7.2 G/DL (ref 12.1–17)
HIGH TARGET, HITG: 130 MG/DL
INR PPP: 1.3 (ref 0.9–1.1)
INSULIN ADMINSTERED, INSADM: 0 UNITS/HOUR
INSULIN ADMINSTERED, INSADM: 0 UNITS/HOUR
INSULIN ADMINSTERED, INSADM: 0.8 UNITS/HOUR
INSULIN ADMINSTERED, INSADM: 0.9 UNITS/HOUR
INSULIN ADMINSTERED, INSADM: 1.2 UNITS/HOUR
INSULIN ADMINSTERED, INSADM: 1.2 UNITS/HOUR
INSULIN ADMINSTERED, INSADM: 1.3 UNITS/HOUR
INSULIN ADMINSTERED, INSADM: 1.4 UNITS/HOUR
INSULIN ADMINSTERED, INSADM: 1.4 UNITS/HOUR
INSULIN ADMINSTERED, INSADM: 1.5 UNITS/HOUR
INSULIN ADMINSTERED, INSADM: 1.6 UNITS/HOUR
INSULIN ADMINSTERED, INSADM: 1.8 UNITS/HOUR
INSULIN ADMINSTERED, INSADM: 2 UNITS/HOUR
INSULIN ADMINSTERED, INSADM: 2.2 UNITS/HOUR
INSULIN ADMINSTERED, INSADM: 2.2 UNITS/HOUR
INSULIN ADMINSTERED, INSADM: 2.3 UNITS/HOUR
INSULIN ADMINSTERED, INSADM: 2.9 UNITS/HOUR
INSULIN ADMINSTERED, INSADM: 3.8 UNITS/HOUR
INSULIN ADMINSTERED, INSADM: 5.6 UNITS/HOUR
INSULIN BOLUS ADMINISTERED, INSBOLADM: 2 UNITS/HOUR
INSULIN BOLUS ORDERED, INSBOLORD: 2 UNITS/HOUR
INSULIN ORDER, INSORD: 0 UNITS/HOUR
INSULIN ORDER, INSORD: 0.7 UNITS/HOUR
INSULIN ORDER, INSORD: 0.8 UNITS/HOUR
INSULIN ORDER, INSORD: 0.9 UNITS/HOUR
INSULIN ORDER, INSORD: 1.2 UNITS/HOUR
INSULIN ORDER, INSORD: 1.2 UNITS/HOUR
INSULIN ORDER, INSORD: 1.3 UNITS/HOUR
INSULIN ORDER, INSORD: 1.4 UNITS/HOUR
INSULIN ORDER, INSORD: 1.4 UNITS/HOUR
INSULIN ORDER, INSORD: 1.5 UNITS/HOUR
INSULIN ORDER, INSORD: 1.6 UNITS/HOUR
INSULIN ORDER, INSORD: 1.8 UNITS/HOUR
INSULIN ORDER, INSORD: 2 UNITS/HOUR
INSULIN ORDER, INSORD: 2.2 UNITS/HOUR
INSULIN ORDER, INSORD: 2.2 UNITS/HOUR
INSULIN ORDER, INSORD: 2.3 UNITS/HOUR
INSULIN ORDER, INSORD: 2.9 UNITS/HOUR
INSULIN ORDER, INSORD: 3.8 UNITS/HOUR
INSULIN ORDER, INSORD: 5.6 UNITS/HOUR
LACTATE SERPL-SCNC: 1 MMOL/L (ref 0.4–2)
LDH SERPL L TO P-CCNC: 522 U/L (ref 85–241)
LOW TARGET, LOT: 95 MG/DL
MAGNESIUM SERPL-MCNC: 2 MG/DL (ref 1.6–2.4)
MCH RBC QN AUTO: 28.7 PG (ref 26–34)
MCHC RBC AUTO-ENTMCNC: 32.9 G/DL (ref 30–36.5)
MCV RBC AUTO: 87.3 FL (ref 80–99)
MINUTES UNTIL NEXT BG, NBG: 120 MIN
MINUTES UNTIL NEXT BG, NBG: 15 MIN
MINUTES UNTIL NEXT BG, NBG: 60 MIN
MULTIPLIER, MUL: 0.01
MULTIPLIER, MUL: 0.02
MULTIPLIER, MUL: 0.03
MULTIPLIER, MUL: 0.04
MULTIPLIER, MUL: 0.05
MULTIPLIER, MUL: 0.05
NRBC # BLD: 0 K/UL (ref 0–0.01)
NRBC BLD-RTO: 0 PER 100 WBC
ORDER INITIALS, ORDINIT: NORMAL
PCO2 BLDV: 37.4 MMHG (ref 41–51)
PH BLDV: 7.41 [PH] (ref 7.32–7.42)
PLATELET # BLD AUTO: 90 K/UL (ref 150–400)
PMV BLD AUTO: 10.8 FL (ref 8.9–12.9)
PO2 BLDV: 33 MMHG (ref 25–40)
POTASSIUM SERPL-SCNC: 4.1 MMOL/L (ref 3.5–5.1)
PROT SERPL-MCNC: 5.9 G/DL (ref 6.4–8.2)
PROTHROMBIN TIME: 13.6 SEC (ref 9–11.1)
RBC # BLD AUTO: 2.51 M/UL (ref 4.1–5.7)
SAO2 % BLDV: 65 % (ref 65–88)
SERVICE CMNT-IMP: ABNORMAL
SERVICE CMNT-IMP: NORMAL
SODIUM SERPL-SCNC: 138 MMOL/L (ref 136–145)
SPECIMEN EXP DATE BLD: NORMAL
SPECIMEN TYPE: ABNORMAL
STATUS OF UNIT,%ST: NORMAL
STATUS OF UNIT,%ST: NORMAL
THERAPEUTIC RANGE,PTTT: ABNORMAL SECS (ref 58–77)
TOTAL RESP. RATE, ITRR: 18
UNIT DIVISION, %UDIV: 0
UNIT DIVISION, %UDIV: 0
WBC # BLD AUTO: 7.6 K/UL (ref 4.1–11.1)

## 2018-05-10 PROCEDURE — 77030012390 HC DRN CHST BTL GTNG -B

## 2018-05-10 PROCEDURE — 77030027876 HC STPLR ENDOSC FLX PWR J&J -G1: Performed by: THORACIC SURGERY (CARDIOTHORACIC VASCULAR SURGERY)

## 2018-05-10 PROCEDURE — 74011250636 HC RX REV CODE- 250/636: Performed by: NURSE PRACTITIONER

## 2018-05-10 PROCEDURE — 74011000272 HC RX REV CODE- 272: Performed by: THORACIC SURGERY (CARDIOTHORACIC VASCULAR SURGERY)

## 2018-05-10 PROCEDURE — 85730 THROMBOPLASTIN TIME PARTIAL: CPT | Performed by: NURSE PRACTITIONER

## 2018-05-10 PROCEDURE — 80053 COMPREHEN METABOLIC PANEL: CPT | Performed by: NURSE PRACTITIONER

## 2018-05-10 PROCEDURE — G8978 MOBILITY CURRENT STATUS: HCPCS

## 2018-05-10 PROCEDURE — 76010000107 HC CV SURG FIRST 0.5 HR: Performed by: THORACIC SURGERY (CARDIOTHORACIC VASCULAR SURGERY)

## 2018-05-10 PROCEDURE — 65610000003 HC RM ICU SURGICAL

## 2018-05-10 PROCEDURE — 77030008467 HC STPLR SKN COVD -B: Performed by: THORACIC SURGERY (CARDIOTHORACIC VASCULAR SURGERY)

## 2018-05-10 PROCEDURE — 85610 PROTHROMBIN TIME: CPT | Performed by: NURSE PRACTITIONER

## 2018-05-10 PROCEDURE — 74011250636 HC RX REV CODE- 250/636: Performed by: THORACIC SURGERY (CARDIOTHORACIC VASCULAR SURGERY)

## 2018-05-10 PROCEDURE — 77030031139 HC SUT VCRL2 J&J -A: Performed by: THORACIC SURGERY (CARDIOTHORACIC VASCULAR SURGERY)

## 2018-05-10 PROCEDURE — 82803 BLOOD GASES ANY COMBINATION: CPT

## 2018-05-10 PROCEDURE — 76060000031 HC ANESTHESIA FIRST 0.5 HR: Performed by: THORACIC SURGERY (CARDIOTHORACIC VASCULAR SURGERY)

## 2018-05-10 PROCEDURE — 71045 X-RAY EXAM CHEST 1 VIEW: CPT

## 2018-05-10 PROCEDURE — 02PA3RZ REMOVAL OF SHORT-TERM EXTERNAL HEART ASSIST SYSTEM FROM HEART, PERCUTANEOUS APPROACH: ICD-10-PCS | Performed by: THORACIC SURGERY (CARDIOTHORACIC VASCULAR SURGERY)

## 2018-05-10 PROCEDURE — 83735 ASSAY OF MAGNESIUM: CPT | Performed by: NURSE PRACTITIONER

## 2018-05-10 PROCEDURE — C1757 CATH, THROMBECTOMY/EMBOLECT: HCPCS | Performed by: THORACIC SURGERY (CARDIOTHORACIC VASCULAR SURGERY)

## 2018-05-10 PROCEDURE — 74011250636 HC RX REV CODE- 250/636

## 2018-05-10 PROCEDURE — G8979 MOBILITY GOAL STATUS: HCPCS

## 2018-05-10 PROCEDURE — 97110 THERAPEUTIC EXERCISES: CPT

## 2018-05-10 PROCEDURE — 97161 PT EVAL LOW COMPLEX 20 MIN: CPT

## 2018-05-10 PROCEDURE — 97530 THERAPEUTIC ACTIVITIES: CPT

## 2018-05-10 PROCEDURE — 74011250637 HC RX REV CODE- 250/637: Performed by: NURSE PRACTITIONER

## 2018-05-10 PROCEDURE — 82962 GLUCOSE BLOOD TEST: CPT

## 2018-05-10 PROCEDURE — 83605 ASSAY OF LACTIC ACID: CPT | Performed by: THORACIC SURGERY (CARDIOTHORACIC VASCULAR SURGERY)

## 2018-05-10 PROCEDURE — 97165 OT EVAL LOW COMPLEX 30 MIN: CPT

## 2018-05-10 PROCEDURE — 36415 COLL VENOUS BLD VENIPUNCTURE: CPT | Performed by: NURSE PRACTITIONER

## 2018-05-10 PROCEDURE — 74011000250 HC RX REV CODE- 250: Performed by: THORACIC SURGERY (CARDIOTHORACIC VASCULAR SURGERY)

## 2018-05-10 PROCEDURE — 74011000258 HC RX REV CODE- 258: Performed by: NURSE PRACTITIONER

## 2018-05-10 PROCEDURE — 74011000250 HC RX REV CODE- 250

## 2018-05-10 PROCEDURE — 74011000250 HC RX REV CODE- 250: Performed by: NURSE PRACTITIONER

## 2018-05-10 PROCEDURE — 74011636637 HC RX REV CODE- 636/637: Performed by: NURSE PRACTITIONER

## 2018-05-10 PROCEDURE — 85027 COMPLETE CBC AUTOMATED: CPT | Performed by: NURSE PRACTITIONER

## 2018-05-10 PROCEDURE — 83615 LACTATE (LD) (LDH) ENZYME: CPT | Performed by: NURSE PRACTITIONER

## 2018-05-10 PROCEDURE — 77030008027: Performed by: THORACIC SURGERY (CARDIOTHORACIC VASCULAR SURGERY)

## 2018-05-10 RX ORDER — SODIUM CHLORIDE 9 MG/ML
INJECTION, SOLUTION INTRAVENOUS
Status: DISCONTINUED | OUTPATIENT
Start: 2018-05-10 | End: 2018-05-10 | Stop reason: HOSPADM

## 2018-05-10 RX ORDER — PROPOFOL 10 MG/ML
INJECTION, EMULSION INTRAVENOUS AS NEEDED
Status: DISCONTINUED | OUTPATIENT
Start: 2018-05-10 | End: 2018-05-10 | Stop reason: HOSPADM

## 2018-05-10 RX ORDER — LIDOCAINE HYDROCHLORIDE 10 MG/ML
INJECTION, SOLUTION EPIDURAL; INFILTRATION; INTRACAUDAL; PERINEURAL
Status: COMPLETED
Start: 2018-05-10 | End: 2018-05-10

## 2018-05-10 RX ORDER — LIDOCAINE HYDROCHLORIDE 10 MG/ML
15 INJECTION INFILTRATION; PERINEURAL ONCE
Status: COMPLETED | OUTPATIENT
Start: 2018-05-10 | End: 2018-05-10

## 2018-05-10 RX ORDER — DOBUTAMINE HYDROCHLORIDE 200 MG/100ML
INJECTION INTRAVENOUS
Status: DISCONTINUED | OUTPATIENT
Start: 2018-05-10 | End: 2018-05-10 | Stop reason: HOSPADM

## 2018-05-10 RX ADMIN — FAMOTIDINE 20 MG: 20 TABLET ORAL at 08:39

## 2018-05-10 RX ADMIN — PROPOFOL 20 MG: 10 INJECTION, EMULSION INTRAVENOUS at 14:53

## 2018-05-10 RX ADMIN — ASPIRIN 81 MG 81 MG: 81 TABLET ORAL at 08:39

## 2018-05-10 RX ADMIN — Medication 10 ML: at 08:48

## 2018-05-10 RX ADMIN — Medication 2 G: at 02:56

## 2018-05-10 RX ADMIN — Medication 2 G: at 08:39

## 2018-05-10 RX ADMIN — PROPOFOL 30 MG: 10 INJECTION, EMULSION INTRAVENOUS at 14:45

## 2018-05-10 RX ADMIN — LIDOCAINE HYDROCHLORIDE: 10 INJECTION, SOLUTION EPIDURAL; INFILTRATION; INTRACAUDAL; PERINEURAL at 15:00

## 2018-05-10 RX ADMIN — Medication 10 ML: at 21:13

## 2018-05-10 RX ADMIN — OXYCODONE HYDROCHLORIDE AND ACETAMINOPHEN 2 TABLET: 5; 325 TABLET ORAL at 15:19

## 2018-05-10 RX ADMIN — Medication 10 ML: at 15:28

## 2018-05-10 RX ADMIN — STANDARDIZED SENNA CONCENTRATE AND DOCUSATE SODIUM 1 TABLET: 8.6; 5 TABLET, FILM COATED ORAL at 17:41

## 2018-05-10 RX ADMIN — Medication 2 G: at 14:00

## 2018-05-10 RX ADMIN — SODIUM CHLORIDE: 900 IRRIGANT IRRIGATION at 16:00

## 2018-05-10 RX ADMIN — OXYCODONE HYDROCHLORIDE AND ACETAMINOPHEN 2 TABLET: 5; 325 TABLET ORAL at 00:24

## 2018-05-10 RX ADMIN — Medication 10 ML: at 12:00

## 2018-05-10 RX ADMIN — Medication 10 ML: at 06:05

## 2018-05-10 RX ADMIN — PROPOFOL 20 MG: 10 INJECTION, EMULSION INTRAVENOUS at 14:36

## 2018-05-10 RX ADMIN — DOBUTAMINE HYDROCHLORIDE 5 MCG/KG/MIN: 200 INJECTION INTRAVENOUS at 14:13

## 2018-05-10 RX ADMIN — AMIODARONE HYDROCHLORIDE 400 MG: 200 TABLET ORAL at 20:06

## 2018-05-10 RX ADMIN — HYDROMORPHONE HYDROCHLORIDE 1 MG: 1 INJECTION, SOLUTION INTRAMUSCULAR; INTRAVENOUS; SUBCUTANEOUS at 00:24

## 2018-05-10 RX ADMIN — PROPOFOL 30 MG: 10 INJECTION, EMULSION INTRAVENOUS at 14:57

## 2018-05-10 RX ADMIN — Medication 10 ML: at 21:14

## 2018-05-10 RX ADMIN — PROPOFOL 20 MG: 10 INJECTION, EMULSION INTRAVENOUS at 14:41

## 2018-05-10 RX ADMIN — MUPIROCIN: 20 OINTMENT TOPICAL at 17:46

## 2018-05-10 RX ADMIN — CHLORHEXIDINE GLUCONATE 10 ML: 1.2 RINSE ORAL at 08:57

## 2018-05-10 RX ADMIN — DEXTROSE MONOHYDRATE 8 G: 25 INJECTION, SOLUTION INTRAVENOUS at 11:53

## 2018-05-10 RX ADMIN — Medication 400 MG: at 17:41

## 2018-05-10 RX ADMIN — STANDARDIZED SENNA CONCENTRATE AND DOCUSATE SODIUM 1 TABLET: 8.6; 5 TABLET, FILM COATED ORAL at 09:00

## 2018-05-10 RX ADMIN — FAMOTIDINE 20 MG: 20 TABLET ORAL at 20:06

## 2018-05-10 RX ADMIN — OXYCODONE HYDROCHLORIDE AND ACETAMINOPHEN 2 TABLET: 5; 325 TABLET ORAL at 10:11

## 2018-05-10 RX ADMIN — HYDROMORPHONE HYDROCHLORIDE 1 MG: 1 INJECTION, SOLUTION INTRAMUSCULAR; INTRAVENOUS; SUBCUTANEOUS at 04:00

## 2018-05-10 RX ADMIN — SODIUM CHLORIDE: 9 INJECTION, SOLUTION INTRAVENOUS at 14:13

## 2018-05-10 RX ADMIN — MUPIROCIN: 20 OINTMENT TOPICAL at 08:57

## 2018-05-10 RX ADMIN — Medication 400 MG: at 08:39

## 2018-05-10 RX ADMIN — AMIODARONE HYDROCHLORIDE 400 MG: 200 TABLET ORAL at 08:39

## 2018-05-10 RX ADMIN — SODIUM CHLORIDE 9 ML/HR: 900 INJECTION, SOLUTION INTRAVENOUS at 16:00

## 2018-05-10 RX ADMIN — Medication 10 ML: at 15:27

## 2018-05-10 RX ADMIN — POLYETHYLENE GLYCOL 3350 17 G: 17 POWDER, FOR SOLUTION ORAL at 08:38

## 2018-05-10 RX ADMIN — INSULIN LISPRO 2 UNITS: 100 INJECTION, SOLUTION INTRAVENOUS; SUBCUTANEOUS at 16:30

## 2018-05-10 RX ADMIN — AMIODARONE HYDROCHLORIDE 0.5 MG/MIN: 50 INJECTION, SOLUTION INTRAVENOUS at 07:12

## 2018-05-10 RX ADMIN — ATORVASTATIN CALCIUM 10 MG: 10 TABLET, FILM COATED ORAL at 21:14

## 2018-05-10 RX ADMIN — PROPOFOL 30 MG: 10 INJECTION, EMULSION INTRAVENOUS at 14:49

## 2018-05-10 RX ADMIN — DEXTROSE MONOHYDRATE 5 MCG/KG/MIN: 5 INJECTION, SOLUTION INTRAVENOUS at 16:00

## 2018-05-10 RX ADMIN — OXYCODONE HYDROCHLORIDE AND ACETAMINOPHEN 2 TABLET: 5; 325 TABLET ORAL at 06:19

## 2018-05-10 RX ADMIN — CHLORHEXIDINE GLUCONATE 10 ML: 1.2 RINSE ORAL at 17:46

## 2018-05-10 RX ADMIN — SODIUM CHLORIDE 5 ML/HR: 450 INJECTION, SOLUTION INTRAVENOUS at 18:30

## 2018-05-10 RX ADMIN — LIDOCAINE HYDROCHLORIDE 15 ML: 10 INJECTION, SOLUTION INFILTRATION; PERINEURAL at 16:00

## 2018-05-10 NOTE — PROGRESS NOTES
Problem: Falls - Risk of  Goal: *Absence of Falls  Document César Fall Risk and appropriate interventions in the flowsheet. Outcome: Progressing Towards Goal  Fall Risk Interventions:  Mobility Interventions: Communicate number of staff needed for ambulation/transfer    Mentation Interventions: Adequate sleep, hydration, pain control, Evaluate medications/consider consulting pharmacy    Medication Interventions: Evaluate medications/consider consulting pharmacy    Elimination Interventions: Call light in reach, Toileting schedule/hourly rounds    History of Falls Interventions: Consult care management for discharge planning        Problem: Pressure Injury - Risk of  Goal: *Prevention of pressure injury  Document Celestine Scale and appropriate interventions in the flowsheet.    Outcome: Progressing Towards Goal  Pressure Injury Interventions:  Sensory Interventions: Assess changes in LOC, Pressure redistribution bed/mattress (bed type)    Moisture Interventions: Absorbent underpads    Activity Interventions: Pressure redistribution bed/mattress(bed type)    Mobility Interventions: Pressure redistribution bed/mattress (bed type), PT/OT evaluation    Nutrition Interventions: Document food/fluid/supplement intake, Discuss nutritional consult with provider, Offer support with meals,snacks and hydration    Friction and Shear Interventions: Lift sheet               Problem: Heart Failure: Day 5  Goal: Diagnostic Test/Procedures  Unable to teach patient who is intubated and sedated    THESE NOTES ARE FOR 5/8/2018

## 2018-05-10 NOTE — PROGRESS NOTES
Problem: Self Care Deficits Care Plan (Adult)  Goal: *Acute Goals and Plan of Care (Insert Text)  Occupational Therapy Goals  Initiated 5/10/2018  1. Patient will perform ADLs standing 5 mins without fatigue or LOB with supervision/set-up within 7 day(s). 2.  Patient will perform lower body ADLs with supervision/set-up within 7 day(s). 3.  Patient will perform upper body dressing with supervision/set-up within 7 day(s). 4.  Patient will perform toilet transfers with supervision/set-up within 7 day(s). 5.  Patient will perform all aspects of toileting with supervision/set-up within 7 day(s). 6.  Patient will participate in cardiac/sternal upper extremity therapeutic exercise/activities to increase independence with ADLs with supervision/set-up for 5 minutes within 7 day(s). Occupational Therapy EVALUATION  Patient: Mayela Lo (42 y.o. male)  Date: 5/10/2018  Primary Diagnosis: CAD  CAD (coronary artery disease)  cardiac insufficiency  CORONARY ARTERY DISEASE  unknown  Procedure(s) (LRB):  ON PUMP CORONARY ARTERY BYPASS GRAFT X 3 WITH LEFT LEG ENDOVASCULAR VEIN HARVEST/ LEFT INTERNAL MAMMARY ARTERY HARVEST. TRANSESOPHAGEAL ECHOCARDIOGRAM AND EPI-AORTIC ULTRASOUND BY DR. Gallo Hurtado. (N/A) 2 Days Post-Op   Precautions:  Fall, Sternal    ASSESSMENT :  Based on the objective data described below, the patient presents with overall min Ax2 for functional mobility, s/u-max A for upper body ADLs, total A for lower body ADLs s/p CABG x3 and Impella insertion POD 2. OT evaluation delayed d/t patient being intubated and sedated. Patient demonstrating typical limitations following cardiac procedure with sternotomy including newly enforced sternal precautions, post-op pain, limited ROM, generalized weakness, balance deficits, decreased safety awareness and cognition (attention to task, sequencing, etc.) decreased functional activity tolerance, and impaired cardiopulmonary endurance.  Patient may benefit from rehab once medically cleared for D/C 2* above deficits pending progress with acute rehab services. Recommend with nursing patient to complete as able in order to maintain strength, endurance and independence: ADLs with supervision/setup, OOB to chair 3x/day and mobilizing to the bathroom for toileting with 2 assist. Thank you for your assistance. Patient will benefit from skilled intervention to address the above impairments. Patients rehabilitation potential is considered to be Good  Factors which may influence rehabilitation potential include:   [x]             None noted  []             Mental ability/status  []             Medical condition  []             Home/family situation and support systems  []             Safety awareness  []             Pain tolerance/management  []             Other:      PLAN :  Recommendations and Planned Interventions:  [x]               Self Care Training                  [x]        Therapeutic Activities  [x]               Functional Mobility Training    []        Cognitive Retraining  [x]               Therapeutic Exercises           [x]        Endurance Activities  [x]               Balance Training                   []        Neuromuscular Re-Education  []               Visual/Perceptual Training     [x]   Home Safety Training  [x]               Patient Education                 [x]        Family Training/Education  []               Other (comment):    Frequency/Duration: Patient will be followed by occupational therapy 5 times a week to address goals. Discharge Recommendations: Rehab vs. To Be Determined  Further Equipment Recommendations for Discharge: TBD     SUBJECTIVE:   Patient stated There's my queen! \" (as wife walked into unit)    The patient stated 1/3 sternal precautions. Reviewed all 3 with patient. OBJECTIVE DATA SUMMARY:   HISTORY:   History reviewed. No pertinent past medical history.   Past Surgical History:   Procedure Laterality Date    HX ORTHOPAEDIC left hip surgery       Prior Level of Function/Environment/Context: Patient reports being IND with self care and functional mobility prior to sx. Patient was working. Patient has hx of illicit drug use. Patient lives in 1 level home with wife. No DME    Home Situation  Home Environment: Private residence  # Steps to Enter: 5  Rails to Enter: Yes  Hand Rails : Bilateral  One/Two Story Residence: One story  Living Alone: No  Support Systems: Family member(s), Child(vanna)  Patient Expects to be Discharged to[de-identified] Private residence  Current DME Used/Available at Home: Blood pressure cuff  Tub or Shower Type: Shower  []  Right hand dominant   []  Left hand dominant    EXAMINATION OF PERFORMANCE DEFICITS:  Cognitive/Behavioral Status:  Neurologic State: Drowsy  Orientation Level: Oriented X4  Cognition: Follows commands; Appropriate for age attention/concentration; Impaired decision making;Poor safety awareness  Perception: Appears intact  Perseveration: No perseveration noted  Safety/Judgement: Awareness of environment; Fall prevention    Skin: Appears grossly intact, R axillary Impella intact, Swanz-Ervin intact    Edema: None noted in BUEs    Hearing: Auditory  Auditory Impairment: None    Vision/Perceptual:    Tracking: Able to track stimulus in all quadrants w/o difficulty     Acuity: Within Defined Limits      Range of Motion:  Shoulder flexion to 90*  Elbows WFL  Wrists WFL   All digits WFL  AROM: Generally decreased, functional (2* sternal precautions, Impella)    Strength:  Grossly tested,  equal 4/5  Strength: Generally decreased, functional (2* sternal precautions, Impella)    Coordination:  Coordination: Within functional limits  Fine Motor Skills-Upper: Left Intact; Right Intact    Gross Motor Skills-Upper: Left Intact; Right Intact    Tone & Sensation:  Tone: Normal  Sensation: Intact    Balance:  Sitting: Impaired; Without support  Sitting - Static: Fair (occasional)  Sitting - Dynamic: Fair (occasional)  Standing: Impaired; With support  Standing - Static: Fair  Standing - Dynamic : Fair    Functional Mobility and Transfers for ADLs:  Bed Mobility:  Supine to Sit:  (bed mechanics utilized)    Transfers:  Sit to Stand: Minimum assistance; Additional time;Assist x2 (Per PT report)  Stand to Sit: Minimum assistance; Additional time;Assist x2 (Per PT report)  Bed to Chair: Minimum assistance; Additional time;Assist x2 (Per PT report)  Toilet Transfer : Minimum assistance; Additional time;Assist x2 (Infer per PT report, sternal prec, BUE ROM)    ADL Assessment:  Feeding: Setup (Infer per obs of func mob, sternal prec, BUE ROM)    Oral Facial Hygiene/Grooming: Minimum assistance (Infer per obs of func mob, sternal prec, BUE ROM, FM coord)    Bathing: Maximum assistance (Infer per obs of func mob, sternal prec, BUE ROM)    Upper Body Dressing: Maximum assistance (Infer per obs of func mob, sternal prec, BUE ROM)    Lower Body Dressing: Total assistance (Infer per obs of func mob, sternal prec, BUE ROM)    Toileting: Total assistance (Infer per obs of func mob, sternal prec, BUE ROM)    ADL Intervention and task modifications:    Lower Body Dressing Assistance  Socks: Total assistance (dependent)  Leg Crossed Method Used: Yes (attempted, unable)  Position Performed: Long sitting on bed    Cognitive Retraining  Safety/Judgement: Awareness of environment; Fall prevention    Patient instructed no asymmetrical reaching over head to ensure B UEs when shoulders >90* i.e. reaching in cabinets and dressing. Instruction on upper body dressing techniques of over head, then arms through to decrease pain and unilateral shoulder flexion >90*. Instruction on the benefits of utilizing B UEs during functional tasks i.e. opening the fridge, stepping into the tub.      May have to adjust home setup to increase ease with items closer to waist height to prevent deep bending and the automatic  of asymmetrical UE WB/pushing for stabilization during bending. Benefit to don clothing tailor sitting and don all clothing while sitting prior to standing. Patient demonstrated lower body dressing seated in bed in chair position with Total assistance. Instruction and indicated understanding on the benefits of loose clothing throughout to accommodate for post surgical swelling, decreased ROM and increased pain. Instruction and indicated understanding the technique of pull over shirt versus front open clothing. Therapeutic Exercises:   Patient instructed on the benefits and demonstrated cardiac exercises while seated in bed with Supervision. Instructed and indicated understanding on how to progress reps, sets against gravity, working up to 5 lbs, standing and so on based on surgeon clearance for more weight in prep for basic and instrumental ADLs. Instruction on the use of household items in place of weights as needed.     CARDIAC   EXERCISE    Sets    Reps    Active  Active Assist    Passive  Self ROM    Comments    Shoulder flexion  1  5   [x]                            []                             []                             []                             Only to 90* 2* Impella   Shoulder abduction    []                             []                             []                             []                                Scapular elevation  1  5  [x]                             []                              []                             []                                Scapular retraction    [x]                             []                             []                             []                                Trunk rotation    []                             []                             []                             []                                Trunk sidebending    []                             []                              []                             []                                        Functional Measure:  Barthel Index:    Bathin  Bladder: 10  Bowels: 10  Groomin  Dressin  Feedin  Mobility: 5  Stairs: 0  Toilet Use: 0  Transfer (Bed to Chair and Back): 5  Total: 35       Barthel and G-code impairment scale:  Percentage of impairment CH  0% CI  1-19% CJ  20-39% CK  40-59% CL  60-79% CM  80-99% CN  100%   Barthel Score 0-100 100 99-80 79-60 59-40 20-39 1-19   0   Barthel Score 0-20 20 17-19 13-16 9-12 5-8 1-4 0      The Barthel ADL Index: Guidelines  1. The index should be used as a record of what a patient does, not as a record of what a patient could do. 2. The main aim is to establish degree of independence from any help, physical or verbal, however minor and for whatever reason. 3. The need for supervision renders the patient not independent. 4. A patient's performance should be established using the best available evidence. Asking the patient, friends/relatives and nurses are the usual sources, but direct observation and common sense are also important. However direct testing is not needed. 5. Usually the patient's performance over the preceding 24-48 hours is important, but occasionally longer periods will be relevant. 6. Middle categories imply that the patient supplies over 50 per cent of the effort. 7. Use of aids to be independent is allowed. Irene Macias., Barthel, D.W. ([id number]). Functional evaluation: the Barthel Index. [address] [facility] (14)2. MookCleveland Clinic Union Hospitalredd cortney Valentino, LEVI.J.M.SMILEY, Kenneth Melendez., Kathie Lawrence.[facility], [address] [place] [address] (). Measuring the change indisability after inpatient rehabilitation; comparison of the responsiveness of the Barthel Index and Functional [place] Measure. Journal of Neurology, Neurosurgery, and Psychiatry, 66(4), 830-561. Anna Turpin, N.[place].MARCELO, YOLI Coughlin, & Ariana Fletcher MSaigeA. (2004.) Assessment of post-stroke quality of life in cost-effectiveness studies: The usefulness of the Barthel Index and the EuroQoL-5D.  [facility], 13, 032-96 G codes: In compliance with CMSs Claims Based Outcome Reporting, the following G-code set was chosen for this patient based on their primary functional limitation being treated: The outcome measure chosen to determine the severity of the functional limitation was the Barthel Index with a score of 35/100 which was correlated with the impairment scale. ? Self Care:     - CURRENT STATUS: CL - 60%-79% impaired, limited or restricted    - GOAL STATUS: CK - 40%-59% impaired, limited or restricted    - D/C STATUS:  ---------------To be determined---------------     Occupational Therapy Evaluation Charge Determination   History Examination Decision-Making   LOW Complexity : Brief history review  LOW Complexity : 1-3 performance deficits relating to physical, cognitive , or psychosocial skils that result in activity limitations and / or participation restrictions  HIGH Complexity : Patient presents with comorbidities that affect occupational performance. Signifigant modification of tasks or assistance (eg, physical or verbal) with assessment (s) is necessary to enable patient to complete evaluation       Based on the above components, the patient evaluation is determined to be of the following complexity level: LOW   Pain:  Pain Scale 1: Numeric (0 - 10)  Pain Intensity 1: 3  Pain Location 1: Chest  Pain Orientation 1: Anterior  Pain Description 1: Aching  Pain Intervention(s) 1: Medication (see MAR)     Activity Tolerance:   Fair, VSS  Please refer to the flowsheet for vital signs taken during this treatment. After treatment:   [] Patient left in no apparent distress sitting up in chair  [x] Patient left in no apparent distress in bed  [x] Call bell left within reach  [x] Nursing notified  [] Caregiver present  [] Bed alarm activated    COMMUNICATION/EDUCATION:   The patients plan of care was discussed with: Physical Therapist and Registered Nurse.   [x] Home safety education was provided and the patient/caregiver indicated understanding. [x] Patient/family have participated as able in goal setting and plan of care. [] Patient/family agree to work toward stated goals and plan of care. [] Patient understands intent and goals of therapy, but is neutral about his/her participation. [] Patient is unable to participate in goal setting and plan of care. This patients plan of care is appropriate for delegation to JOHN.     Thank you for this referral.  Flora Arvizu OT  Time Calculation: 24 mins

## 2018-05-10 NOTE — PROGRESS NOTES
Spiritual Care Assessment/Progress Note  [facility]      NAME: Aamir Linn      MRN: [id number]  AGE: 61 y.o.  SEX: male  Jainism Affiliation: Confucianist   Language: English     5/10/2018     Total Time (in minutes): 10     Spiritual Assessment begun in [facility] 4 CV INTNSV CARE through conversation with:         [x]Patient        [] Family    [] Friend(s)        Reason for Consult: Palliative Care, Initial/Spiritual Assessment     Spiritual beliefs: (Please include comment if needed)     [x] Identifies with a france tradition:     [x] Supported by a france community:      [] Claims no spiritual orientation:      [] Seeking spiritual identity:           [] Adheres to an individual form of spirituality:      [] Not able to assess:                     Identified resources for coping:      [x] Prayer                               [x] Music                  [] Guided Imagery     [x] Family/friends                 [] Pet visits     [] Devotional reading                         [] Unknown     [] Other:                                               Interventions offered during this visit: (See comments for more details)    Patient Interventions: Affirmation of emotions/emotional suffering, Affirmation of france, Catharsis/review of pertinent events in supportive environment, Iconic (affirming the presence of God/Higher Power), Coping skills reviewed/reinforced, Normalization of emotional/spiritual concerns, Life review/legacy, Prayer (actual), Prayer (assurance of), Jainism beliefs/image of God discussed           Plan of Care:     [x] Support spiritual and/or cultural needs    [] Support AMD and/or advance care planning process      [] Support grieving process   [] Coordinate Rites and/or Rituals    [] Coordination with community clergy   [] No spiritual needs identified at this time   [] Detailed Plan of Care below (See Comments)  [] Make referral to Music Therapy  [] Make referral to Pet Therapy     [] Make referral to Addiction services  [] Make referral to [facility]  [] Make referral to Spiritual Care Partner  [] No future visits requested        [] Follow up visits as needed     Comments: Mayra Crawley is visiting for an initial spiritual assessment with pt in CVICU 35. Pt awoke and shared about his medical experience at this time. Pt is familiar to  from his stay at [facility].  offered a listening presence, words of support, and prayer. Pt is actively playing gospel music, which is encouraging for positive coping. Pt expressed thanks for  support.      Tina Vanegas M.Div, M.S, Loy [address] available at [address] [place] [phone])

## 2018-05-10 NOTE — PROGRESS NOTES
Problem: Falls - Risk of  Goal: *Absence of Falls  Document César Fall Risk and appropriate interventions in the flowsheet. Outcome: Progressing Towards Goal  Fall Risk Interventions:  Mobility Interventions: OT consult for ADLs, PT Consult for mobility concerns, Strengthening exercises (ROM-active/passive), Utilize gait belt for transfers/ambulation, Communicate number of staff needed for ambulation/transfer, Assess mobility with egress test    Mentation Interventions: Adequate sleep, hydration, pain control, Gait belt with transfers/ambulation, Family/sitter at bedside, Familiar objects from home, Eyeglasses and hearing aids, Evaluate medications/consider consulting pharmacy, Bed/chair exit alarm, Door open when patient unattended, Reorient patient    Medication Interventions: Assess postural VS orthostatic hypotension, Bed/chair exit alarm, Evaluate medications/consider consulting pharmacy, Utilize gait belt for transfers/ambulation, Teach patient to arise slowly, Patient to call before getting OOB    Elimination Interventions: Call light in reach, Patient to call for help with toileting needs, Toilet paper/wipes in reach, Toileting schedule/hourly rounds, Urinal in reach    History of Falls Interventions: Bed/chair exit alarm, Consult care management for discharge planning, Door open when patient unattended, Evaluate medications/consider consulting pharmacy, Investigate reason for fall, Room close to nurse's station, Utilize gait belt for transfer/ambulation        Problem: Pressure Injury - Risk of  Goal: *Prevention of pressure injury  Document Celestine Scale and appropriate interventions in the flowsheet.    Outcome: Progressing Towards Goal  Pressure Injury Interventions:  Sensory Interventions: Assess changes in LOC, Assess need for specialty bed, Avoid rigorous massage over bony prominences, Check visual cues for pain, Chair cushion, Discuss PT/OT consult with provider, Float heels, Maintain/enhance activity level, Keep linens dry and wrinkle-free, Minimize linen layers, Monitor skin under medical devices, Pad between skin to skin, Pressure redistribution bed/mattress (bed type), Sit a 90-degree angle/use footstool if needed, Suspension boots, Turn and reposition approx. every two hours (pillows and wedges if needed), Use 30-degree side-lying position    Moisture Interventions: Absorbent underpads, Apply protective barrier, creams and emollients, Assess need for specialty bed, Check for incontinence Q2 hours and as needed, Contain wound drainage, Internal/External fecal devices, Internal/External urinary devices, Limit adult briefs, Maintain skin hydration (lotion/cream), Moisture barrier, Minimize layers, Offer toileting Q_hr    Activity Interventions: Assess need for specialty bed, Chair cushion, Increase time out of bed, Pressure redistribution bed/mattress(bed type), PT/OT evaluation, Trapeze to reposition    Mobility Interventions: Assess need for specialty bed, Chair cushion, Float heels, HOB 30 degrees or less, Pressure redistribution bed/mattress (bed type), PT/OT evaluation, Suspension boots, Trapeze to reposition, Turn and reposition approx. every two hours(pillow and wedges)    Nutrition Interventions: Document food/fluid/supplement intake, Discuss nutritional consult with provider, Offer support with meals,snacks and hydration    Friction and Shear Interventions: Apply protective barrier, creams and emollients, Feet elevated on foot rest, Foam dressings/transparent film/skin sealants, HOB 30 degrees or less, Lift sheet, Lift team/patient mobility team, Minimize layers, Sit at 90-degree angle, Transfer aides:transfer board/Sylvia lift/ceiling lift, Trapeze to reposition, Transferring/repositioning devices               Problem: CABG: Post-Op Day 2/Transfer Day  Goal: Off Pathway (Use only if patient is Off Pathway)  Outcome: Progressing Towards Goal  Patient remains in CVICU.  Impella in place this morning, weaned down and removed at 1500. Patient on Dobutamine, Amio, Insulin, to remain in CVICU today. Worked with OT and PT. Progressing diet. CTs with 50cc/hr output serosanguinous fluid. Problem: Heart Failure: Day 5  Goal: Activity/Safety  Outcome: Progressing Towards Goal  Worked with PT and OT. Up to chair with min 2 assist today. Goal: Diagnostic Test/Procedures  Outcome: Progressing Towards Goal  Removal of Impella, remains on Dobutamine gtt.    Goal: Respiratory  Outcome: Progressing Towards Goal  Remains on 6 L NC to keep SpO2 92% or >

## 2018-05-10 NOTE — PROGRESS NOTES
[facility] ICU Progress Note    Admit Date: 2018  POD:  2 Day Post-Op    Procedure:  Procedure(s):  ON PUMP CORONARY ARTERY BYPASS GRAFT X 3 WITH LEFT LEG ENDOVASCULAR VEIN HARVEST/ LEFT INTERNAL MAMMARY ARTERY HARVEST. TRANSESOPHAGEAL ECHOCARDIOGRAM AND EPI-AORTIC ULTRASOUND BY DR. Yolanda Nielsen. Subjective:   Pt seen with Dr. Steve Hernandez. Tmax 99.6, on 6L NC. Dobut 3, amio 0.5, insulin. Impella level P6, bival through impella only. Objective:   Vitals:  Blood pressure 111/70, pulse 80, temperature 98.9 °F (37.2 °C), resp. rate 17, height 5' 6\" (1.676 m), weight 203 lb 7.8 oz (92.3 kg), SpO2 92 %. Temp (24hrs), Av.3 °F (37.4 °C), Min:98.9 °F (37.2 °C), Max:99.6 °F (37.6 °C)    Hemodynamics:   CO: CO (l/min): 7.2 l/min   CI: CI (l/min/m2): 3.7 l/min/m2   CVP: CVP (mmHg): 9 mmHg (05/10/18 08)   SVR: SVR (dyne*sec)/cm5: 677 (dyne*sec)/cm5 (05/10/18 3924)   PAP Systolic: PAP Systolic: 46 (79/57/67 9331)   PAP Diastolic: PAP Diastolic: 16 (39/86/40 3906)   PVR:     SV02: SVO2 (%): 63 % (05/10/18 08)   SCV02:      EKG/Rhythm:  NSR    CT Output: 1100 ml    Oxygen Therapy:  Oxygen Therapy  O2 Sat (%): 92 % (05/10/18 08)  Pulse via Oximetry: 77 beats per minute (05/10/18 0800)  O2 Device: Nasal cannula (05/10/18 0400)  O2 Flow Rate (L/min): 6 l/min (05/10/18 0400)  FIO2 (%): 50 % (18 1318)    CXR: IMPRESSION: Decreased interstitial edema.     Admission Weight: Last Weight   Weight: 188 lb 11.4 oz (85.6 kg) Weight: 203 lb 7.8 oz (92.3 kg)     Intake / Output / Drain:  Current Shift: 05/10 07 - 05/10 1900  In: -   Out: 75 [Urine:25; Drains:50]  Last 24 hrs.:     Intake/Output Summary (Last 24 hours) at 05/10/18 0838  Last data filed at 05/10/18 0800   Gross per 24 hour   Intake          2319.37 ml   Output             3510 ml   Net         -1190.63 ml       EXAM:  General:  Alert, NAD                                                                                            Lungs:   Clear upper, diminished bases to auscultation bilaterally. Incision:  Dsg cdi   Heart:  Regular rate and rhythm, S1, S2 normal, no murmur, click, rub or gallop. Abdomen:   Soft, non-tender. Bowel sounds hypoactive. No masses,  No organomegaly. Extremities:  No edema. PPP. Neurologic:  Gross motor and sensory apparatus intact. Labs:   Recent Labs      05/10/18   0811   05/10/18   0355   WBC   --    --   7.6   HGB   --    --   7.2*   HCT   --    --   21.9*   PLT   --    --   90*   NA   --    --   138   K   --    --   4.1   BUN   --    --   16   CREA   --    --   1.22   GLU   --    --   113*   GLUCPOC  155*   < >  119*   INR   --    --   1.3*    < > = values in this interval not displayed. Assessment:     Active Problems:    CAD (coronary artery disease) (2018)         Plan/Recommendations/Medical Decision Makin. CAD s/p CABG: On ASA, statin, no BB until off dobut  2. Acute systolic heart failure (NYHA class III on admission): EF 10-15%. On impella level P6. Cont dobut gtt. No BB, ACE/ARB until appropriate. Plan to dc impella later today. 3. Acute hypoxic respiratory failure: Improved, on NC, wean O2 for sats >92%  4. Post op anemia st acute blood loss: Hgb 7.2,  Monitor H&H and CT output  5. Thrombocytopenia: Plt 90, monitor  6. HTN: Monitor  7. Renal insufficiency: Monitor U/O and creatinine  8. Elevated liver enzymes: improving, monitor  9. + cocaine use:  on cessation when appropriate  10. Tobacco use: reinforce cessation  11. Dispo: Cont ICU, remove impella today.     Signed By: Ed Dodd NP

## 2018-05-10 NOTE — ANESTHESIA POSTPROCEDURE EVALUATION
Post-Anesthesia Evaluation and Assessment    Patient: Donita Escalante MRN: [id number]  SSN: [id number]    YOB: 1960  Age: 61 y.o. Sex: male       Cardiovascular Function/Vital Signs  Visit Vitals    /70    Pulse 80    Temp 37.4 °C (99.3 °F)    Resp 22    Ht 5' 6\" (1.676 m)    Wt 92.3 kg (203 lb 7.8 oz)    SpO2 97%    BMI 32.84 kg/m2       Patient is status post general anesthesia for Procedure(s):  PLACEMENT OF LEFT VENTRICULAR ASSIST DEVICE (TEMPORARY), RIGHT AXILLARY CUT DOWN, GHASSAN BY DR Emiliano León. Nausea/Vomiting: None    Postoperative hydration reviewed and adequate. Pain:  Pain Scale 1: Numeric (0 - 10) (05/10/18 1100)  Pain Intensity 1: 3 (05/10/18 1100)   Managed    Neurological Status:   Neuro  Neurologic State: Drowsy (05/10/18 1323)  Orientation Level: Oriented X4 (05/10/18 1323)  Cognition: Follows commands; Appropriate for age attention/concentration; Impaired decision making;Poor safety awareness (05/10/18 1323)  Speech: Clear (05/10/18 0800)  Assessment L Pupil: Round (05/10/18 0800)  Size L Pupil (mm): 3 (05/10/18 0800)  Assessment R Pupil: Round (05/10/18 0800)  Size R Pupil (mm): 3 (05/10/18 0800)  LUE Motor Response: Purposeful (05/10/18 0800)  LLE Motor Response: Purposeful (05/10/18 0800)  RUE Motor Response: Purposeful (05/10/18 0800)  RLE Motor Response: Purposeful (05/10/18 0800)   At baseline    Mental Status and Level of Consciousness: Arousable    Pulmonary Status:   O2 Device: Nasal cannula (05/10/18 1200)   Adequate oxygenation and airway patent    Complications related to anesthesia: None    Post-anesthesia assessment completed.  No concerns    Signed By: Geoffrey Aparicio MD     May 10, 2018

## 2018-05-10 NOTE — PROGRESS NOTES
[facility] Progress Note      DOS:   5/10/2018  NAME:  Martha Ramirez   MRN:   [id number]     REFERRING PROVIDER:  Dr. Mensah Forth: Carrington Syed MD    Chief Complaint:  SOB/Chest pain     HPI: 61y.o. year old male with a history of HTN who was admitted to [facility] with complaint of chest pain and SOB that started yesterday morning, he ruled in for NSTEMI and CSS was consulted for surgical intervention and he was transferred to [facility]. He has a history of HTN but does not take any antihypertensives, he admits to doing cocaine the mornign his chest pain started and is currently smoker. He denies HA, N/V, edema or dizziness but states he continues to be SOB and has orthopnea. The [facility] has been consulted to comment on his acute cardiomyopathy and medical management. 24Hr events:  Weaning dobutamine  Hemodynamics stable on impella      Impression / Plan:   Heart Failure Status: NYHA Class III    Acute HFeEF, ICM, EF 15%, 3V CAD   POD 2 CABG   Hold coreg due to bradycardia    Cont ASA   Hold diuretics today- CVP 8-10, will reassess. Plan to explant impella today    Remains on dobutamine at [facility]   Not a candidate for durable MCS due to recent cocaine use, would need to abstain for at least 6 months. History:  History reviewed. No pertinent past medical history. Past Surgical History:   Procedure Laterality Date    HX ORTHOPAEDIC      left hip surgery     Social History     Social History    Marital status:      Spouse name: N/A    Number of children: N/A    Years of education: N/A     Occupational History    Not on file.      Social History Main Topics    Smoking status: Former Smoker     Quit date: 5/3/2018    Smokeless tobacco: Not on file    Alcohol use No    Drug use: Not on file    Sexual activity: Not on file     Other Topics Concern    Not on file     Social History Narrative     Family History   Problem Relation Age of Onset    Diabetes Mother        Current Medications:   Current Facility-Administered Medications   Medication Dose Route Frequency Provider Last Rate Last Dose    0.9% sodium chloride infusion 250 mL  250 mL IntraVENous PRN Nataly Feliz MD        atorvastatin (LIPITOR) tablet 10 mg  10 mg Oral QHS Darby Bumps, NP   10 mg at 05/09/18 2117    0.9% sodium chloride infusion 250 mL  250 mL IntraVENous PRN Lisa Mckeon MD        sodium chloride (NS) flush 5-10 mL  5-10 mL IntraVENous Q8H Sean Gonzalez, NP   10 mL at 05/10/18 3573    sodium chloride (NS) flush 5-10 mL  5-10 mL IntraVENous PRN Sean Gonzalez NP        albumin human 5% (BUMINATE) solution 12.5 g  12.5 g IntraVENous Q2H PRN Sean Gonzalez, NP   12.5 g at 05/09/18 0200    0.45% sodium chloride infusion  10 mL/hr IntraVENous CONTINUOUS Sean Gonzalez NP 10 mL/hr at 05/09/18 1841 10 mL/hr at 05/09/18 1841    0.9% sodium chloride infusion  9 mL/hr IntraVENous CONTINUOUS Sean Gonzalez NP 12 mL/hr at 05/09/18 1953 12 mL/hr at 05/09/18 1953    oxyCODONE-acetaminophen (PERCOCET) 5-325 mg per tablet 1 Tab  1 Tab Oral Q4H PRN Sean Gonzalez, NP        oxyCODONE-acetaminophen (PERCOCET) 5-325 mg per tablet 2 Tab  2 Tab Oral Q4H PRN Sean Gonzalez, NP   2 Tab at 05/10/18 1011    naloxone (NARCAN) injection 0.4 mg  0.4 mg IntraVENous PRN Sean Gonzalez, NP        mupirocin (BACTROBAN) 2 % ointment   Both Nostrils BID Sean Gonzalez, NP        ceFAZolin (ANCEF) 2 g/20 mL in sterile water IV syringe  2 g IntraVENous Q6H Sean Gonzalez, NP   2 g at 05/10/18 0014    amiodarone (CORDARONE) tablet 400 mg  400 mg Oral Q12H Sean Gonzalez, NP   400 mg at 05/10/18 0839    ondansetron (ZOFRAN) injection 4 mg  4 mg IntraVENous Q4H PRN Sean Gonzalez, NP        albuterol (PROVENTIL VENTOLIN) nebulizer solution 2.5 mg  2.5 mg Nebulization Q4H PRN Sean Gonzalez NP        aspirin chewable tablet 81 mg  81 mg Oral DAILY Kesha Cisneros NP   81 mg at 05/10/18 0248    midazolam (VERSED) injection 1 mg  1 mg IntraVENous Q1H PRN Kesha Cisneros NP        chlorhexidine (PERIDEX) 0.12 % mouthwash 10 mL  10 mL Oral BID Kesha Cisneros NP   10 mL at 05/10/18 0857    famotidine (PEPCID) tablet 20 mg  20 mg Oral Q12H Kesha Cisneros NP   20 mg at 05/10/18 2344    magnesium oxide (MAG-OX) tablet 400 mg  400 mg Oral BID Kesha Cisneros NP   400 mg at 05/10/18 2141    calcium chloride 1 g in 0.9% sodium chloride 50 mL IVPB  1 g IntraVENous PRN Kesha Cisneros NP        bisacodyl (DULCOLAX) suppository 10 mg  10 mg Rectal DAILY PRN Kesha Cisneros NP        senna-docusate (PERICOLACE) 8.6-50 mg per tablet 1 Tab  1 Tab Oral BID Kesha Cisneros NP   1 Tab at 05/10/18 0900    polyethylene glycol (MIRALAX) packet 17 g  17 g Oral DAILY Kesha Cisneros NP   17 g at 05/10/18 1994    ELECTROLYTE REPLACEMENT PROTOCOL  1 Each Other PRN Kesha Cisneros NP        magnesium sulfate 1 g/100 ml IVPB (premix or compounded)  1 g IntraVENous PRN Kesha Cisneros NP        glucose chewable tablet 16 g  4 Tab Oral PRN Kesha Cisneros NP        dextrose (D50W) injection syrg 12.5-25 g  12.5-25 g IntraVENous PRN Kesha Cisneros NP        glucagon (GLUCAGEN) injection 1 mg  1 mg IntraMUSCular PRN Kesha Cisneros NP        insulin lispro (HUMALOG) injection   SubCUTAneous TIDAC Kesha Cisneros NP   Stopped at 05/10/18 0730    insulin lispro (HUMALOG) injection   SubCUTAneous AC&HS Kesha Cisneros NP        insulin glargine (LANTUS) injection 1-50 Units  1-50 Units SubCUTAneous ONCE PRN Kesha Cisneros NP        sodium chloride (NS) flush 20 mL  20 mL InterCATHeter PRN Kesha Cisneros NP        sodium chloride (NS) flush 10 mL  10 mL InterCATHeter Q24H Kesha Cisneros NP   10 mL at 05/09/18 2118    sodium chloride (NS) flush 10 mL  10 mL InterCATHeter PRN Carmen Quezada Dayne Núñez, NP   10 mL at 05/10/18 0848    sodium chloride (NS) flush 10 mL  10 mL InterCATHeter Q8H Gretchen Thompson NP   10 mL at 05/10/18 9304    alteplase (CATHFLO) 1 mg in sterile water (preservative free) 1 mL injection  1 mg InterCATHeter PRN Gretchen Thompson NP        bacitracin 500 unit/gram packet 1 Packet  1 Packet Topical PRN Gretchen Thompson NP        HYDROmorphone (PF) (DILAUDID) injection 1 mg  1 mg IntraVENous Q3H PRN Gretchen Thompson NP   1 mg at 05/10/18 0400    HYDROmorphone (PF) (DILAUDID) injection 0.5 mg  0.5 mg IntraVENous Q3H PRN Gretchen Thompson NP        sodium chloride (NS) flush 10 mL  10 mL InterCATHeter Q24H Nhoemi Herron MD   10 mL at 05/09/18 1230    sodium chloride (NS) flush 10 mL  10 mL Mykel Spain MD   10 mL at 05/10/18 1240    0.9% sodium chloride infusion 250 mL  250 mL IntraVENous PRN Nohemi Herron MD        EPINEPHrine (ADRENALIN) 5 mg in 0.9% sodium chloride 250 mL infusion  1-10 mcg/min IntraVENous TITRATE Nohemi Herron MD   Stopped at 05/09/18 0622    NOREPINephrine (LEVOPHED) 8 mg in 5% dextrose 250mL infusion  2-16 mcg/min IntraVENous TITRATE Nohemi Herron MD   Stopped at 05/09/18 0420    propofol (DIPRIVAN) infusion  0-50 mcg/kg/min IntraVENous TITRATE Nohemi Herron MD   Stopped at 05/09/18 1230    dextrose 5% infusion  4-20 mL/hr IntraVENous TITRATE Nohemi Herron MD 17.4 mL/hr at 05/09/18 1914 17.4 mL/hr at 05/09/18 1914    DOBUTamine (DOBUTREX) 500 MG/250 mL (2000 mcg/mL) in D5W infusion  0-10 mcg/kg/min (Order-Specific) IntraVENous TITRATE Gretchen Thompson NP 7.7 mL/hr at 05/10/18 0758 3 mcg/kg/min at 05/10/18 0758    insulin regular (NOVOLIN R, HUMULIN R) 100 Units in 0.9% sodium chloride 100 mL infusion  1-50 Units/hr IntraVENous TITRATE Nohemi Herron MD 2.3 mL/hr at 05/10/18 0928 2.3 Units/hr at 05/10/18 0928    niCARdipine (CARDENE) 50 mg in 0.9% sodium chloride 100 mL infusion  5-15 mg/hr IntraVENous TITRATE Jim Menjivar MD   Stopped at 18 0830    amiodarone (CORDARONE) 375 mg/250 mL D5W infusion  0.5-1 mg/min IntraVENous CONTINUOUS Jim Menjivar MD 20 mL/hr at 05/10/18 0758 0.5 mg/min at 05/10/18 0758    bivalirudin (ANGIOMAX) 500 mg in dextrose 5% 500 mL infusion  4-20 mL/hr Other TITRATE Tiarra Balderas NP 15.1 mL/hr at 18 2144 15.1 mL/hr at 18    PHENYLephrine (PF)(SRUTHI-SYNEPHRINE) 30 mg in 0.9% sodium chloride 250 mL infusion   mcg/min IntraVENous TITRATE Jim Menjivar MD   Stopped at 18 2305       Allergies: No Known Allergies    ROS:    General ROS: positive for  - fatigue, states he feels he has been hit by a truck  Respiratory ROS: negative for - cough or shortness of breath  Cardiovascular ROS: sternal pain, no CONRAD, no edema   Gastrointestinal ROS: No flatus or BM  Neurological ROS: negative    Physical Exam:   Constitutional: AAO x 3, in chair position   HENT:   Head: Normocephalic and atraumatic. Eyes:PERRL. Neck: Neck supple. No JVD present. Cardiovascular: Normal rate, S1 normal and S2 normal.    Pulses: + pulses , PACs    Pulmonary/Chest: diminished BLL, no cough  Abdominal: Soft. Bowel sounds are normal. He exhibits no distension. Musculoskeletal: Normal range of motion. He exhibits no edema. Neurological: sedated   Skin: Skin is warm and dry. Surgical dressings intact.        Vitals:   Visit Vitals    /70    Pulse 80    Temp 98.9 °F (37.2 °C)    Resp 12    Ht 5' 6\" (1.676 m)    Wt 203 lb 7.8 oz (92.3 kg)    SpO2 97%    BMI 32.84 kg/m2         Temp (24hrs), Av.3 °F (37.4 °C), Min:98.9 °F (37.2 °C), Max:99.6 °F (37.6 °C)      Admission Weight: Last Weight   Weight: 188 lb 11.4 oz (85.6 kg) Weight: 203 lb 7.8 oz (92.3 kg)     Intake / Output / Drain:  Last 24 hrs.:     Intake/Output Summary (Last 24 hours) at 05/10/18 1101  Last data filed at 05/10/18 1000   Gross per 24 hour Intake          2744.53 ml   Output             3440 ml   Net          -695.47 ml       Oxygen Therapy:  Oxygen Therapy  O2 Sat (%): 97 % (05/10/18 1000)  Pulse via Oximetry: 70 beats per minute (05/10/18 1000)  O2 Device: Nasal cannula (05/10/18 0400)  O2 Flow Rate (L/min): 6 l/min (05/10/18 0400)  FIO2 (%): 50 % (05/09/18 1318)    Recent Labs:   Labs Latest Ref Rng & Units 5/10/2018 5/9/2018 5/9/2018 5/9/2018 5/8/2018 5/8/2018 5/8/2018   WBC 4.1 - 11.1 K/uL 7.6 - - 6.9 7.9 5.4 -   RBC 4.10 - 5.70 M/uL 2.51(L) - - 2.19(L) 2.50(L) 2.72(L) -   Hemoglobin 12.1 - 17.0 g/dL 7. 2(L) 6. 8(L) 7. 1(L) 6. 3(L) 7. 2(L) 7. 8(L) -   Hematocrit 36.6 - 50.3 % 21. 9(L) 20. 9(L) 21. 6(L) 19. 9(L) 22. 8(L) 24. 8(L) -   MCV 80.0 - 99.0 FL 87.3 - - 90.9 91.2 91.2 -   Platelets 433 - 213 K/uL 90(L) - - 116(L) 125(L) 101(L) 81(L)   Lymphocytes 12 - 49 % - - - - 4(L) - -   Monocytes 5 - 13 % - - - - 1(L) - -   Eosinophils 0 - 7 % - - - - 0 - -   Basophils 0 - 1 % - - - - 0 - -   Bands 0 - 6 % - - - - 4 - -   Albumin 3.5 - 5.0 g/dL 3. 0(L) - - 3. 2(L) 3.0(L) 3.5 -   Calcium 8.5 - 10.1 MG/DL 8. 1(L) - - 7. 9(L) 7. 8(L) 8.2(L) -   SGOT 15 - 37 U/L 54(H) - - 62(H) 60(H) 50(H) -   Glucose 65 - 100 mg/dL 113(H) - - 117(H) 141(H) 134(H) -   BUN 6 - 20 MG/DL 16 - - 17 16 15 -   Creatinine 0.70 - 1.30 MG/DL 1.22 - - 1.35(H) 1.36(H) 1.29 -   Sodium 136 - 145 mmol/L 138 - - 140 143 142 -   Potassium 3.5 - 5.1 mmol/L 4.1 - - 4.3 4.2 4.1 -   TSH 0.36 - 3.74 uIU/mL - - - - - - -   LDH 85 - 241 U/L 522(H) - - 478(H) - - -   Some recent data might be hidden     EKG:   EKG Results     Procedure 720 Value Units Date/Time    EKG, 12 LEAD, INITIAL [[id number]] Collected:  05/09/18 0345    Order Status:  Completed Updated:  05/09/18 1126     Ventricular Rate 69 BPM      Atrial Rate 69 BPM      P-R Interval 146 ms      QRS Duration 106 ms      Q-T Interval 456 ms      QTC Calculation (Bezet) 488 ms      Calculated P Axis 27 degrees      Calculated R Axis 38 degrees Calculated T Axis 154 degrees      Diagnosis --     Normal sinus rhythm  Incomplete left bundle branch block  ST & T wave abnormality, consider anterolateral ischemia  Prolonged QT  When compared with ECG of 04-MAY-2018 09:15,  ST now depressed in Anterior leads  T wave inversion more evident in Anterior leads  Confirmed by Anneliese Pratt MD, Jenna Pimentelahmet ([id number]) on 5/9/2018 11:26:09 AM      [address] [place] [address] [[id number]] Collected:  05/09/18 0422    Order Status:  Completed Updated:  05/09/18 0454    SCANNED CARDIAC RHYTHM STRIP [[id number]] Collected:  05/07/18 0632    Order Status:  Completed Updated:  05/07/18 0640    SCANNED CARDIAC RHYTHM STRIP [[id number]] Collected:  05/07/18 0602    Order Status:  Completed Updated:  05/07/18 0604    SCANNED CARDIAC RHYTHM STRIP [[id number]] Collected:  05/05/18 0629    Order Status:  Completed Updated:  05/05/18 0631    EKG, 12 LEAD, INITIAL [[id number]]     Order Status:  Completed         Echocardiogram:   5/3/18:  Left ventricle: Apical akinesis, Lateral hypokinesis, Normal septal motion  The ventricle was moderately to severely dilated. Systolic function was  normal. Ejection fraction was estimated in the range of 30 % to 35 %. There was severe diffuse hypokinesis with regional variations. Left atrium: The atrium was moderately to severely dilated. Mitral valve: There was mild to moderate regurgitation. Tricuspid valve: There was moderate regurgitation.     Cardiac Catheterizations:   5/4: Cardiac catheterization 5/4/18:  L Main: >50% osteal      LAD: 40-50% proximal, 70% after first diagonal, 80-90% mid lesion      LCflex: Diffuse mild disease, OM1 small vessel difffuse 70-90% disease.      RCA: small dominant vessel, 50% proximal lesion, 70-90% tandem lesions proximal-md lesion, 80% distal lesion      LVEDP: 30 mHg      LVEF: 15% diffuse hypokinesis      No significant gradient across aortic valve.      PCI: None    Radiology (CXR, CT scans):   CXR Results (Last 48 hours)               05/10/18 0527  XR CHEST PORT Final result    Impression:  IMPRESSION: Decreased interstitial edema. Narrative:  EXAM:  XR CHEST PORT. INDICATION: Postop heart. COMPARISON: 5/9/2018. FINDINGS:    A portable AP radiograph of the chest was obtained at 0449 hours. There are   sternal sutures. The right subclavian Impella device is unchanged in position. Lines and tubes: The patient is on a cardiac monitor. The right jugular   [place]-Ervin catheter and bilateral chest tubes are unchanged. Lungs: The vascular congestion and interstitial edema throughout the lungs has   decreased slightly. Pleura: There is no pneumothorax or pleural effusion. Mediastinum: The cardiac silhouette is enlarged. Bones and soft tissues: The bones and soft tissues are grossly within normal   limits. 05/09/18 1452  XR CHEST PORT Final result    Impression:  IMPRESSION:    1. Patient has been extubated. There is suspicion of tiny pneumothoraces   bilaterally. Results called by the technologist.   2. There is persistent left lower lobe atelectasis/effusion. There is increasing   right basilar atelectasis. Narrative:  EXAM:  XR CHEST PORT       INDICATION:  postop heart       COMPARISON:  5/9/2018       FINDINGS: A portable AP radiograph of the chest was obtained at 1439 hours. Depth of inspiration is shallower than earlier study. Patient has been   extubated. [place]vin catheter tip, left ventricular assist device, bilateral   chest tubes are unchanged. .  There is suggestion of a tiny apical pneumothorax   bilaterally. .  There is persistent left lower lobe atelectasis. There is slight   increasing right basilar atelectasis. .  Cardiomegaly is stable. Alejandra Moreland 05/09/18 0618  XR CHEST PORT Final result    Impression:  IMPRESSION: Interval increase in left retrocardiac opacity likely affecting   atelectasis with pulmonary vascular congestion.  Numerous tubes and lines in   stable and expected positions as above. Narrative:  EXAM:  XR CHEST PORT       INDICATION:  postop heart       COMPARISON:  Chest x-ray 5/8/2018. FINDINGS: A portable AP radiograph of the chest was obtained at 04:37 hours. The   patient is on a cardiac monitor. Endotracheal tube projects in stable position   over the tracheal lucency with the tip approximately 7 cm above the jake. Enteric tube traverses expected course to below the diaphragm into the left   upper quadrant. Right [place]-Ervin catheter is in position through a right internal   jugular vein introducer catheter with the tip projecting at expected location of   the main pulmonary artery. Right subclavian intra-aortic balloon pump appears in   stable position. Left and right pleural and mediastinal drains remain in stable   position. There is no pneumothorax. There is been interval increase in   retrocardiac left airspace opacity. There is pulmonary vascular congestion with   otherwise clear lungs. There are median sternotomy wires and surgical clips   compatible with prior CABG. The cardiac silhouette remains enlarged. Metastatic   contours are otherwise unremarkable. Chest wall structures and visualized upper   abdomen appear stable with no acute interval change. 05/08/18 1958  XR CHEST PORT Final result    Impression:  IMPRESSION: Interval median sternotomy with findings as above. Narrative:  EXAM:  XR CHEST PORT       INDICATION:  Post CABG       COMPARISON:  0406 hours       FINDINGS: A portable AP radiograph of the chest was obtained at 1953 hours. Median sternotomy wires are demonstrated in the interval. An endotracheal tube   is again shown terminating in the thoracic inlet. A transesophageal tube placed   in the interval extending to the stomach. A right IJ line is again extending to   the main pulmonary artery. There are interval bilateral chest tubes.  There is a   transsubclavian intra-aortic balloon pump again shown. There is mild   interstitial pulmonary edema again noted. There is no pneumothorax or effusion. Moderate cardiac contour enlargement is unchanged. There may be a pericardial   air present in the interval along the cardiac base and apex of this might be   artifactual in nature. A follow-up chest x-ray should be able to further assess   this. Mediastinal and hilar contours are stable. The bones and soft tissues are   grossly within normal limits.                      Javon Barahona NP  [address], [address] [place] [address]  Office [phone]  Fax [phone]  24 hour VAD/HF Pager: [phone]

## 2018-05-10 NOTE — ANESTHESIA PREPROCEDURE EVALUATION
Anesthetic History   No history of anesthetic complications            Review of Systems / Medical History  Patient summary reviewed, nursing notes reviewed and pertinent labs reviewed    Pulmonary          Smoker         Neuro/Psych   Within defined limits           Cardiovascular    Hypertension      CHF    Past MI and CAD      Comments: EF 15-20% s/p preop impella for support for CABG    GI/Hepatic/Renal  Within defined limits              Endo/Other  Within defined limits           Other Findings   Comments: H/o cocaine abuse           Physical Exam    Airway  Mallampati: II  TM Distance: 4 - 6 cm  Neck ROM: normal range of motion   Mouth opening: Normal     Cardiovascular    Rhythm: regular  Rate: normal         Dental    Dentition: Full upper dentures and Poor dentition  Comments: Multiple loose lower teeth.  Pt understand the risk of dislodging his lower teeth and agrees to proceed   Pulmonary  Breath sounds clear to auscultation               Abdominal  GI exam deferred       Other Findings            Anesthetic Plan    ASA: 4  Anesthesia type: MAC    Monitoring Plan: Arterial line, [place]-Ervin, GHASSAN and CVP      Induction: Intravenous  Anesthetic plan and risks discussed with: Patient

## 2018-05-10 NOTE — DIABETES MGMT
[facility] Cardiac Surgery Progress Note     Recommendations/ Comments:Recommendations/ Comments:  Pt arrived to CVICU at 1928 on 5-8-18. Consider continuing insulin gtt for at least 48hrs post-op and eating 50% solid foods then,  1) transition off gtt per [employer] Protocol   2) continue accu-checks and humalog correctional insulin ac & hs   3) ADA/AHA diet as diet advanced    Insulin gtt should not be stopped until after 1928 on 5-10-18 to complete 48hr post-op time frame. Currently on insulin gtt. Current drip rate is 2.2 units per hour and blood sugar at 1311 was 132 mg/dl. Chart reviewed on [employer]. Patient is 61 y.o. male s/p Cardiac surgery  POD 2, POD 0 impella removal, see below. No history of diabetes. 5/7/18 LVAD placement  5/8/18 CABG  5/10/18 Impella removal     A1c:   Lab Results   Component Value Date/Time    Hemoglobin A1c 4.8 05/03/2018 01:06 PM         Recent Glucose Results:   Lab Results   Component Value Date/Time     (H) 05/10/2018 03:55 AM    GLUCPOC 132 (H) 05/10/2018 01:11 PM    GLUCPOC 95 05/10/2018 12:02 PM    GLUCPOC 80 05/10/2018 11:42 AM        Lab Results   Component Value Date/Time    Creatinine 1.22 05/10/2018 03:55 AM     Estimated Creatinine Clearance: 70.2 mL/min (based on Cr of 1.22). Active Orders   Diet    DIET NPO        PO intake: No data found. Will continue to follow as needed. Thank you.   Cecilia Reyna RD

## 2018-05-10 NOTE — PROCEDURES
The patient was sedated and anesthesia in attendance   The right chest prepped and draped   1% lidocaine infiltrated into the wound  Skin staples removed  The device was withdrawn into the graft and removed  Good right radial pulse documented  A vascular staple device was deployed and the graft remnant returned to the wound  The wound was irrigated with antibiotics and closed       CPT code 85527

## 2018-05-10 NOTE — PROGRESS NOTES
Problem: Mobility Impaired (Adult and Pediatric)  Goal: *Acute Goals and Plan of Care (Insert Text)  Physical Therapy Goals  Initiated 5/10/2018  1. Patient will move from supine to sit and sit to supine, scoot up and down and roll side to side in bed with minimal assistance/contact guard assist within 5 days. 2.  Patient will perform sit to/from stand with minimal assistance/contact guard assist within 5 days. 3.  Patient will ambulate 25 feet with least restrictive assistive device and minimal assistance/contact guard assist within 5 days. 4.  Stair goal to be added when appropriate. 5.  Patient will perform cardiac exercises per protocol with supervision/set-up within 5 days. 6.  Patient will verbally and functionally recall 3/3 sternal precautions within 5 days. physical Therapy EVALUATION  Patient: Nhea Galeano (81 y.o. male)  Date: 5/10/2018  Primary Diagnosis: CAD  CAD (coronary artery disease)  cardiac insufficiency  CORONARY ARTERY DISEASE  unknown  Procedure(s) (LRB):  IMPELLA REMOVAL/ CVICU (N/A) Day of Surgery   Precautions:  Fall, Sternal    ASSESSMENT :  Based on the objective data described below, the patient presents with overall minimal to moderate assist x 2 for all functional tasks this date following Impella (5/7/18) and CABG x 3 (5/8/18). Patient's functional mobility is currently limited by the following: newly enforced sternal precautions, post-op sternotomy pain, impaired cardiopulmonary tolerance, both sitting and standing balance deficits, generally decreased functional strength, and cognitive deficits (complex thinking, delayed initiation).  Reviewed 5-lb weight lifting restrictions, use of bear for \"splinting\" while coughing/sneezing, continued use of incentive spirometer 10 x per hour, role of acute PT and typical mobility progression, signs/symptoms of DVTs/pneumonia/sternal wound dehiscence, and importance of frequent mobilization outside of therapy sessions with nursing staff. At this time, anticipate potential rehab needs. Patient will benefit from skilled intervention to address the above impairments. Patients rehabilitation potential is considered to be Fair  Factors which may influence rehabilitation potential include:   []         None noted  []         Mental ability/status  []         Medical condition  []         Home/family situation and support systems  []         Safety awareness  []         Pain tolerance/management  [x]         Other: Cocaine drug user      PLAN :  Recommendations and Planned Interventions:  [x]           Bed Mobility Training             []    Neuromuscular Re-Education  [x]           Transfer Training                   []    Orthotic/Prosthetic Training  [x]           Gait Training                         []    Modalities  [x]           Therapeutic Exercises           [x]    Edema Management/Control  [x]           Therapeutic Activities            [x]    Patient and Family Training/Education  []           Other (comment):    Frequency/Duration: Patient will be followed by physical therapy  daily to address goals. Discharge Recommendations: Rehab, TBD  Further Equipment Recommendations for Discharge: TBD     SUBJECTIVE:   Patient stated You look like [facility].     OBJECTIVE DATA SUMMARY:   HISTORY:    History reviewed. No pertinent past medical history. Past Surgical History:   Procedure Laterality Date    HX ORTHOPAEDIC      left hip surgery     Prior Level of Function/Home Situation: Functionally independent. Lives with wife.    Personal factors and/or comorbidities impacting plan of care: drug abuser    Home Situation  Home Environment: Private residence  # Steps to Enter: 5  Rails to Enter: Yes  Hand Rails : Bilateral  One/Two Story Residence: One story  Living Alone: No  Support Systems: Family member(s), Child(vanna)  Patient Expects to be Discharged to[de-identified] Private residence  Current DME Used/Available at Home: Blood pressure cuff  Tub or Shower Type: Shower    EXAMINATION/PRESENTATION/DECISION MAKING:   Critical Behavior:  Neurologic State: Drowsy  Orientation Level: Oriented X4  Cognition: Follows commands, Appropriate for age attention/concentration, Impaired decision making, Poor safety awareness  Safety/Judgement: Awareness of environment, Fall prevention  Hearing: Auditory  Auditory Impairment: None  Skin:  Impella dressing (right axillary) oozing  Edema: Trace noted in LEs  Range Of Motion:  AROM: Generally decreased, functional  Strength:    Strength: Generally decreased, functional  Tone & Sensation:   Tone: Normal  Sensation: Intact  Coordination:  Coordination: Within functional limits  Vision:   Tracking: Able to track stimulus in all quadrants w/o difficulty  Acuity: Within Defined Limits  Functional Mobility:  Bed Mobility:  Supine to Sit:  (Mechanics of bed utilized)  Scooting: Assist x2;Minimum assistance (For anteriorly scooting in seated position)  Transfers:  Sit to Stand: Assist x2;Minimum assistance  Stand to Sit: Assist x2;Minimum assistance  Bed to Chair: Assist x2;Minimum assistance; Additional time  Balance:   Sitting: Impaired; Without support  Sitting - Static: Fair (occasional)  Sitting - Dynamic: Fair (occasional)  Standing: Impaired; With support  Standing - Static: Fair  Standing - Dynamic : Fair    Functional Measure:  Barthel Index:    Bathin  Bladder: 10  Bowels: 10  Groomin  Dressin  Feedin  Mobility: 5  Stairs: 0  Toilet Use: 0  Transfer (Bed to Chair and Back): 5  Total: 35       Barthel and G-code impairment scale:  Percentage of impairment CH  0% CI  1-19% CJ  20-39% CK  40-59% CL  60-79% CM  80-99% CN  100%   Barthel Score 0-100 100 99-80 79-60 59-40 20-39 1-19   0   Barthel Score 0-20 20 17-19 13-16 9-12 5-8 1-4 0      The Barthel ADL Index: Guidelines  1. The index should be used as a record of what a patient does, not as a record of what a patient could do.   2. The main aim is to establish degree of independence from any help, physical or verbal, however minor and for whatever reason. 3. The need for supervision renders the patient not independent. 4. A patient's performance should be established using the best available evidence. Asking the patient, friends/relatives and nurses are the usual sources, but direct observation and common sense are also important. However direct testing is not needed. 5. Usually the patient's performance over the preceding 24-48 hours is important, but occasionally longer periods will be relevant. 6. Middle categories imply that the patient supplies over 50 per cent of the effort. 7. Use of aids to be independent is allowed. Julieth Quezada., Barthel, DSaigeW. (1390). Functional evaluation: the Barthel Index. [facility] (14)2. Trudy Esposito cortney EUNICE Avelar, Angeli Garcia., Maxime Valdez, Maribel Beltrán, [address] [place] [address] (1999). Measuring the change indisability after inpatient rehabilitation; comparison of the responsiveness of the Barthel Index and Functional Startex Measure. Journal of Neurology, Neurosurgery, and Psychiatry, 66(4), 332-733. Evy Ramirez, N.J.A, YOLI Coughlin, & Hiwot Coles, MGEOVANNY. (2004.) Assessment of post-stroke quality of life in cost-effectiveness studies: The usefulness of the Barthel Index and the EuroQoL-5D. Quality of Life Research, 13, 013-04     G codes: In compliance with CMSs Claims Based Outcome Reporting, the following G-code set was chosen for this patient based on their primary functional limitation being treated: The outcome measure chosen to determine the severity of the functional limitation was the Barthel with a score of 35/100 which was correlated with the impairment scale.     ? Mobility - Walking and Moving Around:     - CURRENT STATUS: CL - 60%-79% impaired, limited or restricted    - GOAL STATUS: CK - 40%-59% impaired, limited or restricted    - D/C STATUS:  ---------------To be determined--------------- Physical Therapy Evaluation Charge Determination   History Examination Presentation Decision-Making   MEDIUM  Complexity : 1-2 comorbidities / personal factors will impact the outcome/ POC  HIGH Complexity : 4+ Standardized tests and measures addressing body structure, function, activity limitation and / or participation in recreation  LOW Complexity : Stable, uncomplicated  Other outcome measures Barthel  MEDIUM      Based on the above components, the patient evaluation is determined to be of the following complexity level: LOW     Pain:  Pain Scale 1: Numeric (0 - 10)  Pain Intensity 1: 5  Pain Location 1: Chest  Pain Orientation 1: Left  Pain Description 1: Sharp  Pain Intervention(s) 1: Medication (see MAR)     Activity Tolerance:  Please refer to the flowsheet for vital signs taken during this treatment. After treatment:   [x]         Patient left in no apparent distress sitting up in chair  []         Patient left in no apparent distress in bed  [x]         Call bell left within reach  [x]         Nursing notified  []         Caregiver present  []         Bed alarm activated    COMMUNICATION/EDUCATION:   The patients plan of care was discussed with: Occupational Therapist, Registered Nurse and Rehabilitation Attendant. [x]         Fall prevention education was provided and the patient/caregiver indicated understanding. [x]         Patient/family have participated as able in goal setting and plan of care. [x]         Patient/family agree to work toward stated goals and plan of care. []         Patient understands intent and goals of therapy, but is neutral about his/her participation. []         Patient is unable to participate in goal setting and plan of care.     Thank you for this referral.  Ida Rooney PT, DPT   Time Calculation: 25 mins

## 2018-05-10 NOTE — PROGRESS NOTES
Critical Care Note     ACTIVE MEDICAL PROBLEMS    Acute systolic CHF  Severe ischemic cardiomyopathy   Acute respiratory failure  encephalopathy likely secondary to hx of drug us  SP impella placement   S/P CABG    IMPRESSION     Good hemodynamics overnight with progressive improvement on low dose dobutamine  Impella level reduced to P 6 with good hemodynamics       Patient Vitals for the past 4 hrs:   Temp Pulse Resp SpO2   05/10/18 1300 - 80 18 96 %   05/10/18 1200 99.3 °F (37.4 °C) 80 23 94 %   05/10/18 1100 - 80 23 95 %         Intake/Output Summary (Last 24 hours) at 05/10/18 1425  Last data filed at 05/10/18 1400   Gross per 24 hour   Intake          2009.24 ml   Output             3020 ml   Net         -1010.76 ml     Hemodynamics:   CO: CO (l/min): 6.3 l/min   CI: CI (l/min/m2): 3.9 l/min/m2   CVP: CVP (mmHg): 7 mmHg (05/10/18 1300)   SVR: SVR (dyne*sec)/cm5: 873 (dyne*sec)/cm5 (05/10/18 8377)   PAP Systolic: PAP Systolic: 41 (57/34/69 9601)   PAP Diastolic: PAP Diastolic: 16 (43/93/52 4607)   PVR:     SV02: SVO2 (%): 58 % (05/10/18 1300)   SCV02:      Gen alert comfortable no complaints  Chest clear  Cardiac RRR      Chest xrays reviewed     HCT   Date/Time Value Ref Range Status   05/10/2018 03:55 AM 21.9 (L) 36.6 - 50.3 % Final     WBC   Date/Time Value Ref Range Status   05/10/2018 03:55 AM 7.6 4.1 - 11.1 K/uL Final     Comment:     Due to mathematical rounding between the [address], and the new [employer] analyzers, the reported automated differential may vary by up to +/- 0.5% per cell line. This finding may produce a result that is 100% +/- 3%, which is clinically insignificant.          Labs reviewed for last 24 hours         Plan   Plan to remove Impella device today  Discussed with pt re plan for sedation and local removal        Critical care time 30 minutes    CPT code 51872

## 2018-05-10 NOTE — PROGRESS NOTES
2000: Bedside shift change report given to [address] [place] [address] (oncoming nurse) by Hoang Stockton (offgoing nurse). Report included the following information SBAR, Kardex, ED Summary, OR Summary, Procedure Summary, Intake/Output, MAR, Recent Results and Cardiac Rhythm Paced. Pt family and Avila Morales here to visit and pray over pt.    2100: impella rep on the phone for pt update. 0500: pt bathed with chlorhexidine. 0800: Bedside shift change report given to  (oncoming nurse) by [address] (offgoing nurse). Report included the following information SBAR, Kardex, ED Summary, OR Summary, Procedure Summary, Intake/Output, MAR, Recent Results and Cardiac Rhythm Paced. Problem: Falls - Risk of  Goal: *Absence of Falls  Document César Fall Risk and appropriate interventions in the flowsheet. Outcome: Progressing Towards Goal  Fall Risk Interventions:  Mobility Interventions: Communicate number of staff needed for ambulation/transfer  Mentation Interventions: Adequate sleep, hydration, pain control, Evaluate medications/consider consulting pharmacy  Medication Interventions: Evaluate medications/consider consulting pharmacy  Elimination Interventions: Call light in reach, Toileting schedule/hourly rounds  History of Falls Interventions: Consult care management for discharge planning  Problem: Pressure Injury - Risk of  Goal: *Prevention of pressure injury  Document Celestine Scale and appropriate interventions in the flowsheet.    Outcome: Progressing Towards Goal  Pressure Injury Interventions:  Sensory Interventions: Assess changes in LOC, Pressure redistribution bed/mattress (bed type)  Moisture Interventions: Absorbent underpads  Activity Interventions: Pressure redistribution bed/mattress(bed type)  Mobility Interventions: Pressure redistribution bed/mattress (bed type), PT/OT evaluation  Nutrition Interventions: Document food/fluid/supplement intake, Discuss nutritional consult with provider, Offer support with meals,snacks and hydration  Friction and Shear Interventions: Lift sheet  Problem: Unstable Angina/NSTEMI: Discharge Outcomes  Goal: *Hemodynamically stable  Outcome: Progressing Towards Goal  Weaning pressors  Goal: *Lungs clear or at baseline  Outcome: Progressing Towards Goal  Bases diminished  Problem: CABG: Post-Op Day 1  Goal: Respiratory  Outcome: Progressing Towards Goal  NC 6L  Goal: *Hemodynamically stable without vasoactive medications  Outcome: Progressing Towards Goal  Pressors weaned  Problem: Infection - Risk of, Urinary Catheter-Associated Urinary Tract Infection  Goal: *Absence of infection signs and symptoms  Outcome: Progressing Towards Goal  Chlorhexidine wipes

## 2018-05-10 NOTE — PROGRESS NOTES
0800: Care of patient taken over. Lines assessed, Impella reviewed.  at bedside, per MD Impella decreased to P6, plan to explant this evening. 0945Liana Ahr NP at bedside, per NP patient to be NPO and going to OR for explant of impella this evening. Per NP no further weaning of impella needed. Dobutamine gtt to stay at 3.     1030: Weaning Impella P level to P4 in preparation for removal this afternoon. All hemodynamic parameters remaining WDL. 1100: OT at bedside working with patient, wife at bedside. 1150: PT at bedside to stand patient and attempt getting up to the chair. Hemodynamics and oxygenation WDL. Standing with min 2 assist.     1200: Impella decreased to P2. MAP, PA tolerating CI 3.1, SVO2 down to 52%. 1245: SVO2 remains 51%, CVP up from 7 to 12, CI down to 2.5, Dobutamine increased to 5. Impella remains on P2.     1300: Patient 2 assist back to bed. Did well.     1330: Iliana Ramsay NP at bedside, updated on Impella weaned to P2, Dobutamine increased to 5, and urine output down last hour to 10cc. Patient with CVP of 4 now, no diuretics ordered. Patient scheduled to go to OR for removal of impella. 1400: OR staff at bedside, to begin setting up for bedside OR procedure. Care of patient turned over to [facility]. 1450:  at bedside, time out performed; procedure started. 1505: Procedure completed, dressing placed by OR staff, Patient awake, Hemodynamics stable, Care of patient resumed. Patient cleaned, gown changed, medicated for pain post turn. 1600: Patient re-assessed, pain decreased, Cardiac diet ordered patient tolerating, Sister at bedside. 1730: Patient assisted up to chair. Patient asking to be bathed, full bath performed, Urinary catheter removed, patient up to chair and linens changed. Tolerated ambulation. 1900: Amio gtt stopped per protocol, patient continues on PO Amio. Patient assisted back to bed. Daughter remains at bedside. 2000: Bedside and Verbal shift change report given to Triny Griggs (oncoming nurse) by Chinyere Oneill RN (offgoing nurse). Report included the following information SBAR, Kardex, Procedure Summary, Intake/Output, MAR, Accordion, Recent Results, Med Rec Status, Cardiac Rhythm A-Paced 80 and Alarm Parameters .

## 2018-05-11 ENCOUNTER — APPOINTMENT (OUTPATIENT)
Dept: GENERAL RADIOLOGY | Age: 58
DRG: 215 | End: 2018-05-11
Attending: NURSE PRACTITIONER
Payer: COMMERCIAL

## 2018-05-11 ENCOUNTER — HOME HEALTH ADMISSION (OUTPATIENT)
Dept: HOME HEALTH SERVICES | Facility: HOME HEALTH | Age: 58
End: 2018-05-11
Payer: COMMERCIAL

## 2018-05-11 LAB
ADMINISTERED INITIALS, ADMINIT: NORMAL
ALBUMIN SERPL-MCNC: 2.6 G/DL (ref 3.5–5)
ALBUMIN/GLOB SERPL: 0.8 {RATIO} (ref 1.1–2.2)
ALP SERPL-CCNC: 44 U/L (ref 45–117)
ALT SERPL-CCNC: 11 U/L (ref 12–78)
ANION GAP SERPL CALC-SCNC: 6 MMOL/L (ref 5–15)
ARTERIAL PATENCY WRIST A: ABNORMAL
AST SERPL-CCNC: 30 U/L (ref 15–37)
BASE EXCESS BLDV CALC-SCNC: 5 MMOL/L
BDY SITE: ABNORMAL
BILIRUB SERPL-MCNC: 0.9 MG/DL (ref 0.2–1)
BUN SERPL-MCNC: 16 MG/DL (ref 6–20)
BUN/CREAT SERPL: 19 (ref 12–20)
CALCIUM SERPL-MCNC: 8.2 MG/DL (ref 8.5–10.1)
CHLORIDE SERPL-SCNC: 103 MMOL/L (ref 97–108)
CO2 SERPL-SCNC: 28 MMOL/L (ref 21–32)
CREAT SERPL-MCNC: 0.85 MG/DL (ref 0.7–1.3)
D50 ADMINISTERED, D50ADM: 0 ML
D50 ORDER, D50ORD: 0 ML
D50 ORDER, D50ORD: 12 ML
ERYTHROCYTE [DISTWIDTH] IN BLOOD BY AUTOMATED COUNT: 14.8 % (ref 11.5–14.5)
GAS FLOW.O2 O2 DELIVERY SYS: ABNORMAL L/MIN
GAS FLOW.O2 SETTING OXYMISER: 6 L/M
GLOBULIN SER CALC-MCNC: 3.2 G/DL (ref 2–4)
GLSCOM COMMENTS: NORMAL
GLUCOSE BLD STRIP.AUTO-MCNC: 107 MG/DL (ref 65–100)
GLUCOSE BLD STRIP.AUTO-MCNC: 111 MG/DL (ref 65–100)
GLUCOSE BLD STRIP.AUTO-MCNC: 127 MG/DL (ref 65–100)
GLUCOSE BLD STRIP.AUTO-MCNC: 132 MG/DL (ref 65–100)
GLUCOSE BLD STRIP.AUTO-MCNC: 70 MG/DL (ref 65–100)
GLUCOSE BLD STRIP.AUTO-MCNC: 88 MG/DL (ref 65–100)
GLUCOSE BLD STRIP.AUTO-MCNC: 89 MG/DL (ref 65–100)
GLUCOSE BLD STRIP.AUTO-MCNC: 91 MG/DL (ref 65–100)
GLUCOSE BLD STRIP.AUTO-MCNC: 91 MG/DL (ref 65–100)
GLUCOSE BLD STRIP.AUTO-MCNC: 93 MG/DL (ref 65–100)
GLUCOSE BLD STRIP.AUTO-MCNC: 99 MG/DL (ref 65–100)
GLUCOSE BLD STRIP.AUTO-MCNC: 99 MG/DL (ref 65–100)
GLUCOSE SERPL-MCNC: 92 MG/DL (ref 65–100)
GLUCOSE, GLC: 107 MG/DL
GLUCOSE, GLC: 70 MG/DL
GLUCOSE, GLC: 88 MG/DL
GLUCOSE, GLC: 89 MG/DL
GLUCOSE, GLC: 91 MG/DL
GLUCOSE, GLC: 91 MG/DL
GLUCOSE, GLC: 93 MG/DL
GLUCOSE, GLC: 99 MG/DL
GLUCOSE, GLC: 99 MG/DL
HCO3 BLDV-SCNC: 29 MMOL/L (ref 23–28)
HCT VFR BLD AUTO: 22.1 % (ref 36.6–50.3)
HGB BLD-MCNC: 7.1 G/DL (ref 12.1–17)
HIGH TARGET, HITG: 130 MG/DL
HIGH TARGET, HITG: 140 MG/DL
INSULIN ADMINSTERED, INSADM: 0 UNITS/HOUR
INSULIN ORDER, INSORD: 0 UNITS/HOUR
INSULIN ORDER, INSORD: 0.3 UNITS/HOUR
LOW TARGET, LOT: 100 MG/DL
LOW TARGET, LOT: 95 MG/DL
MAGNESIUM SERPL-MCNC: 2.2 MG/DL (ref 1.6–2.4)
MCH RBC QN AUTO: 28.3 PG (ref 26–34)
MCHC RBC AUTO-ENTMCNC: 32.1 G/DL (ref 30–36.5)
MCV RBC AUTO: 88 FL (ref 80–99)
MINUTES UNTIL NEXT BG, NBG: 15 MIN
MINUTES UNTIL NEXT BG, NBG: 60 MIN
MULTIPLIER, MUL: 0
MULTIPLIER, MUL: 0.01
MULTIPLIER, MUL: 0.02
NRBC # BLD: 0 K/UL (ref 0–0.01)
NRBC BLD-RTO: 0 PER 100 WBC
ORDER INITIALS, ORDINIT: NORMAL
PCO2 BLDV: 43.5 MMHG (ref 41–51)
PH BLDV: 7.43 [PH] (ref 7.32–7.42)
PLATELET # BLD AUTO: 102 K/UL (ref 150–400)
PMV BLD AUTO: 11.1 FL (ref 8.9–12.9)
PO2 BLDV: 26 MMHG (ref 25–40)
POTASSIUM SERPL-SCNC: 4.3 MMOL/L (ref 3.5–5.1)
PROT SERPL-MCNC: 5.8 G/DL (ref 6.4–8.2)
RBC # BLD AUTO: 2.51 M/UL (ref 4.1–5.7)
SAO2 % BLDV: 50 % (ref 65–88)
SERVICE CMNT-IMP: ABNORMAL
SERVICE CMNT-IMP: NORMAL
SODIUM SERPL-SCNC: 137 MMOL/L (ref 136–145)
SPECIMEN TYPE: ABNORMAL
TOTAL RESP. RATE, ITRR: 18
WBC # BLD AUTO: 7.9 K/UL (ref 4.1–11.1)

## 2018-05-11 PROCEDURE — 76937 US GUIDE VASCULAR ACCESS: CPT

## 2018-05-11 PROCEDURE — 36415 COLL VENOUS BLD VENIPUNCTURE: CPT | Performed by: NURSE PRACTITIONER

## 2018-05-11 PROCEDURE — 71045 X-RAY EXAM CHEST 1 VIEW: CPT

## 2018-05-11 PROCEDURE — 77030020847 HC STATLOK BARD -A

## 2018-05-11 PROCEDURE — 74011250637 HC RX REV CODE- 250/637: Performed by: NURSE PRACTITIONER

## 2018-05-11 PROCEDURE — 36569 INSJ PICC 5 YR+ W/O IMAGING: CPT | Performed by: NURSE PRACTITIONER

## 2018-05-11 PROCEDURE — 83735 ASSAY OF MAGNESIUM: CPT | Performed by: NURSE PRACTITIONER

## 2018-05-11 PROCEDURE — 02HV33Z INSERTION OF INFUSION DEVICE INTO SUPERIOR VENA CAVA, PERCUTANEOUS APPROACH: ICD-10-PCS | Performed by: THORACIC SURGERY (CARDIOTHORACIC VASCULAR SURGERY)

## 2018-05-11 PROCEDURE — 65610000003 HC RM ICU SURGICAL

## 2018-05-11 PROCEDURE — 77030018719 HC DRSG PTCH ANTIMIC J&J -A

## 2018-05-11 PROCEDURE — C1751 CATH, INF, PER/CENT/MIDLINE: HCPCS

## 2018-05-11 PROCEDURE — 74011250636 HC RX REV CODE- 250/636: Performed by: NURSE PRACTITIONER

## 2018-05-11 PROCEDURE — 82962 GLUCOSE BLOOD TEST: CPT

## 2018-05-11 PROCEDURE — 80053 COMPREHEN METABOLIC PANEL: CPT | Performed by: NURSE PRACTITIONER

## 2018-05-11 PROCEDURE — 85027 COMPLETE CBC AUTOMATED: CPT | Performed by: NURSE PRACTITIONER

## 2018-05-11 PROCEDURE — 77030020365 HC SOL INJ SOD CL 0.9% 50ML

## 2018-05-11 PROCEDURE — 82803 BLOOD GASES ANY COMBINATION: CPT

## 2018-05-11 RX ORDER — HYDRALAZINE HYDROCHLORIDE 20 MG/ML
INJECTION INTRAMUSCULAR; INTRAVENOUS
Status: DISPENSED
Start: 2018-05-11 | End: 2018-05-11

## 2018-05-11 RX ORDER — AMIODARONE HYDROCHLORIDE 200 MG/1
200 TABLET ORAL EVERY 12 HOURS
Status: DISCONTINUED | OUTPATIENT
Start: 2018-05-11 | End: 2018-05-14 | Stop reason: HOSPADM

## 2018-05-11 RX ORDER — SODIUM CHLORIDE 0.9 % (FLUSH) 0.9 %
10 SYRINGE (ML) INJECTION EVERY 8 HOURS
Status: DISCONTINUED | OUTPATIENT
Start: 2018-05-11 | End: 2018-05-14 | Stop reason: HOSPADM

## 2018-05-11 RX ORDER — SODIUM CHLORIDE 0.9 % (FLUSH) 0.9 %
10 SYRINGE (ML) INJECTION EVERY 24 HOURS
Status: DISCONTINUED | OUTPATIENT
Start: 2018-05-11 | End: 2018-05-14 | Stop reason: HOSPADM

## 2018-05-11 RX ORDER — DOBUTAMINE HYDROCHLORIDE 200 MG/100ML
3 INJECTION INTRAVENOUS CONTINUOUS
Status: CANCELLED | OUTPATIENT
Start: 2018-05-11

## 2018-05-11 RX ORDER — DOBUTAMINE HYDROCHLORIDE 200 MG/100ML
2 INJECTION INTRAVENOUS CONTINUOUS
Status: DISCONTINUED | OUTPATIENT
Start: 2018-05-11 | End: 2018-05-11 | Stop reason: SDUPTHER

## 2018-05-11 RX ORDER — BACITRACIN 500 UNIT/G
1 PACKET (EA) TOPICAL AS NEEDED
Status: DISCONTINUED | OUTPATIENT
Start: 2018-05-11 | End: 2018-05-14 | Stop reason: HOSPADM

## 2018-05-11 RX ORDER — SODIUM CHLORIDE 0.9 % (FLUSH) 0.9 %
20 SYRINGE (ML) INJECTION AS NEEDED
Status: DISCONTINUED | OUTPATIENT
Start: 2018-05-11 | End: 2018-05-14 | Stop reason: HOSPADM

## 2018-05-11 RX ORDER — DOBUTAMINE HYDROCHLORIDE 200 MG/100ML
0-10 INJECTION INTRAVENOUS
Status: DISCONTINUED | OUTPATIENT
Start: 2018-05-11 | End: 2018-05-11

## 2018-05-11 RX ORDER — SODIUM CHLORIDE 0.9 % (FLUSH) 0.9 %
10 SYRINGE (ML) INJECTION AS NEEDED
Status: DISCONTINUED | OUTPATIENT
Start: 2018-05-11 | End: 2018-05-14 | Stop reason: HOSPADM

## 2018-05-11 RX ADMIN — DOBUTAMINE IN DEXTROSE 2 MCG/KG/MIN: 200 INJECTION, SOLUTION INTRAVENOUS at 13:23

## 2018-05-11 RX ADMIN — ATORVASTATIN CALCIUM 10 MG: 10 TABLET, FILM COATED ORAL at 22:13

## 2018-05-11 RX ADMIN — OXYCODONE HYDROCHLORIDE AND ACETAMINOPHEN 2 TABLET: 5; 325 TABLET ORAL at 08:27

## 2018-05-11 RX ADMIN — Medication 400 MG: at 17:39

## 2018-05-11 RX ADMIN — Medication 10 ML: at 20:11

## 2018-05-11 RX ADMIN — MUPIROCIN: 20 OINTMENT TOPICAL at 08:31

## 2018-05-11 RX ADMIN — Medication 10 ML: at 22:13

## 2018-05-11 RX ADMIN — Medication 10 ML: at 22:14

## 2018-05-11 RX ADMIN — OXYCODONE HYDROCHLORIDE AND ACETAMINOPHEN 2 TABLET: 5; 325 TABLET ORAL at 05:17

## 2018-05-11 RX ADMIN — Medication 10 ML: at 06:01

## 2018-05-11 RX ADMIN — ASPIRIN 81 MG 81 MG: 81 TABLET ORAL at 08:29

## 2018-05-11 RX ADMIN — FAMOTIDINE 20 MG: 20 TABLET ORAL at 08:29

## 2018-05-11 RX ADMIN — STANDARDIZED SENNA CONCENTRATE AND DOCUSATE SODIUM 1 TABLET: 8.6; 5 TABLET, FILM COATED ORAL at 17:39

## 2018-05-11 RX ADMIN — AMIODARONE HYDROCHLORIDE 200 MG: 200 TABLET ORAL at 22:13

## 2018-05-11 RX ADMIN — MUPIROCIN: 20 OINTMENT TOPICAL at 17:42

## 2018-05-11 RX ADMIN — POLYETHYLENE GLYCOL 3350 17 G: 17 POWDER, FOR SOLUTION ORAL at 09:00

## 2018-05-11 RX ADMIN — DOBUTAMINE IN DEXTROSE 2 MCG/KG/MIN: 200 INJECTION, SOLUTION INTRAVENOUS at 09:00

## 2018-05-11 RX ADMIN — CHLORHEXIDINE GLUCONATE 10 ML: 1.2 RINSE ORAL at 17:42

## 2018-05-11 RX ADMIN — Medication 400 MG: at 08:29

## 2018-05-11 RX ADMIN — FAMOTIDINE 20 MG: 20 TABLET ORAL at 22:13

## 2018-05-11 RX ADMIN — STANDARDIZED SENNA CONCENTRATE AND DOCUSATE SODIUM 1 TABLET: 8.6; 5 TABLET, FILM COATED ORAL at 08:29

## 2018-05-11 RX ADMIN — CHLORHEXIDINE GLUCONATE 10 ML: 1.2 RINSE ORAL at 08:31

## 2018-05-11 NOTE — PROGRESS NOTES
Problem: CABG: Post-Op Day 3  Goal: Activity/Safety  Outcome: Progressing Towards Goal  Impella out and received PICC line today and all central lines removed. Patient OOB  With all meals. Explaining the importance of increasing his activity  Goal: Nutrition/Diet  Outcome: Progressing Towards Goal  Patient is eating 50-75% of his diet  Goal: Treatments/Interventions/Procedures  Outcome: Progressing Towards Goal  Patient received a PICC line today and all central lines removed  Goal: Psychosocial  Outcome: Progressing Towards Goal  Alert and oriented, pleasant. Awaiting transfer to CVSU  Goal: *Hemodynamically stable  Outcome: Progressing Towards Goal  Paced rhythm secodary to bradycardia  Goal: *Optimal pain control at patient's stated goal  Outcome: Progressing Towards Goal  Minimal use of pain medication.  Usually denies pain  Goal: *Demonstrates progressive activity  Outcome: Progressing Towards Goal  OOB with every meal

## 2018-05-11 NOTE — PROGRESS NOTES
Reason for Admission:   MI, S/P CABG                   RRAT Score:         12            Plan for utilizing home health: [facility]                    Likelihood of Readmission:  low                         Transition of Care Plan: [facility]- wife will transport    Met with patient - independent prior to admit- lives with wife - works as a  - patient has Jh Khan- uses [address] for his meds- uses no DME - is agreeable to home care - choice obtained and referral made to [facility] and they will accept patient. Will follow. ANURAG Diaz    Care Management Interventions  PCP Verified by CM:  Yes  Transition of Care Consult (CM Consult): [address]: Yes  MyChart Signup: No  Discharge Durable Medical Equipment: No  Physical Therapy Consult: Yes  Occupational Therapy Consult: Yes  Speech Therapy Consult: No  Current Support Network: Lives with Spouse, Own Home  Confirm Follow Up Transport: Family  Plan discussed with Pt/Family/Caregiver: Yes  Freedom of Choice Offered: Yes  Discharge Location  Discharge Placement: Home with home health

## 2018-05-11 NOTE — PROCEDURES
[address] [place] [address]    Thiago Bradley  MR#: [id number]  : 1960  ACCOUNT #: [de-identified]   DATE OF SERVICE: 2018    INDICATION:  Preoperative evaluation. Bedside spirometry was performed. FINDINGS:  Reduced FVC, reduced FEV1, normal FEV1/FVC ratio. IMPRESSION:  Moderate restrictive ventilatory defect based on spirometry. Mid expiratory flow rates were reduced and small airways disease cannot be excluded. Technician noted this was best effort after several attempts. Clinical correlation is recommended.       MD Christian Gurrola / Chris Villar  D: 2018 12:13     T: 2018 13:39  JOB #: [id number]

## 2018-05-11 NOTE — PROGRESS NOTES
2000: Bedside shift change report given to Familia Griggs (oncoming nurse) by Chriss Riddle (offgoing nurse). Report included the following information SBAR, Kardex, ED Summary, Procedure Summary, Intake/Output, MAR, Recent Results and Cardiac Rhythm Paced. Family leaving for the night. Pt back to bed from sitting up in chair. 2 assist.    0400: pt bathed with chlorhexidine. 0600: Bedside shift change report given to [employer] (oncoming nurse) by [address] (offgoing nurse). Report included the following information SBAR, Kardex, ED Summary, OR Summary, Procedure Summary, Intake/Output, MAR, Recent Results and Cardiac Rhythm Paced.

## 2018-05-11 NOTE — ANESTHESIA POSTPROCEDURE EVALUATION
Post-Anesthesia Evaluation and Assessment    Patient: Malcom Saleh MRN: [id number]  SSN: [id number]    YOB: 1960  Age: 61 y.o. Sex: male       Cardiovascular Function/Vital Signs  Visit Vitals    /70    Pulse 80    Temp 36.5 °C (97.7 °F)    Resp 14    Ht 5' 6\" (1.676 m)    Wt 88.7 kg (195 lb 8.8 oz)    SpO2 97%    BMI 31.56 kg/m2       Patient is status post MAC anesthesia for Procedure(s):  IMPELLA REMOVAL/ CVICU. Nausea/Vomiting: None    Postoperative hydration reviewed and adequate. Pain:  Pain Scale 1: Numeric (0 - 10) (05/11/18 1018)  Pain Intensity 1: 0 (05/11/18 1018)   Managed    Neurological Status:   Neuro  Neurologic State: Alert; Appropriate for age (05/11/18 0800)  Orientation Level: Oriented X4 (05/11/18 0800)  Cognition: Appropriate for age attention/concentration (05/11/18 0800)  Speech: Clear (05/11/18 0800)  Assessment L Pupil: Brisk;Round (05/11/18 0800)  Size L Pupil (mm): 3 (05/11/18 0800)  Assessment R Pupil: Brisk;Round (05/11/18 0800)  Size R Pupil (mm): 3 (05/11/18 0800)  LUE Motor Response: Purposeful (05/11/18 0800)  LLE Motor Response: Purposeful (05/11/18 0800)  RUE Motor Response: Purposeful (05/11/18 0800)  RLE Motor Response: Purposeful (05/11/18 0800)   At baseline    Mental Status and Level of Consciousness: Arousable    Pulmonary Status:   O2 Device: Nasal cannula (05/11/18 0700)   Adequate oxygenation and airway patent    Complications related to anesthesia: None    Post-anesthesia assessment completed.  No concerns    Signed By: Rhona Emmanuel MD     May 11, 2018

## 2018-05-11 NOTE — PROGRESS NOTES
Physical Therapy  5.11.18    Chart reviewed in preparation for PT re-evaluation (Impella removed yesterday, 5/10/18). Attempted to see patient twice today. On first attempt, PA line being removed. On second attempt, patient up to [facility] with nursing assist x 2 and requesting time to utilize. Will follow up tomorrow. Ramone Jacobson, PT, DPT    Recommendation for Nursing: Patient to complete as able in order to maintain strength, endurance, and functional independence  1. OOB to chair 3x daily, preferably for meals, with A x 2, gait belt, and reinforcement of sternal precautions  2. UE/cardiac exercises (page 4-5 in booklet), exercises 1-3, x 5 repetitions of each as tolerated  3. Review of sternal precautions (3 P's) with family/nursing staff and \"chest splinting\" while coughing/sneezing/vomiting    Thank you for your assistance.

## 2018-05-11 NOTE — PROGRESS NOTES
Occupational Therapy Note 1049 -   5.11.2018    Chart reviewed in prep for OT session, bedside RN reporting patient just returned to bed, requesting OT defer treatment at this time. Will f/u this PM as able and appropriate. Thank you. Recommend with nursing patient to complete as able in order to maintain strength, endurance and independence: ADLs with supervision/setup, OOB to chair 3x/day and mobilizing to the [facility] for toileting with 2 assist. Thank you for your assistance. Long Briones MS, OTR/L

## 2018-05-11 NOTE — PROGRESS NOTES
0630: Bedside and Verbal shift change report given by Jayashree Collazo RN (oncoming nurse). Report given with SBAR, Kardex, OR Summary, Procedure Summary, Intake/Output, MAR, Recent Results, Med Rec Status and Cardiac Rhythm paced.

## 2018-05-11 NOTE — PROGRESS NOTES
[facility] ICU Progress Note    Admit Date: 2018  POD:  2 Day Post-Op    Procedure:  Procedure(s):  ON PUMP CORONARY ARTERY BYPASS GRAFT X 3 WITH LEFT LEG ENDOVASCULAR VEIN HARVEST/ LEFT INTERNAL MAMMARY ARTERY HARVEST. TRANSESOPHAGEAL ECHOCARDIOGRAM AND EPI-AORTIC ULTRASOUND BY DR. Gabriel Capone. Subjective:   Pt seen with Dr. Janene Damon. Tmax 99.3, on 6L NC. Dobut 3. Up in chair. No complaints. Objective:   Vitals:  Blood pressure 100/56, pulse 80, temperature 98.1 °F (36.7 °C), resp. rate 20, height 5' 6\" (1.676 m), weight 195 lb 8.8 oz (88.7 kg), SpO2 95 %. Temp (24hrs), Av.4 °F (36.9 °C), Min:97.7 °F (36.5 °C), Max:99.3 °F (37.4 °C)    Hemodynamics:   CO: CO (l/min): 9.2 l/min   CI: CI (l/min/m2): 4.7 l/min/m2   CVP: CVP (mmHg): 10 mmHg (18 08)   SVR: SVR (dyne*sec)/cm5: 857 (dyne*sec)/cm5 (18 4068)   PAP Systolic: PAP Systolic: 47 (14/56/53 8638)   PAP Diastolic: PAP Diastolic: 20 (60/30/53 3077)   PVR:     SV02: SVO2 (%): 52 % (18)   SCV02:      EKG/Rhythm:  Paced AAI 80    CT Output: 750 ml    Oxygen Therapy:  Oxygen Therapy  O2 Sat (%): 95 % (18 08)  Pulse via Oximetry: 76 beats per minute (18 08)  O2 Device: Nasal cannula (18)  O2 Flow Rate (L/min): 6 l/min (18)  FIO2 (%): 50 % (18 1318)    CXR: IMPRESSION: Bibasilar atelectasis/effusions left greater than right are slightly  improved.     Admission Weight: Last Weight   Weight: 188 lb 11.4 oz (85.6 kg) Weight: 195 lb 8.8 oz (88.7 kg)     Intake / Output / Drain:  Current Shift:  0701 -  1900  In: 7.7 [I.V.:7.7]  Out: 10 [Drains:10]  Last 24 hrs.:     Intake/Output Summary (Last 24 hours) at 18 0855  Last data filed at 18 0800   Gross per 24 hour   Intake          1487.42 ml   Output             1415 ml   Net            72.42 ml       EXAM:  General:  Alert, NAD                                                                                            Lungs:   Clear upper, diminished bases to auscultation bilaterally. Incision:  Dsg cdi   Heart:  Regular rate and rhythm, S1, S2 normal, no murmur, click, rub or gallop. Abdomen:   Soft, non-tender. Bowel sounds hypoactive. No masses,  No organomegaly. Extremities:  No edema. PPP. Neurologic:  Gross motor and sensory apparatus intact. Labs:   Recent Labs      18   0822   18   0403   05/10/18   0355   WBC   --    --   7.9   --   7.6   HGB   --    --   7.1*   --   7.2*   HCT   --    --   22.1*   --   21.9*   PLT   --    --   102*   --   90*   NA   --    --   137   --   138   K   --    --   4.3   --   4.1   BUN   --    --   16   --   16   CREA   --    --   0.85   --   1.22   GLU   --    --   92   --   113*   GLUCPOC  99   < >   --    < >  119*   INR   --    --    --    --   1.3*    < > = values in this interval not displayed. Assessment:     Active Problems:    CAD (coronary artery disease) (2018)         Plan/Recommendations/Medical Decision Makin. CAD s/p CABG: On ASA, statin, no BB until off dobut  2. Acute systolic heart failure (NYHA class III on admission): EF 10-15%. Impella removed 5/10. Cont dobut gtt at 3 today -plan to decrease by 1 mcg daily over the weekend. No BB, ACE/ARB until appropriate. Will need repeat echo prior to discharge to eval for lifevest.  3. Acute hypoxic respiratory failure: Improved, on NC, wean O2 for sats >92%  4. Post op anemia st acute blood loss: Hgb 7.1,  Monitor H&H and CT output  5. Thrombocytopenia: Plt 102, monitor  6. HTN: not on any antihypertensives, Monitor  7. Renal insufficiency: improved, Monitor U/O and creatinine  8. Elevated liver enzymes:resolved  9. + cocaine use:  on cessation when appropriate  10. Tobacco use: reinforce cessation  11. Dispo: Get PICC today. Remove PA line. Transfer to stepdown later today.     Signed By: Dennys Meehan NP

## 2018-05-11 NOTE — PROGRESS NOTES
1115: PICC team in to place picc line.  Dobutamine to 2 mcg    1200 Chest xray done, 245: PA line pulled

## 2018-05-11 NOTE — PROCEDURES
PICC Placement Note    PRE-PROCEDURE VERIFICATION  Correct Procedure: yes  Correct Site:  yes  Temperature: Temp: 97.7 °F (36.5 °C), Temperature Source: Temp Source: Pulmonary Artery  Recent Labs      05/11/18   0403  05/10/18   0355   BUN  16  16   CREA  0.85  1.22   PLT  102*  90*   INR   --   1.3*   WBC  7.9  7.6       Allergies: Review of patient's allergies indicates no known allergies. Education materials, including PICC Booklet and written information regarding central catheter related bloodstream infection and prevention given to patient. See Patient Education activity for further details. PROCEDURE DETAIL  A double lumen PICC line was started for desire for reliable access. The following documentation is in addition to the PICC properties in the lines/airways flowsheet :  Lot #: [id number]  Xylocaine 1% used intradermally:  yes  Total Catheter Length: 43 (cm)  Internal Catheter Length: 43 (cm)  Vein Selection for PICC: left basilic  Central Line Bundle followed: yes  Complication Related to Insertion: none    The placement was verified by X-ray. The PICC is on the left side \" terminating near the cavoatrial junction. \"  Garrison Liming is okay to use.   Report to nursing    Maninder Lopez, AMELIEN, RN, VA-BC   Vascular Access Team

## 2018-05-11 NOTE — PROGRESS NOTES
Problem: Discharge Planning  Goal: *Discharge to safe environment  Outcome: Progressing Towards Goal  See cm notes.  ANURAG Kovacs

## 2018-05-11 NOTE — PROGRESS NOTES
[facility] Progress Note      DOS:   5/11/2018  NAME:  Mohit Sage   MRN:   [id number]     REFERRING PROVIDER:  Dr. Ze Booker: Wang Hampton MD    Chief Complaint:  SOB/Chest pain     HPI: 61y.o. year old male with a history of HTN who was admitted to [facility] with complaint of chest pain and SOB that started yesterday morning, he ruled in for NSTEMI and CSS was consulted for surgical intervention and he was transferred to [facility] [place]. He has a history of HTN but does not take any antihypertensives, he admits to doing cocaine the mornign his chest pain started and is currently smoker. He denies HA, N/V, edema or dizziness but states he continues to be SOB and has orthopnea. The [facility] has been consulted to comment on his acute cardiomyopathy and medical management. 24Hr events:  Weaning dobutamine  S/p impella removal      Impression / Plan:   Heart Failure Status: NYHA Class III    Acute HFeEF, ICM, EF 15%, 3V CAD   POD 3 CABG   Impella removed successfully yesterday- hemodynamics improved. Hold coreg due to bradycardia and on dobutamine. Cont ASA   Hold diuretics today- CVP 8-10, will reassess. Will need lifevest pre discharge if EF remains < 35% on repeat TTE   Slow wean of dobutamine at 3mcg   Not a candidate for durable MCS due to recent cocaine use, would need to abstain for at least 6 months. History:  History reviewed. No pertinent past medical history. Past Surgical History:   Procedure Laterality Date    HX ORTHOPAEDIC      left hip surgery     Social History     Social History    Marital status:      Spouse name: N/A    Number of children: N/A    Years of education: N/A     Occupational History    Not on file.      Social History Main Topics    Smoking status: Former Smoker     Quit date: 5/3/2018    Smokeless tobacco: Not on file    Alcohol use No    Drug use: Not on file    Sexual activity: Not on file     Other Topics Concern    Not on file     Social History Narrative     Family History   Problem Relation Age of Onset    Diabetes Mother        Current Medications:   Current Facility-Administered Medications   Medication Dose Route Frequency Provider Last Rate Last Dose    0.9% sodium chloride infusion 250 mL  250 mL IntraVENous PRN Mariah Ruiz MD        atorvastatin (LIPITOR) tablet 10 mg  10 mg Oral QHS Kareem King NP   10 mg at 05/10/18 2114    0.9% sodium chloride infusion 250 mL  250 mL IntraVENous PRN Anum Saeed MD        sodium chloride (NS) flush 5-10 mL  5-10 mL IntraVENous Q8H Agueda Dong NP   10 mL at 05/11/18 0601    sodium chloride (NS) flush 5-10 mL  5-10 mL IntraVENous PRN Agueda Dong NP        albumin human 5% (BUMINATE) solution 12.5 g  12.5 g IntraVENous Q2H PRN Agueda Dong NP   12.5 g at 05/09/18 0200    0.45% sodium chloride infusion  10 mL/hr IntraVENous CONTINUOUS Agueda Dong NP 5 mL/hr at 05/10/18 2000 5 mL/hr at 05/10/18 2000    0.9% sodium chloride infusion  9 mL/hr IntraVENous CONTINUOUS Agueda Dong NP 9 mL/hr at 05/10/18 2000 9 mL/hr at 05/10/18 2000    oxyCODONE-acetaminophen (PERCOCET) 5-325 mg per tablet 1 Tab  1 Tab Oral Q4H PRN Agueda Dong NP        oxyCODONE-acetaminophen (PERCOCET) 5-325 mg per tablet 2 Tab  2 Tab Oral Q4H PRN Agueda Dong NP   2 Tab at 05/11/18 0517    naloxone (NARCAN) injection 0.4 mg  0.4 mg IntraVENous PRN Agueda Dong NP        mupirocin (BACTROBAN) 2 % ointment   Both Nostrils BID Agueda Dong NP        amiodarone (CORDARONE) tablet 400 mg  400 mg Oral Q12H Agueda Dong NP   400 mg at 05/10/18 2006    ondansetron (ZOFRAN) injection 4 mg  4 mg IntraVENous Q4H PRN Agueda Dong NP        albuterol (PROVENTIL VENTOLIN) nebulizer solution 2.5 mg  2.5 mg Nebulization Q4H PRN Agueda Dong NP        aspirin chewable tablet 81 mg  81 mg Oral DAILY Yolanda Brennan Debra Marino, TUCKER   81 mg at 05/10/18 4171    midazolam (VERSED) injection 1 mg  1 mg IntraVENous Q1H PRN Seema Pap, NP        chlorhexidine (PERIDEX) 0.12 % mouthwash 10 mL  10 mL Oral BID Seema Pap, NP   10 mL at 05/10/18 1746    famotidine (PEPCID) tablet 20 mg  20 mg Oral Q12H Seema Pap, NP   20 mg at 05/10/18 2006    magnesium oxide (MAG-OX) tablet 400 mg  400 mg Oral BID Seema Pap, NP   400 mg at 05/10/18 1741    calcium chloride 1 g in 0.9% sodium chloride 50 mL IVPB  1 g IntraVENous PRN Seema Pap, NP        bisacodyl (DULCOLAX) suppository 10 mg  10 mg Rectal DAILY PRN Seema Sheehan, NP        senna-docusate (PERICOLACE) 8.6-50 mg per tablet 1 Tab  1 Tab Oral BID Seema Pap, NP   1 Tab at 05/10/18 1741    polyethylene glycol (MIRALAX) packet 17 g  17 g Oral DAILY Seema Pap, NP   17 g at 05/10/18 1411    ELECTROLYTE REPLACEMENT PROTOCOL  1 Each Other PRN Seema Sheehan, NP        magnesium sulfate 1 g/100 ml IVPB (premix or compounded)  1 g IntraVENous PRN Seema Sheehan, NP        glucose chewable tablet 16 g  4 Tab Oral PRN Seema Pap, NP        dextrose (D50W) injection syrg 12.5-25 g  12.5-25 g IntraVENous PRN Seema Pap, NP   8 g at 05/10/18 1153    glucagon (GLUCAGEN) injection 1 mg  1 mg IntraMUSCular PRN Seema Sheehan, NP        insulin lispro (HUMALOG) injection   SubCUTAneous TIDAC Seema Sheehan, NP   2 Units at 05/10/18 1630    insulin lispro (HUMALOG) injection   SubCUTAneous AC&HS Seema Pap, NP        insulin glargine (LANTUS) injection 1-50 Units  1-50 Units SubCUTAneous ONCE PRN Seema Pap, NP        sodium chloride (NS) flush 20 mL  20 mL InterCATHeter PRN Seema Pap, NP        sodium chloride (NS) flush 10 mL  10 mL InterCATHeter Q24H Seema Pap, NP   10 mL at 05/10/18 2113    sodium chloride (NS) flush 10 mL  10 mL InterCATHeter PRN Artur Hernandez Di Kappa, NP   10 mL at 05/10/18 0848    sodium chloride (NS) flush 10 mL  10 mL InterCATHeter Q8H Holly Prudent, NP   10 mL at 05/11/18 0601    alteplase (CATHFLO) 1 mg in sterile water (preservative free) 1 mL injection  1 mg InterCATHeter PRN Holly Prudent, NP        bacitracin 500 unit/gram packet 1 Packet  1 Packet Topical PRN Holly Prudent, NP        HYDROmorphone (PF) (DILAUDID) injection 1 mg  1 mg IntraVENous Q3H PRN Holly Prudent, NP   1 mg at 05/10/18 0400    HYDROmorphone (PF) (DILAUDID) injection 0.5 mg  0.5 mg IntraVENous Q3H PRN Holly Prudent, NP        sodium chloride (NS) flush 10 mL  10 mL InterCATHeter Q24H Tammy Jaquez MD   10 mL at 05/10/18 1200    sodium chloride (NS) flush 10 mL  10 mL Mannie Pearson MD   10 mL at 05/11/18 0601    0.9% sodium chloride infusion 250 mL  250 mL IntraVENous PRN Tammy Jaquez MD        EPINEPHrine (ADRENALIN) 5 mg in 0.9% sodium chloride 250 mL infusion  1-10 mcg/min IntraVENous TITRATE Tammy Jaquez MD   Stopped at 05/09/18 0622    NOREPINephrine (LEVOPHED) 8 mg in 5% dextrose 250mL infusion  2-16 mcg/min IntraVENous TITRATE Tammy Jaquez MD   Stopped at 05/09/18 0420    propofol (DIPRIVAN) infusion  0-50 mcg/kg/min IntraVENous TITRATE Tammy Jaquez MD   Stopped at 05/09/18 1230    dextrose 5% infusion  4-20 mL/hr IntraVENous TITRATE Tammy Jaquez MD 17.4 mL/hr at 05/09/18 1914 17.4 mL/hr at 05/09/18 1914    DOBUTamine (DOBUTREX) 500 MG/250 mL (2000 mcg/mL) in D5W infusion  0-10 mcg/kg/min (Order-Specific) IntraVENous TITRATE Holly Queen NP 7.7 mL/hr at 05/11/18 0519 3 mcg/kg/min at 05/11/18 0519    insulin regular (NOVOLIN R, HUMULIN R) 100 Units in 0.9% sodium chloride 100 mL infusion  1-50 Units/hr IntraVENous TITRATE Tammy Jaquez MD   Stopped at 05/11/18 0030    niCARdipine (CARDENE) 50 mg in 0.9% sodium chloride 100 mL infusion  5-15 mg/hr IntraVENous TITRATE Laura Shaw MD   Stopped at 18 0830    amiodarone (CORDARONE) 375 mg/250 mL D5W infusion  0.5-1 mg/min IntraVENous CONTINUOUS Laura Shaw MD   Stopped at 05/10/18 1900    bivalirudin (ANGIOMAX) 500 mg in dextrose 5% 500 mL infusion  4-20 mL/hr Other TITRATE Al Dolan, NP 15.1 mL/hr at 18 2144 15.1 mL/hr at 18 2144    PHENYLephrine (PF)(SRUTHI-SYNEPHRINE) 30 mg in 0.9% sodium chloride 250 mL infusion   mcg/min IntraVENous TITRATE Laura Shaw MD   Stopped at 18 2305       Allergies: No Known Allergies    ROS:    General ROS: positive for  - fatigued  Respiratory ROS: negative for - cough or shortness of breath  Cardiovascular ROS: sternal pain, no CONRAD, no edema   Gastrointestinal ROS: No flatus or BM  Neurological ROS: negative    Physical Exam:   Constitutional: AAO x 3  HENT:   Head: Normocephalic and atraumatic. Eyes:PERRL. Neck: Neck supple. No JVD present. Cardiovascular: Normal rate, S1 normal and S2 normal.  Pulses: + pulses  Pulmonary/Chest: diminished BLL, no cough  Abdominal: Soft. Bowel sounds are normal. He exhibits no distension. Musculoskeletal: Normal range of motion. He exhibits no edema. Neurological: sedated   Skin: Skin is warm and dry. Surgical dressings intact.        Vitals:   Visit Vitals    /56    Pulse 80    Temp 98.4 °F (36.9 °C)    Resp 15    Ht 5' 6\" (1.676 m)    Wt 195 lb 8.8 oz (88.7 kg)    SpO2 98%    BMI 31.56 kg/m2         Temp (24hrs), Av.5 °F (36.9 °C), Min:97.7 °F (36.5 °C), Max:99.3 °F (37.4 °C)      Admission Weight: Last Weight   Weight: 188 lb 11.4 oz (85.6 kg) Weight: 195 lb 8.8 oz (88.7 kg)     Intake / Output / Drain:  Last 24 hrs.:     Intake/Output Summary (Last 24 hours) at 18 0726  Last data filed at 18 0700   Gross per 24 hour   Intake          1779.72 ml   Output             1480 ml   Net           299.72 ml       Oxygen Therapy:  Oxygen Therapy  O2 Sat (%): 98 % (05/11/18 0700)  Pulse via Oximetry: 75 beats per minute (05/11/18 0700)  O2 Device: Nasal cannula (05/11/18 0700)  O2 Flow Rate (L/min): 6 l/min (05/11/18 0700)  FIO2 (%): 50 % (05/09/18 1318)    Recent Labs:   Labs Latest Ref Rng & Units 5/11/2018 5/10/2018 5/9/2018 5/9/2018 5/9/2018 5/8/2018 5/8/2018   WBC 4.1 - 11.1 K/uL 7.9 7.6 - - 6.9 7.9 5.4   RBC 4.10 - 5.70 M/uL 2.51(L) 2.51(L) - - 2.19(L) 2.50(L) 2.72(L)   Hemoglobin 12.1 - 17.0 g/dL 7. 1(L) 7. 2(L) 6. 8(L) 7. 1(L) 6. 3(L) 7. 2(L) 7. 8(L)   Hematocrit 36.6 - 50.3 % 22. 1(L) 21. 9(L) 20. 9(L) 21. 6(L) 19. 9(L) 22. 8(L) 24. 8(L)   MCV 80.0 - 99.0 FL 88.0 87.3 - - 90.9 91.2 91.2   Platelets 200 - 908 K/uL 102(L) 90(L) - - 116(L) 125(L) 101(L)   Lymphocytes 12 - 49 % - - - - - 4(L) -   Monocytes 5 - 13 % - - - - - 1(L) -   Eosinophils 0 - 7 % - - - - - 0 -   Basophils 0 - 1 % - - - - - 0 -   Bands 0 - 6 % - - - - - 4 -   Albumin 3.5 - 5.0 g/dL 2. 6(L) 3.0(L) - - 3. 2(L) 3.0(L) 3.5   Calcium 8.5 - 10.1 MG/DL 8. 2(L) 8. 1(L) - - 7. 9(L) 7. 8(L) 8.2(L)   SGOT 15 - 37 U/L 30 54(H) - - 62(H) 60(H) 50(H)   Glucose 65 - 100 mg/dL 92 113(H) - - 117(H) 141(H) 134(H)   BUN 6 - 20 MG/DL 16 16 - - 17 16 15   Creatinine 0.70 - 1.30 MG/DL 0.85 1.22 - - 1.35(H) 1.36(H) 1.29   Sodium 136 - 145 mmol/L 137 138 - - 140 143 142   Potassium 3.5 - 5.1 mmol/L 4.3 4.1 - - 4.3 4.2 4.1   TSH 0.36 - 3.74 uIU/mL - - - - - - -   LDH 85 - 241 U/L - 522(H) - - 478(H) - -   Some recent data might be hidden     EKG:   EKG Results     Procedure 720 Value Units Date/Time    EKG, 12 LEAD, INITIAL [[id number]] Collected:  05/09/18 0345    Order Status:  Completed Updated:  05/09/18 1126     Ventricular Rate 69 BPM      Atrial Rate 69 BPM      P-R Interval 146 ms      QRS Duration 106 ms      Q-T Interval 456 ms      QTC Calculation (Bezet) 488 ms      Calculated P Axis 27 degrees      Calculated R Axis 38 degrees      Calculated T Axis 154 degrees      Diagnosis --     Normal sinus rhythm  Incomplete left bundle branch block  ST & T wave abnormality, consider anterolateral ischemia  Prolonged QT  When compared with ECG of 04-MAY-2018 09:15,  ST now depressed in Anterior leads  T wave inversion more evident in Anterior leads  Confirmed by Anabell Andersen MD, Indu Wolf ([id number]) on 5/9/2018 11:26:09 AM      [address] [place] [address] [[id number]] Collected:  05/09/18 0422    Order Status:  Completed Updated:  05/09/18 0454    SCANNED CARDIAC RHYTHM STRIP [[id number]] Collected:  05/07/18 0632    Order Status:  Completed Updated:  05/07/18 0640    SCANNED CARDIAC RHYTHM STRIP [[id number]] Collected:  05/07/18 0602    Order Status:  Completed Updated:  05/07/18 0604    SCANNED CARDIAC RHYTHM STRIP [[id number]] Collected:  05/05/18 0629    Order Status:  Completed Updated:  05/05/18 0631    EKG, 12 LEAD, INITIAL [[id number]]     Order Status:  Completed         Echocardiogram:   5/3/18:  Left ventricle: Apical akinesis, Lateral hypokinesis, Normal septal motion  The ventricle was moderately to severely dilated. Systolic function was  normal. Ejection fraction was estimated in the range of 30 % to 35 %. There was severe diffuse hypokinesis with regional variations. Left atrium: The atrium was moderately to severely dilated. Mitral valve: There was mild to moderate regurgitation. Tricuspid valve: There was moderate regurgitation.     Cardiac Catheterizations:   5/4: Cardiac catheterization 5/4/18:  L Main: >50% osteal      LAD: 40-50% proximal, 70% after first diagonal, 80-90% mid lesion      LCflex: Diffuse mild disease, OM1 small vessel difffuse 70-90% disease.      RCA: small dominant vessel, 50% proximal lesion, 70-90% tandem lesions proximal-md lesion, 80% distal lesion      LVEDP: 30 mHg      LVEF: 15% diffuse hypokinesis      No significant gradient across aortic valve.      PCI: None    Radiology (CXR, CT scans):   CXR Results  (Last 48 hours)               05/10/18 0527  XR CHEST PORT Final result    Impression: IMPRESSION: Decreased interstitial edema. Narrative:  EXAM:  XR CHEST PORT. INDICATION: Postop heart. COMPARISON: 5/9/2018. FINDINGS:    A portable AP radiograph of the chest was obtained at 0449 hours. There are   sternal sutures. The right subclavian Impella device is unchanged in position. Lines and tubes: The patient is on a cardiac monitor. The right jugular   [place]-Ervin catheter and bilateral chest tubes are unchanged. Lungs: The vascular congestion and interstitial edema throughout the lungs has   decreased slightly. Pleura: There is no pneumothorax or pleural effusion. Mediastinum: The cardiac silhouette is enlarged. Bones and soft tissues: The bones and soft tissues are grossly within normal   limits. 05/09/18 1452  XR CHEST PORT Final result    Impression:  IMPRESSION:    1. Patient has been extubated. There is suspicion of tiny pneumothoraces   bilaterally. Results called by the technologist.   2. There is persistent left lower lobe atelectasis/effusion. There is increasing   right basilar atelectasis. Narrative:  EXAM:  XR CHEST PORT       INDICATION:  postop heart       COMPARISON:  5/9/2018       FINDINGS: A portable AP radiograph of the chest was obtained at 1439 hours. Depth of inspiration is shallower than earlier study. Patient has been   extubated. [place]vin catheter tip, left ventricular assist device, bilateral   chest tubes are unchanged. .  There is suggestion of a tiny apical pneumothorax   bilaterally. .  There is persistent left lower lobe atelectasis. There is slight   increasing right basilar atelectasis. .  Cardiomegaly is stable. Brisa Elizabeth NP  [address] [facility] [place]  [address] [facility], [address] [place] [address]  Office [phone]  Fax [phone]  24 hour VAD/HF Pager: [phone]

## 2018-05-11 NOTE — DIABETES MGMT
[facility] Cardiac Surgery Progress Note     Recommendations/ Comments:Recommendations/ Comments:  Pt arrived to CVICU at 1928 on 5-8-18. Consider continuing insulin gtt for at least 48hrs post-op and eating 50% solid foods then,  1) transition off gtt per [facility] Protocol   2) continue accu-checks and humalog correctional insulin ac & hs   3) AHA diet as diet advanced    Insulin drip  stopped since  0000. He received 0 units of insulin in the last 6 hours. Chart reviewed on [employer]. Patient is 61 y.o. male s/p Cardiac surgery  POD 3,  POD 1 impella removal, see below. No history of diabetes. 5/7/18 LVAD placement  5/8/18 CABG  5/10/18 Impella removal     A1c:   Lab Results   Component Value Date/Time    Hemoglobin A1c 4.8 05/03/2018 01:06 PM         Recent Glucose Results:   Lab Results   Component Value Date/Time    GLU 92 05/11/2018 04:03 AM    GLUCPOC 127 (H) 05/11/2018 12:42 PM    GLUCPOC 99 05/11/2018 08:22 AM    GLUCPOC 99 05/11/2018 07:15 AM        Lab Results   Component Value Date/Time    Creatinine 0.85 05/11/2018 04:03 AM     Estimated Creatinine Clearance: 98.9 mL/min (based on Cr of 0.85). Active Orders   Diet    DIET CARDIAC Regular        PO intake:   Patient Vitals for the past 72 hrs:   % Diet Eaten   05/11/18 1300 75 %   05/11/18 0802 75 %   05/10/18 1634 80 %   05/10/18 0800 80 %       Will continue to follow as needed. Thank you.   Elzbieta Reyna RD

## 2018-05-12 ENCOUNTER — APPOINTMENT (OUTPATIENT)
Dept: GENERAL RADIOLOGY | Age: 58
DRG: 215 | End: 2018-05-12
Attending: NURSE PRACTITIONER
Payer: COMMERCIAL

## 2018-05-12 LAB
ALBUMIN SERPL-MCNC: 2.4 G/DL (ref 3.5–5)
ALBUMIN/GLOB SERPL: 0.7 {RATIO} (ref 1.1–2.2)
ALP SERPL-CCNC: 47 U/L (ref 45–117)
ALT SERPL-CCNC: 14 U/L (ref 12–78)
ANION GAP SERPL CALC-SCNC: 5 MMOL/L (ref 5–15)
AST SERPL-CCNC: 27 U/L (ref 15–37)
BILIRUB SERPL-MCNC: 0.8 MG/DL (ref 0.2–1)
BUN SERPL-MCNC: 14 MG/DL (ref 6–20)
BUN/CREAT SERPL: 17 (ref 12–20)
CALCIUM SERPL-MCNC: 8.4 MG/DL (ref 8.5–10.1)
CHLORIDE SERPL-SCNC: 105 MMOL/L (ref 97–108)
CO2 SERPL-SCNC: 29 MMOL/L (ref 21–32)
CREAT SERPL-MCNC: 0.82 MG/DL (ref 0.7–1.3)
ERYTHROCYTE [DISTWIDTH] IN BLOOD BY AUTOMATED COUNT: 14 % (ref 11.5–14.5)
GLOBULIN SER CALC-MCNC: 3.4 G/DL (ref 2–4)
GLUCOSE BLD STRIP.AUTO-MCNC: 109 MG/DL (ref 65–100)
GLUCOSE BLD STRIP.AUTO-MCNC: 156 MG/DL (ref 65–100)
GLUCOSE SERPL-MCNC: 97 MG/DL (ref 65–100)
HCT VFR BLD AUTO: 22.5 % (ref 36.6–50.3)
HGB BLD-MCNC: 7.1 G/DL (ref 12.1–17)
MCH RBC QN AUTO: 28.1 PG (ref 26–34)
MCHC RBC AUTO-ENTMCNC: 31.6 G/DL (ref 30–36.5)
MCV RBC AUTO: 88.9 FL (ref 80–99)
NRBC # BLD: 0 K/UL (ref 0–0.01)
NRBC BLD-RTO: 0 PER 100 WBC
PLATELET # BLD AUTO: 140 K/UL (ref 150–400)
PMV BLD AUTO: 11.6 FL (ref 8.9–12.9)
POTASSIUM SERPL-SCNC: 4.4 MMOL/L (ref 3.5–5.1)
PROT SERPL-MCNC: 5.8 G/DL (ref 6.4–8.2)
RBC # BLD AUTO: 2.53 M/UL (ref 4.1–5.7)
SERVICE CMNT-IMP: ABNORMAL
SERVICE CMNT-IMP: ABNORMAL
SODIUM SERPL-SCNC: 139 MMOL/L (ref 136–145)
WBC # BLD AUTO: 7.7 K/UL (ref 4.1–11.1)

## 2018-05-12 PROCEDURE — 65660000000 HC RM CCU STEPDOWN

## 2018-05-12 PROCEDURE — 77030032490 HC SLV COMPR SCD KNE COVD -B

## 2018-05-12 PROCEDURE — 74011636637 HC RX REV CODE- 636/637: Performed by: NURSE PRACTITIONER

## 2018-05-12 PROCEDURE — 97110 THERAPEUTIC EXERCISES: CPT

## 2018-05-12 PROCEDURE — 82962 GLUCOSE BLOOD TEST: CPT

## 2018-05-12 PROCEDURE — 74011000258 HC RX REV CODE- 258: Performed by: PHYSICIAN ASSISTANT

## 2018-05-12 PROCEDURE — 80053 COMPREHEN METABOLIC PANEL: CPT | Performed by: NURSE PRACTITIONER

## 2018-05-12 PROCEDURE — 71045 X-RAY EXAM CHEST 1 VIEW: CPT

## 2018-05-12 PROCEDURE — 85730 THROMBOPLASTIN TIME PARTIAL: CPT | Performed by: NURSE PRACTITIONER

## 2018-05-12 PROCEDURE — 36415 COLL VENOUS BLD VENIPUNCTURE: CPT | Performed by: NURSE PRACTITIONER

## 2018-05-12 PROCEDURE — 85027 COMPLETE CBC AUTOMATED: CPT | Performed by: NURSE PRACTITIONER

## 2018-05-12 PROCEDURE — 74011250636 HC RX REV CODE- 250/636: Performed by: PHYSICIAN ASSISTANT

## 2018-05-12 PROCEDURE — 74011250637 HC RX REV CODE- 250/637: Performed by: NURSE PRACTITIONER

## 2018-05-12 PROCEDURE — 77010033678 HC OXYGEN DAILY

## 2018-05-12 PROCEDURE — 85610 PROTHROMBIN TIME: CPT | Performed by: NURSE PRACTITIONER

## 2018-05-12 PROCEDURE — 97116 GAIT TRAINING THERAPY: CPT

## 2018-05-12 PROCEDURE — 74011250636 HC RX REV CODE- 250/636: Performed by: NURSE PRACTITIONER

## 2018-05-12 PROCEDURE — 74011000258 HC RX REV CODE- 258: Performed by: NURSE PRACTITIONER

## 2018-05-12 RX ORDER — SODIUM CHLORIDE 0.9 % (FLUSH) 0.9 %
5-10 SYRINGE (ML) INJECTION EVERY 8 HOURS
Status: DISCONTINUED | OUTPATIENT
Start: 2018-05-12 | End: 2018-05-14 | Stop reason: HOSPADM

## 2018-05-12 RX ORDER — METOPROLOL TARTRATE 25 MG/1
25 TABLET, FILM COATED ORAL EVERY 12 HOURS
Status: DISCONTINUED | OUTPATIENT
Start: 2018-05-13 | End: 2018-05-14

## 2018-05-12 RX ORDER — LANOLIN ALCOHOL/MO/W.PET/CERES
1 CREAM (GRAM) TOPICAL
Status: DISCONTINUED | OUTPATIENT
Start: 2018-05-13 | End: 2018-05-14 | Stop reason: HOSPADM

## 2018-05-12 RX ORDER — SODIUM CHLORIDE 0.9 % (FLUSH) 0.9 %
5-10 SYRINGE (ML) INJECTION AS NEEDED
Status: DISCONTINUED | OUTPATIENT
Start: 2018-05-12 | End: 2018-05-14 | Stop reason: HOSPADM

## 2018-05-12 RX ADMIN — INSULIN LISPRO 2 UNITS: 100 INJECTION, SOLUTION INTRAVENOUS; SUBCUTANEOUS at 11:33

## 2018-05-12 RX ADMIN — Medication 10 ML: at 07:11

## 2018-05-12 RX ADMIN — MUPIROCIN: 20 OINTMENT TOPICAL at 09:00

## 2018-05-12 RX ADMIN — OXYCODONE HYDROCHLORIDE AND ACETAMINOPHEN 2 TABLET: 5; 325 TABLET ORAL at 02:43

## 2018-05-12 RX ADMIN — Medication 10 ML: at 21:06

## 2018-05-12 RX ADMIN — Medication 10 ML: at 14:00

## 2018-05-12 RX ADMIN — POLYETHYLENE GLYCOL 3350 17 G: 17 POWDER, FOR SOLUTION ORAL at 08:32

## 2018-05-12 RX ADMIN — Medication 10 ML: at 12:00

## 2018-05-12 RX ADMIN — FAMOTIDINE 20 MG: 20 TABLET ORAL at 21:04

## 2018-05-12 RX ADMIN — STANDARDIZED SENNA CONCENTRATE AND DOCUSATE SODIUM 1 TABLET: 8.6; 5 TABLET, FILM COATED ORAL at 17:43

## 2018-05-12 RX ADMIN — Medication 400 MG: at 08:32

## 2018-05-12 RX ADMIN — Medication 10 ML: at 21:04

## 2018-05-12 RX ADMIN — DEXTROSE MONOHYDRATE 1 MCG/KG/MIN: 5 INJECTION, SOLUTION INTRAVENOUS at 15:13

## 2018-05-12 RX ADMIN — OXYCODONE HYDROCHLORIDE AND ACETAMINOPHEN 2 TABLET: 5; 325 TABLET ORAL at 08:39

## 2018-05-12 RX ADMIN — ATORVASTATIN CALCIUM 10 MG: 10 TABLET, FILM COATED ORAL at 21:04

## 2018-05-12 RX ADMIN — OXYCODONE HYDROCHLORIDE AND ACETAMINOPHEN 1 TABLET: 5; 325 TABLET ORAL at 15:21

## 2018-05-12 RX ADMIN — STANDARDIZED SENNA CONCENTRATE AND DOCUSATE SODIUM 1 TABLET: 8.6; 5 TABLET, FILM COATED ORAL at 08:32

## 2018-05-12 RX ADMIN — CHLORHEXIDINE GLUCONATE 10 ML: 1.2 RINSE ORAL at 09:00

## 2018-05-12 RX ADMIN — AMIODARONE HYDROCHLORIDE 200 MG: 200 TABLET ORAL at 08:32

## 2018-05-12 RX ADMIN — Medication 400 MG: at 17:43

## 2018-05-12 RX ADMIN — FAMOTIDINE 20 MG: 20 TABLET ORAL at 08:32

## 2018-05-12 RX ADMIN — ASPIRIN 81 MG 81 MG: 81 TABLET ORAL at 08:32

## 2018-05-12 RX ADMIN — DEXTROSE MONOHYDRATE 1 MCG/KG/MIN: 5 INJECTION, SOLUTION INTRAVENOUS at 07:40

## 2018-05-12 RX ADMIN — OXYCODONE HYDROCHLORIDE AND ACETAMINOPHEN 2 TABLET: 5; 325 TABLET ORAL at 22:13

## 2018-05-12 RX ADMIN — AMIODARONE HYDROCHLORIDE 200 MG: 200 TABLET ORAL at 21:04

## 2018-05-12 NOTE — PROGRESS NOTES
Bedside, Verbal and Written shift change report given to Maritza (oncoming nurse) by Son Lott (offgoing nurse). Report included the following information SBAR, Kardex, MAR and Recent Results. TRANSFER - OUT REPORT:    Verbal report given to Kristen(name) on Karen Stubbs  being transferred to [facility](unit) for routine progression of care       Report consisted of patients Situation, Background, Assessment and   Recommendations(SBAR). Information from the following report(s) SBAR, Kardex, [facility] and Recent Results was reviewed with the receiving nurse. Lines:   PICC Double Lumen (Active)   Central Line Being Utilized Yes 5/12/2018  8:00 AM   Criteria for Appropriate Use Limited/no vessel suitable for conventional peripheral access 5/12/2018  8:00 AM   Site Assessment Clean, dry, & intact 5/12/2018  8:00 AM   Phlebitis Assessment 0 5/12/2018  8:00 AM   Infiltration Assessment 0 5/12/2018  8:00 AM   Date of Last Dressing Change 05/11/18 5/12/2018  8:00 AM   Dressing Status Clean, dry, & intact 5/12/2018  8:00 AM   Action Taken Open ports on tubing capped 5/12/2018  8:00 AM   Dressing Type Disk with Chlorhexadine gluconate (CHG); Tape;Transparent 5/12/2018  8:00 AM   Hub Color/Line Status Red;Capped;Flushed 5/12/2018  8:00 AM   Positive Blood Return (Site #1) Yes 5/12/2018  8:00 AM   Hub Color/Line Status Purple; Infusing;Flushed 5/12/2018  8:00 AM   Positive Blood Return (Site #2) Yes 5/12/2018  8:00 AM   Alcohol Cap Used Yes 5/12/2018  8:00 AM       Peripheral IV 05/07/18 Right Forearm (Active)   Site Assessment Clean, dry, & intact 5/12/2018  8:00 AM   Phlebitis Assessment 0 5/12/2018  8:00 AM   Infiltration Assessment 0 5/12/2018  8:00 AM   Dressing Status Clean, dry, & intact 5/12/2018  8:00 AM   Dressing Type Tape;Transparent 5/12/2018  8:00 AM   Hub Color/Line Status Pink;Capped;Flushed 5/12/2018  8:00 AM   Action Taken Open ports on tubing capped 5/12/2018  8:00 AM   Alcohol Cap Used Yes 5/12/2018  8:00 AM Opportunity for questions and clarification was provided.       Patient transported with:   Monitor  O2 @ 1 liters  Registered Nurse  [facility]

## 2018-05-12 NOTE — PROGRESS NOTES
Problem: Mobility Impaired (Adult and Pediatric)  Goal: *Acute Goals and Plan of Care (Insert Text)  Physical Therapy Goals  Initiated 5/10/2018  1. Patient will move from supine to sit and sit to supine, scoot up and down and roll side to side in bed with minimal assistance/contact guard assist within 5 days. 2.  Patient will perform sit to/from stand with minimal assistance/contact guard assist within 5 days. 3.  Patient will ambulate 25 feet with least restrictive assistive device and minimal assistance/contact guard assist within 5 days. 4.  Stair goal to be added when appropriate. 5.  Patient will perform cardiac exercises per protocol with supervision/set-up within 5 days. 6.  Patient will verbally and functionally recall 3/3 sternal precautions within 5 days. physical Therapy TREATMENT  Patient: Serge Mcclellan (89 y.o. male)  Date: 5/12/2018  Diagnosis: CAD  CAD (coronary artery disease)  cardiac insufficiency  CORONARY ARTERY DISEASE  unknown <principal problem not specified>  Procedure(s) (LRB):  IMPELLA REMOVAL/ CVICU (N/A) 2 Days Post-Op  Precautions: Fall, Sternal  Chart, physical therapy assessment, plan of care and goals were reviewed. ASSESSMENT:  Chart reviewed, RN cleared patient for mobility, and patient received in bed. Patient progress with therapy today, transferring to EOB with CGA, standing CGA, and ambulating 50 ft with CGA and all VSS (second hand to assist with lines only). On 3L NC sats improved from low 90s to 100% during gait training. Once in chair at end of session PT reviewed cardiac ther ex and patient performed UE portion correctly. Patient ended session in chair with all needs placed within reach and RN notified of patient's status.  Discharge rec- HHPT vs TBD pending progress with mobility     Progression toward goals:  [x]      Improving appropriately and progressing toward goals  []      Improving slowly and progressing toward goals  []      Not making progress toward goals and plan of care will be adjusted     PLAN:  Patient continues to benefit from skilled intervention to address the above impairments. Continue treatment per established plan of care. Discharge Recommendations:  Home Health  Further Equipment Recommendations for Discharge:  TBD     SUBJECTIVE:   Patient stated I want a do not disturb sign when I go home so people can finally let me rest.     OBJECTIVE DATA SUMMARY:   Patient mobilized on continuous portable monitor/telemetry. Critical Behavior:  Neurologic State: Alert  Orientation Level: Oriented X4  Cognition: Appropriate for age attention/concentration  Safety/Judgement: Awareness of environment, Fall prevention  Functional Mobility Training:  Bed Mobility:  Log    Supine to Sit: Contact guard assistance              Transfers:  Sit to Stand: Contact guard assistance;Assist x2 (second for line management)  Stand to Sit: Contact guard assistance                             Balance:  Sitting: Intact  Standing: Impaired  Standing - Static: Constant support;Good  Standing - Dynamic : Fair  Ambulation/Gait Training:  Distance (ft): 50 Feet (ft)  Assistive Device: Gait belt  Ambulation - Level of Assistance: Contact guard assistance        Gait Abnormalities: Decreased step clearance;Trunk sway increased              Speed/Colette: Pace decreased (<100 feet/min)                       Stairs: Therapeutic Exercises:   Patient instructed on the benefits and demonstrated cardiac exercises while sitting in chair. Instructed and indicated understanding on how to progress reps, sets, against gravity, working up through weights and so on based on cardiologist clearance in prep for functional activity. Can use household items for weights.      CARDIAC  EXERCISE   Sets   Reps   Active Active Assist   Passive Self ROM   Comments   Shoulder flexion 1 5 [x]                                            []                                            [] []                                               Shoulder abduction 1  5 [x]                                            []                                            []                                            []                                               Scapular retraction/elevation 1 5 [x]                                            []                                            []                                            []                                                 Pain:  Pain Scale 1: Numeric (0 - 10)  Pain Intensity 1: 0  Pain Location 1: Chest  Pain Orientation 1: Mid  Pain Description 1: Aching  Pain Intervention(s) 1: Medication (see MAR)  Activity Tolerance:   Good, VSS throughout  Please refer to the flowsheet for vital signs taken during this treatment.   After treatment:   [x] Patient left in no apparent distress sitting up in chair  [] Patient left in no apparent distress in bed  [x] Call bell left within reach  [x] Nursing notified  [] Caregiver present  [] Bed alarm activated    COMMUNICATION/COLLABORATION:   The patients plan of care was discussed with: Registered Nurse    Alex Pathak PT, DPT   Time Calculation: 25 mins

## 2018-05-12 NOTE — PROGRESS NOTES
PSBAR, Kardex, ED Summary, Procedure Summary, Intake/Output, MAR, Accordion and Cardiac Rhythm roblem: Falls - Risk of  Goal: *Absence of Falls  Document César Fall Risk and appropriate interventions in the flowsheet. Outcome: Progressing Towards Goal  Fall Risk Interventions:  Mobility Interventions: Patient to call before getting OOB, OT consult for ADLs, PT Consult for mobility concerns, PT Consult for assist device competence, Strengthening exercises (ROM-active/passive), Utilize walker, cane, or other assitive device, Utilize gait belt for transfers/ambulation    Mentation Interventions: Adequate sleep, hydration, pain control, Door open when patient unattended, Evaluate medications/consider consulting pharmacy, Toileting rounds, Update white board    Medication Interventions: Evaluate medications/consider consulting pharmacy, Patient to call before getting OOB, Teach patient to arise slowly, Utilize gait belt for transfers/ambulation    Elimination Interventions: Call light in reach, Patient to call for help with toileting needs, Toilet paper/wipes in reach, Toileting schedule/hourly rounds, Urinal in reach    History of Falls Interventions: Evaluate medications/consider consulting pharmacy      Bedside and Verbal shift change report given to Jv Potts RN (oncoming nurse) by Romana Fry RN (offgoing nurse).  Report included the following informatiSRSBAR, Kardex, ED Summary, Procedure Summary, Intake/Output, MAR, Accordion and Cardiac Rhythm SR.

## 2018-05-12 NOTE — PROGRESS NOTES
Problem: Falls - Risk of  Goal: *Absence of Falls  Document César Fall Risk and appropriate interventions in the flowsheet. Outcome: Progressing Towards Goal  Fall Risk Interventions:  Mobility Interventions: Patient to call before getting OOB, Strengthening exercises (ROM-active/passive), Communicate number of staff needed for ambulation/transfer    Mentation Interventions: Adequate sleep, hydration, pain control, Increase mobility, More frequent rounding, Update white board    Medication Interventions: Evaluate medications/consider consulting pharmacy, Patient to call before getting OOB, Teach patient to arise slowly    Elimination Interventions: Call light in reach, Elevated toilet seat, Toilet paper/wipes in reach, Toileting schedule/hourly rounds, Patient to call for help with toileting needs    History of Falls Interventions: Consult care management for discharge planning, Door open when patient unattended, Evaluate medications/consider consulting pharmacy, Investigate reason for fall, Room close to nurse's station        Problem: Pressure Injury - Risk of  Goal: *Prevention of pressure injury  Document Celestine Scale and appropriate interventions in the flowsheet. Outcome: Progressing Towards Goal  Pressure Injury Interventions:  Sensory Interventions: Assess changes in LOC, Float heels, Keep linens dry and wrinkle-free, Minimize linen layers, Turn and reposition approx.  every two hours (pillows and wedges if needed)    Moisture Interventions: Maintain skin hydration (lotion/cream)    Activity Interventions: Chair cushion, Increase time out of bed, Pressure redistribution bed/mattress(bed type), PT/OT evaluation    Mobility Interventions: Float heels, HOB 30 degrees or less, Pressure redistribution bed/mattress (bed type), PT/OT evaluation    Nutrition Interventions: Document food/fluid/supplement intake    Friction and Shear Interventions: Lift sheet, HOB 30 degrees or less, Lift team/patient mobility team, Sit at 90-degree angle

## 2018-05-12 NOTE — PROGRESS NOTES
[facility] ICU Progress Note    Admit Date: 2018  POD:  3 Day Post-Op    Procedure:  Procedure(s):  ON PUMP CORONARY ARTERY BYPASS GRAFT X 3 WITH LEFT LEG ENDOVASCULAR VEIN HARVEST/ LEFT INTERNAL MAMMARY ARTERY HARVEST. TRANSESOPHAGEAL ECHOCARDIOGRAM AND EPI-AORTIC ULTRASOUND BY DR. Anna Chow. Subjective:   Pt seen with Dr. Angelina Barth. Dobut wean 1 per day.  down, DC CTs. To stepdown     Objective:   Vitals:  Blood pressure 109/68, pulse 61, temperature 98.9 °F (37.2 °C), resp. rate 17, height 5' 6\" (1.676 m), weight 189 lb 6 oz (85.9 kg), SpO2 93 %. Temp (24hrs), Av.8 °F (37.1 °C), Min:97.7 °F (36.5 °C), Max:99.4 °F (37.4 °C)    Hemodynamics:   CO: CO (l/min): 9.2 l/min   CI: CI (l/min/m2): 2.8 l/min/m2   CVP: CVP (mmHg): 6 mmHg (18 1200)   SVR: SVR (dyne*sec)/cm5: 857 (dyne*sec)/cm5 (18 5523)   PAP Systolic: PAP Systolic: 44 (68/81/26 1445)   PAP Diastolic: PAP Diastolic: 17 (70/75/68 8108)   PVR:     SV02: SVO2 (%): 54 % (18 1200)   SCV02:      EKG/Rhythm:  Paced AAI 80    CT Output: 750 ml    Oxygen Therapy:  Oxygen Therapy  O2 Sat (%): 93 % (18 0800)  Pulse via Oximetry: 80 beats per minute (18 1200)  O2 Device: Nasal cannula (18 0800)  O2 Flow Rate (L/min): 2 l/min (18 0800)  FIO2 (%): 50 % (18 1318)    CXR: IMPRESSION: Bibasilar atelectasis/effusions left greater than right are slightly  improved.     Admission Weight: Last Weight   Weight: 188 lb 11.4 oz (85.6 kg) Weight: 189 lb 6 oz (85.9 kg)     Intake / Output / Drain:  Current Shift:  07 -  190  In: 74.4 [I.V.:74.4]  Out: 400 [Urine:400]  Last 24 hrs.:     Intake/Output Summary (Last 24 hours) at 18 1145  Last data filed at 18 0800   Gross per 24 hour   Intake           545.93 ml   Output             2155 ml   Net         -1609.07 ml       EXAM:  General:  Alert, NAD                                                                                            Lungs:   Clear upper, diminished bases to auscultation bilaterally. Incision:  Dsg cdi   Heart:  Regular rate and rhythm, S1, S2 normal, no murmur, click, rub or gallop. Abdomen:   Soft, non-tender. Bowel sounds hypoactive. No masses,  No organomegaly. Extremities:  No edema. PPP. Neurologic:  Gross motor and sensory apparatus intact. Labs:   Recent Labs      18   1128   18   0428   05/10/18   0355   WBC   --    --   7.7   < >  7.6   HGB   --    --   7.1*   < >  7.2*   HCT   --    --   22.5*   < >  21.9*   PLT   --    --   140*   < >  90*   NA   --    --   139   < >  138   K   --    --   4.4   < >  4.1   BUN   --    --   14   < >  16   CREA   --    --   0.82   < >  1.22   GLU   --    --   97   < >  113*   GLUCPOC  156*   < >   --    < >  119*   INR   --    --    --    --   1.3*    < > = values in this interval not displayed. Assessment:     Active Problems:    CAD (coronary artery disease) (2018)         Plan/Recommendations/Medical Decision Makin. CAD s/p CABG: On ASA, statin, dobut to 1  2. Acute systolic heart failure (NYHA class III on admission): EF 10-15%. Impella removed 5/10. Cont dobut gtt at 3 today -plan to decrease by 1 mcg daily over the weekend. No BB, ACE/ARB until appropriate. Will need repeat echo prior to discharge to eval for lifevest.  3. Acute hypoxic respiratory failure: Improved, on NC, wean O2 for sats >92%  4. Post op anemia st acute blood loss: Hgb 7.1,  Monitor H&H and CT output  5. Thrombocytopenia: Plt 102, monitor  6. HTN: not on any antihypertensives, Monitor  7. Renal insufficiency: improved, Monitor U/O and creatinine  8. Elevated liver enzymes:resolved  9. + cocaine use:  on cessation when appropriate  10. Tobacco use: reinforce cessation  11. Dispo:  Transfer to [facility].     Signed By: DIGNA Morris

## 2018-05-12 NOTE — PROGRESS NOTES
Problem: Falls - Risk of  Goal: *Absence of Falls  Document César Fall Risk and appropriate interventions in the flowsheet.    Outcome: Progressing Towards Goal  Fall Risk Interventions:  Mobility Interventions: Patient to call before getting OOB    Mentation Interventions: Adequate sleep, hydration, pain control    Medication Interventions: Evaluate medications/consider consulting pharmacy, Patient to call before getting OOB    Elimination Interventions: Call light in reach, Patient to call for help with toileting needs, Urinal in reach    History of Falls Interventions: Room close to nurse's station        Problem: CABG: Post-Op Day 2/Transfer Day  Goal: *Hemodynamically stable without vasoactive medications  Outcome: Progressing Towards Goal  Slow wean dobutamine

## 2018-05-12 NOTE — PROGRESS NOTES
Bedside shift change report given to [address] [place] [address] (oncoming nurse) by Karen Mendoza (offgoing nurse). Report included the following information SBAR, Kardex, Intake/Output, MAR and Recent Results.

## 2018-05-12 NOTE — PROGRESS NOTES
Bedside shift change report given to TERRANCE Fitzpatrick (oncoming nurse) by Romana Diener, RN (offgoing nurse). Report included the following information SBAR, Kardex, Procedure Summary, Intake/Output and Recent Results.

## 2018-05-13 ENCOUNTER — APPOINTMENT (OUTPATIENT)
Dept: GENERAL RADIOLOGY | Age: 58
DRG: 215 | End: 2018-05-13
Attending: NURSE PRACTITIONER
Payer: COMMERCIAL

## 2018-05-13 LAB
ANION GAP SERPL CALC-SCNC: 7 MMOL/L (ref 5–15)
APTT PPP: 25.6 SEC (ref 22.1–32)
BUN SERPL-MCNC: 13 MG/DL (ref 6–20)
BUN/CREAT SERPL: 14 (ref 12–20)
CALCIUM SERPL-MCNC: 8.5 MG/DL (ref 8.5–10.1)
CHLORIDE SERPL-SCNC: 105 MMOL/L (ref 97–108)
CO2 SERPL-SCNC: 27 MMOL/L (ref 21–32)
CREAT SERPL-MCNC: 0.91 MG/DL (ref 0.7–1.3)
ERYTHROCYTE [DISTWIDTH] IN BLOOD BY AUTOMATED COUNT: 14.2 % (ref 11.5–14.5)
GLUCOSE SERPL-MCNC: 109 MG/DL (ref 65–100)
HCT VFR BLD AUTO: 24 % (ref 36.6–50.3)
HGB BLD-MCNC: 7.4 G/DL (ref 12.1–17)
INR PPP: 1 (ref 0.9–1.1)
MAGNESIUM SERPL-MCNC: 2.2 MG/DL (ref 1.6–2.4)
MCH RBC QN AUTO: 27.8 PG (ref 26–34)
MCHC RBC AUTO-ENTMCNC: 30.8 G/DL (ref 30–36.5)
MCV RBC AUTO: 90.2 FL (ref 80–99)
NRBC # BLD: 0 K/UL (ref 0–0.01)
NRBC BLD-RTO: 0 PER 100 WBC
PLATELET # BLD AUTO: 189 K/UL (ref 150–400)
PMV BLD AUTO: 10.8 FL (ref 8.9–12.9)
POTASSIUM SERPL-SCNC: 4.4 MMOL/L (ref 3.5–5.1)
PROTHROMBIN TIME: 10.4 SEC (ref 9–11.1)
RBC # BLD AUTO: 2.66 M/UL (ref 4.1–5.7)
SODIUM SERPL-SCNC: 139 MMOL/L (ref 136–145)
THERAPEUTIC RANGE,PTTT: NORMAL SECS (ref 58–77)
WBC # BLD AUTO: 7.2 K/UL (ref 4.1–11.1)

## 2018-05-13 PROCEDURE — 36592 COLLECT BLOOD FROM PICC: CPT

## 2018-05-13 PROCEDURE — 74011250637 HC RX REV CODE- 250/637: Performed by: PHYSICIAN ASSISTANT

## 2018-05-13 PROCEDURE — 85027 COMPLETE CBC AUTOMATED: CPT | Performed by: NURSE PRACTITIONER

## 2018-05-13 PROCEDURE — 77010033678 HC OXYGEN DAILY

## 2018-05-13 PROCEDURE — 97116 GAIT TRAINING THERAPY: CPT

## 2018-05-13 PROCEDURE — 83735 ASSAY OF MAGNESIUM: CPT | Performed by: NURSE PRACTITIONER

## 2018-05-13 PROCEDURE — 74011250637 HC RX REV CODE- 250/637: Performed by: NURSE PRACTITIONER

## 2018-05-13 PROCEDURE — 80048 BASIC METABOLIC PNL TOTAL CA: CPT | Performed by: NURSE PRACTITIONER

## 2018-05-13 PROCEDURE — 65660000000 HC RM CCU STEPDOWN

## 2018-05-13 PROCEDURE — 71045 X-RAY EXAM CHEST 1 VIEW: CPT

## 2018-05-13 RX ADMIN — AMIODARONE HYDROCHLORIDE 200 MG: 200 TABLET ORAL at 08:19

## 2018-05-13 RX ADMIN — Medication 10 ML: at 07:18

## 2018-05-13 RX ADMIN — CHLORHEXIDINE GLUCONATE 10 ML: 1.2 RINSE ORAL at 17:06

## 2018-05-13 RX ADMIN — Medication 400 MG: at 08:19

## 2018-05-13 RX ADMIN — CHLORHEXIDINE GLUCONATE 10 ML: 1.2 RINSE ORAL at 08:20

## 2018-05-13 RX ADMIN — Medication 10 ML: at 17:07

## 2018-05-13 RX ADMIN — OXYCODONE HYDROCHLORIDE AND ACETAMINOPHEN 2 TABLET: 5; 325 TABLET ORAL at 07:18

## 2018-05-13 RX ADMIN — Medication 400 MG: at 17:06

## 2018-05-13 RX ADMIN — FAMOTIDINE 20 MG: 20 TABLET ORAL at 08:19

## 2018-05-13 RX ADMIN — FERROUS SULFATE TAB 325 MG (65 MG ELEMENTAL FE) 325 MG: 325 (65 FE) TAB at 08:19

## 2018-05-13 RX ADMIN — METOPROLOL TARTRATE 25 MG: 25 TABLET ORAL at 08:19

## 2018-05-13 RX ADMIN — POLYETHYLENE GLYCOL 3350 17 G: 17 POWDER, FOR SOLUTION ORAL at 08:18

## 2018-05-13 RX ADMIN — STANDARDIZED SENNA CONCENTRATE AND DOCUSATE SODIUM 1 TABLET: 8.6; 5 TABLET, FILM COATED ORAL at 08:19

## 2018-05-13 RX ADMIN — MUPIROCIN: 20 OINTMENT TOPICAL at 08:21

## 2018-05-13 RX ADMIN — ASPIRIN 81 MG 81 MG: 81 TABLET ORAL at 08:19

## 2018-05-13 NOTE — PROGRESS NOTES
Problem: Mobility Impaired (Adult and Pediatric)  Goal: *Acute Goals and Plan of Care (Insert Text)  Physical Therapy Goals  Initiated 5/10/2018  1. Patient will move from supine to sit and sit to supine, scoot up and down and roll side to side in bed with minimal assistance/contact guard assist within 5 days. 2.  Patient will perform sit to/from stand with minimal assistance/contact guard assist within 5 days. 3.  Patient will ambulate 25 feet with least restrictive assistive device and minimal assistance/contact guard assist within 5 days. 4.  Stair goal to be added when appropriate. 5.  Patient will perform cardiac exercises per protocol with supervision/set-up within 5 days. 6.  Patient will verbally and functionally recall 3/3 sternal precautions within 5 days. physical Therapy TREATMENT  Patient: Karen Stubbs (84 y.o. male)  Date: 5/13/2018  Diagnosis: CAD  CAD (coronary artery disease)  cardiac insufficiency  CORONARY ARTERY DISEASE  unknown <principal problem not specified>  Procedure(s) (LRB):  IMPELLA REMOVAL/ CVICU (N/A) 3 Days Post-Op  Precautions: Fall, Sternal  Chart, physical therapy assessment, plan of care and goals were reviewed. ASSESSMENT:  Chart reviewed, RN cleared patient for mobility, and patient received in chair with RN administering meds. Patient progressed with therapy, performing all tasks SBA to CGA. When gait training for 120 ft patient required standing rest break at 70ft due to fatigue with increased assistance provided (CGA) and minimal gait deviations noted (path deviations, increased trunk sway, and some shuffling steps). Patient returned to bed at end of session, performed cardiac ther ex, then all needs were placed within reach and RN notified of patient's status.  Discharge rec - HHPT     Progression toward goals:  [x]      Improving appropriately and progressing toward goals  []      Improving slowly and progressing toward goals  []      Not making progress toward goals and plan of care will be adjusted     PLAN:  Patient continues to benefit from skilled intervention to address the above impairments. Continue treatment per established plan of care. Discharge Recommendations:  Home Health  Further Equipment Recommendations for Discharge:  TBD     SUBJECTIVE:   Patient stated I thank you for coming to help me get out of here.    The patient stated 3/3 sternal precautions. Reviewed all 3 with patient. OBJECTIVE DATA SUMMARY:   Patient mobilized on continuous portable monitor/telemetry. Critical Behavior:  Neurologic State: Alert, Appropriate for age  Orientation Level: Oriented X4  Cognition: Appropriate for age attention/concentration, Appropriate safety awareness, Follows commands  Safety/Judgement: Awareness of environment, Fall prevention  Functional Mobility Training:  Bed Mobility:  Log       Sit to Supine: Supervision           Transfers:  Sit to Stand: Stand-by assistance  Stand to Sit: Stand-by assistance        Bed to Chair: Stand-by assistance                    Balance:     Ambulation/Gait Training:  Distance (ft): 120 Feet (ft) (2 min standing rest break at 70ft)  Assistive Device: Gait belt  Ambulation - Level of Assistance: Stand-by assistance;Contact guard assistance (SBA increased to CGA with fatigue)        Gait Abnormalities: Decreased step clearance;Trunk sway increased; Path deviations (all increased with distance)              Speed/Colette: Pace decreased (<100 feet/min)  Step Length: Left shortened;Right shortened                    Stairs: Therapeutic Exercises:   Patient instructed on the benefits and demonstrated cardiac exercises while supine. Instructed and indicated understanding on how to progress reps and sets against gravity, working up to 5 lbs and so on based on surgeon clearance for more weight in prep for functional activity. Can use household items for weights.      CARDIAC  EXERCISE   Sets   Reps   Active Active Assist   Passive Self ROM   Comments   Shoulder flexion 1 5 []                                            []                                            []                                            []                                               Shoulder abduction 1  5 []                                            []                                            []                                            []                                               Scapular elevation 1 5 []                                            []                                            []                                            []                                               Scapular retraction 1 5 []                                            []                                            []                                            []                                                 Pain:  Pain Scale 1: Numeric (0 - 10)  Pain Intensity 1: 2              Activity Tolerance:   Good, required one standing rest break, understood activity pacing and recommendation to ambulate with RN/PCT 60ft/half of what he did during this session at least twice today  Please refer to the flowsheet for vital signs taken during this treatment.   After treatment:   [] Patient left in no apparent distress sitting up in chair  [x] Patient left in no apparent distress in bed  [x] Call bell left within reach  [x] Nursing notified  [] Caregiver present  [] Bed alarm activated    COMMUNICATION/COLLABORATION:   The patients plan of care was discussed with: Registered Nurse    Sandhya Fofana PT, DPT   Time Calculation: 13 mins

## 2018-05-13 NOTE — PROGRESS NOTES
0730:Bedside shift change report given to Lawrence Briceno (oncoming nurse) by Ariel Coello (offgoing nurse). Report included the following information SBAR, Kardex, Procedure Summary, Intake/Output, MAR, Recent Results, Med Rec Status and Cardiac Rhythm NSR.     1706: Patient refused Pericolace , pt educated             Patient ambulated in the hallway twice             Fair appetite              1930:Bedside shift change report given to [address] Domenico Arriola (oncoming nurse) by Lawrence Briceno (offgoing nurse). Report included the following information SBAR, Kardex, Procedure Summary, Intake/Output, MAR, Recent Results, Med Rec Status and Cardiac Rhythm NSR, sinus terri     Problem: Falls - Risk of  Goal: *Absence of Falls  Document César Fall Risk and appropriate interventions in the flowsheet. Outcome: Progressing Towards Goal  Fall Risk Interventions:  Calls out for assistance   Mobility Interventions: Patient to call before getting OOB    Mentation Interventions: Adequate sleep, hydration, pain control, Door open when patient unattended, Evaluate medications/consider consulting pharmacy, Toileting rounds, Update white board    Medication Interventions: Patient to call before getting OOB, Evaluate medications/consider consulting pharmacy    Elimination Interventions: Patient to call for help with toileting needs, Call light in reach, Toileting schedule/hourly rounds    History of Falls Interventions: Door open when patient unattended, Evaluate medications/consider consulting pharmacy    Problem: Pressure Injury - Risk of  Goal: *Prevention of pressure injury  Document Celestine Scale and appropriate interventions in the flowsheet.    Outcome: Progressing Towards Goal  Pressure Injury Interventions:  Turns self, no pressure ulcers noted   Sensory Interventions: Assess changes in LOC, Chair cushion, Pad between skin to skin, Pressure redistribution bed/mattress (bed type)    Moisture Interventions: Absorbent underpads, Assess need for specialty bed, Limit adult briefs, Maintain skin hydration (lotion/cream), Moisture barrier, Offer toileting Q_hr    Activity Interventions: Increase time out of bed, Pressure redistribution bed/mattress(bed type)    Mobility Interventions: HOB 30 degrees or less, Pressure redistribution bed/mattress (bed type), PT/OT evaluation    Nutrition Interventions: Discuss nutritional consult with provider, Document food/fluid/supplement intake    Friction and Shear Interventions: Foam dressings/transparent film/skin sealants, HOB 30 degrees or less, Lift sheet, Minimize layers       Problem: Infection - Risk of, Central Venous Catheter-Associated Bloodstream Infection  Goal: *Absence of infection signs and symptoms  Outcome: Progressing Towards Goal  No s/sx of infection

## 2018-05-13 NOTE — PROGRESS NOTES
[facility] ICU Progress Note    Admit Date: 2018  POD:  4 Day Post-Op    Procedure:  Procedure(s):  ON PUMP CORONARY ARTERY BYPASS GRAFT X 3 WITH LEFT LEG ENDOVASCULAR VEIN HARVEST/ LEFT INTERNAL MAMMARY ARTERY HARVEST. TRANSESOPHAGEAL ECHOCARDIOGRAM AND EPI-AORTIC ULTRASOUND BY DR. Jackeline Campa. Subjective:   Pt seen with Dr. Augustin Monzon. Dobut dc today. Cr 0.9      Objective:   Vitals:  Blood pressure 125/75, pulse (!) 59, temperature 98.6 °F (37 °C), resp. rate 18, height 5' 6\" (1.676 m), weight 188 lb 4.4 oz (85.4 kg), SpO2 96 %. Temp (24hrs), Av.4 °F (36.9 °C), Min:97.6 °F (36.4 °C), Max:98.9 °F (37.2 °C)    Hemodynamics:   CO: CO (l/min): 9.2 l/min   CI: CI (l/min/m2): 2.8 l/min/m2   CVP: CVP (mmHg): 6 mmHg (18 1200)   SVR: SVR (dyne*sec)/cm5: 857 (dyne*sec)/cm5 (18 5800)   PAP Systolic: PAP Systolic: 44 (17//06 7566)   PAP Diastolic: PAP Diastolic: 17 (54/61/27 5761)   PVR:     SV02: SVO2 (%): 54 % (18 1200)   SCV02:      EKG/Rhythm:  Paced AAI 80    CT Output: 750 ml    Oxygen Therapy:  Oxygen Therapy  O2 Sat (%): 96 % (18 1108)  Pulse via Oximetry: 80 beats per minute (18 1200)  O2 Device: Room air (18 1108)  O2 Flow Rate (L/min): 2 l/min (18 0349)  FIO2 (%): 50 % (18 1318)    CXR: IMPRESSION: Bibasilar atelectasis/effusions left greater than right are slightly  improved. Admission Weight: Last Weight   Weight: 188 lb 11.4 oz (85.6 kg) Weight: 188 lb 4.4 oz (85.4 kg)     Intake / Output / Drain:  Current Shift:  07 -  190  In: 286.3 [P.O.:240;  I.V.:46.3]  Out: 400 [Urine:400]  Last 24 hrs.:     Intake/Output Summary (Last 24 hours) at 18 1302  Last data filed at 18 7404   Gross per 24 hour   Intake           646.31 ml   Output             1100 ml   Net          -453.69 ml       EXAM:  General:  Alert, NAD                                                                                            Lungs:   Clear upper, diminished bases to auscultation bilaterally. Incision:  Dsg cdi   Heart:  Regular rate and rhythm, S1, S2 normal, no murmur, click, rub or gallop. Abdomen:   Soft, non-tender. Bowel sounds hypoactive. No masses,  No organomegaly. Extremities:  No edema. PPP. Neurologic:  Gross motor and sensory apparatus intact. Labs:   Recent Labs      18   0352  18   1128   18   0355   WBC  7.2   --    < >   --    HGB  7.4*   --    < >   --    HCT  24.0*   --    < >   --    PLT  189   --    < >   --    NA  139   --    < >   --    K  4.4   --    < >   --    BUN  13   --    < >   --    CREA  0.91   --    < >   --    GLU  109*   --    < >   --    GLUCPOC   --   156*   < >   --    INR   --    --    --   1.0    < > = values in this interval not displayed. Assessment:     Active Problems:    CAD (coronary artery disease) (2018)         Plan/Recommendations/Medical Decision Makin. CAD s/p CABG: On ASA, statin, dobut dc  2. Acute systolic heart failure (NYHA class III on admission): EF 10-15%. Impella removed 5/10. Cont dobut gtt at 3 today -plan to decrease by 1 mcg daily over the weekend. No BB, ACE/ARB until appropriate. Will need repeat echo prior to discharge to eval for lifevest.  3. Acute hypoxic respiratory failure: Improved, on NC, wean O2 for sats >92%  4. Post op anemia st acute blood loss: Hgb 7.1,  Monitor H&H and CT output  5. Thrombocytopenia: Plt 102, monitor  6. HTN: not on any antihypertensives, Monitor  7. Renal insufficiency: improved, Monitor U/O and creatinine  8. Elevated liver enzymes:resolved  9. + cocaine use:  on cessation when appropriate  10. Tobacco use: reinforce cessation  11. Dispo:  Transfer to [facility].     Signed By: DIGNA Esquivel

## 2018-05-13 NOTE — PROGRESS NOTES
1930 Bedside shift change report given to sarah Bedolla RN (oncoming nurse) by Reyna Robbins RN (offgoing nurse). Report included the following information SBAR, Intake/Output, Recent Results and Cardiac Rhythm NSR.   0730 Uneventful shift. Bedside shift change report given to Amanda Ambrosio (oncoming nurse) by Bong Fournier RN (offgoing nurse). Report included the following information SBAR, Intake/Output, Recent Results and Cardiac Rhythm NSR. Problem: Falls - Risk of  Goal: *Absence of Falls  Document César Fall Risk and appropriate interventions in the flowsheet.    Outcome: Progressing Towards Goal  Fall Risk Interventions:  Mobility Interventions: Communicate number of staff needed for ambulation/transfer, Patient to call before getting OOB, PT Consult for mobility concerns, PT Consult for assist device competence, Utilize walker, cane, or other assitive device, Strengthening exercises (ROM-active/passive)    Medication Interventions: Teach patient to arise slowly, Patient to call before getting OOB      Problem: CABG: Post-Op Day 2/Transfer Day  Goal: *Hemodynamically stable without vasoactive medications  Outcome: Progressing Towards Goal  Patient is on dobutamine 1mcg/kg/min  Goal: *Lungs clear or at baseline  Outcome: Progressing Towards Goal  Pt lungs are diminished to auscultation  Goal: *Tolerating diet  Outcome: Progressing Towards Goal  Pt reports decreased appetite

## 2018-05-14 VITALS
DIASTOLIC BLOOD PRESSURE: 77 MMHG | HEIGHT: 66 IN | SYSTOLIC BLOOD PRESSURE: 115 MMHG | OXYGEN SATURATION: 92 % | RESPIRATION RATE: 18 BRPM | BODY MASS INDEX: 29.51 KG/M2 | TEMPERATURE: 99 F | HEART RATE: 66 BPM | WEIGHT: 183.64 LBS

## 2018-05-14 LAB
ANION GAP SERPL CALC-SCNC: 6 MMOL/L (ref 5–15)
BUN SERPL-MCNC: 14 MG/DL (ref 6–20)
BUN/CREAT SERPL: 14 (ref 12–20)
CALCIUM SERPL-MCNC: 8.7 MG/DL (ref 8.5–10.1)
CHLORIDE SERPL-SCNC: 106 MMOL/L (ref 97–108)
CO2 SERPL-SCNC: 26 MMOL/L (ref 21–32)
CREAT SERPL-MCNC: 0.98 MG/DL (ref 0.7–1.3)
ERYTHROCYTE [DISTWIDTH] IN BLOOD BY AUTOMATED COUNT: 13.9 % (ref 11.5–14.5)
GLUCOSE SERPL-MCNC: 105 MG/DL (ref 65–100)
HCT VFR BLD AUTO: 25.8 % (ref 36.6–50.3)
HGB BLD-MCNC: 8 G/DL (ref 12.1–17)
MAGNESIUM SERPL-MCNC: 2 MG/DL (ref 1.6–2.4)
MCH RBC QN AUTO: 27.6 PG (ref 26–34)
MCHC RBC AUTO-ENTMCNC: 31 G/DL (ref 30–36.5)
MCV RBC AUTO: 89 FL (ref 80–99)
NRBC # BLD: 0 K/UL (ref 0–0.01)
NRBC BLD-RTO: 0 PER 100 WBC
PLATELET # BLD AUTO: 270 K/UL (ref 150–400)
PMV BLD AUTO: 11.4 FL (ref 8.9–12.9)
POTASSIUM SERPL-SCNC: 4.3 MMOL/L (ref 3.5–5.1)
RBC # BLD AUTO: 2.9 M/UL (ref 4.1–5.7)
SODIUM SERPL-SCNC: 138 MMOL/L (ref 136–145)
WBC # BLD AUTO: 9 K/UL (ref 4.1–11.1)

## 2018-05-14 PROCEDURE — 36415 COLL VENOUS BLD VENIPUNCTURE: CPT | Performed by: NURSE PRACTITIONER

## 2018-05-14 PROCEDURE — 83735 ASSAY OF MAGNESIUM: CPT | Performed by: NURSE PRACTITIONER

## 2018-05-14 PROCEDURE — 74011250637 HC RX REV CODE- 250/637: Performed by: NURSE PRACTITIONER

## 2018-05-14 PROCEDURE — 80048 BASIC METABOLIC PNL TOTAL CA: CPT | Performed by: NURSE PRACTITIONER

## 2018-05-14 PROCEDURE — 85027 COMPLETE CBC AUTOMATED: CPT | Performed by: NURSE PRACTITIONER

## 2018-05-14 PROCEDURE — 93308 TTE F-UP OR LMTD: CPT

## 2018-05-14 RX ORDER — POLYETHYLENE GLYCOL 3350 17 G/17G
17 POWDER, FOR SOLUTION ORAL DAILY
Qty: 14 PACKET | Refills: 0 | Status: SHIPPED | OUTPATIENT
Start: 2018-05-15 | End: 2018-07-16

## 2018-05-14 RX ORDER — METOPROLOL SUCCINATE 25 MG/1
25 TABLET, EXTENDED RELEASE ORAL DAILY
Status: DISCONTINUED | OUTPATIENT
Start: 2018-05-14 | End: 2018-05-14 | Stop reason: HOSPADM

## 2018-05-14 RX ORDER — SPIRONOLACTONE 25 MG/1
25 TABLET ORAL DAILY
Qty: 30 TAB | Refills: 1 | Status: SHIPPED | OUTPATIENT
Start: 2018-05-15 | End: 2019-03-10

## 2018-05-14 RX ORDER — GUAIFENESIN 100 MG/5ML
81 LIQUID (ML) ORAL DAILY
Qty: 365 TAB | Refills: 0 | Status: SHIPPED | OUTPATIENT
Start: 2018-05-15 | End: 2019-03-10

## 2018-05-14 RX ORDER — METOPROLOL SUCCINATE 25 MG/1
25 TABLET, EXTENDED RELEASE ORAL DAILY
Qty: 30 TAB | Refills: 1 | Status: SHIPPED | OUTPATIENT
Start: 2018-05-15 | End: 2019-03-10

## 2018-05-14 RX ORDER — LISINOPRIL 5 MG/1
2.5 TABLET ORAL DAILY
Status: DISCONTINUED | OUTPATIENT
Start: 2018-05-14 | End: 2018-05-14 | Stop reason: HOSPADM

## 2018-05-14 RX ORDER — AMIODARONE HYDROCHLORIDE 200 MG/1
200 TABLET ORAL EVERY 12 HOURS
Qty: 60 TAB | Refills: 0 | Status: SHIPPED | OUTPATIENT
Start: 2018-05-14 | End: 2018-05-21

## 2018-05-14 RX ORDER — OXYCODONE AND ACETAMINOPHEN 5; 325 MG/1; MG/1
1-2 TABLET ORAL
Qty: 40 TAB | Refills: 0 | Status: SHIPPED | OUTPATIENT
Start: 2018-05-14 | End: 2018-05-21

## 2018-05-14 RX ORDER — LANOLIN ALCOHOL/MO/W.PET/CERES
325 CREAM (GRAM) TOPICAL
Qty: 14 TAB | Refills: 0 | Status: SHIPPED | OUTPATIENT
Start: 2018-05-15 | End: 2018-07-16

## 2018-05-14 RX ORDER — LISINOPRIL 2.5 MG/1
2.5 TABLET ORAL DAILY
Qty: 30 TAB | Refills: 1 | Status: SHIPPED | OUTPATIENT
Start: 2018-05-15 | End: 2019-03-10

## 2018-05-14 RX ORDER — SPIRONOLACTONE 25 MG/1
25 TABLET ORAL DAILY
Status: DISCONTINUED | OUTPATIENT
Start: 2018-05-14 | End: 2018-05-14 | Stop reason: HOSPADM

## 2018-05-14 RX ORDER — ATORVASTATIN CALCIUM 10 MG/1
10 TABLET, FILM COATED ORAL
Qty: 30 TAB | Refills: 1 | Status: SHIPPED | OUTPATIENT
Start: 2018-05-14 | End: 2019-03-10

## 2018-05-14 RX ORDER — AMOXICILLIN 250 MG
1 CAPSULE ORAL 2 TIMES DAILY
Qty: 60 TAB | Refills: 0 | Status: SHIPPED | OUTPATIENT
Start: 2018-05-14 | End: 2019-03-10

## 2018-05-14 RX ADMIN — ASPIRIN 81 MG 81 MG: 81 TABLET ORAL at 11:24

## 2018-05-14 RX ADMIN — Medication 10 ML: at 11:26

## 2018-05-14 RX ADMIN — METOPROLOL SUCCINATE 25 MG: 25 TABLET, EXTENDED RELEASE ORAL at 11:22

## 2018-05-14 RX ADMIN — AMIODARONE HYDROCHLORIDE 200 MG: 200 TABLET ORAL at 11:23

## 2018-05-14 RX ADMIN — FERROUS SULFATE TAB 325 MG (65 MG ELEMENTAL FE) 325 MG: 325 (65 FE) TAB at 11:24

## 2018-05-14 RX ADMIN — LISINOPRIL 2.5 MG: 5 TABLET ORAL at 11:21

## 2018-05-14 RX ADMIN — Medication 400 MG: at 11:25

## 2018-05-14 RX ADMIN — FAMOTIDINE 20 MG: 20 TABLET ORAL at 11:26

## 2018-05-14 RX ADMIN — SPIRONOLACTONE 25 MG: 25 TABLET, FILM COATED ORAL at 11:21

## 2018-05-14 NOTE — DISCHARGE SUMMARY
[facility] Discharge Summary     Patient ID:  Debra Mayer  [id number]  13 y.o.  1960    Admit date: 5/4/2018    Discharge date: 5/14/2018     Admitting Physician: Anum Saeed MD     Referring Cardiologist:  Dr. Kwadwo Uriostegui    PCP:  Char Melton MD    Admitting Diagnoses: CAD    Discharge Diagnoses:     Hospital Problems  Date Reviewed: 5/10/2018          Codes Class Noted POA    CAD (coronary artery disease) ICD-10-CM: I25.10  ICD-9-CM: 414.00  5/4/2018 Unknown              Discharged Condition: good    Disposition: home, see patient instructions for treatment and plan    Procedures for this admission:  Procedure(s):  [address] CVICU    Discharge Medications:      My Medications      START taking these medications       Instructions Each Dose to Equal   Morning Noon Evening Bedtime    amiodarone 200 mg tablet   Commonly known as:  CORDARONE       Your last dose was: Your next dose is: Take 1 Tab by mouth every twelve (12) hours. 200 mg                        aspirin 81 mg chewable tablet   Start taking on:  5/15/2018       Your last dose was: Your next dose is: Take 1 Tab by mouth daily. 81 mg                        atorvastatin 10 mg tablet   Commonly known as:  LIPITOR       Your last dose was: Your next dose is: Take 1 Tab by mouth nightly. 10 mg                        ferrous sulfate 325 mg (65 mg iron) tablet   Start taking on:  5/15/2018       Your last dose was: Your next dose is: Take 1 Tab by mouth daily (with breakfast). 325 mg                        lisinopril 2.5 mg tablet   Commonly known as:  [employer]ails   Start taking on:  5/15/2018       Your last dose was: Your next dose is: Take 1 Tab by mouth daily.     2.5 mg                        metoprolol succinate 25 mg XL tablet   Commonly known as:  TOPROL-XL   Start taking on:  5/15/2018       Your last dose was: Your next dose is: Take 1 Tab by mouth daily. 25 mg                        oxyCODONE-acetaminophen 5-325 mg per tablet   Commonly known as:  PERCOCET       Your last dose was: Your next dose is: Take 1-2 Tabs by mouth every four (4) hours as needed. Max Daily Amount: 12 Tabs. 1-2 Tab                        polyethylene glycol 17 gram packet   Commonly known as:  MIRALAX   Start taking on:  5/15/2018       Your last dose was: Your next dose is: Take 1 Packet by mouth daily. Take until having regular bowel movements. 17 g                        senna-docusate 8.6-50 mg per tablet   Commonly known as:  PERICOLACE       Your last dose was: Your next dose is: Take 1 Tab by mouth two (2) times a day. 1 Tab                        spironolactone 25 mg tablet   Commonly known as:  ALDACTONE   Start taking on:  5/15/2018       Your last dose was: Your next dose is: Take 1 Tab by mouth daily. 25 mg                          STOP taking these medications          amLODIPine 10 mg tablet   Commonly known as:  NORVASC                Where to Get Your Medications      These medications were sent to [facility]/pharmacy #[id number] - [place], [address] [place] [address], [phone]     Phone:  [phone]     amiodarone 200 mg tablet    aspirin 81 mg chewable tablet    atorvastatin 10 mg tablet    ferrous sulfate 325 mg (65 mg iron) tablet    lisinopril 2.5 mg tablet    metoprolol succinate 25 mg XL tablet    polyethylene glycol 17 gram packet    senna-docusate 8.6-50 mg per tablet    spironolactone 25 mg tablet         Information on where to get these meds will be given to you by the nurse or doctor. !  Ask your nurse or doctor about these medications     oxyCODONE-acetaminophen 5-325 mg per tablet             HPI: Lucho Montana is a 61 y.o. male who was referred for cardiac evaluation of coronary artery disease, referred by Dr. Otilia Chi. Pt's PMH of HTN and active tobacco use. Pt presented to [facility] ER with c/o left sided chest pain that started in the morning of 5/3, that radiates to L arm while driving. Associated with shortness of breath. Does admit to cocaine use this morning. Pt has not been complaint wit his BP medications. Pt admits to chronic cough and SOB for the past couple of months. Pt denies edema, orthopnea, syncope, dizziness or PND. EKG in ER was unremarkable for ischemia. Positive troponin 0.37, pro-BNP 6036. Pt had cardiac cath this morning, results below.       Pt currently uses tobacco 1PPD for 40 years,  Denies alcohol. Admits to cocaine use, drug screen in ER positive for cocaine and marijuana. Pt is . No known family cardiac history.        Hospital Course: The patient was transferred to [facility] from [facility] for CAD and low EF. He had an impella placed on 5/7/18 by Dr. Bre Valentin. He then underwent a CABGx3 by Dr Bre Valentin on 5/8/18 -see op notes for details. The patient was transferred to the ICU in stable condition on amiodarone, precedex, insulin, dobutamine, levophed, and epinephrine, with impella at level P9. His impella was removed on 5/10/18. He was hemodynamically stable and transferred to stepdown on POD 3. After medical optimization and working with PT/OT he is being discharged home. He will have a lifevest fitted as an outpatient. POD#1:1. CAD s/p CABG: On ASA, statin, no BB until appropriate  2. Acute systolic heart failure (NYHA class III on admission): EF 10-15%. On impella level P9. Cont dobut gtt. No BB, ACE/ARB until appropriate. Diuretics per AHF. 3. Acute hypoxic respiratory failure: Wean vent today. Diurese  4. Post op anemia st acute blood loss: Hgb 6.3, 1 unit PRBC this morning. Monitor H&H and CT output, no anticaogulation for impella until later today per Dr. William Wyatt. 5. Thrombocytopenia: Plt 116, monitor  6.  HTN: Wean cardene gtt for SBP <140  7. Renal insufficiency: Monitor U/O and creatinine  8. Elevated liver enzymes: monitor  9. + cocaine use:  on cessation when appropriate  10. Tobacco use: reinforce cessation  11. Dispo: Cont ICU, wean vent to extubate today.     POD#2: 1. CAD s/p CABG: On ASA, statin, no BB until off dobut  2. Acute systolic heart failure (NYHA class III on admission): EF 10-15%. On impella level P6. Cont dobut gtt. No BB, ACE/ARB until appropriate. Plan to dc impella later today. 3. Acute hypoxic respiratory failure: Improved, on NC, wean O2 for sats >92%  4. Post op anemia st acute blood loss: Hgb 7.2,  Monitor H&H and CT output  5. Thrombocytopenia: Plt 90, monitor  6. HTN: Monitor  7. Renal insufficiency: Monitor U/O and creatinine  8. Elevated liver enzymes: improving, monitor  9. + cocaine use:  on cessation when appropriate  10. Tobacco use: reinforce cessation  11. Dispo: Cont ICU, remove impella today. POD#3: 1. CAD s/p CABG: On ASA, statin, no BB until off dobut  2. Acute systolic heart failure (NYHA class III on admission): EF 10-15%. Impella removed 5/10. Cont dobut gtt at 3 today -plan to decrease by 1 mcg daily over the weekend. No BB, ACE/ARB until appropriate. Will need repeat echo prior to discharge to [facility] for lifevest.  3. Acute hypoxic respiratory failure: Improved, on NC, wean O2 for sats >92%  4. Post op anemia st acute blood loss: Hgb 7.1,  Monitor H&H and CT output  5. Thrombocytopenia: Plt 102, monitor  6. HTN: not on any antihypertensives, Monitor  7. Renal insufficiency: improved, Monitor U/O and creatinine  8. Elevated liver enzymes:resolved  9. + cocaine use:  on cessation when appropriate  10. Tobacco use: reinforce cessation  11. Dispo: Get PICC today. Remove PA line. Transfer to stepdown later today. POD#4: 1. CAD s/p CABG: On ASA, statin, dobut to 1  2. Acute systolic heart failure (NYHA class III on admission): EF 10-15%. Impella removed 5/10. Cont dobut gtt at 3 today -plan to decrease by 1 mcg daily over the weekend. No BB, ACE/ARB until appropriate. Will need repeat echo prior to discharge to eval for lifevest.  3. Acute hypoxic respiratory failure: Improved, on NC, wean O2 for sats >92%  4. Post op anemia st acute blood loss: Hgb 7.1,  Monitor H&H and CT output  5. Thrombocytopenia: Plt 102, monitor  6. HTN: not on any antihypertensives, Monitor  7. Renal insufficiency: improved, Monitor U/O and creatinine  8. Elevated liver enzymes:resolved  9. + cocaine use:  on cessation when appropriate  10. Tobacco use: reinforce cessation  11. Dispo:  Transfer to stepdown. POD#5: 1. CAD s/p CABG: On ASA, statin, dobut dc  2. Acute systolic heart failure (NYHA class III on admission): EF 10-15%. Impella removed 5/10. Cont dobut gtt at 3 today -plan to decrease by 1 mcg daily over the weekend. No BB, ACE/ARB until appropriate. Will need repeat echo prior to discharge to eval for lifevest.  3. Acute hypoxic respiratory failure: Improved, on NC, wean O2 for sats >92%  4. Post op anemia st acute blood loss: Hgb 7.1,  Monitor H&H and CT output  5. Thrombocytopenia: Plt 102, monitor  6. HTN: not on any antihypertensives, Monitor  7. Renal insufficiency: improved, Monitor U/O and creatinine  8. Elevated liver enzymes:resolved  9. + cocaine use:  on cessation when appropriate  10. Tobacco use: reinforce cessation  11. Dispo:  Transfer to stepdown. POD#6: 1. CAD s/p CABG: On ASA, statin, BB  2. Acute systolic heart failure (NYHA class III on admission): EF 10-15%. Impella removed 5/10. Dobut gtt stopped 5/12. On metop xl, lisinopril, spironolactone. Repeat echo today to eval for lifevest.  3. Acute hypoxic respiratory failure: resolved, on RA  4. Post op anemia st acute blood loss: Hgb 8,  Monitor H&H  5. Thrombocytopenia: resolved  6. HTN: cont metop, lisinopril  7. Renal insufficiency: improved, Monitor U/O and creatinine  8.  Elevated liver enzymes:resolved  9. + cocaine use:  on cessation when appropriate  10. Tobacco use: reinforce cessation  11. Dispo:  PT/OT, dc home today or tomorrow.      Update: TTE results EF 30%. Contacted lifevest to begin the process for lifevest. Pt is insistent on leaving the hospital today. Pt will be fitted for lifevest as outpatient. Referral to outpatient cardiac rehab made. Discharge Vital Signs:   Visit Vitals    /77 (BP 1 Location: Right arm, BP Patient Position: At rest;Head of bed elevated (Comment degrees))    Pulse 66    Temp 99 °F (37.2 °C)    Resp 18    Ht 5' 6\" (1.676 m)    Wt 183 lb 10.3 oz (83.3 kg)    SpO2 92%    BMI 29.64 kg/m2       Labs:   Recent Labs      05/14/18   0305   05/12/18   1128   05/12/18   0355   WBC  9.0   < >   --    < >   --    HGB  8.0*   < >   --    < >   --    HCT  25.8*   < >   --    < >   --    PLT  270   < >   --    < >   --    NA  138   < >   --    < >   --    K  4.3   < >   --    < >   --    BUN  14   < >   --    < >   --    CREA  0.98   < >   --    < >   --    GLU  105*   < >   --    < >   --    GLUCPOC   --    --   156*   < >   --    INR   --    --    --    --   1.0    < > = values in this interval not displayed. Diagnostics: IMPRESSION  Impression:  Interval removal of subxiphoid chest tubes. Otherwise unchanged. Patient Instructions/Follow Up Care:  Discharge instructions were reviewed with the patient and family present. Questions were also answered at this time. Prescriptions and medications were reviewed. The patient has a follow up appointment with the Nurse Practitioner or [address] [place] [id number] Assistant on 5/21 and with Dr. Tono Bautista on 6/18. The patient was also instructed to follow up with his primary care physician as needed. The patient and family were encouraged to call with any questions or concerns.        Signed:  Don Mcmahon NP  5/14/2018  1:04 PM

## 2018-05-14 NOTE — CARDIO/PULMONARY
Cardiac Rehab: CABG education folder  to bedside. Met with Malcom Saleh and his wife  to review cardiac surgery post discharge instructions and to discuss participation in the [address] [place] [address]. Malcom Saleh continues to get on his phone and does not make eye contact. Educated using teach back method. Reviewed the use of bear for sternal support, daily weight and temperature monitoring, showering restrictions, signs and symptoms of infection at surgery sites, daily walking and arm exercises, and use of incentive spirometer. Malcom Saleh was able to demonstrate proper use of incentive spirometer, achieving 900 ml. Discussed the Smoking Cessation Program link added to AVS. Discussed Heart Healthy/Low Sodium (2000 mg.) diet. Placed a  red reminder bracelet. Discussed purpose of bracelet, duration of wear, and when to call surgeons office. Discussed Cardiac Rehab Program format, benefits, and encouraged participation. Malcom Saleh lives in [place] so the new Cardiac Rehab program at [facility] will contact him after discharge to enroll.    Chuckie Garner RN

## 2018-05-14 NOTE — PROGRESS NOTES
[facility] Progress Note      DOS:   5/14/2018  NAME:  Harshad Stein   MRN:   [id number]     REFERRING PROVIDER:  Dr. Weldon Joo: Mariana Wasserman MD    Chief Complaint:  SOB/Chest pain     HPI: 61y.o. year old male with a history of HTN who was admitted to [facility] [place] with complaint of chest pain and SOB that started yesterday morning, he ruled in for NSTEMI and CSS was consulted for surgical intervention and he was transferred to [facility]. He has a history of HTN but does not take any antihypertensives, he admits to doing cocaine the mornign his chest pain started and is currently smoker. He denies HA, N/V, edema or dizziness but states he continues to be SOB and has orthopnea. The [facility] has been consulted to comment on his acute cardiomyopathy and medical management. He underwent a CABG x 3 with impella support, weaned off all mcs and inotropic support without issue, starting GDMT. 24Hr events:  Stable off dobutamine  No acute overnight events  Rude and cursing at nursing staff and wants to leave. Impression / Plan:   Heart Failure Status: NYHA Class III    Acute HFeEF, ICM, EF 15%, 3V CAD   POD 5 CABG   Hold coreg due to bradycardia and on dobutamine. Cont ASA   No diuretics today, appears euvolemic    Will need lifevest pre discharge if EF remains < 35% on repeat TT   Repeat TTE   Stable off dobutamine   Change to Toprol XL 25mg   Start Spironolactone 25mg   Start Lisinopril 2.5mg   Not a candidate for durable MCS due to recent cocaine use, would need to abstain for at least 6 months. History:  History reviewed. No pertinent past medical history. Past Surgical History:   Procedure Laterality Date    HX ORTHOPAEDIC      left hip surgery     Social History     Social History    Marital status:      Spouse name: N/A    Number of children: N/A    Years of education: N/A     Occupational History    Not on file.      Social History Main Topics    Smoking status: Former Smoker     Quit date: 5/3/2018    Smokeless tobacco: Not on file    Alcohol use No    Drug use: Not on file    Sexual activity: Not on file     Other Topics Concern    Not on file     Social History Narrative     Family History   Problem Relation Age of Onset    Diabetes Mother        Current Medications:   Current Facility-Administered Medications   Medication Dose Route Frequency Provider Last Rate Last Dose    metoprolol succinate (TOPROL-XL) XL tablet 25 mg  25 mg Oral DAILY Mike Huber NP        lisinopril (PRINIVIL, ZESTRIL) tablet 2.5 mg  2.5 mg Oral DAILY Mike Huber NP        spironolactone (ALDACTONE) tablet 25 mg  25 mg Oral DAILY Mike Huber NP        sodium chloride (NS) flush 5-10 mL  5-10 mL IntraVENous Q8H [place] Lobe, NP   10 mL at 05/13/18 0718    sodium chloride (NS) flush 5-10 mL  5-10 mL IntraVENous PRN Chintan Lobe, NP        ferrous sulfate tablet 325 mg  1 Tab Oral DAILY WITH BREAKFAST [place] Lobe, NP   325 mg at 05/13/18 0819    amiodarone (CORDARONE) tablet 200 mg  200 mg Oral Q12H [place] Lobe, NP   200 mg at 05/13/18 0819    sodium chloride (NS) flush 20 mL  20 mL InterCATHeter PRN [place] Lobe, NP        sodium chloride (NS) flush 10 mL  10 mL InterCATHeter Q24H [place] Lobe, NP   10 mL at 05/13/18 1707    sodium chloride (NS) flush 10 mL  10 mL InterCATHeter PRN Chintan Lobe, NP        sodium chloride (NS) flush 10 mL  10 mL InterCATHeter Q8H Chintan Lobe, NP   10 mL at 05/13/18 0718    bacitracin 500 unit/gram packet 1 Packet  1 Packet Topical PRN [place] Lobe, NP        atorvastatin (LIPITOR) tablet 10 mg  10 mg Oral QHS Chintan Lobe, NP   10 mg at 05/12/18 2104    oxyCODONE-acetaminophen (PERCOCET) 5-325 mg per tablet 1 Tab  1 Tab Oral Q4H PRN Leverne Starch, NP   1 Tab at 05/12/18 1521    oxyCODONE-acetaminophen (PERCOCET) 5-325 mg per tablet 2 Tab  2 Tab Oral Q4H PRN [place] Erin Dash, NP   2 Tab at 05/13/18 9629    naloxone [facility]) injection 0.4 mg  0.4 mg IntraVENous PRN Evan Cisneros, NP        ondansetron [facility]) injection 4 mg  4 mg IntraVENous Q4H PRN Evanfrancisco Cisneros NP        albuterol (PROVENTIL VENTOLIN) nebulizer solution 2.5 mg  2.5 mg Nebulization Q4H PRN Evan Cisneros NP        aspirin chewable tablet 81 mg  81 mg Oral DAILY Evan Csineros, NP   81 mg at 05/13/18 0819    chlorhexidine (PERIDEX) 0.12 % mouthwash 10 mL  10 mL Oral BID Evan Cisneros, NP   10 mL at 05/13/18 1706    famotidine (PEPCID) tablet 20 mg  20 mg Oral Q12H Evan Cisneros, NP   20 mg at 05/13/18 3206    magnesium oxide (MAG-OX) tablet 400 mg  400 mg Oral BID Evan Cisneros, NP   400 mg at 05/13/18 1706    bisacodyl (DULCOLAX) suppository 10 mg  10 mg Rectal DAILY PRN Evan Cisneros, NP        senna-docusate (PERICOLACE) 8.6-50 mg per tablet 1 Tab  1 Tab Oral BID Evan Cisneros, NP   1 Tab at 05/13/18 5271    polyethylene glycol (MIRALAX) packet 17 g  17 g Oral DAILY Evan Cisneros, NP   17 g at 05/13/18 0818    glucose chewable tablet 16 g  4 Tab Oral PRN Evan Cisneros, NP        glucagon [facility]) injection 1 mg  1 mg IntraMUSCular PRN Evan Cisneros, NP        bacitracin 500 unit/gram packet 1 Packet  1 Packet Topical PRN Evan Cisneros, NP           Allergies: No Known Allergies    ROS:    General ROS: frustrated, wants to go home. Respiratory ROS: negative for - cough or shortness of breath  Cardiovascular ROS: sternal pain, no CONRAD, no edema   Gastrointestinal ROS: No flatus or BM  Neurological ROS: negative    Physical Exam:   Constitutional: AAO x 3  HENT:   Head: Normocephalic and atraumatic. Eyes:PERRL. Neck: Neck supple. No JVD present. Cardiovascular: Normal rate, S1 normal and S2 normal.  Pulses: + pulses  Pulmonary/Chest: CTA, no cough   Abdominal: Soft. Bowel sounds are normal. He exhibits no distension. Musculoskeletal: no edema noted, full ROM, not following sternal precautions. Neurological: grossly normal, ambulating on own. Skin: Skin is warm and dry. Surgical dressings intact. Vitals:   Visit Vitals    /71 (BP 1 Location: Right arm, BP Patient Position: Sitting)    Pulse 76    Temp 98.8 °F (37.1 °C)    Resp 18    Ht 5' 6\" (1.676 m)    Wt 183 lb 10.3 oz (83.3 kg)    SpO2 99%    BMI 29.64 kg/m2         Temp (24hrs), Av.9 °F (37.2 °C), Min:98.4 °F (36.9 °C), Max:99.5 °F (37.5 °C)      Admission Weight: Last Weight   Weight: 188 lb 11.4 oz (85.6 kg) Weight: 183 lb 10.3 oz (83.3 kg)     Intake / Output / Drain:  Last 24 hrs.:     Intake/Output Summary (Last 24 hours) at 18 0997  Last data filed at 18 0305   Gross per 24 hour   Intake                0 ml   Output              650 ml   Net             -650 ml       Oxygen Therapy:  Oxygen Therapy  O2 Sat (%): 99 % (18 0745)  Pulse via Oximetry: 80 beats per minute (18 1200)  O2 Device: Room air (18 0818)  O2 Flow Rate (L/min): 2 l/min (18 0349)  FIO2 (%): 50 % (18 1318)    Recent Labs:   Labs Latest Ref Rng & Units 2018 2018 2018 2018 5/10/2018 2018 2018   WBC 4.1 - 11.1 K/uL 9.0 7.2 7.7 7.9 7.6 - -   RBC 4.10 - 5.70 M/uL 2.90(L) 2.66(L) 2.53(L) 2.51(L) 2.51(L) - -   Hemoglobin 12.1 - 17.0 g/dL 8. 0(L) 7. 4(L) 7. 1(L) 7. 1(L) 7. 2(L) 6. 8(L) 7. 1(L)   Hematocrit 36.6 - 50.3 % 25. 8(L) 24. 0(L) 22. 5(L) 22. 1(L) 21. 9(L) 20. 9(L) 21. 6(L)   MCV 80.0 - 99.0 FL 89.0 90.2 88.9 88.0 87.3 - -   Platelets 964 - 720 K/uL 270 189 140(L) 102(L) 90(L) - -   Lymphocytes 12 - 49 % - - - - - - -   Monocytes 5 - 13 % - - - - - - -   Eosinophils 0 - 7 % - - - - - - -   Basophils 0 - 1 % - - - - - - -   Bands 0 - 6 % - - - - - - -   Albumin 3.5 - 5.0 g/dL - - 2. 4(L) 2. 6(L) 3.0(L) - -   Calcium 8.5 - 10.1 MG/DL 8.7 8.5 8.4(L) 8.2(L) 8. 1(L) - -   SGOT 15 - 37 U/L - - 27 30 54(H) - -   Glucose 65 - 100 mg/dL 105(H) 109(H) 97 92 113(H) - -   BUN 6 - 20 MG/DL 14 13 14 16 16 - -   Creatinine 0.70 - 1.30 MG/DL 0.98 0.91 0.82 0.85 1.22 - -   Sodium 136 - 145 mmol/L 138 139 139 137 138 - -   Potassium 3.5 - 5.1 mmol/L 4.3 4.4 4.4 4.3 4.1 - -   TSH 0.36 - 3.74 uIU/mL - - - - - - -   LDH 85 - 241 U/L - - - - 522(H) - -   Some recent data might be hidden     EKG:   EKG Results     Procedure 720 Value Units Date/Time    SCANNED CARDIAC RHYTHM STRIP [[id number]] Collected:  05/12/18 0653    Order Status:  Completed Updated:  05/12/18 0742    SCANNED CARDIAC RHYTHM STRIP [[id number]] Collected:  05/11/18 0934    Order Status:  Completed Updated:  05/11/18 0935    EKG, 12 LEAD, INITIAL [[id number]] Collected:  05/09/18 0345    Order Status:  Completed Updated:  05/09/18 1126     Ventricular Rate 69 BPM      Atrial Rate 69 BPM      P-R Interval 146 ms      QRS Duration 106 ms      Q-T Interval 456 ms      QTC Calculation (Bezet) 488 ms      Calculated P Axis 27 degrees      Calculated R Axis 38 degrees      Calculated T Axis 154 degrees      Diagnosis --     Normal sinus rhythm  Incomplete left bundle branch block  ST & T wave abnormality, consider anterolateral ischemia  Prolonged QT  When compared with ECG of 04-MAY-2018 09:15,  ST now depressed in Anterior leads  T wave inversion more evident in Anterior leads  Confirmed by Aftab Cochran MD, Alexandra Rivera ([id number]) on 5/9/2018 11:26:09 AM      SCANNED CARDIAC RHYTHM STRIP [[id number]] Collected:  05/09/18 0422    Order Status:  Completed Updated:  05/09/18 1193    SCANNED CARDIAC RHYTHM STRIP [[id number]] Collected:  05/07/18 0632    Order Status:  Completed Updated:  05/07/18 0640    SCANNED CARDIAC RHYTHM STRIP [[id number]] Collected:  05/07/18 0602    Order Status:  Completed Updated:  05/07/18 0604    SCANNED CARDIAC RHYTHM STRIP [[id number]] Collected:  05/05/18 0629    Order Status:  Completed Updated:  05/05/18 0631    EKG, 12 LEAD, INITIAL [[id number]]     Order Status:  Completed Echocardiogram:   5/3/18:  Left ventricle: Apical akinesis, Lateral hypokinesis, Normal septal motion  The ventricle was moderately to severely dilated. Systolic function was  normal. Ejection fraction was estimated in the range of 30 % to 35 %. There was severe diffuse hypokinesis with regional variations. Left atrium: The atrium was moderately to severely dilated. Mitral valve: There was mild to moderate regurgitation. Tricuspid valve: There was moderate regurgitation. Cardiac Catheterizations:   5/4: Cardiac catheterization 5/4/18:  L Main: >50% osteal      LAD: 40-50% proximal, 70% after first diagonal, 80-90% mid lesion      LCflex: Diffuse mild disease, OM1 small vessel difffuse 70-90% disease.      RCA: small dominant vessel, 50% proximal lesion, 70-90% tandem lesions proximal-md lesion, 80% distal lesion      LVEDP: 30 mHg      LVEF: 15% diffuse hypokinesis      No significant gradient across aortic valve.      PCI: None    Radiology (CXR, CT scans):   CXR Results  (Last 48 hours)               05/13/18 0429  XR CHEST PORT Final result    Impression:  Impression:   Interval removal of subxiphoid chest tubes. Otherwise unchanged. Narrative:  Chest portable AP       History: Recent heart surgery       Comparison: 5/12/2018       Findings:  The patient is status post median sternotomy and CABG. The previously   seen subxiphoid chest tubes and mediastinal drain have been removed. Retained   epicardial pacing wires are in place. A left PICC line terminates in appropriate   position. The heart is moderately enlarged, but unchanged. There is unchanged   mild pulmonary vasculature engorgement and edema. Unchanged area of patchy   opacification is seen in the superior aspect of the right lower lobe. A small   left pleural effusion is again seen. No pneumothorax.                     Billie Lawton, TUCKER  [address], [address] [facility], [address] [place] [address]  Office [phone]  Fax [phone]  24 hour VAD/HF Pager: [phone]

## 2018-05-14 NOTE — PROGRESS NOTES
Cardiac Surgery Care Coordinator- Met with Luis M Mccullough and his family, he stated he is gong home today. Reviewed discharge instructions to include reinforcement of sternal precautions and continued use of the incentive spirometer. Discussed showering instructions and briefly discussed the Life Vest.  Mrs John Palmer and her daughter are without questions,  Mr John Palmer stated he does not need instructions he knows what to do. Will follow up with a phone call after discharge.  Wilfrid Richard RN

## 2018-05-14 NOTE — PROGRESS NOTES
0730: Bedside shift change report given to Fanta Solorio RN (oncoming nurse) by Benny Nelson RN (offgoing nurse). Report included the following information SBAR, Kardex, Procedure Summary, Intake/Output, MAR, Accordion, Recent Results, Med Rec Status and Cardiac Rhythm SB to NSR.   0740: Pt states \"I'm leaving today whether the doctors let me go or not. \" RN attempted to explain the normal process for discharge, pt replied \"if I have one more nurse that lies to me I'm going to knock their ass out. \"   1547: Pt refused to take his morning medications. 0900: Spoke with family for them to come in for family meeting prior to discharge. 0930: Wires removed. Bed rest for 1 hour, will continue to monitor. 1300: PICC line removed. 1320: IV(s) and tele-monitor removed. Discharge instructions reviewed with patient with time allowed for questions. 1420: Printed and faxed information sheets to Lee Pizano at [facility]    Problem: Falls - Risk of  Goal: *Absence of Falls  Document César Fall Risk and appropriate interventions in the flowsheet. Outcome: Progressing Towards Goal  Fall Risk Interventions:  Mobility Interventions: Communicate number of staff needed for ambulation/transfer, OT consult for ADLs, PT Consult for mobility concerns, PT Consult for assist device competence, Strengthening exercises (ROM-active/passive)    Mentation Interventions: Door open when patient unattended    Medication Interventions: Teach patient to arise slowly    Elimination Interventions: Call light in reach, Patient to call for help with toileting needs, Toilet paper/wipes in reach, Toileting schedule/hourly rounds, Urinal in reach    History of Falls Interventions: Door open when patient unattended        Problem: Pressure Injury - Risk of  Goal: *Prevention of pressure injury  Document Celestine Scale and appropriate interventions in the flowsheet.    Outcome: Progressing Towards Goal  Pressure Injury Interventions:  Sensory Interventions: Assess changes in LOC, Minimize linen layers, Maintain/enhance activity level    Moisture Interventions: Limit adult briefs, Absorbent underpads    Activity Interventions: PT/OT evaluation, Pressure redistribution bed/mattress(bed type)    Mobility Interventions: Float heels, Pressure redistribution bed/mattress (bed type)    Nutrition Interventions: Document food/fluid/supplement intake    Friction and Shear Interventions: Lift sheet               Problem: CABG: Post-Op Day 5  Goal: Activity/Safety  Outcome: Progressing Towards Goal  OOB as tolerated   Goal: Diagnostic Test/Procedures  Outcome: Progressing Towards Goal  Labs being monitored  Goal: Discharge Planning  Outcome: Progressing Towards Goal  Interdisciplinary team involved.      Problem: Heart Failure: Day 5  Goal: Medications  Outcome: Not Progressing Towards Goal  Pt refused morning medications    Problem: Infection - Risk of, Central Venous Catheter-Associated Bloodstream Infection  Goal: *Absence of infection signs and symptoms  Outcome: Progressing Towards Goal  No s/sx of infection

## 2018-05-14 NOTE — PROGRESS NOTES
Atrial pacing wires pulled. Ventricular wire cute dt meeting resistance. Pt instructed to remain on bedrest for 1 hour and will need to be monitored for 4 hours prior to leaving the hospital. Pt states he understands.

## 2018-05-14 NOTE — PROGRESS NOTES
[facility] ICU Progress Note    Admit Date: 2018  POD:  6 Day Post-Op    Procedure:  Procedure(s):  ON PUMP CORONARY ARTERY BYPASS GRAFT X 3 WITH LEFT LEG ENDOVASCULAR VEIN HARVEST/ LEFT INTERNAL MAMMARY ARTERY HARVEST. TRANSESOPHAGEAL ECHOCARDIOGRAM AND EPI-AORTIC ULTRASOUND BY DR. Adia Collazo. Subjective:   Pt seen with Dr. Pranav Carty. Afebrile, on RA. Pt ambulating around the unit. Refusing his AM medications. Wants to be discharged this morning. Objective:   Vitals:  Blood pressure 120/71, pulse 76, temperature 98.8 °F (37.1 °C), resp. rate 18, height 5' 6\" (1.676 m), weight 183 lb 10.3 oz (83.3 kg), SpO2 99 %. Temp (24hrs), Av.9 °F (37.2 °C), Min:98.4 °F (36.9 °C), Max:99.5 °F (37.5 °C)    EKG/Rhythm:  NSR 70s    Oxygen Therapy:  Oxygen Therapy  O2 Sat (%): 99 % (18 0745)  Pulse via Oximetry: 80 beats per minute (18 1200)  O2 Device: Room air (18 0818)  O2 Flow Rate (L/min): 2 l/min (18 0349)  FIO2 (%): 50 % (18 1318)    CXR:   Impression:  Interval removal of subxiphoid chest tubes. Otherwise unchanged.             Admission Weight: Last Weight   Weight: 188 lb 11.4 oz (85.6 kg) Weight: 183 lb 10.3 oz (83.3 kg)     Intake / Output / Drain:  Current Shift:    Last 24 hrs.:     Intake/Output Summary (Last 24 hours) at 18 0972  Last data filed at 18 0305   Gross per 24 hour   Intake                0 ml   Output              650 ml   Net             -650 ml       EXAM:  General:  Alert, NAD                                                                                            Lungs:   Clear upper, diminished bases to auscultation bilaterally. Incision:  Dsg cdi   Heart:  Regular rate and rhythm, S1, S2 normal, no murmur, click, rub or gallop. Abdomen:   Soft, non-tender. Bowel sounds hypoactive. No masses,  No organomegaly. Extremities:  No edema. PPP. Neurologic:  Gross motor and sensory apparatus intact.      Labs:   Recent Labs      18   7089 18   1128   18   0355   WBC  9.0   < >   --    < >   --    HGB  8.0*   < >   --    < >   --    HCT  25.8*   < >   --    < >   --    PLT  270   < >   --    < >   --    NA  138   < >   --    < >   --    K  4.3   < >   --    < >   --    BUN  14   < >   --    < >   --    CREA  0.98   < >   --    < >   --    GLU  105*   < >   --    < >   --    GLUCPOC   --    --   156*   < >   --    INR   --    --    --    --   1.0    < > = values in this interval not displayed. Assessment:     Active Problems:    CAD (coronary artery disease) (2018)         Plan/Recommendations/Medical Decision Makin. CAD s/p CABG: On ASA, statin, BB  2. Acute systolic heart failure (NYHA class III on admission): EF 10-15%. Impella removed 5/10. Dobut gtt stopped . On metop xl, lisinopril, spironolactone. Repeat echo today to eval for lifevest.  3. Acute hypoxic respiratory failure: resolved, on RA  4. Post op anemia st acute blood loss: Hgb 8,  Monitor H&H  5. Thrombocytopenia: resolved  6. HTN: cont metop, lisinopril  7. Renal insufficiency: improved, Monitor U/O and creatinine  8. Elevated liver enzymes:resolved  9. + cocaine use:  on cessation when appropriate  10. Tobacco use: reinforce cessation  11. Dispo:  PT/OT, dc home today or tomorrow. Update: TTE results EF 30%. Contacted lifevest to begin the process for lifevest. Pt is insistent on leaving the hospital today. Pt will be fitted for lifevest as outpatient.     Signed By: Roxanne Sandoval NP

## 2018-05-14 NOTE — DISCHARGE INSTRUCTIONS
Cardiac Surgery Specialist    [address]                           [place] [address]  Office- [phone]  Fax- [phone]       Office- [phone]  Fax- [phone]  _____________________________________________________________  Dr. Reginal Copes Dr. Claudean Rutter Dr. Fabienne Batch [facility]   Deepak Epperson AGAP  IVORY Faustin PA-C, PA-C     Name:[facility]     Surgery & Date: Procedure(s):  [facility] REMOVAL/ CVICU    Discharge Date: 5/14/18    MEDICATIONS:  Please refer to your After Visit Summary for your medication list. If you do not have a prescription for a new medication, you may purchase the medication over the counter. DO NOT TAKE ANY MEDICATIONS THAT ARE NOT ON THIS LIST    INSTRUCTIONS:  NO SMOKING OR TOBACCO PRODUCTS  Follow all the instructions in your discharge book  You may shower. Wash all incisions twice daily with mild soap and water. No lotions, ointments or powder. Call the office immediately for any redness, swelling, or drainage from your incision. Take your temperature daily and call for a temperature of 101 degrees or higher or for any symptoms that make you think you have and infection. Weigh yourself each morning. Call if you gain more than 5 pounds in 48 hours. Use the incentive spirometer 6-8 times a day-10 breaths each time. Use a pillow or your bear to splint your breastbone when coughing or sneezing. If you feel your breast bone clicking or popping, notify the office immediately. Walk several hundred feet several times daily. DIET  Eat an American Heart Association diet. If you are having trouble with your appetite, eat what you can. Try eating small, frequent meals throughout the day.       ACTIVITY  NO DRIVING--you will be evaluated to drive at your follow up visit. Increase your activity by walking several times a day. Stay out of bed most of the day. When sitting, keep your legs elevated. You may ride in a car, but you must get out every hour and walk around. If you ride in a car with an airbag that can not be switched off, put the seat ALL the way back or ride in the back seat. NO LIFTING MORE THAN 5 POUND FOR 1 MONTH, THEN YOU CAN INCREASE TO NO MORE THAN 10 LBS FOR THE 2nd MONTH AND NO MORE THAN 15 LBS FOR THE 3rd MONTH.  YOU WILL NO LONGER HAVE ANY LIFTING RESTRICTIONS AFTER 3 MONTHS. FOLLOW UP  1. Your first follow up appointment will be on 5/21/18 at 1100am. Our office is located in [address] [place] [address] on floor G-5. Your second follow up appointment will be in four weeks, on 6/18/18 at 1:15pm. Please call our office at [phone] if you are unable to make either one of these appointments. 2. You will be receiving a call before your 5 day appointment to begin cardiac rehab. They are located in the [address] [place] [address] on [facility]. Their phone number is [phone]. Please call if you have not been contacted 2-3 weeks after discharge from the hospital.  3. We will make an appointment with your cardiologist at your last appointment. 4. Consult you primary care physician regarding your influenza &   pneumovax vaccines. 5.   Please bring all medications with you to your appointment. Signature:___________________________________________________                Smoking Cessation Program: This is a free, phone/text/email based, smoking cessation program. The program is individualized to meet each patient's needs. To enroll use the link [email or web address] or text William Severe to 301 3291 from any smart phone.

## 2018-05-15 ENCOUNTER — HOME CARE VISIT (OUTPATIENT)
Dept: SCHEDULING | Facility: HOME HEALTH | Age: 58
End: 2018-05-15

## 2018-05-15 ENCOUNTER — TELEPHONE (OUTPATIENT)
Dept: CARDIOTHORACIC SURGERY | Age: 58
End: 2018-05-15

## 2018-05-15 ENCOUNTER — TELEPHONE (OUTPATIENT)
Dept: CASE MANAGEMENT | Age: 58
End: 2018-05-15

## 2018-05-15 NOTE — TELEPHONE ENCOUNTER
[facility] nurse attempted to visit patient at home but he wasn't there. Called wife at work and she did not know where patient was. Notifed [facility] RN that patient was agitated and combative with staff yesterday prior to leaving the hospital. [facility] RN advised us that since patient was not at home would have to sign off on his care. Our office will cont attempts to reach patient and hopefully he will come to office visits.

## 2018-05-15 NOTE — TELEPHONE ENCOUNTER
Cardiac Surgery Discharge - Follow up call placed to [facility]. No answer, he immediately returned call. Reviewed plan of care after discharge and encouraged [facility] to verbalize. Mr Dede Cox stated he missed his home health appt because he was out for a walk. Discussed precautions and reviewed medications, patient without questions regarding medications. Encouraged continued use of the incentive spirometer. Confirmed follow up appts and reinforced importance of wearing red reminder bracelet. [facility] is without questions or concerns.  ePnny Herrera RN

## 2018-05-17 ENCOUNTER — HOME CARE VISIT (OUTPATIENT)
Dept: HOME HEALTH SERVICES | Facility: HOME HEALTH | Age: 58
End: 2018-05-17

## 2018-05-17 ENCOUNTER — TELEPHONE (OUTPATIENT)
Dept: CARDIOTHORACIC SURGERY | Age: 58
End: 2018-05-17

## 2018-05-17 NOTE — ANESTHESIA PROCEDURE NOTES
GHASSAN  Date/Time: 5/8/2018 10:57 PM      Procedure Details: probe placement, image aquisition & interpretation    Risks and benefits discussed with the patient and plans are to proceed    Procedure Note    Performed by: Elder Hair  Authorized by: Danii DRISCOLL       Indications: assessment of ascending aorta and assessment of surgical repair  Modalities: 2D, CF, CWD, PWD  Probe Type: multiplane  Insertion: atraumatic  Patient Status: intubated and sedated    Echocardiographic and Doppler Measurements   Aorta  Size  Diam(cm)  Dissection PlaqueThick(mm)  Plaque Mobile    Ascending normal  No 0-3 No    Arch normal  No 0-3 No    Descending normal  No 0-3 No          Valves  Annulus  Stenosis  Area/Grad  Regurg  Leaflet   Morph  Leaflet   Motion    Aortic normal none impella in good position across valve 2+ normal normal    Mitral dilated none  0 normal normal    Tricuspid normal none  1+ normal normal          Atria  Size  SEC (smoke)  Thrombus  Tumor  Device    Rt Atrium dilated No No No No    Lt Atrium dilated No No No No     Interatrial Septum Morphology: patent foramen ovale    Interventricular Septum Morphology: normal    Ventricle  Cavity Size  Cavity Dimension Hypertrophy  Thrombus  Gloal FXN  EF    RV normal  No no mildly impaired     LV dilated   No severely impaired 10-15%       Regional Function  (1 = normal, 2 = mildly hypokinetic, 3 = severely hypokinetic, 4 = akinetic, 5 = dyskinetic) LAV - Long [place]   ME LAV = 0  ME LAV = 90  ME LAV = 130   Basal Sept:3 Basal Ant:3 Basal Post:4   Mid Sept:3 Mid Ant:3 Mid Post:4   Apical Sept:4 Apical Ant:4 Basal Ant Sept:3   Basal Lat:3 Basal Inf:3 Mid Ant Sept:3   Mid Lat:3 Mid Inf:3    Apical Lat:4 Apical Inf:4        Pericardium: normal    Post Intervention Follow-up Study  Ventricular Global Function: improved  Ventricular Regional Function: unchanged     Valve  Function  Regurgitation  Area    Aortic no change      Mitral no change      Tricuspid no change Prosthetic        Complications: None

## 2018-05-17 NOTE — ADDENDUM NOTE
Addendum  created 05/16/18 [id number] by Nash Rogers MD    Anesthesia Intra Blocks edited, Child order released for a procedure order, Sign clinical note

## 2018-05-19 ENCOUNTER — HOME CARE VISIT (OUTPATIENT)
Dept: SCHEDULING | Facility: HOME HEALTH | Age: 58
End: 2018-05-19
Payer: COMMERCIAL

## 2018-05-19 VITALS — WEIGHT: 187 LBS | HEIGHT: 71 IN | BODY MASS INDEX: 26.18 KG/M2

## 2018-05-19 PROCEDURE — G0299 HHS/HOSPICE OF RN EA 15 MIN: HCPCS

## 2018-05-19 PROCEDURE — 400013 HH SOC

## 2018-05-21 ENCOUNTER — OFFICE VISIT (OUTPATIENT)
Dept: CARDIOTHORACIC SURGERY | Age: 58
End: 2018-05-21

## 2018-05-21 ENCOUNTER — HOME CARE VISIT (OUTPATIENT)
Dept: SCHEDULING | Facility: HOME HEALTH | Age: 58
End: 2018-05-21
Payer: COMMERCIAL

## 2018-05-21 VITALS
HEART RATE: 51 BPM | HEIGHT: 66 IN | TEMPERATURE: 98.6 F | OXYGEN SATURATION: 96 % | SYSTOLIC BLOOD PRESSURE: 114 MMHG | DIASTOLIC BLOOD PRESSURE: 70 MMHG | WEIGHT: 189.5 LBS | RESPIRATION RATE: 16 BRPM | BODY MASS INDEX: 30.46 KG/M2

## 2018-05-21 DIAGNOSIS — Z95.1 S/P CABG X 3: Primary | ICD-10-CM

## 2018-05-21 RX ORDER — ACETAMINOPHEN 500 MG
1000 TABLET ORAL
COMMUNITY
End: 2019-03-10

## 2018-05-21 NOTE — MR AVS SNAPSHOT
[facility] 
 
 
 [facility] [id number] [phone] Patient: Masoud Barksdale MRN: [id number] DNC:[id number] Visit Information Date & Time Provider Department Dept. Phone Encounter #  
 5/21/2018 11:00 AM Laura Shaw MD [facility] [phone] [id number] Your Appointments 6/6/2018  9:00 AM  
Follow Up with DEMETRIO Anderson [address] ([facility]) [address]  
[phone]  
  
   
 [address] [facility]. [address]  
  
    
 6/18/2018  1:15 PM  
POST OP with Laura Shaw MD  
[facility] - [facility] ([facility] Appt Note: 1 month post op [address] [place] [address] [address] [place] [address] [id number] Upcoming Health Maintenance Date Due Hepatitis C Screening 1960 DTaP/Tdap/Td series (1 - Tdap) 9/16/1980 FOBT Q 1 YEAR AGE 50-75 9/16/2009 Influenza Age 5 to Adult 8/1/2018 Allergies as of 5/21/2018  Review Complete On: 5/21/2018 By: Everett Jaime NP No Known Allergies Current Immunizations  Reviewed on 5/6/2018 No immunizations on file. Not reviewed this visit You Were Diagnosed With   
  
 Codes Comments S/P CABG x 3    -  Primary ICD-10-CM: Z95.1 ICD-9-CM: V45.81 Vitals BP Pulse Temp Resp Height(growth percentile) Weight(growth percentile) 114/70 (BP 1 Location: Left arm, BP Patient Position: Sitting) (!) 51 98.6 °F (37 °C) (Oral) 16 5' 6\" (1.676 m) 189 lb 8 oz (86 kg) SpO2 BMI Smoking Status 96% 30.59 kg/m2 Former Smoker Vitals History BMI and BSA Data Body Mass Index Body Surface Area 30.59 kg/m 2 2 m 2 Preferred Pharmacy Pharmacy Name Phone [id number] [phone] Your Updated Medication List  
  
   
This list is accurate as of 5/21/18 11:16 AM.  Always use your most recent med list.  
  
  
  
  
 amiodarone 200 mg tablet Commonly known as:  CORDARONE Take 1 Tab by mouth every twelve (12) hours. aspirin 81 mg chewable tablet Take 1 Tab by mouth daily. atorvastatin 10 mg tablet Commonly known as:  LIPITOR Take 1 Tab by mouth nightly. ferrous sulfate 325 mg (65 mg iron) tablet Take 1 Tab by mouth daily (with breakfast). lisinopril 2.5 mg tablet Commonly known as:  Burma Fairy Take 1 Tab by mouth daily. metoprolol succinate 25 mg XL tablet Commonly known as:  TOPROL-XL Take 1 Tab by mouth daily. polyethylene glycol 17 gram packet Commonly known as:  Pedro Coma Take 1 Packet by mouth daily. Take until having regular bowel movements. senna-docusate 8.6-50 mg per tablet Commonly known as:  Olsen Cris Take 1 Tab by mouth two (2) times a day. spironolactone 25 mg tablet Commonly known as:  ALDACTONE Take 1 Tab by mouth daily. TYLENOL EXTRA STRENGTH 500 mg tablet Generic drug:  acetaminophen Take 1,000 mg by mouth every six (6) hours as needed for Pain. To-Do List   
 05/21/2018 To Be Determined Appointment with Hiren Guadarrama at [place] [address]  
  
 05/23/2018 To Be Determined Appointment with Hiren Guadarrama at [place] [address]  
  
 05/25/2018 To Be Determined Appointment with Hiren Guadarrama at [place] [address]  
  
 05/28/2018 To Be Determined Appointment with Hiren Guadarrama at [place] [address]  
  
 05/30/2018 To Be Determined Appointment with Hiren Guadarrama at [place] [address]  
  
 06/01/2018 To Be Determined Appointment with Jaspreet Mars RN at [place] [address] Introducing [facility] [place] [facility]! [facility] introduces [facility] patient portal. Now you can access parts of your medical record, email your doctor's office, and request medication refills online. 1. In your internet browser, go to [email or web address] The Glosst 2. Click on the First Time User? Click Here link in the Sign In box. You will see the New Member Sign Up page. 3. Enter your [employer] Access Code exactly as it appears below. You will not need to use this code after youve completed the sign-up process. If you do not sign up before the expiration date, you must request a new code. · [employer] Access Code: [email or web address] Expires: 8/1/2018 12:55 PM 
 
4. Enter the last four digits of your Social Security Number (xxxx) and Date of Birth (mm/dd/yyyy) as indicated and click Submit. You will be taken to the next sign-up page. 5. Create a [facility] ID. This will be your [facility] login ID and cannot be changed, so think of one that is secure and easy to remember. 6. Create a [facility] password. You can change your password at any time. 7. Enter your Password Reset Question and Answer. This can be used at a later time if you forget your password. 8. Enter your e-mail address. You will receive e-mail notification when new information is available in [address] 9. Click Sign Up. You can now view and download portions of your medical record. 10. Click the Download Summary menu link to download a portable copy of your medical information. If you have questions, please visit the Frequently Asked Questions section of the [employer] website. Remember, [facility] is NOT to be used for urgent needs. For medical emergencies, dial 911. Now available from your iPhone and Android! Please provide this summary of care documentation to your next provider. Your primary care clinician is listed as Roc Rhodes If you have any questions after today's visit, please call [phone].

## 2018-05-21 NOTE — PROGRESS NOTES
Patient: Harshad Stein   Age: 61 y.o. Patient Care Team:  Mariana Wasserman MD as PCP - General (Internal Medicine)  Vamsi Chaves MD (Cardiothoracic Surgery)  Ivette Hoyos MD (Internal Medicine)    Diagnosis: The encounter diagnosis was S/P CABG x 3. Problem List:   Patient Active Problem List   Diagnosis Code    NSTEMI (non-ST elevated myocardial infarction) ([facility] [address] I21.4    Cocaine use F14.90    Tobacco use Z72.0    HTN (hypertension) I10    Elevated serum creatinine R79.89    CAD (coronary artery disease) I25.10    S/P CABG x 3 Z95.1        Date of Surgery: 5/8/18    Surgery: S/p CABG x 3    HPI: Pt is here for routine postop appt. Pt c/o SOB/CONRAD, no change since discharge from hospital. He denies any weight gain or swelling at home. He reports some pain just proximal to his incision and numbness/tingling in his fingers. He was supposed to be fitted for a LifeVest at home but patient reports this never happened. He is eating well, normal bowel habits. Seems to be compliant with his medications. Current Medications:   Current Outpatient Prescriptions   Medication Sig Dispense Refill    acetaminophen (TYLENOL EXTRA STRENGTH) 500 mg tablet Take 1,000 mg by mouth every six (6) hours as needed for Pain.  aspirin 81 mg chewable tablet Take 1 Tab by mouth daily. 365 Tab 0    atorvastatin (LIPITOR) 10 mg tablet Take 1 Tab by mouth nightly. 30 Tab 1    ferrous sulfate 325 mg (65 mg iron) tablet Take 1 Tab by mouth daily (with breakfast). 14 Tab 0    lisinopril (PRINIVIL, ZESTRIL) 2.5 mg tablet Take 1 Tab by mouth daily. 30 Tab 1    metoprolol succinate (TOPROL-XL) 25 mg XL tablet Take 1 Tab by mouth daily. 30 Tab 1    polyethylene glycol (MIRALAX) 17 gram packet Take 1 Packet by mouth daily. Take until having regular bowel movements. 14 Packet 0    senna-docusate (PERICOLACE) 8.6-50 mg per tablet Take 1 Tab by mouth two (2) times a day.  60 Tab 0    spironolactone (ALDACTONE) 25 mg tablet Take 1 Tab by mouth daily. 30 Tab 1    amiodarone (CORDARONE) 200 mg tablet Take 1 Tab by mouth every twelve (12) hours. 60 Tab 0       Vitals: Blood pressure 114/70, pulse (!) 51, temperature 98.6 °F (37 °C), temperature source Oral, resp. rate 16, height 5' 6\" (1.676 m), weight 189 lb 8 oz (86 kg), SpO2 96 %. Allergies: has No Known Allergies. Physical Exam:  Wounds: clean, dry, no drainage. Staples to impella site intact     Lungs: clear to auscultation bilaterally, slightly diminished L base but no crackles auscultated     Heart: regular rate and rhythm, S1, S2 normal, no murmur, click, rub or gallop    Extremities: no edema    Assessment/Plan:   1. CAD s/p CABG: On ASA, statin, BB  2. Acute systolic heart failure (NYHA class III on admission): EF 10-15%. On metop xl, lisinopril, spironolactone. Repeat echo due to SOB symptoms and does not appear volume overloaded. Contact LifeVest to have pt fitted - they are waiting on ins auth from [place]    3. Post op anemia st acute blood loss: cont iron  4. HTN: cont metop, lisinopril  5. Renal insufficiency: improved, Monitor U/O and creatinine  6. + cocaine use:  on cessation when appropriate  7. Tobacco use: reinforce cessation  8. Bradycardia: d/c amio  9.  F/u in 3 weeks, [facility] appt on 6/6/18     Rehab - ready to begin when able, encouraged him to participate  Walking: yes  Glucometer: n/a

## 2018-05-24 ENCOUNTER — HOME CARE VISIT (OUTPATIENT)
Dept: SCHEDULING | Facility: HOME HEALTH | Age: 58
End: 2018-05-24
Payer: COMMERCIAL

## 2018-05-24 ENCOUNTER — HOSPITAL ENCOUNTER (OUTPATIENT)
Dept: NON INVASIVE DIAGNOSTICS | Age: 58
Discharge: HOME | End: 2018-05-24
Attending: NURSE PRACTITIONER
Payer: COMMERCIAL

## 2018-05-24 DIAGNOSIS — Z95.1 S/P CABG X 3: ICD-10-CM

## 2018-05-24 PROCEDURE — G0300 HHS/HOSPICE OF LPN EA 15 MIN: HCPCS

## 2018-05-24 PROCEDURE — 93306 TTE W/DOPPLER COMPLETE: CPT

## 2018-05-25 ENCOUNTER — TELEPHONE (OUTPATIENT)
Dept: CARDIOTHORACIC SURGERY | Age: 58
End: 2018-05-25

## 2018-05-25 NOTE — TELEPHONE ENCOUNTER
Attempted to call patient to review echo results but no answer at numbers listed and unable to leave voicemail. Reviewed by Dr. Heather Artis, echo results acceptable with actually some improvement in EF.

## 2018-05-28 ENCOUNTER — HOME CARE VISIT (OUTPATIENT)
Dept: SCHEDULING | Facility: HOME HEALTH | Age: 58
End: 2018-05-28
Payer: COMMERCIAL

## 2018-05-28 PROCEDURE — G0300 HHS/HOSPICE OF LPN EA 15 MIN: HCPCS

## 2018-05-29 VITALS
HEART RATE: 60 BPM | RESPIRATION RATE: 18 BRPM | DIASTOLIC BLOOD PRESSURE: 88 MMHG | WEIGHT: 183 LBS | OXYGEN SATURATION: 97 % | BODY MASS INDEX: 29.54 KG/M2 | TEMPERATURE: 97.8 F | SYSTOLIC BLOOD PRESSURE: 126 MMHG

## 2018-05-30 ENCOUNTER — HOME CARE VISIT (OUTPATIENT)
Dept: SCHEDULING | Facility: HOME HEALTH | Age: 58
End: 2018-05-30
Payer: COMMERCIAL

## 2018-05-30 VITALS
OXYGEN SATURATION: 98 % | HEART RATE: 55 BPM | RESPIRATION RATE: 20 BRPM | DIASTOLIC BLOOD PRESSURE: 78 MMHG | BODY MASS INDEX: 29.05 KG/M2 | SYSTOLIC BLOOD PRESSURE: 132 MMHG | WEIGHT: 180 LBS | TEMPERATURE: 98.3 F

## 2018-05-30 PROCEDURE — G0300 HHS/HOSPICE OF LPN EA 15 MIN: HCPCS

## 2018-06-01 ENCOUNTER — HOME CARE VISIT (OUTPATIENT)
Dept: SCHEDULING | Facility: HOME HEALTH | Age: 58
End: 2018-06-01
Payer: COMMERCIAL

## 2018-06-01 VITALS
HEART RATE: 54 BPM | SYSTOLIC BLOOD PRESSURE: 120 MMHG | DIASTOLIC BLOOD PRESSURE: 84 MMHG | TEMPERATURE: 97.1 F | OXYGEN SATURATION: 95 % | RESPIRATION RATE: 18 BRPM

## 2018-06-01 PROCEDURE — G0299 HHS/HOSPICE OF RN EA 15 MIN: HCPCS

## 2018-06-06 ENCOUNTER — TELEPHONE (OUTPATIENT)
Dept: CARDIOLOGY CLINIC | Age: 58
End: 2018-06-06

## 2018-06-06 NOTE — TELEPHONE ENCOUNTER
Telephone Call RE:  Appointment reminder     Outcome:     [] Patient confirmed appointment   [] Patient rescheduled appointment for    [] Unable to reach   [] Left message              [x] Other:     Patient unable to keep this appointment. He is scheduled with Dr. Steve Hernandez in June. He will call me back to schedule this appointment with us. We will try to accommodate him on the same day as appointment with Dr. Stvee Hernandez.       Ángel Costello

## 2018-06-27 ENCOUNTER — TELEPHONE (OUTPATIENT)
Dept: CARDIAC REHAB | Age: 58
End: 2018-06-27

## 2018-06-27 NOTE — TELEPHONE ENCOUNTER
6/27/2018 Cardiac Rehab: Called Mr. Masoud Barksdale to discuss participation in the Cardiac Rehab Program following CABG on May 8, 2018. Unable to leave voicemail, mailbox full.  Skippy Shorten

## 2018-06-29 ENCOUNTER — TELEPHONE (OUTPATIENT)
Dept: CARDIAC REHAB | Age: 58
End: 2018-06-29

## 2018-06-29 NOTE — TELEPHONE ENCOUNTER
CARDIAC REHAB @ [facility] 6/29/2018: LM ASKING FOR RETURN CALL TO SET UP CARDIAC REHAB. IF NO RETURN CALL, WILL FOLLOW UP IN ~ 3 DAYS.  SUGEY ESPARZA

## 2018-06-29 NOTE — TELEPHONE ENCOUNTER
CARDIAC REHAB @ [facility] 6/29/2018: UNABLE TO LEAVE MESSAGE, WILL TRY AGAIN IN 3 DAYS - SUGEY ESPARZA

## 2018-07-16 ENCOUNTER — OFFICE VISIT (OUTPATIENT)
Dept: CARDIOTHORACIC SURGERY | Age: 58
End: 2018-07-16

## 2018-07-16 VITALS
DIASTOLIC BLOOD PRESSURE: 84 MMHG | OXYGEN SATURATION: 99 % | TEMPERATURE: 98.4 F | SYSTOLIC BLOOD PRESSURE: 126 MMHG | HEART RATE: 54 BPM | WEIGHT: 179 LBS | BODY MASS INDEX: 28.77 KG/M2 | HEIGHT: 66 IN | RESPIRATION RATE: 16 BRPM

## 2018-07-16 DIAGNOSIS — Z95.1 S/P CABG X 3: Primary | ICD-10-CM

## 2018-07-16 NOTE — PROGRESS NOTES
Patient: Lydia Cartagena   Age: 61 y.o. Patient Care Team:  Jennifer Cardoza MD as PCP - General (Internal Medicine)  Ion Taylor MD (Cardiothoracic Surgery)  Army Tamera MD (Internal Medicine)    Diagnosis: The encounter diagnosis was S/P CABG x 3. Problem List:   Patient Active Problem List   Diagnosis Code    NSTEMI (non-ST elevated myocardial infarction) ([facility] [address] I21.4    Cocaine use F14.90    Tobacco use Z72.0    HTN (hypertension) I10    Elevated serum creatinine R79.89    CAD (coronary artery disease) I25.10    S/P CABG x 3 Z95.1        Date of Surgery: 5/8/18    Surgery: S/p CABG x 3    HPI: Pt is here for routine postop appt. Pt has not shown for previous appts. Some fatigue but able to participate in ADLs/cut grass. Some chest tenderness with coughing. Dizziness when he turns his head abruptly to left and right. Denies SOB, dizziness w/ activity or swelling. Pt c/o SOB/CONRAD, no change since discharge from hospital. He denies any weight gain or swelling at home. He reports some pain just proximal to his incision and numbness/tingling in his fingers. He was supposed to be fitted for a LifeVest at home but patient reports this never happened. He is eating well, normal bowel habits. Seems to be compliant with his medications. Current Medications:   Current Outpatient Prescriptions   Medication Sig Dispense Refill    acetaminophen (TYLENOL EXTRA STRENGTH) 500 mg tablet Take 1,000 mg by mouth every six (6) hours as needed for Pain.  aspirin 81 mg chewable tablet Take 1 Tab by mouth daily. 365 Tab 0    atorvastatin (LIPITOR) 10 mg tablet Take 1 Tab by mouth nightly. 30 Tab 1    lisinopril (PRINIVIL, ZESTRIL) 2.5 mg tablet Take 1 Tab by mouth daily. 30 Tab 1    metoprolol succinate (TOPROL-XL) 25 mg XL tablet Take 1 Tab by mouth daily. 30 Tab 1    senna-docusate (PERICOLACE) 8.6-50 mg per tablet Take 1 Tab by mouth two (2) times a day.  60 Tab 0    spironolactone (ALDACTONE) 25 mg tablet Take 1 Tab by mouth daily. 30 Tab 1       Vitals: Blood pressure 126/84, pulse (!) 54, temperature 98.4 °F (36.9 °C), temperature source Oral, resp. rate 16, height 5' 6\" (1.676 m), weight 179 lb (81.2 kg), SpO2 99 %. Allergies: has No Known Allergies. Physical Exam:  Wounds: clean, dry, no drainage. Lungs: clear to auscultation bilaterally,     Heart: regular rate and rhythm, S1, S2 normal, no murmur, click, rub or gallop    Extremities: no edema    Assessment/Plan:   1. CAD s/p CABG: On ASA, statin, BB  2. Acute systolic heart failure (NYHA class III on admission): Last echo 5/24 EF 45-50%. On metop xl, lisinopril, spironolactone. 3. HTN: cont metop, lisinopril  4. + cocaine use:  on cessation. Pt admits to some use since surgery. 5. Tobacco use: reinforce cessation  Pt admits to some use since surgery. 6. Bradycardia: cont BB.    7. F/u with cardiology/PCP. Advised NO to handicap parking or disability letter -- needs to FU with PCP.       Rehab - ready to begin when able, encouraged him to participate  Walking: yes  Glucometer: n/a

## 2018-08-31 ENCOUNTER — TELEPHONE (OUTPATIENT)
Dept: CARDIAC REHAB | Age: 58
End: 2018-08-31

## 2018-11-16 ENCOUNTER — HOSPITAL ENCOUNTER (EMERGENCY)
Age: 58
Discharge: HOME | End: 2018-11-16
Attending: EMERGENCY MEDICINE
Payer: COMMERCIAL

## 2018-11-16 ENCOUNTER — APPOINTMENT (OUTPATIENT)
Dept: GENERAL RADIOLOGY | Age: 58
End: 2018-11-16
Attending: PHYSICIAN ASSISTANT
Payer: COMMERCIAL

## 2018-11-16 VITALS
SYSTOLIC BLOOD PRESSURE: 145 MMHG | HEART RATE: 87 BPM | RESPIRATION RATE: 24 BRPM | OXYGEN SATURATION: 95 % | TEMPERATURE: 98.6 F | DIASTOLIC BLOOD PRESSURE: 91 MMHG

## 2018-11-16 DIAGNOSIS — Z91.14 NONCOMPLIANCE WITH MEDICATION REGIMEN: ICD-10-CM

## 2018-11-16 DIAGNOSIS — R07.89 MUSCULOSKELETAL CHEST PAIN: Primary | ICD-10-CM

## 2018-11-16 LAB
ALBUMIN SERPL-MCNC: 2.9 G/DL (ref 3.5–5)
ALBUMIN/GLOB SERPL: 0.7 {RATIO} (ref 1.1–2.2)
ALP SERPL-CCNC: 79 U/L (ref 45–117)
ALT SERPL-CCNC: 16 U/L (ref 12–78)
ANION GAP SERPL CALC-SCNC: 8 MMOL/L (ref 5–15)
AST SERPL-CCNC: 22 U/L (ref 15–37)
BASOPHILS # BLD: 0 K/UL (ref 0–0.1)
BASOPHILS NFR BLD: 0 % (ref 0–1)
BILIRUB SERPL-MCNC: 0.6 MG/DL (ref 0.2–1)
BUN SERPL-MCNC: 8 MG/DL (ref 6–20)
BUN/CREAT SERPL: 6 (ref 12–20)
CALCIUM SERPL-MCNC: 8.8 MG/DL (ref 8.5–10.1)
CHLORIDE SERPL-SCNC: 107 MMOL/L (ref 97–108)
CK MB CFR SERPL CALC: 1.1 % (ref 0–2.5)
CK MB SERPL-MCNC: 1.3 NG/ML (ref 5–25)
CK SERPL-CCNC: 116 U/L (ref 39–308)
CO2 SERPL-SCNC: 24 MMOL/L (ref 21–32)
COMMENT, HOLDF: NORMAL
CREAT SERPL-MCNC: 1.37 MG/DL (ref 0.7–1.3)
DIFFERENTIAL METHOD BLD: ABNORMAL
EOSINOPHIL # BLD: 0.6 K/UL (ref 0–0.4)
EOSINOPHIL NFR BLD: 10 % (ref 0–7)
ERYTHROCYTE [DISTWIDTH] IN BLOOD BY AUTOMATED COUNT: 14.3 % (ref 11.5–14.5)
GLOBULIN SER CALC-MCNC: 4.4 G/DL (ref 2–4)
GLUCOSE SERPL-MCNC: 100 MG/DL (ref 65–100)
HCT VFR BLD AUTO: 43.3 % (ref 36.6–50.3)
HGB BLD-MCNC: 13.9 G/DL (ref 12.1–17)
LYMPHOCYTES # BLD: 1.1 K/UL
LYMPHOCYTES NFR BLD: 21 % (ref 12–49)
MAGNESIUM SERPL-MCNC: 2.1 MG/DL (ref 1.6–2.4)
MCH RBC QN AUTO: 28.9 PG (ref 26–34)
MCHC RBC AUTO-ENTMCNC: 32.1 G/DL (ref 30–36.5)
MCV RBC AUTO: 90 FL (ref 80–99)
MONOCYTES # BLD: 0.5 K/UL (ref 0–1)
MONOCYTES NFR BLD: 10 % (ref 5–13)
NEUTS SEG # BLD: 3.1 K/UL (ref 1.8–8)
NEUTS SEG NFR BLD: 59 % (ref 32–75)
PLATELET # BLD AUTO: 201 K/UL (ref 150–400)
PMV BLD AUTO: 10.4 FL (ref 8.9–12.9)
POTASSIUM SERPL-SCNC: 4.8 MMOL/L (ref 3.5–5.1)
PROT SERPL-MCNC: 7.3 G/DL (ref 6.4–8.2)
RBC # BLD AUTO: 4.81 M/UL (ref 4.1–5.7)
SAMPLES BEING HELD,HOLD: NORMAL
SODIUM SERPL-SCNC: 139 MMOL/L (ref 136–145)
TROPONIN I SERPL-MCNC: <0.05 NG/ML
WBC # BLD AUTO: 5.3 K/UL (ref 4.1–11.1)
XXWBCSUS: 0

## 2018-11-16 PROCEDURE — 93005 ELECTROCARDIOGRAM TRACING: CPT

## 2018-11-16 PROCEDURE — 77030013140 HC MSK NEB VYRM -A

## 2018-11-16 PROCEDURE — 80053 COMPREHEN METABOLIC PANEL: CPT

## 2018-11-16 PROCEDURE — 99284 EMERGENCY DEPT VISIT MOD MDM: CPT

## 2018-11-16 PROCEDURE — 83735 ASSAY OF MAGNESIUM: CPT

## 2018-11-16 PROCEDURE — 71046 X-RAY EXAM CHEST 2 VIEWS: CPT

## 2018-11-16 PROCEDURE — 82550 ASSAY OF CK (CPK): CPT

## 2018-11-16 PROCEDURE — 84484 ASSAY OF TROPONIN QUANT: CPT

## 2018-11-16 PROCEDURE — 85025 COMPLETE CBC W/AUTO DIFF WBC: CPT

## 2018-11-16 PROCEDURE — 94640 AIRWAY INHALATION TREATMENT: CPT

## 2018-11-16 PROCEDURE — 74011000250 HC RX REV CODE- 250: Performed by: PHYSICIAN ASSISTANT

## 2018-11-16 PROCEDURE — 36415 COLL VENOUS BLD VENIPUNCTURE: CPT

## 2018-11-16 RX ADMIN — ALBUTEROL SULFATE 1 DOSE: 2.5 SOLUTION RESPIRATORY (INHALATION) at 17:27

## 2018-11-16 NOTE — DISCHARGE INSTRUCTIONS
We hope that we have addressed all of your medical concerns. The examination and treatment you received in the Emergency Department were for an emergent problem and were not intended as complete care. It is important that you follow up with your healthcare provider(s) for ongoing care. If your symptoms worsen or do not improve as expected, and you are unable to reach your usual health care provider(s), you should return to the Emergency Department. Today's healthcare is undergoing tremendous change, and patient satisfaction surveys are one of the many tools to assess the quality of medical care. You may receive a survey from the [employer] regarding your experience in the Emergency Department. I hope that your experience has been completely positive, particularly the medical care that I provided. As such, please participate in the survey; anything less than excellent does not meet my expectations or intentions. formerly [facility] and 8 [facility] participate in nationally recognized quality of care measures. If your blood pressure is greater than 120/80, as reported below, we urge that you seek medical care to address the potential of high blood pressure, commonly known as hypertension. Hypertension can be hereditary or can be caused by certain medical conditions, pain, stress, or \"white coat syndrome. \"       Please make an appointment with your health care provider(s) for follow up of your Emergency Department visit. VITALS:   Patient Vitals for the past 8 hrs:   Pulse Resp BP SpO2   11/16/18 1728 -- -- -- 98 %   11/16/18 1720 85 18 (!) 162/96 97 %          Thank you for allowing us to provide you with medical care today. We realize that you have many choices for your emergency care needs. Please choose us in the future for any continued health care needs. Pau Saavedra [facility]   Office: [phone]            Recent Results (from the past 24 hour(s))   SAMPLES BEING HELD    Collection Time: 11/16/18  5:08 PM   Result Value Ref Range    SAMPLES BEING HELD 1BLUE     COMMENT        Add-on orders for these samples will be processed based on acceptable specimen integrity and analyte stability, which may vary by analyte. CBC WITH AUTOMATED DIFF    Collection Time: 11/16/18  5:08 PM   Result Value Ref Range    WBC 5.3 4.1 - 11.1 K/uL    RBC 4.81 4.10 - 5.70 M/uL    HGB 13.9 12.1 - 17.0 g/dL    HCT 43.3 36.6 - 50.3 %    MCV 90.0 80.0 - 99.0 FL    MCH 28.9 26.0 - 34.0 PG    MCHC 32.1 30.0 - 36.5 g/dL    RDW 14.3 11.5 - 14.5 %    PLATELET 413 138 - 014 K/uL    MPV 10.4 8.9 - 12.9 FL    NEUTROPHILS 59 32 - 75 %    LYMPHOCYTES 21 12 - 49 %    MONOCYTES 10 5 - 13 %    EOSINOPHILS 10 (H) 0 - 7 %    BASOPHILS 0 0 - 1 %    ABS. NEUTROPHILS 3.1 1.8 - 8.0 K/UL    ABS. LYMPHOCYTES 1.1 K/UL    ABS. MONOCYTES 0.5 0.0 - 1.0 K/UL    ABS. EOSINOPHILS 0.6 (H) 0.0 - 0.4 K/UL    ABS. BASOPHILS 0.0 0.0 - 0.1 K/UL    DF AUTOMATED      XXWBCSUS 0     METABOLIC PANEL, COMPREHENSIVE    Collection Time: 11/16/18  5:08 PM   Result Value Ref Range    Sodium 139 136 - 145 mmol/L    Potassium 4.8 3.5 - 5.1 mmol/L    Chloride 107 97 - 108 mmol/L    CO2 24 21 - 32 mmol/L    Anion gap 8 5 - 15 mmol/L    Glucose 100 65 - 100 mg/dL    BUN 8 6 - 20 MG/DL    Creatinine 1.37 (H) 0.70 - 1.30 MG/DL    BUN/Creatinine ratio 6 (L) 12 - 20      GFR est AA >60 >60 ml/min/1.73m2    GFR est non-AA 53 (L) >60 ml/min/1.73m2    Calcium 8.8 8.5 - 10.1 MG/DL    Bilirubin, total 0.6 0.2 - 1.0 MG/DL    ALT (SGPT) 16 12 - 78 U/L    AST (SGOT) 22 15 - 37 U/L    Alk.  phosphatase 79 45 - 117 U/L    Protein, total 7.3 6.4 - 8.2 g/dL    Albumin 2.9 (L) 3.5 - 5.0 g/dL    Globulin 4.4 (H) 2.0 - 4.0 g/dL    A-G Ratio 0.7 (L) 1.1 - 2.2     TROPONIN I    Collection Time: 11/16/18  5:08 PM   Result Value Ref Range    Troponin-I, Qt. <0.05 <0.05 ng/mL MAGNESIUM    Collection Time: 11/16/18  5:08 PM   Result Value Ref Range    Magnesium 2.1 1.6 - 2.4 mg/dL   CK W/ CKMB & INDEX    Collection Time: 11/16/18  5:08 PM   Result Value Ref Range     39 - 308 U/L    CK - MB 1.3 <3.6 NG/ML    CK-MB Index 1.1 0.0 - 2.5         Xr Chest Pa Lat    Result Date: 11/16/2018  PA AND LATERAL CHEST RADIOGRAPHS: 11/16/2018 5:57 PM INDICATION: Right chest pain, cough. COMPARISON: 5/13/2018, 5/11/2018. TECHNIQUE: Frontal and lateral radiographs of the chest. FINDINGS: The lungs are clear. The central airways are patent. The heart size is normal. Post CABG. There are retained epicardial pacing wires. No pneumothorax or pleural effusion. There is mild left pleural scarring. Surgical clips are noted in the right infraclavicular region. 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 IMPRESSION: Clear lungs. Musculoskeletal Chest Pain: Care Instructions  Your Care Instructions    Chest pain is not always a sign that something is wrong with your heart or that you have another serious problem. The doctor thinks your chest pain is caused by strained muscles or ligaments, inflamed chest cartilage, or another problem in your chest, rather than by your heart. You may need more tests to find the cause of your chest pain. Follow-up care is a key part of your treatment and safety. Be sure to make and go to all appointments, and call your doctor if you are having problems. It's also a good idea to know your test results and keep a list of the medicines you take. How can you care for yourself at home? · Take pain medicines exactly as directed. ? If the doctor gave you a prescription medicine for pain, take it as prescribed. ? If you are not taking a prescription pain medicine, ask your doctor if you can take an over-the-counter medicine. · Rest and protect the sore area.   · Stop, change, or take a break from any activity that may be causing your pain or soreness. · Put ice or a cold pack on the sore area for 10 to 20 minutes at a time. Try to do this every 1 to 2 hours for the next 3 days (when you are awake) or until the swelling goes down. Put a thin cloth between the ice and your skin. · After 2 or 3 days, apply a heating pad set on low or a warm cloth to the area that hurts. Some doctors suggest that you go back and forth between hot and cold. · Do not wrap or tape your ribs for support. This may cause you to take smaller breaths, which could increase your risk of lung problems. · Mentholated creams such as Bengay or Icy Hot may soothe sore muscles. Follow the instructions on the package. · Follow your doctor's instructions for exercising. · Gentle stretching and massage may help you get better faster. Stretch slowly to the point just before pain begins, and hold the stretch for at least 15 to 30 seconds. Do this 3 or 4 times a day. Stretch just after you have applied heat. · As your pain gets better, slowly return to your normal activities. Any increased pain may be a sign that you need to rest a while longer. When should you call for help? Call 911 anytime you think you may need emergency care. For example, call if:    · You have chest pain or pressure. This may occur with:  ? Sweating. ? Shortness of breath. ? Nausea or vomiting. ? Pain that spreads from the chest to the neck, jaw, or one or both shoulders or arms. ? Dizziness or lightheadedness. ? A fast or uneven pulse. After calling 911, chew 1 adult-strength aspirin. Wait for an ambulance.  Do not try to drive yourself.     · You have sudden chest pain and shortness of breath, or you cough up blood.    Call your doctor now or seek immediate medical care if:    · You have any trouble breathing.     · Your chest pain gets worse.     · Your chest pain occurs consistently with exercise and is relieved by rest.    Watch closely for changes in your health, and be sure to contact your doctor if:    · Your chest pain does not get better after 1 week. Where can you learn more? Go to http://megan-gonzalo.info/. Enter V293 in the search box to learn more about \"Musculoskeletal Chest Pain: Care Instructions. \"  Current as of: November 20, 2017  Content Version: 11.8  © 0043-6891 [employer]. Care instructions adapted under license by [employer] (which disclaims liability or warranty for this information). If you have questions about a medical condition or this instruction, always ask your healthcare professional. [place] [address] any warranty or liability for your use of this information.

## 2018-11-16 NOTE — ED NOTES
Pt resting on stretcher in no distress. Nebulizer treatment completed. Pt reports feeling better, although he continues to cough. Call bell in reach.

## 2018-11-16 NOTE — ED NOTES
The patient was discharged home by DIGNA Hsu in stable condition. The patient is alert and oriented, in no respiratory distress and discharge vital signs obtained. The patient's diagnosis, condition and treatment were explained. The patient expressed understanding. No prescriptions given. No work/school note given. A discharge plan has been developed. A  was not involved in the process. Aftercare instructions were given. Pt ambulatory out of the ED with family.

## 2018-11-16 NOTE — ED TRIAGE NOTES
Pt arrives via [employer]. Pt states \"I have had a cough chest congestion for a week. I started having chest pain yesterday its on the right side I hope its from my cough. \" Pt denies fever.

## 2018-11-16 NOTE — ED NOTES
Plan of care discussed with patient. Labs drawn and sent. Pt awaiting imaging for xray. Call bell in reach.

## 2018-11-16 NOTE — ED PROVIDER NOTES
Niyah Patterson is a 60 y.o. male with hx significant for NSTEMI 5/2018 s/p CABGx3, HTN who presents via EMS to ER with c/o R sided chest pain x yx. States chest pain started while he as sitting at home. Notes pain has been constant since onset. Notes he has had a cough and recent congestion over the past week and is unsure if cough chest pain related to these sx. Additionally notes wheezing and \"rattling\" in his chest intermittently as well. Denies SOB, nausea, vomiting, diaphoresis, and any other complaints. Pt states he did stop taking all of his medications suddenly one month ago \"because I didn't want to take them anymore\". Did not consult his physician prior to stopping all his medications. Additionally, notes he used cocaine two days ago for the first time \"in a while\" and is unsure if sx could be related to that. States he used to use cocaine regularly but has not used since his NSTEMI and subsequent triple bypass in May. No other complaints. No medicine at home for sx. He specifically denies any fevers, chills, nausea, vomiting, shortness of breath, headache, rash, diarrhea, abdominal pain, urinary/bowel changes, sweating or weight loss. PCP: Sharlyne Collet, MD  
PMHx significant for: No past medical history on file. PSHx significant for: Past Surgical History: 
2018: CARDIAC SURG PROCEDURE UNLIST Comment:  triple bipass No date: HX ORTHOPAEDIC Comment:  left hip surgery No Known Allergies There are no other complaints, changes or physical findings at this time. No past medical history on file. Past Surgical History:  
Procedure Laterality Date  HX ORTHOPAEDIC    
 left hip surgery Family History:  
Problem Relation Age of Onset  Diabetes Mother Social History Socioeconomic History  Marital status:  Spouse name: Not on file  Number of children: Not on file  Years of education: Not on file  Highest education level: Not on file Social Needs  Financial resource strain: Not on file  Food insecurity - worry: Not on file  Food insecurity - inability: Not on file  Transportation needs - medical: Not on file  Transportation needs - non-medical: Not on file Occupational History  Not on file Tobacco Use  Smoking status: Former Smoker Last attempt to quit: 5/3/2018 Years since quittin.5  Smokeless tobacco: Never Used Substance and Sexual Activity  Alcohol use: No  
 Drug use: Yes Types: Cocaine  Sexual activity: Not on file Other Topics Concern  Not on file Social History Narrative  Not on file ALLERGIES: Patient has no known allergies. Review of Systems Constitutional: Negative. Negative for appetite change, chills, fatigue and fever. HENT: Negative. Negative for congestion and sore throat. Eyes: Negative. Negative for visual disturbance. Respiratory: Positive for cough and wheezing. Negative for shortness of breath. Cardiovascular: Positive for chest pain. Negative for palpitations and leg swelling. Gastrointestinal: Negative. Negative for abdominal pain, constipation, diarrhea, nausea and vomiting. Genitourinary: Negative. Negative for dysuria, flank pain and hematuria. Musculoskeletal: Negative. Negative for back pain and neck pain. Skin: Negative. Negative for rash. Neurological: Negative. Negative for dizziness, syncope, weakness, numbness and headaches. Hematological: Negative. Psychiatric/Behavioral: Negative. All other systems reviewed and are negative. There were no vitals filed for this visit. Physical Exam  
Constitutional: He is oriented to person, place, and time. He appears well-developed and well-nourished. No distress. HENT:  
Head: Normocephalic and atraumatic.   
Right Ear: External ear normal.  
Left Ear: External ear normal.  
Nose: Nose normal.  
 Mouth/Throat: Oropharynx is clear and moist.  
Neck: Normal range of motion. Neck supple. Cardiovascular: Normal rate, regular rhythm, S1 normal, S2 normal, normal heart sounds, intact distal pulses and normal pulses. Exam reveals no gallop and no friction rub. No murmur heard. Pulses: 
     Dorsalis pedis pulses are 2+ on the right side, and 2+ on the left side. Pulmonary/Chest: Effort normal. No accessory muscle usage. No respiratory distress. He has no decreased breath sounds. He has wheezes (scattered expiratory wheezing bilat, inc bilat bases). He has no rhonchi. He has no rales. Frequent coarse cough Abdominal: Soft. Normal appearance and bowel sounds are normal. He exhibits no distension and no mass. There is no hepatosplenomegaly. There is no tenderness. There is no rebound, no guarding and no CVA tenderness. Musculoskeletal: Normal range of motion. He exhibits no edema or tenderness. Neurological: He is alert and oriented to person, place, and time. He has normal strength. No sensory deficit. Coordination normal.  
Skin: Skin is warm and dry. No rash noted. He is not diaphoretic. No erythema. No pallor. Psychiatric: He has a normal mood and affect. His behavior is normal.  
Nursing note and vitals reviewed. MDM Number of Diagnoses or Management Options Diagnosis management comments: DDx: pneumonia, bronchitis, ACS, MSK chest pain, electrolyte abnormality Amount and/or Complexity of Data Reviewed Clinical lab tests: ordered and reviewed Tests in the radiology section of CPT®: ordered and reviewed Tests in the medicine section of CPT®: ordered and reviewed Discuss the patient with other providers: yes (Dr. Luis Wilson) Independent visualization of images, tracings, or specimens: yes Patient Progress Patient progress: stable Procedures 5:23 PM  
Triston Norwood PA-C discussed patient with Meg Ritchie MD who is in agreement with care plan as outlined. Will be in to see the patient. No further recommendations. Roopa Beauchamp PA-C  
 
EKG interpretation:  
Rhythm: normal sinus rhythm and PAC's; and regular . Rate (approx.): 76; Axis: normal; AL interval: normal; QRS interval: normal ; ST/T wave: T wave inverted; in lateral leads; Other findings: when compared to EKG from 5/9/18, T wave inversion in anterior leads no longer present. IVORY Locke Ricard Battles, MD 
 
6:44 PM 
Roopa Beauchamp PA-C  into reevaluate patient at this time. All wheezing resolved with breathing tx. Lungs CTA bilaterally  Updated on available results. All questions answered. Will discharge home with cardiology follow up. Roopa Beauchamp PA-C 
 
6:46 PM 
Pt has been reevaluated. There are no new complaints, changes, or physical findings at this time. Medications have been reviewed w/ pt and/or family. Pt and/or family's questions have been answered. Pt and/or family expressed good understanding of the dx/tx/rx and is in agreement with plan of care. Pt instructed and agreed to f/u w/ PCP, cardiology and to return to ED upon further deterioration. Pt is ready for discharge. LABORATORY TESTS: 
Recent Results (from the past 12 hour(s)) SAMPLES BEING HELD Collection Time: 11/16/18  5:08 PM  
Result Value Ref Range SAMPLES BEING HELD 1BLUE   
 COMMENT Add-on orders for these samples will be processed based on acceptable specimen integrity and analyte stability, which may vary by analyte. CBC WITH AUTOMATED DIFF Collection Time: 11/16/18  5:08 PM  
Result Value Ref Range WBC 5.3 4.1 - 11.1 K/uL  
 RBC 4.81 4.10 - 5.70 M/uL  
 HGB 13.9 12.1 - 17.0 g/dL HCT 43.3 36.6 - 50.3 % MCV 90.0 80.0 - 99.0 FL  
 MCH 28.9 26.0 - 34.0 PG  
 MCHC 32.1 30.0 - 36.5 g/dL  
 RDW 14.3 11.5 - 14.5 % PLATELET 377 693 - 490 K/uL MPV 10.4 8.9 - 12.9 FL  
 NEUTROPHILS 59 32 - 75 % LYMPHOCYTES 21 12 - 49 % MONOCYTES 10 5 - 13 % EOSINOPHILS 10 (H) 0 - 7 % BASOPHILS 0 0 - 1 %  
 ABS. NEUTROPHILS 3.1 1.8 - 8.0 K/UL  
 ABS. LYMPHOCYTES 1.1 K/UL  
 ABS. MONOCYTES 0.5 0.0 - 1.0 K/UL  
 ABS. EOSINOPHILS 0.6 (H) 0.0 - 0.4 K/UL  
 ABS. BASOPHILS 0.0 0.0 - 0.1 K/UL  
 DF AUTOMATED XXWBCSUS 0    
METABOLIC PANEL, COMPREHENSIVE Collection Time: 11/16/18  5:08 PM  
Result Value Ref Range Sodium 139 136 - 145 mmol/L Potassium 4.8 3.5 - 5.1 mmol/L Chloride 107 97 - 108 mmol/L  
 CO2 24 21 - 32 mmol/L Anion gap 8 5 - 15 mmol/L Glucose 100 65 - 100 mg/dL BUN 8 6 - 20 MG/DL Creatinine 1.37 (H) 0.70 - 1.30 MG/DL  
 BUN/Creatinine ratio 6 (L) 12 - 20 GFR est AA >60 >60 ml/min/1.73m2 GFR est non-AA 53 (L) >60 ml/min/1.73m2 Calcium 8.8 8.5 - 10.1 MG/DL Bilirubin, total 0.6 0.2 - 1.0 MG/DL  
 ALT (SGPT) 16 12 - 78 U/L  
 AST (SGOT) 22 15 - 37 U/L Alk. phosphatase 79 45 - 117 U/L Protein, total 7.3 6.4 - 8.2 g/dL Albumin 2.9 (L) 3.5 - 5.0 g/dL Globulin 4.4 (H) 2.0 - 4.0 g/dL A-G Ratio 0.7 (L) 1.1 - 2.2    
TROPONIN I Collection Time: 11/16/18  5:08 PM  
Result Value Ref Range Troponin-I, Qt. <0.05 <0.05 ng/mL MAGNESIUM Collection Time: 11/16/18  5:08 PM  
Result Value Ref Range Magnesium 2.1 1.6 - 2.4 mg/dL CK W/ CKMB & INDEX Collection Time: 11/16/18  5:08 PM  
Result Value Ref Range  39 - 308 U/L  
 CK - MB 1.3 <3.6 NG/ML  
 CK-MB Index 1.1 0.0 - 2.5 IMAGING RESULTS: 
XR CHEST PA LAT Final Result Xr Chest Pa Lat Result Date: 11/16/2018 PA AND LATERAL CHEST RADIOGRAPHS: 11/16/2018 5:57 PM INDICATION: Right chest pain, cough. COMPARISON: 5/13/2018, 5/11/2018. TECHNIQUE: Frontal and lateral radiographs of the chest. FINDINGS: The lungs are clear. The central airways are patent. The heart size is normal. Post CABG. There are retained epicardial pacing wires. No pneumothorax or pleural effusion. There is mild left pleural scarring. Surgical clips are noted in the right infraclavicular region. 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 IMPRESSION: Clear lungs. MEDICATIONS GIVEN: 
Medications  
albuterol 5mg / ipratropium 0.5mg neb solution (1 Dose Nebulization Given 11/16/18 8838) IMPRESSION: 
1. Musculoskeletal chest pain 2. Noncompliance with medication regimen PLAN: 
1. Current Discharge Medication List  
  
CONTINUE these medications which have NOT CHANGED Details  
acetaminophen (TYLENOL EXTRA STRENGTH) 500 mg tablet Take 1,000 mg by mouth every six (6) hours as needed for Pain. aspirin 81 mg chewable tablet Take 1 Tab by mouth daily. Qty: 365 Tab, Refills: 0 Associated Diagnoses: S/P CABG (coronary artery bypass graft)  
  
atorvastatin (LIPITOR) 10 mg tablet Take 1 Tab by mouth nightly. Qty: 30 Tab, Refills: 1 Associated Diagnoses: S/P CABG (coronary artery bypass graft)  
  
lisinopril (PRINIVIL, ZESTRIL) 2.5 mg tablet Take 1 Tab by mouth daily. Qty: 30 Tab, Refills: 1 Associated Diagnoses: S/P CABG (coronary artery bypass graft) metoprolol succinate (TOPROL-XL) 25 mg XL tablet Take 1 Tab by mouth daily. Qty: 30 Tab, Refills: 1 Associated Diagnoses: S/P CABG (coronary artery bypass graft)  
  
senna-docusate (PERICOLACE) 8.6-50 mg per tablet Take 1 Tab by mouth two (2) times a day. Qty: 60 Tab, Refills: 0 Associated Diagnoses: S/P CABG (coronary artery bypass graft)  
  
spironolactone (ALDACTONE) 25 mg tablet Take 1 Tab by mouth daily. Qty: 30 Tab, Refills: 1 Associated Diagnoses: S/P CABG (coronary artery bypass graft) 2. Follow-up Information Follow up With Specialties Details Why Contact Info Chriss Lau MD Cardiology Call Monday and schedule next available appointment for further evaluation [address] [place] [address] [phone] [address] [facility] 
[phone] Kyra Aguayo MD Internal Medicine Schedule an appointment as soon as possible for a visit in 1 week  [address] [place] [address] 
[phone] [facility] EMERGENCY DEPT Emergency Medicine  [facility] [address] [facility] 
[phone] Return to ED if worse

## 2018-11-18 LAB
ATRIAL RATE: 76 BPM
CALCULATED P AXIS, ECG09: 23 DEGREES
CALCULATED R AXIS, ECG10: 17 DEGREES
CALCULATED T AXIS, ECG11: 155 DEGREES
DIAGNOSIS, 93000: NORMAL
P-R INTERVAL, ECG05: 146 MS
Q-T INTERVAL, ECG07: 396 MS
QRS DURATION, ECG06: 96 MS
QTC CALCULATION (BEZET), ECG08: 445 MS
VENTRICULAR RATE, ECG03: 76 BPM

## 2018-12-30 ENCOUNTER — APPOINTMENT (OUTPATIENT)
Dept: GENERAL RADIOLOGY | Age: 58
End: 2018-12-30
Attending: EMERGENCY MEDICINE
Payer: COMMERCIAL

## 2018-12-30 ENCOUNTER — HOSPITAL ENCOUNTER (EMERGENCY)
Age: 58
Discharge: HOME | End: 2018-12-30
Attending: EMERGENCY MEDICINE | Admitting: EMERGENCY MEDICINE
Payer: COMMERCIAL

## 2018-12-30 VITALS
WEIGHT: 190 LBS | DIASTOLIC BLOOD PRESSURE: 91 MMHG | RESPIRATION RATE: 22 BRPM | TEMPERATURE: 97.7 F | BODY MASS INDEX: 30.53 KG/M2 | HEART RATE: 59 BPM | SYSTOLIC BLOOD PRESSURE: 131 MMHG | OXYGEN SATURATION: 92 % | HEIGHT: 66 IN

## 2018-12-30 DIAGNOSIS — R07.9 ACUTE CHEST PAIN: Primary | ICD-10-CM

## 2018-12-30 LAB
ALBUMIN SERPL-MCNC: 3 G/DL (ref 3.5–5)
ALBUMIN/GLOB SERPL: 0.7 {RATIO} (ref 1.1–2.2)
ALP SERPL-CCNC: 68 U/L (ref 45–117)
ALT SERPL-CCNC: 16 U/L (ref 12–78)
ANION GAP SERPL CALC-SCNC: 7 MMOL/L (ref 5–15)
AST SERPL-CCNC: 14 U/L (ref 15–37)
BASOPHILS # BLD: 0.1 K/UL (ref 0–0.1)
BASOPHILS NFR BLD: 1 % (ref 0–1)
BILIRUB SERPL-MCNC: 0.7 MG/DL (ref 0.2–1)
BUN SERPL-MCNC: 13 MG/DL (ref 6–20)
BUN/CREAT SERPL: 9 (ref 12–20)
CALCIUM SERPL-MCNC: 8.5 MG/DL (ref 8.5–10.1)
CHLORIDE SERPL-SCNC: 109 MMOL/L (ref 97–108)
CO2 SERPL-SCNC: 26 MMOL/L (ref 21–32)
COMMENT, HOLDF: NORMAL
CREAT SERPL-MCNC: 1.39 MG/DL (ref 0.7–1.3)
DIFFERENTIAL METHOD BLD: ABNORMAL
EOSINOPHIL # BLD: 0.5 K/UL (ref 0–0.4)
EOSINOPHIL NFR BLD: 9 % (ref 0–7)
ERYTHROCYTE [DISTWIDTH] IN BLOOD BY AUTOMATED COUNT: 13.9 % (ref 11.5–14.5)
GLOBULIN SER CALC-MCNC: 4.4 G/DL (ref 2–4)
GLUCOSE SERPL-MCNC: 89 MG/DL (ref 65–100)
HCT VFR BLD AUTO: 42 % (ref 36.6–50.3)
HGB BLD-MCNC: 13.1 G/DL (ref 12.1–17)
IMM GRANULOCYTES # BLD: 0 K/UL (ref 0–0.04)
IMM GRANULOCYTES NFR BLD AUTO: 0 % (ref 0–0.5)
LYMPHOCYTES # BLD: 1.4 K/UL (ref 0.8–3.5)
LYMPHOCYTES NFR BLD: 26 % (ref 12–49)
MCH RBC QN AUTO: 28.5 PG (ref 26–34)
MCHC RBC AUTO-ENTMCNC: 31.2 G/DL (ref 30–36.5)
MCV RBC AUTO: 91.5 FL (ref 80–99)
MONOCYTES # BLD: 0.5 K/UL (ref 0–1)
MONOCYTES NFR BLD: 9 % (ref 5–13)
NEUTS SEG # BLD: 3 K/UL (ref 1.8–8)
NEUTS SEG NFR BLD: 55 % (ref 32–75)
NRBC # BLD: 0 K/UL (ref 0–0.01)
NRBC BLD-RTO: 0 PER 100 WBC
PLATELET # BLD AUTO: 287 K/UL (ref 150–400)
PMV BLD AUTO: 10 FL (ref 8.9–12.9)
POTASSIUM SERPL-SCNC: 4.3 MMOL/L (ref 3.5–5.1)
PROT SERPL-MCNC: 7.4 G/DL (ref 6.4–8.2)
RBC # BLD AUTO: 4.59 M/UL (ref 4.1–5.7)
SAMPLES BEING HELD,HOLD: NORMAL
SODIUM SERPL-SCNC: 142 MMOL/L (ref 136–145)
TROPONIN I BLD-MCNC: <0.04 NG/ML (ref 0–0.08)
TROPONIN I BLD-MCNC: <0.04 NG/ML (ref 0–0.08)
WBC # BLD AUTO: 5.4 K/UL (ref 4.1–11.1)

## 2018-12-30 PROCEDURE — 36415 COLL VENOUS BLD VENIPUNCTURE: CPT

## 2018-12-30 PROCEDURE — 84484 ASSAY OF TROPONIN QUANT: CPT

## 2018-12-30 PROCEDURE — 93005 ELECTROCARDIOGRAM TRACING: CPT

## 2018-12-30 PROCEDURE — 71046 X-RAY EXAM CHEST 2 VIEWS: CPT

## 2018-12-30 PROCEDURE — 85025 COMPLETE CBC W/AUTO DIFF WBC: CPT

## 2018-12-30 PROCEDURE — 74011250637 HC RX REV CODE- 250/637: Performed by: EMERGENCY MEDICINE

## 2018-12-30 PROCEDURE — 99285 EMERGENCY DEPT VISIT HI MDM: CPT

## 2018-12-30 PROCEDURE — 80053 COMPREHEN METABOLIC PANEL: CPT

## 2018-12-30 RX ORDER — SPIRONOLACTONE 25 MG/1
25 TABLET ORAL
Status: COMPLETED | OUTPATIENT
Start: 2018-12-30 | End: 2018-12-30

## 2018-12-30 RX ORDER — SODIUM CHLORIDE 0.9 % (FLUSH) 0.9 %
5-10 SYRINGE (ML) INJECTION AS NEEDED
Status: DISCONTINUED | OUTPATIENT
Start: 2018-12-30 | End: 2018-12-30 | Stop reason: HOSPADM

## 2018-12-30 RX ORDER — SODIUM CHLORIDE 0.9 % (FLUSH) 0.9 %
5-10 SYRINGE (ML) INJECTION EVERY 8 HOURS
Status: DISCONTINUED | OUTPATIENT
Start: 2018-12-30 | End: 2018-12-30 | Stop reason: HOSPADM

## 2018-12-30 RX ORDER — METOPROLOL SUCCINATE 25 MG/1
25 TABLET, EXTENDED RELEASE ORAL
Status: COMPLETED | OUTPATIENT
Start: 2018-12-30 | End: 2018-12-30

## 2018-12-30 RX ORDER — LISINOPRIL 5 MG/1
2.5 TABLET ORAL
Status: COMPLETED | OUTPATIENT
Start: 2018-12-30 | End: 2018-12-30

## 2018-12-30 RX ADMIN — LISINOPRIL 2.5 MG: 5 TABLET ORAL at 12:16

## 2018-12-30 RX ADMIN — SPIRONOLACTONE 25 MG: 25 TABLET ORAL at 12:10

## 2018-12-30 RX ADMIN — METOPROLOL SUCCINATE 25 MG: 25 TABLET, EXTENDED RELEASE ORAL at 12:10

## 2018-12-30 NOTE — ED TRIAGE NOTES
Pt arrives via EMS with report of CP, pt admits he has not been taking any of his medications. Hx of CABG six months prior. Pt was given nitro and asa on route, reports pain is now gone.

## 2018-12-30 NOTE — ED NOTES
Dr. Cathy Cevallos reviewed discharge instructions with the patient. The patient verbalized understanding instructions and need for follow up with primary care provider. Pt is not in any current distress and shows no evidence of clinical instability. Pt left ambulatory. Pt family/friends are present and pt left with all personal belongings. Pt states chief complaint has improved with treatment provided. Pt is alert and oriented to time, place, person and situation, pt hemodynamically/respiratorily stable. Paperwork given by provider and reviewed with patient, opportunity for questions/clarification given. Visit Vitals BP (!) 131/91 Pulse (!) 59 Temp 97.7 °F (36.5 °C) Resp 22 Ht 5' 6\" (1.676 m) Wt 86.2 kg (190 lb) SpO2 92% BMI 30.67 kg/m²

## 2018-12-30 NOTE — DISCHARGE INSTRUCTIONS

## 2018-12-30 NOTE — ED PROVIDER NOTES
60 y.o. male with past medical history significant for CAD who presents via EMS from home with chief complaint of chest pain. Per EMS, patient c/o acute onset \"3/10\" midsternal chest pain radiating to the left side of his chest with associated SOB at approx. 1000 this morning after an argument with his wife. Initial /140 - given 1 NG and 1 asa en route, improving pain to a \"2/10. \" /124 with good pulses just PTA in the ED. Patient endorses Hx of MI and CABG approx. 6 months ago - has not been compliant with his medications, states he is \"hardheaded and bad at taking care of (himself). \" Upon arrival in the ED, patient reports improvement of chest pain but still c/o persistent cough. Patient denies n/v, fever, and abdominal pain. There are no other acute medical concerns at this time. Social hx: Current every day tobacco smoker; Denies EtOH use; Endorses illicit drug use (cocaine) PCP: Kalani Siegel MD 
 
Note written by Yulia Apodaca, as dictated by Thea Anderson MD 11:26 AM 
 
 
 
  
 
Past Medical History:  
Diagnosis Date  CAD (coronary artery disease) MI x4 Past Surgical History:  
Procedure Laterality Date  CARDIAC SURG PROCEDURE UNLIST  2018  
 triple bipass  HX ORTHOPAEDIC    
 left hip surgery Family History:  
Problem Relation Age of Onset  Diabetes Mother Social History Socioeconomic History  Marital status:  Spouse name: Not on file  Number of children: Not on file  Years of education: Not on file  Highest education level: Not on file Social Needs  Financial resource strain: Not on file  Food insecurity - worry: Not on file  Food insecurity - inability: Not on file  Transportation needs - medical: Not on file  Transportation needs - non-medical: Not on file Occupational History  Not on file Tobacco Use  Smoking status: Current Some Day Smoker Last attempt to quit: 5/3/2018 Years since quittin.6  Smokeless tobacco: Never Used  Tobacco comment: last use this past wednesday Substance and Sexual Activity  Alcohol use: No  
 Drug use: Yes Types: Cocaine  Sexual activity: Not on file Other Topics Concern  Not on file Social History Narrative  Not on file ALLERGIES: Patient has no known allergies. Review of Systems Constitutional: Negative for fever. Respiratory: Positive for cough and shortness of breath. Cardiovascular: Positive for chest pain. Gastrointestinal: Negative for abdominal pain, nausea and vomiting. All other systems reviewed and are negative. Vitals:  
 18 1130 BP: (!) 148/107 Pulse: 75 Resp: 24 Temp: 97.7 °F (36.5 °C) SpO2: 93% Weight: 86.2 kg (190 lb) Height: 5' 6\" (1.676 m) Physical Exam  
Constitutional: He is oriented to person, place, and time. He appears well-developed and well-nourished. No distress. HENT:  
Head: Normocephalic and atraumatic. Eyes: Conjunctivae are normal. No scleral icterus. Neck: Neck supple. No tracheal deviation present. Cardiovascular: Normal rate, regular rhythm, normal heart sounds and intact distal pulses. Exam reveals no gallop and no friction rub. No murmur heard. Pulmonary/Chest: Effort normal and breath sounds normal. He has no wheezes. He has no rales. Abdominal: Soft. He exhibits no distension. There is no tenderness. There is no rebound and no guarding. Musculoskeletal: He exhibits no edema. Neurological: He is alert and oriented to person, place, and time. Skin: Skin is warm and dry. No rash noted. Psychiatric: He has a normal mood and affect. Nursing note and vitals reviewed. Note written by Yulia Mosquera, as dictated by Iram Min MD 11:26 AM  
 
MDM Procedures ED EKG interpretation: 
Rhythm: normal sinus rhythm; and regular . Rate (approx.): 71; ST/T wave: lateral T-wave inversions. Note written by Yulia Stark, as dictated by Raven Berry MD 11:35 AM 
 
Progress Note: 
Results, treatment, and follow up plan have been discussed with patient. Questions were answered. Ness Aviles MD 
 
A/P: CP after argument with wife - think unlikely to be from cardiac source despite hx CAD; reassuring appearance and exam; VSS; CBC, CMP, trop times 2, EKG, CXR ok; home with PCP/cards f/u.   Ness Aviles MD

## 2019-01-01 LAB
ATRIAL RATE: 71 BPM
CALCULATED P AXIS, ECG09: 25 DEGREES
CALCULATED R AXIS, ECG10: 36 DEGREES
CALCULATED T AXIS, ECG11: 144 DEGREES
DIAGNOSIS, 93000: NORMAL
P-R INTERVAL, ECG05: 146 MS
Q-T INTERVAL, ECG07: 434 MS
QRS DURATION, ECG06: 98 MS
QTC CALCULATION (BEZET), ECG08: 471 MS
VENTRICULAR RATE, ECG03: 71 BPM

## 2019-03-10 ENCOUNTER — APPOINTMENT (OUTPATIENT)
Dept: NON INVASIVE DIAGNOSTICS | Age: 59
End: 2019-03-10
Attending: INTERNAL MEDICINE
Payer: COMMERCIAL

## 2019-03-10 ENCOUNTER — APPOINTMENT (OUTPATIENT)
Dept: GENERAL RADIOLOGY | Age: 59
End: 2019-03-10
Attending: EMERGENCY MEDICINE
Payer: COMMERCIAL

## 2019-03-10 ENCOUNTER — HOSPITAL ENCOUNTER (OUTPATIENT)
Age: 59
Setting detail: OBSERVATION
Discharge: HOME | End: 2019-03-13
Attending: EMERGENCY MEDICINE | Admitting: INTERNAL MEDICINE
Payer: COMMERCIAL

## 2019-03-10 DIAGNOSIS — I50.9 ACUTE CONGESTIVE HEART FAILURE, UNSPECIFIED HEART FAILURE TYPE (HCC): ICD-10-CM

## 2019-03-10 DIAGNOSIS — I16.1 HYPERTENSIVE EMERGENCY: Primary | ICD-10-CM

## 2019-03-10 DIAGNOSIS — J06.9 VIRAL UPPER RESPIRATORY TRACT INFECTION: ICD-10-CM

## 2019-03-10 PROBLEM — Z91.199 MEDICALLY NONCOMPLIANT: Status: ACTIVE | Noted: 2019-03-10

## 2019-03-10 LAB
ALBUMIN SERPL-MCNC: 3.2 G/DL (ref 3.5–5)
ALBUMIN/GLOB SERPL: 0.7 {RATIO} (ref 1.1–2.2)
ALP SERPL-CCNC: 63 U/L (ref 45–117)
ALT SERPL-CCNC: 16 U/L (ref 12–78)
AMPHET UR QL SCN: NEGATIVE
ANION GAP SERPL CALC-SCNC: 8 MMOL/L (ref 5–15)
AST SERPL-CCNC: 16 U/L (ref 15–37)
BARBITURATES UR QL SCN: NEGATIVE
BASOPHILS # BLD: 0 K/UL (ref 0–0.1)
BASOPHILS NFR BLD: 1 % (ref 0–1)
BENZODIAZ UR QL: NEGATIVE
BILIRUB SERPL-MCNC: 0.7 MG/DL (ref 0.2–1)
BNP SERPL-MCNC: 6477 PG/ML
BUN SERPL-MCNC: 10 MG/DL (ref 6–20)
BUN/CREAT SERPL: 8 (ref 12–20)
CALCIUM SERPL-MCNC: 8.6 MG/DL (ref 8.5–10.1)
CANNABINOIDS UR QL SCN: NEGATIVE
CHLORIDE SERPL-SCNC: 109 MMOL/L (ref 97–108)
CO2 SERPL-SCNC: 26 MMOL/L (ref 21–32)
COCAINE UR QL SCN: POSITIVE
COMMENT, HOLDF: NORMAL
COMMENT, HOLDF: NORMAL
CREAT SERPL-MCNC: 1.29 MG/DL (ref 0.7–1.3)
DIFFERENTIAL METHOD BLD: NORMAL
DRUG SCRN COMMENT,DRGCM: ABNORMAL
ECHO AO ASC DIAM: 3.23 CM
ECHO AO ROOT DIAM: 3.28 CM
ECHO AV PEAK GRADIENT: 5.9 MMHG
ECHO AV PEAK VELOCITY: 120.96 CM/S
ECHO AV REGURGITANT PHT: 629.8 CM
ECHO IVC SNIFF: 1.89 CM
ECHO LA AREA 4C: 14.6 CM2
ECHO LA MAJOR AXIS: 4.8 CM
ECHO LA TO AORTIC ROOT RATIO: 1.46
ECHO LA VOL 2C: 71.25 ML (ref 18–58)
ECHO LA VOL 4C: 37.09 ML (ref 18–58)
ECHO LA VOL BP: 56.2 ML (ref 18–58)
ECHO LA VOL/BSA BIPLANE: 29.38 ML/M2 (ref 16–28)
ECHO LA VOLUME INDEX A2C: 37.25 ML/M2 (ref 16–28)
ECHO LA VOLUME INDEX A4C: 19.39 ML/M2 (ref 16–28)
ECHO LV E' LATERAL VELOCITY: 5.73 CENTIMETER/SECOND
ECHO LV E' SEPTAL VELOCITY: 5.19 CENTIMETER/SECOND
ECHO LV INTERNAL DIMENSION DIASTOLIC: 6.27 CM (ref 4.2–5.9)
ECHO LV INTERNAL DIMENSION SYSTOLIC: 5.31 CM
ECHO LV IVSD: 1.2 CM (ref 0.6–1)
ECHO LV MASS 2D: 431.6 G (ref 88–224)
ECHO LV MASS INDEX 2D: 225.7 G/M2 (ref 49–115)
ECHO LV POSTERIOR WALL DIASTOLIC: 1.3 CM (ref 0.6–1)
ECHO LVOT DIAM: 2.24 CM
ECHO MV A VELOCITY: 39.61 CM/S
ECHO MV AREA PHT: 4 CM2
ECHO MV E DECELERATION TIME (DT): 190.1 MS
ECHO MV E VELOCITY: 106.58 CM/S
ECHO MV E/A RATIO: 2.69
ECHO MV PRESSURE HALF TIME (PHT): 55.1 MS
ECHO RV INTERNAL DIMENSION: 3.2 CM
ECHO RV TAPSE: 1.07 CM (ref 1.5–2)
ECHO TV REGURGITANT MAX VELOCITY: 243.39 CM/S
ECHO TV REGURGITANT PEAK GRADIENT: 23.7 MMHG
EOSINOPHIL # BLD: 0.4 K/UL (ref 0–0.4)
EOSINOPHIL NFR BLD: 7 % (ref 0–7)
ERYTHROCYTE [DISTWIDTH] IN BLOOD BY AUTOMATED COUNT: 13.7 % (ref 11.5–14.5)
FLUAV AG NPH QL IA: NEGATIVE
FLUBV AG NOSE QL IA: NEGATIVE
GLOBULIN SER CALC-MCNC: 4.3 G/DL (ref 2–4)
GLUCOSE SERPL-MCNC: 92 MG/DL (ref 65–100)
HCT VFR BLD AUTO: 43 % (ref 36.6–50.3)
HGB BLD-MCNC: 13.3 G/DL (ref 12.1–17)
IMM GRANULOCYTES # BLD AUTO: 0 K/UL (ref 0–0.04)
IMM GRANULOCYTES NFR BLD AUTO: 0 % (ref 0–0.5)
LYMPHOCYTES # BLD: 1.1 K/UL (ref 0.8–3.5)
LYMPHOCYTES NFR BLD: 19 % (ref 12–49)
MCH RBC QN AUTO: 28.2 PG (ref 26–34)
MCHC RBC AUTO-ENTMCNC: 30.9 G/DL (ref 30–36.5)
MCV RBC AUTO: 91.3 FL (ref 80–99)
METHADONE UR QL: NEGATIVE
MONOCYTES # BLD: 0.4 K/UL (ref 0–1)
MONOCYTES NFR BLD: 7 % (ref 5–13)
NEUTS SEG # BLD: 3.8 K/UL (ref 1.8–8)
NEUTS SEG NFR BLD: 66 % (ref 32–75)
NRBC # BLD: 0 K/UL (ref 0–0.01)
NRBC BLD-RTO: 0 PER 100 WBC
OPIATES UR QL: NEGATIVE
PCP UR QL: NEGATIVE
PISA AR MAX VEL: 207.78 CM/S
PLATELET # BLD AUTO: 264 K/UL (ref 150–400)
PMV BLD AUTO: 10.2 FL (ref 8.9–12.9)
POTASSIUM SERPL-SCNC: 4.4 MMOL/L (ref 3.5–5.1)
PROT SERPL-MCNC: 7.5 G/DL (ref 6.4–8.2)
RBC # BLD AUTO: 4.71 M/UL (ref 4.1–5.7)
SAMPLES BEING HELD,HOLD: NORMAL
SAMPLES BEING HELD,HOLD: NORMAL
SODIUM SERPL-SCNC: 143 MMOL/L (ref 136–145)
TROPONIN I SERPL-MCNC: <0.05 NG/ML
WBC # BLD AUTO: 5.7 K/UL (ref 4.1–11.1)

## 2019-03-10 PROCEDURE — 74011250636 HC RX REV CODE- 250/636: Performed by: EMERGENCY MEDICINE

## 2019-03-10 PROCEDURE — 80307 DRUG TEST PRSMV CHEM ANLYZR: CPT

## 2019-03-10 PROCEDURE — 71045 X-RAY EXAM CHEST 1 VIEW: CPT

## 2019-03-10 PROCEDURE — 96374 THER/PROPH/DIAG INJ IV PUSH: CPT

## 2019-03-10 PROCEDURE — 93306 TTE W/DOPPLER COMPLETE: CPT

## 2019-03-10 PROCEDURE — 65660000000 HC RM CCU STEPDOWN

## 2019-03-10 PROCEDURE — 99218 HC RM OBSERVATION: CPT

## 2019-03-10 PROCEDURE — 85025 COMPLETE CBC W/AUTO DIFF WBC: CPT

## 2019-03-10 PROCEDURE — 74011250637 HC RX REV CODE- 250/637: Performed by: INTERNAL MEDICINE

## 2019-03-10 PROCEDURE — 83880 ASSAY OF NATRIURETIC PEPTIDE: CPT

## 2019-03-10 PROCEDURE — 87804 INFLUENZA ASSAY W/OPTIC: CPT

## 2019-03-10 PROCEDURE — 80053 COMPREHEN METABOLIC PANEL: CPT

## 2019-03-10 PROCEDURE — 93005 ELECTROCARDIOGRAM TRACING: CPT

## 2019-03-10 PROCEDURE — 36415 COLL VENOUS BLD VENIPUNCTURE: CPT

## 2019-03-10 PROCEDURE — 74011000250 HC RX REV CODE- 250: Performed by: INTERNAL MEDICINE

## 2019-03-10 PROCEDURE — 74011250637 HC RX REV CODE- 250/637: Performed by: EMERGENCY MEDICINE

## 2019-03-10 PROCEDURE — 84484 ASSAY OF TROPONIN QUANT: CPT

## 2019-03-10 PROCEDURE — 96372 THER/PROPH/DIAG INJ SC/IM: CPT

## 2019-03-10 PROCEDURE — 74011250636 HC RX REV CODE- 250/636: Performed by: INTERNAL MEDICINE

## 2019-03-10 PROCEDURE — 99285 EMERGENCY DEPT VISIT HI MDM: CPT

## 2019-03-10 PROCEDURE — 96375 TX/PRO/DX INJ NEW DRUG ADDON: CPT

## 2019-03-10 RX ORDER — ASPIRIN 325 MG
325 TABLET ORAL
Status: COMPLETED | OUTPATIENT
Start: 2019-03-10 | End: 2019-03-10

## 2019-03-10 RX ORDER — ENOXAPARIN SODIUM 100 MG/ML
40 INJECTION SUBCUTANEOUS EVERY 24 HOURS
Status: DISCONTINUED | OUTPATIENT
Start: 2019-03-10 | End: 2019-03-13 | Stop reason: HOSPADM

## 2019-03-10 RX ORDER — SODIUM CHLORIDE 0.9 % (FLUSH) 0.9 %
5-40 SYRINGE (ML) INJECTION EVERY 8 HOURS
Status: DISCONTINUED | OUTPATIENT
Start: 2019-03-10 | End: 2019-03-13 | Stop reason: HOSPADM

## 2019-03-10 RX ORDER — SPIRONOLACTONE 25 MG/1
25 TABLET ORAL DAILY
Status: DISCONTINUED | OUTPATIENT
Start: 2019-03-10 | End: 2019-03-13 | Stop reason: HOSPADM

## 2019-03-10 RX ORDER — METOPROLOL SUCCINATE 25 MG/1
25 TABLET, EXTENDED RELEASE ORAL DAILY
Status: DISCONTINUED | OUTPATIENT
Start: 2019-03-10 | End: 2019-03-11

## 2019-03-10 RX ORDER — LISINOPRIL 5 MG/1
10 TABLET ORAL DAILY
Status: DISCONTINUED | OUTPATIENT
Start: 2019-03-10 | End: 2019-03-13 | Stop reason: HOSPADM

## 2019-03-10 RX ORDER — FUROSEMIDE 10 MG/ML
20 INJECTION INTRAMUSCULAR; INTRAVENOUS
Status: COMPLETED | OUTPATIENT
Start: 2019-03-10 | End: 2019-03-10

## 2019-03-10 RX ORDER — GUAIFENESIN 100 MG/5ML
81 LIQUID (ML) ORAL DAILY
Status: DISCONTINUED | OUTPATIENT
Start: 2019-03-10 | End: 2019-03-13 | Stop reason: HOSPADM

## 2019-03-10 RX ORDER — HYDRALAZINE HYDROCHLORIDE 20 MG/ML
10 INJECTION INTRAMUSCULAR; INTRAVENOUS
Status: DISCONTINUED | OUTPATIENT
Start: 2019-03-10 | End: 2019-03-13 | Stop reason: HOSPADM

## 2019-03-10 RX ORDER — SODIUM CHLORIDE 0.9 % (FLUSH) 0.9 %
5-40 SYRINGE (ML) INJECTION AS NEEDED
Status: DISCONTINUED | OUTPATIENT
Start: 2019-03-10 | End: 2019-03-13 | Stop reason: HOSPADM

## 2019-03-10 RX ORDER — BUMETANIDE 0.25 MG/ML
1 INJECTION INTRAMUSCULAR; INTRAVENOUS EVERY 12 HOURS
Status: DISCONTINUED | OUTPATIENT
Start: 2019-03-10 | End: 2019-03-13

## 2019-03-10 RX ORDER — ENALAPRILAT 1.25 MG/ML
1.25 INJECTION INTRAVENOUS
Status: DISCONTINUED | OUTPATIENT
Start: 2019-03-10 | End: 2019-03-10

## 2019-03-10 RX ADMIN — ASPIRIN 81 MG 81 MG: 81 TABLET ORAL at 12:14

## 2019-03-10 RX ADMIN — BUMETANIDE 1 MG: 0.25 INJECTION INTRAMUSCULAR; INTRAVENOUS at 15:16

## 2019-03-10 RX ADMIN — ASPIRIN 325 MG: 325 TABLET, COATED ORAL at 07:26

## 2019-03-10 RX ADMIN — LISINOPRIL 10 MG: 5 TABLET ORAL at 12:14

## 2019-03-10 RX ADMIN — METOPROLOL SUCCINATE 25 MG: 25 TABLET, EXTENDED RELEASE ORAL at 12:14

## 2019-03-10 RX ADMIN — NITROGLYCERIN 0.5 INCH: 20 OINTMENT TOPICAL at 08:58

## 2019-03-10 RX ADMIN — ENOXAPARIN SODIUM 40 MG: 100 INJECTION SUBCUTANEOUS at 12:16

## 2019-03-10 RX ADMIN — Medication 20 ML: at 21:02

## 2019-03-10 RX ADMIN — SPIRONOLACTONE 25 MG: 25 TABLET ORAL at 12:14

## 2019-03-10 RX ADMIN — FUROSEMIDE 20 MG: 10 INJECTION, SOLUTION INTRAVENOUS at 08:54

## 2019-03-10 NOTE — PROGRESS NOTES
Notified Dr. Avis Petit regarding patient positive drug screen, and complaints of \"hot and cold flashes. \"    6713: TRANSFER - OUT REPORT:    Verbal report given to [employer], RN(name) on Donita Escalante  being transferred to [facility] (unit) for routine progression of care       Report consisted of patients Situation, Background, Assessment and   Recommendations(SBAR). Information from the following report(s) SBAR, Kardex, [facility] and Recent Results was reviewed with the receiving nurse. Lines:   Peripheral IV 03/10/19 Left Hand (Active)   Site Assessment Clean, dry, & intact 3/10/2019  1:21 PM   Phlebitis Assessment 0 3/10/2019  1:21 PM   Infiltration Assessment 0 3/10/2019  1:21 PM   Dressing Status Clean, dry, & intact 3/10/2019  7:27 AM   Dressing Type Transparent;Tape 3/10/2019  1:21 PM   Hub Color/Line Status Pink 3/10/2019  1:21 PM        Opportunity for questions and clarification was provided.       Patient transported with:   Registered Nurse

## 2019-03-10 NOTE — ED PROVIDER NOTES
The history is provided by the patient. Cough   This is a new problem. The current episode started yesterday. The problem has been rapidly worsening. The cough is non-productive. There has been no fever. Associated symptoms include shortness of breath. Pertinent negatives include no chest pain, no chills, no sore throat, no wheezing and no nausea. He has tried nothing for the symptoms. He is a smoker. patient with CAD s/p MI and CABG x 3 in May 2018, reports not taking any of his meds for a couple months. No CP. Patient reports continued cocaine use and tobacco use    Past Medical History:   Diagnosis Date    CAD (coronary artery disease)     MI x4       Past Surgical History:   Procedure Laterality Date    CARDIAC SURG PROCEDURE UNLIST  2018    triple bipass    HX ORTHOPAEDIC      left hip surgery         Family History:   Problem Relation Age of Onset    Diabetes Mother        Social History     Socioeconomic History    Marital status:      Spouse name: Not on file    Number of children: Not on file    Years of education: Not on file    Highest education level: Not on file   Social Needs    Financial resource strain: Not on file    Food insecurity - worry: Not on file    Food insecurity - inability: Not on file   [employer] needs - medical: Not on file   [employer] needs - non-medical: Not on file   Occupational History    Not on file   Tobacco Use    Smoking status: Current Some Day Smoker     Last attempt to quit: 5/3/2018     Years since quittin.8    Smokeless tobacco: Never Used    Tobacco comment: last use this past wednesday   Substance and Sexual Activity    Alcohol use: No    Drug use: Yes     Types: Cocaine     Comment: last use approximately 3 weeks    Sexual activity: Not on file   Other Topics Concern    Not on file   Social History Narrative    Not on file         ALLERGIES: Patient has no known allergies.     Review of Systems   Constitutional: Negative for chills and fever. HENT: Negative. Negative for sore throat. Eyes: Negative. Respiratory: Positive for cough and shortness of breath. Negative for chest tightness and wheezing. Cardiovascular: Positive for leg swelling. Negative for chest pain. Gastrointestinal: Negative. Negative for nausea. Genitourinary: Negative. Musculoskeletal: Negative. Neurological: Negative. Psychiatric/Behavioral: Negative. There were no vitals filed for this visit. Physical Exam   Constitutional: He is oriented to person, place, and time. He appears well-developed and well-nourished. No distress. HENT:   Head: Normocephalic and atraumatic. Eyes: Conjunctivae and EOM are normal.   Neck: Normal range of motion. Neck supple. Cardiovascular: Normal rate, regular rhythm, normal heart sounds and intact distal pulses. No murmur heard. Pulmonary/Chest: Effort normal and breath sounds normal. No stridor. No respiratory distress. He has no wheezes. He has no rales. Abdominal: Soft. Bowel sounds are normal. There is no tenderness. Musculoskeletal: Normal range of motion. He exhibits no edema, tenderness or deformity. Neurological: He is alert and oriented to person, place, and time. No cranial nerve deficit. He exhibits normal muscle tone. Coordination normal.   Skin: Skin is warm and dry. He is not diaphoretic. Psychiatric: He has a normal mood and affect. Nursing note and vitals reviewed. MDM  Number of Diagnoses or Management Options  Acute congestive heart failure, unspecified heart failure type [facility]): Hypertensive emergency:   Diagnosis management comments: Patient with SOB and multiple cardiac risk factors that are not being helped by his continued cocaine and tobacco use, nor him not taking his medications for a couple months. ddx - cad, pna, chf, influenza.   Check labs, ekg, cxr, give aspirin, drug screen    EKG: 3/10/2019 @ 07:26 unchanged from previous tracing on 12/30/18, sinus rhythm with sinus arrhythmia, rate 83, normal intervals except for prolonged QT, LVH, possible LAE. Amount and/or Complexity of Data Reviewed  Clinical lab tests: reviewed and ordered  Tests in the radiology section of CPT®: ordered and reviewed  Discuss the patient with other providers: yes (Dr. Daryle Setter - hospitalist to admit)    Critical Care  Total time providing critical care: 30-74 minutes (Total critical care time spent exclusive of procedures:  30 minutes)         Procedures           VITALS:   Patient Vitals for the past 8 hrs:   Temp Pulse Resp BP SpO2   03/10/19 0815  73 21 (!) 139/101 (!) 88 %   03/10/19 0719 98.2 °F (36.8 °C) 91 20 (!) 149/106 96 %                  Recent Results (from the past 24 hour(s))   CBC WITH AUTOMATED DIFF    Collection Time: 03/10/19  7:43 AM   Result Value Ref Range    WBC 5.7 4.1 - 11.1 K/uL    RBC 4.71 4.10 - 5.70 M/uL    HGB 13.3 12.1 - 17.0 g/dL    HCT 43.0 36.6 - 50.3 %    MCV 91.3 80.0 - 99.0 FL    MCH 28.2 26.0 - 34.0 PG    MCHC 30.9 30.0 - 36.5 g/dL    RDW 13.7 11.5 - 14.5 %    PLATELET 464 636 - 113 K/uL    MPV 10.2 8.9 - 12.9 FL    NRBC 0.0 0  WBC    ABSOLUTE NRBC 0.00 0.00 - 0.01 K/uL    NEUTROPHILS 66 32 - 75 %    LYMPHOCYTES 19 12 - 49 %    MONOCYTES 7 5 - 13 %    EOSINOPHILS 7 0 - 7 %    BASOPHILS 1 0 - 1 %    IMMATURE GRANULOCYTES 0 0.0 - 0.5 %    ABS. NEUTROPHILS 3.8 1.8 - 8.0 K/UL    ABS. LYMPHOCYTES 1.1 0.8 - 3.5 K/UL    ABS. MONOCYTES 0.4 0.0 - 1.0 K/UL    ABS. EOSINOPHILS 0.4 0.0 - 0.4 K/UL    ABS. BASOPHILS 0.0 0.0 - 0.1 K/UL    ABS. IMM.  GRANS. 0.0 0.00 - 0.04 K/UL    DF AUTOMATED     METABOLIC PANEL, COMPREHENSIVE    Collection Time: 03/10/19  7:43 AM   Result Value Ref Range    Sodium 143 136 - 145 mmol/L    Potassium 4.4 3.5 - 5.1 mmol/L    Chloride 109 (H) 97 - 108 mmol/L    CO2 26 21 - 32 mmol/L    Anion gap 8 5 - 15 mmol/L    Glucose 92 65 - 100 mg/dL    BUN 10 6 - 20 MG/DL    Creatinine 1.29 0.70 - 1.30 MG/DL BUN/Creatinine ratio 8 (L) 12 - 20      GFR est AA >60 >60 ml/min/1.73m2    GFR est non-AA 57 (L) >60 ml/min/1.73m2    Calcium 8.6 8.5 - 10.1 MG/DL    Bilirubin, total 0.7 0.2 - 1.0 MG/DL    ALT (SGPT) 16 12 - 78 U/L    AST (SGOT) 16 15 - 37 U/L    Alk. phosphatase 63 45 - 117 U/L    Protein, total 7.5 6.4 - 8.2 g/dL    Albumin 3.2 (L) 3.5 - 5.0 g/dL    Globulin 4.3 (H) 2.0 - 4.0 g/dL    A-G Ratio 0.7 (L) 1.1 - 2.2     NT-PRO BNP    Collection Time: 03/10/19  7:43 AM   Result Value Ref Range    NT pro-BNP 6,477 (H) <125 PG/ML   TROPONIN I    Collection Time: 03/10/19  7:43 AM   Result Value Ref Range    Troponin-I, Qt. <0.05 <0.05 ng/mL   INFLUENZA A & B AG (RAPID TEST)    Collection Time: 03/10/19  7:43 AM   Result Value Ref Range    Influenza A Antigen NEGATIVE  NEG      Influenza B Antigen NEGATIVE  NEG     SAMPLES BEING HELD    Collection Time: 03/10/19  7:43 AM   Result Value Ref Range    SAMPLES BEING HELD 1RED,1BL,1SST     COMMENT        Add-on orders for these samples will be processed based on acceptable specimen integrity and analyte stability, which may vary by analyte. Xr Chest Port    Result Date: 3/10/2019  EXAM:  XR CHEST PORT INDICATION:  sob COMPARISON:  12/30/2018 FINDINGS: A portable AP radiograph of the chest was obtained at 7:30 hours. The patient is on a cardiac monitor. There is left basilar density, in part due to scarring, although there may be superimposed atelectasis and/or pleural fluid. There is moderate cardiomegaly with mild-to-moderate vascular congestion there is no significant right pleural fluid. .  The patient has undergone prior median sternotomy and CABG. IMPRESSION: Prior cardiac surgery. Mild to moderate CHF. Probable left basilar atelectasis and small pleural effusion superimposed on scarring.

## 2019-03-10 NOTE — ED NOTES
TRANSFER - OUT REPORT:    Verbal report given to \[facility]\"" RN(name) on Justin Leslie  being transferred to [facility] hold in ED(unit) for routine progression of care       Report consisted of patients Situation, Background, Assessment and   Recommendations(SBAR). Information from the following report(s) ED Summary was reviewed with the receiving nurse. Lines:   Peripheral IV 03/10/19 Left Hand (Active)   Site Assessment Clean, dry, & intact 3/10/2019  7:27 AM   Phlebitis Assessment 0 3/10/2019  7:27 AM   Dressing Status Clean, dry, & intact 3/10/2019  7:27 AM   Dressing Type Transparent 3/10/2019  7:27 AM   Hub Color/Line Status Pink 3/10/2019  7:27 AM        Opportunity for questions and clarification was provided.       Patient transported with:   Registered Nurse

## 2019-03-10 NOTE — H&P
[place] [id number] [facility], [address] [place] [address] [place] [address]  [phone]    Admission History and Physical      NAME:  Madison Portillo   :   1960   MRN:  [id number]     PCP:  Sharifa Lewis MD     Date/Time:  3/10/2019         Subjective:     CHIEF COMPLAINT: SOB     HISTORY OF PRESENT ILLNESS:     Mr. Daylin Fernandez is a 60 y.o.  male who is admitted with CHF exacerbation. Mr. Daylin Fernandez with PMH of CHF, HTN, CAD s/p CABG, medically noncompliance presented to ER c/o SOB, which started yesterday and became worse progressively. Denies chest pain or leg swelling. Has nonproductive cough. Pt has Hx of CAD and HTN, but stopped taking his meds for the last 2 months and has no cardiology FU.      Past Medical History:   Diagnosis Date    CAD (coronary artery disease)     MI x4        Past Surgical History:   Procedure Laterality Date    CARDIAC SURG PROCEDURE UNLIST  2018    triple bipass    HX ORTHOPAEDIC      left hip surgery       Social History     Tobacco Use    Smoking status: Current Some Day Smoker     Last attempt to quit: 5/3/2018     Years since quittin.8    Smokeless tobacco: Never Used    Tobacco comment: last use this past wednesday   Substance Use Topics    Alcohol use: No        Family History   Problem Relation Age of Onset    Diabetes Mother         No Known Allergies     Prior to Admission medications    Not on File         Review of Systems:  (bold if positive, if negative)    Gen:  Eyes:  ENT:  CVS:  Pulm:  dyspneaGI:    :    MS:  Skin:  Psych:  Endo:    Hem:  Renal:    Neuro:            Objective:      VITALS:    Vital signs reviewed; most recent are:    Visit Vitals  BP (!) 130/97   Pulse 76   Temp 98.2 °F (36.8 °C)   Resp (!) 35   Ht 5' 6\" (1.676 m)   Wt 81.6 kg (180 lb)   SpO2 95%   BMI 29.05 kg/m²     SpO2 Readings from Last 6 Encounters:   03/10/19 95%   18 92%   18 95%   18 99%   18 95%   18 98%        No intake or output data in the 24 hours ending 03/10/19 1010         Exam:     Physical Exam:    Gen:  Well-developed, well-nourished, in no acute distress  HEENT:  Pink conjunctivae, PERRL, hearing intact to voice, moist mucous membranes  Neck:  Supple, without masses, thyroid non-tender  Resp:  No accessory muscle use, decreased air entry on the LT  Card:  No murmurs, normal S1, S2 without thrills, bruits or peripheral edema  Abd:  Soft, non-tender, non-distended, normoactive bowel sounds are present, no palpable organomegaly and no detectable hernias  Lymph:  No cervical or inguinal adenopathy  Musc:  No cyanosis or clubbing  Skin:  No rashes or ulcers, skin turgor is good  Neuro:  Cranial nerves are grossly intact, no focal motor weakness, follows commands appropriately  Psych:  Good insight, oriented to person, place and time, alert       Labs:    Recent Labs     03/10/19  0743   WBC 5.7   HGB 13.3   HCT 43.0        Recent Labs     03/10/19  0743      K 4.4   *   CO2 26   GLU 92   BUN 10   CREA 1.29   CA 8.6   ALB 3.2*   TBILI 0.7   SGOT 16   ALT 16     Lab Results   Component Value Date/Time    Glucose (POC) 156 (H) 05/12/2018 11:28 AM    Glucose (POC) 109 (H) 05/12/2018 07:39 AM     No results for input(s): PH, PCO2, PO2, HCO3, FIO2 in the last 72 hours. No results for input(s): INR in the last 72 hours. No lab exists for component: INREXT    Telemetry reviewed:   normal sinus rhythm prolonged QT       Assessment/Plan:    1. Acute on chronic systolic CHF exacerbation(congestive heart failure) ([facility] [address] (3/10/2019). His last echo about a year ago: EF of 45%. Admit to telemetry. Check daily weight, I/O. Check echo and start bumex IV. Consult cardiology     2. CAD (coronary artery disease) (5/4/2018)  S/P CABG x 3 (5/21/2018). Stopped taking ASA and BB 2 months ago and has no FU with cardiology. Will start ASA, BB and cardiology consult     3. HTN (hypertension) (5/3/2018).  Started lisinopril and metoprolol. 4.  Tobacco use (5/3/2018). Advised to quit smoking     5. Medically noncompliant (3/10/2019). Needs further counseling.           Previous medical records reviewed     Risk of deterioration: high      Total time spent with patient: 79 [address] [facility] discussed with: Patient, Nursing Staff and >50% of time spent in counseling and coordination of care    Discussed:  Care Plan    Prophylaxis:  Lovenox    Probable Disposition:  Home w/Family           ___________________________________________________    Attending Physician: Allison Toney MD

## 2019-03-10 NOTE — ED NOTES
Patient took off Nitropaste \" It was making me pee too much\". Relayed again to patient IV Lasix given was making him urinate, nitropaste to open vessels \" oh, I didn't know\". This was relayed to patient while giving medication. Spoke with Dr. Deanne Salinas, [facility] to discontinue.

## 2019-03-10 NOTE — PROGRESS NOTES
[facility]: MED RECONCILIATION      Information obtained from: Patient     Allergies: Patient has no known allergies. Comments:  Patient states he is not on any meds currently, he is supposed to be on heart medicines but he has not taken them for about 2 months. States he needs to see the doctor. He states he takes 2 jhony aspirin here and there but not consistently. Prior to Admission Medications:     Medication Documentation Review Audit       Reviewed by Bhavna Parsons PHARMD (Pharmacist) on 03/10/19 at 0144      Medication Sig Documenting Provider Last Dose Status Taking?   aspirin 81 mg chewable tablet Take 1 Tab by mouth daily. Keily Anders NP Not Taking Unknown time Active No   atorvastatin (LIPITOR) 10 mg tablet Take 1 Tab by mouth nightly. Keily Andres NP Not Taking Unknown time Active No   lisinopril (PRINIVIL, ZESTRIL) 2.5 mg tablet Take 1 Tab by mouth daily. Keily Anders NP Not Taking Unknown time Active No   metoprolol succinate (TOPROL-XL) 25 mg XL tablet Take 1 Tab by mouth daily. Keily Anders NP Not Taking Unknown time Active No   spironolactone (ALDACTONE) 25 mg tablet Take 1 Tab by mouth daily.  Keily Anders NP Not Taking Unknown time Active No                        [address], [place]   Contact: 7840

## 2019-03-11 LAB
ANION GAP SERPL CALC-SCNC: 7 MMOL/L (ref 5–15)
ATRIAL RATE: 83 BPM
BUN SERPL-MCNC: 13 MG/DL (ref 6–20)
BUN/CREAT SERPL: 10 (ref 12–20)
CALCIUM SERPL-MCNC: 8.5 MG/DL (ref 8.5–10.1)
CALCULATED P AXIS, ECG09: 31 DEGREES
CALCULATED R AXIS, ECG10: 18 DEGREES
CALCULATED T AXIS, ECG11: 145 DEGREES
CHLORIDE SERPL-SCNC: 107 MMOL/L (ref 97–108)
CHOLEST SERPL-MCNC: 195 MG/DL
CO2 SERPL-SCNC: 26 MMOL/L (ref 21–32)
CREAT SERPL-MCNC: 1.34 MG/DL (ref 0.7–1.3)
DIAGNOSIS, 93000: NORMAL
ERYTHROCYTE [DISTWIDTH] IN BLOOD BY AUTOMATED COUNT: 13.3 % (ref 11.5–14.5)
GLUCOSE SERPL-MCNC: 100 MG/DL (ref 65–100)
HCT VFR BLD AUTO: 44.6 % (ref 36.6–50.3)
HDLC SERPL-MCNC: 45 MG/DL
HDLC SERPL: 4.3 {RATIO} (ref 0–5)
HGB BLD-MCNC: 14.3 G/DL (ref 12.1–17)
LDLC SERPL CALC-MCNC: 131.4 MG/DL (ref 0–100)
LIPID PROFILE,FLP: ABNORMAL
MCH RBC QN AUTO: 28.3 PG (ref 26–34)
MCHC RBC AUTO-ENTMCNC: 32.1 G/DL (ref 30–36.5)
MCV RBC AUTO: 88.3 FL (ref 80–99)
NRBC # BLD: 0 K/UL (ref 0–0.01)
NRBC BLD-RTO: 0 PER 100 WBC
P-R INTERVAL, ECG05: 148 MS
PLATELET # BLD AUTO: 280 K/UL (ref 150–400)
PMV BLD AUTO: 10.3 FL (ref 8.9–12.9)
POTASSIUM SERPL-SCNC: 4.1 MMOL/L (ref 3.5–5.1)
Q-T INTERVAL, ECG07: 410 MS
QRS DURATION, ECG06: 102 MS
QTC CALCULATION (BEZET), ECG08: 481 MS
RBC # BLD AUTO: 5.05 M/UL (ref 4.1–5.7)
SODIUM SERPL-SCNC: 140 MMOL/L (ref 136–145)
TRIGL SERPL-MCNC: 93 MG/DL (ref ?–150)
VENTRICULAR RATE, ECG03: 83 BPM
VLDLC SERPL CALC-MCNC: 18.6 MG/DL
WBC # BLD AUTO: 6.3 K/UL (ref 4.1–11.1)

## 2019-03-11 PROCEDURE — 74011250637 HC RX REV CODE- 250/637: Performed by: INTERNAL MEDICINE

## 2019-03-11 PROCEDURE — 80048 BASIC METABOLIC PNL TOTAL CA: CPT

## 2019-03-11 PROCEDURE — 74011000250 HC RX REV CODE- 250: Performed by: INTERNAL MEDICINE

## 2019-03-11 PROCEDURE — 99218 HC RM OBSERVATION: CPT

## 2019-03-11 PROCEDURE — 85027 COMPLETE CBC AUTOMATED: CPT

## 2019-03-11 PROCEDURE — 96372 THER/PROPH/DIAG INJ SC/IM: CPT

## 2019-03-11 PROCEDURE — 80061 LIPID PANEL: CPT

## 2019-03-11 PROCEDURE — 96376 TX/PRO/DX INJ SAME DRUG ADON: CPT

## 2019-03-11 PROCEDURE — 65660000000 HC RM CCU STEPDOWN

## 2019-03-11 PROCEDURE — 74011250636 HC RX REV CODE- 250/636: Performed by: INTERNAL MEDICINE

## 2019-03-11 PROCEDURE — 36415 COLL VENOUS BLD VENIPUNCTURE: CPT

## 2019-03-11 RX ORDER — ATORVASTATIN CALCIUM 20 MG/1
40 TABLET, FILM COATED ORAL
Status: DISCONTINUED | OUTPATIENT
Start: 2019-03-11 | End: 2019-03-13 | Stop reason: HOSPADM

## 2019-03-11 RX ADMIN — BUMETANIDE 1 MG: 0.25 INJECTION INTRAMUSCULAR; INTRAVENOUS at 03:31

## 2019-03-11 RX ADMIN — Medication 10 ML: at 22:22

## 2019-03-11 RX ADMIN — ASPIRIN 81 MG 81 MG: 81 TABLET ORAL at 08:49

## 2019-03-11 RX ADMIN — ATORVASTATIN CALCIUM 40 MG: 20 TABLET, FILM COATED ORAL at 22:22

## 2019-03-11 RX ADMIN — METOPROLOL SUCCINATE 25 MG: 25 TABLET, EXTENDED RELEASE ORAL at 08:49

## 2019-03-11 RX ADMIN — SPIRONOLACTONE 25 MG: 25 TABLET ORAL at 08:49

## 2019-03-11 RX ADMIN — BUMETANIDE 1 MG: 0.25 INJECTION INTRAMUSCULAR; INTRAVENOUS at 15:26

## 2019-03-11 RX ADMIN — Medication 10 ML: at 15:26

## 2019-03-11 RX ADMIN — LISINOPRIL 10 MG: 5 TABLET ORAL at 08:49

## 2019-03-11 RX ADMIN — Medication 10 ML: at 05:12

## 2019-03-11 RX ADMIN — ENOXAPARIN SODIUM 40 MG: 100 INJECTION SUBCUTANEOUS at 11:59

## 2019-03-11 NOTE — PROGRESS NOTES
Bedside and Verbal shift change report given to Dave Damian (oncoming nurse) by Barney Huynh (offgoing nurse). Report included the following information SBAR, Kardex, Accordion, Recent Results, Med Rec Status and Cardiac Rhythm NSR BBB. 1045  Patient requesting wheelchair to go to ICU to visit a friend. Call placed to Dr Kathrine Gordon, he was made aware of patients request. Patient is ok to go from a medical standpoint but he needs to be accompanied by a tech. Explained to patient that we do not have the resources available at the moment for a tech to go with him but possibly later. Patient very angry.

## 2019-03-11 NOTE — DISCHARGE INSTRUCTIONS
Patient Education      Avoiding Triggers With Heart Failure: Care Instructions  Your Care Instructions    Triggers are anything that make your heart failure flare up. A flare-up is also called \"sudden heart failure\" or \"acute heart failure. \" When you have a flare-up, fluid builds up in your lungs, and you have problems breathing. You might need to go to the hospital. By watching for changes in your condition and avoiding triggers, you can prevent heart failure flare-ups. Follow-up care is a key part of your treatment and safety. Be sure to make and go to all appointments, and call your doctor if you are having problems. It's also a good idea to know your test results and keep a list of the medicines you take. How can you care for yourself at home? Watch for changes in your weight and condition  · Weigh yourself without clothing at the same time each day. Record your weight. Call your doctor if you gain 3 pounds or more in 24 hrs or 5 pounds in one week. A sudden weight gain may mean that your heart failure is getting worse. · Keep a daily record of your symptoms. Write down any changes in how you feel, such as new shortness of breath, cough, or problems eating. Also record if your ankles are more swollen than usual and if you have to urinate in the night more often. Note anything that you ate or did that could have triggered these changes. Limit sodium  Sodium causes your body to hold on to water, making it harder for your heart to pump. People get most of their sodium from processed foods. Fast food and restaurant meals also tend to be very high in sodium. · Your doctor may suggest that you limit sodium to 1,500 milligrams (mg) a day. That is less than 1 teaspoon of salt a day, including all the salt you eat in cooking or in packaged foods. · Read food labels on cans and food packages. They tell you how much sodium you get in one serving. Check the serving size.  If you eat more than one serving, you are getting more sodium. · Be aware that sodium can come in forms other than salt, including monosodium glutamate (MSG), sodium citrate, and sodium bicarbonate (baking soda). MSG is often added to Asian food. You can sometimes ask for food without MSG or salt. · Slowly reducing salt will help you adjust to the taste. Take the salt shaker off the table. · Flavor your food with garlic, lemon juice, onion, vinegar, herbs, and spices instead of salt. Do not use soy sauce, steak sauce, onion salt, garlic salt, mustard, or ketchup on your food, unless it is labeled \"low-sodium\" or \"low-salt. \"  · Make your own salad dressings, sauces, and ketchup without adding salt. · Use fresh or frozen ingredients, instead of canned ones, whenever you can. Choose low-sodium canned goods. · Eat less processed food and food from restaurants, including fast food. Exercise as directed  Moderate, regular exercise is very good for your heart. It improves your blood flow and helps control your weight. But too much exercise can stress your heart and cause a heart failure flare-up. · Check with your doctor before you start an exercise program.  · Walking is an easy way to get exercise. Start out slowly. Gradually increase the length and pace of your walk. Swimming, riding a bike, and using a treadmill are also good forms of exercise. · When you exercise, watch for signs that your heart is working too hard. You are pushing yourself too hard if you cannot talk while you are exercising. If you become short of breath or dizzy or have chest pain, stop, sit down, and rest.  · Do not exercise when you do not feel well. Take medicines correctly  · Take your medicines exactly as prescribed. Call your doctor if you think you are having a problem with your medicine. · Make a list of all the medicines you take. Include those prescribed to you by other doctors and any over-the-counter medicines, vitamins, or supplements you take.  Take this list with you when you go to any doctor. · Take your medicines at the same time every day. It may help you to post a list of all the medicines you take every day and what time of day you take them. · Make taking your medicine as simple as you can. Plan times to take your medicines when you are doing other things, such as eating a meal or getting ready for bed. This will make it easier to remember to take your medicines. · Get organized. Use helpful tools, such as daily or weekly pill containers. When should you call for help? Call 911 if you have symptoms of sudden heart failure such as:  · You have severe trouble breathing. · You cough up pink, foamy mucus. · You have a new irregular or rapid heartbeat. Call your doctor now or seek immediate medical care if:  · You have new or increased shortness of breath. · You are dizzy or lightheaded, or you feel like you may faint. · You have sudden weight gain, such as 3 pounds in 24 hours, or 5 pounds in one week. · You have increased swelling in your legs, ankles, or feet. · You are suddenly so tired or weak that you cannot do your usual activities. Watch closely for changes in your health, and be sure to contact your doctor if you develop new symptoms. Where can you learn more? Go to http://megan-gonzalo.info/  Enter V089 in the search box to learn more about \"Avoiding Triggers With Heart Failure: Care Instructions. \"  © 5838-4166 Healthwise, Incorporated. Care instructions adapted under license by [employer] (which disclaims liability or warranty for this information). This care instruction is for use with your licensed healthcare professional. If you have questions about a medical condition or this instruction, always ask your healthcare professional. [place] [address] any warranty or liability for your use of this information. Content Version: 51.4.006863; Current as of: January 27, 2016 (modified 2/9/18).

## 2019-03-11 NOTE — PROGRESS NOTES
Bedside and Verbal shift change report given to Allie Baxter (oncoming nurse) by Amish Garcia (offgoing nurse). Report included the following information SBAR, Kardex and Recent Results.

## 2019-03-11 NOTE — PROGRESS NOTES
3- Reason for Admission:   SOB                   RRAT Score:   11                  Plan for utilizing home health:  TBD                        Likelihood of Readmission:  Low                         Transition of Care Plan:  Home    I met with the pt to determine potential discharge needs. He lives with his wife, Ortiz Ocampo ([phone]), in a 2-story house. He is independent with his ADL's, uses no assistive devices and drives. His PCP is Dr. Shira Cuellar who has no nurse navigator. He has prescription drug coverage and gets his medications from [facility]. He is not anticipating any discharge needs and his wife will transport him home.     Care Management Interventions  PCP Verified by CM: Yes(Dr. Flo Knight nurse navigator)  Discharge Durable Medical Equipment: No  Physical Therapy Consult: No  Occupational Therapy Consult: No  Speech Therapy Consult: No  Current Support Network: Lives with Spouse, Own Home  Confirm Follow Up Transport: Family  Plan discussed with Pt/Family/Caregiver: Yes  Discharge Location  Discharge Placement: (Home with wife)    [place]up Fabian, [place], CM DM  HTN  RA

## 2019-03-11 NOTE — CONSULTS
Nikolai Anthony MD. [facility]              Patient: Miguel Ángel Rodriguez  : 1960      Today's Date: 3/11/2019            HISTORY OF PRESENT ILLNESS:     History of Present Illness:  Mr. Nely Khan is a 60 y.o.  male who is admitted with CHF exacerbation. Mr. Nely Khan with PMH of CHF, HTN, CAD s/p CABG, medically noncompliance presented to ER c/o SOB, which has worsened lately. Has had intermitted chest pain. Has had CONRAD walking across the room. He stopped all his meds a couple of months ago. Has not seen cardiology since his CABG. Admits to smoking and cocaine use - but says he now wants to live for the lord and will give up drug use. Remains SOB this AM.                  PAST MEDICAL HISTORY:     Past Medical History:   Diagnosis Date    CAD (coronary artery disease)     MI x4    [facility]FrEF [facility]heart [facility] with reduced ejection fraction[facility]     History of cocaine abuse     Hx of CABG     CABG 3 vessel on 18 -  LIMA to LAD, SVG to Diag with Y graft to OM     Ischemic cardiomyopathy     Non-compliance          Past Surgical History:   Procedure Laterality Date    CARDIAC SURG PROCEDURE UNLIST  2018    triple bipass    HX ORTHOPAEDIC      left hip surgery           MEDICATIONS:       Pharmacy Note:  Patient states he is not on any meds currently, he is supposed to be on heart medicines but he has not taken them for about 2 months. States he needs to see the doctor. He states he takes 2 jhony aspirin here and there but not consistently.         Current Facility-Administered Medications   Medication Dose Route Frequency Provider Last Rate Last Dose    atorvastatin (LIPITOR) tablet 40 mg  40 mg Oral QHS Selestine Robinson, Lauraine Denver V, DO        sodium chloride (NS) flush 5-40 mL  5-40 mL IntraVENous Q8H Keith Cotto MD   10 mL at 19 0512    sodium chloride (NS) flush 5-40 mL  5-40 mL IntraVENous PRN Keith Cotto MD        enoxaparin (LOVENOX) injection 40 mg  40 mg SubCUTAneous Q24H Saintclair Pon, MD   40 mg at 03/10/19 1216    bumetanide (BUMEX) injection 1 mg  1 mg IntraVENous Q12H Keith Cotto MD   1 mg at 19 0331    aspirin chewable tablet 81 mg  81 mg Oral DAILY Keith Cotto MD   81 mg at 19 0849    lisinopril (PRINIVIL, ZESTRIL) tablet 10 mg  10 mg Oral DAILY Keith Cotto MD   10 mg at 19 0849    spironolactone (ALDACTONE) tablet 25 mg  25 mg Oral DAILY Keith Cotto MD   25 mg at 19 0849    hydrALAZINE (APRESOLINE) 20 mg/mL injection 10 mg  10 mg IntraVENous Q2H PRN Saintclair Pon, MD               No Known Allergies          SOCIAL HISTORY:     Social History     Tobacco Use    Smoking status: Current Some Day Smoker     Last attempt to quit: 5/3/2018     Years since quittin.8    Smokeless tobacco: Never Used    Tobacco comment: last use this past wednesday   Substance Use Topics    Alcohol use: No    Drug use: Yes     Types: Cocaine     Comment: last use approximately 3 weeks         FAMILY HISTORY:     Family History   Problem Relation Age of Onset    Diabetes Mother              REVIEW OF SYMPTOMS:     Review of Symptoms:  Constitutional: Negative for fever, chills  HEENT: Negative for nosebleeds, tinnitus, and vision changes. Respiratory: +  cough   Cardiovascular: Negative for   syncope   Gastrointestinal: Negative for abdominal pain, diarrhea, melena. Genitourinary: Negative for dysuria  Musculoskeletal: Negative for myalgias. Skin: Negative for rash  Heme: No problems bleeding. Neurological: Negative for speech change and focal weakness. PHYSICAL EXAM:     Physical Exam:  Visit Vitals  /84   Pulse 67   Temp 98 °F (36.7 °C)   Resp 17   Ht 5' 6\" (1.676 m)   Wt 178 lb 6.4 oz (80.9 kg)   SpO2 100%   BMI 28.79 kg/m²     Patient appears generally well, mood and affect are appropriate and pleasant. HEENT:  Hearing intact, non-icteric, normocephalic, atraumatic.    Neck Exam: Supple   Lung Exam: few crackles at bases   Cardiac Exam: Regular rate and rhythm with no murmur  Abdomen: Soft, non-tender, normal bowel sounds. No bruits or masses. Extremities: Moves all ext well. No lower extremity edema.   Psych: Appropriate affect  Neuro - Grossly intact        LABS / OTHER STUDIES:           Recent Results (from the past 24 hour(s))   ECHO ADULT COMPLETE    Collection Time: 03/10/19 12:16 PM   Result Value Ref Range    LA Volume 56.2 18 - 58 mL    LV E' Lateral Velocity 5.73 centimeter/second    LV E' Septal Velocity 5.19 centimeter/second    Tapse 1.07 (A) 1.5 - 2.0 cm    Ao Root D 3.28 cm    LA Vol Index 29.38 16 - 28 ml/m2    AO ASC D 3.23 cm    Aortic Valve Systolic Peak Velocity 649.11 cm/s    AoV PG 5.9 mmHg    LVIDd 6.27 (A) 4.2 - 5.9 cm    LVPWd 1.30 (A) 0.6 - 1.0 cm    LVIDs 5.31 cm    IVSd 1.20 (A) 0.6 - 1.0 cm    LVOT d 2.24 cm    MVA (PHT) 4.0 cm2    MV A Charles 39.61 cm/s    MV E Charles 106.58 cm/s    MV E/A 2.69     Left Atrium to Aortic Root Ratio 1.46     RVIDd 3.20 cm    Inferior Vena Cava Diameter Sniffing 1.89 cm    LA Vol 4C 37.09 18 - 58 mL    LA Vol 2C 71.25 (A) 18 - 58 mL    LA Area 4C 14.6 cm2    LV Mass .6 (A) 88 - 224 g    LV Mass AL Index 225.7 (A) 49 - 115 g/m2    Mitral Valve E Wave Deceleration Time 190.1 ms    Mitral Valve Pressure Half-time 55.1 ms    Left Atrium Major Axis 4.80 cm    Triscuspid Valve Regurgitation Peak Gradient 23.7 mmHg    Aortic Regurgitant Pressure Half-time 629.8 cm    TR Max Velocity 243.39 cm/s    LA Vol Index 37.25 16 - 28 ml/m2    LA Vol Index 19.39 16 - 28 ml/m2    AR Max Charles 694.97 cm/s   METABOLIC PANEL, BASIC    Collection Time: 03/11/19  2:03 AM   Result Value Ref Range    Sodium 140 136 - 145 mmol/L    Potassium 4.1 3.5 - 5.1 mmol/L    Chloride 107 97 - 108 mmol/L    CO2 26 21 - 32 mmol/L    Anion gap 7 5 - 15 mmol/L    Glucose 100 65 - 100 mg/dL    BUN 13 6 - 20 MG/DL    Creatinine 1.34 (H) 0.70 - 1.30 MG/DL    BUN/Creatinine ratio 10 (L) 12 - 20      GFR est AA >60 >60 ml/min/1.73m2    GFR est non-AA 55 (L) >60 ml/min/1.73m2    Calcium 8.5 8.5 - 10.1 MG/DL   CBC W/O DIFF    Collection Time: 03/11/19  2:03 AM   Result Value Ref Range    WBC 6.3 4.1 - 11.1 K/uL    RBC 5.05 4. 10 - 5.70 M/uL    HGB 14.3 12.1 - 17.0 g/dL    HCT 44.6 36.6 - 50.3 %    MCV 88.3 80.0 - 99.0 FL    MCH 28.3 26.0 - 34.0 PG    MCHC 32.1 30.0 - 36.5 g/dL    RDW 13.3 11.5 - 14.5 %    PLATELET 904 063 - 653 K/uL    MPV 10.3 8.9 - 12.9 FL    NRBC 0.0 0  WBC    ABSOLUTE NRBC 0.00 0.00 - 0.01 K/uL   LIPID PANEL    Collection Time: 03/11/19  2:03 AM   Result Value Ref Range    LIPID PROFILE          Cholesterol, total 195 <200 MG/DL    Triglyceride 93 <150 MG/DL    HDL Cholesterol 45 MG/DL    LDL, calculated 131.4 (H) 0 - 100 MG/DL    VLDL, calculated 18.6 MG/DL    CHOL/HDL Ratio 4.3 0 - 5.0               CARDIAC DIAGNOSTICS:     Cardiac Evaluation Includes:      Echo 5/3/18 - LVEF 30-35%,   Apical akinesis, Lateral hypokinesis. LAE, Mild-mod MR, mod  TR      Cardiac Cath 5/4/18 -  L Main: >50% ostial  LAD: 40-50% proximal, 70% after first diagonal, 80-90% mid lesion  LCflex: Diffuse mild disease, OM1 small vessel difffuse 70-90% disease. RCA: small dominant vessel, 50% proximal lesion, 70-90% tandem lesions proximal-md lesion, 80% distal lesion  LVEDP: 30 mHg  LVEF: 15% diffuse hypokinesis  No significant gradient across aortic valve. CABG 3 vessel on 5/8/18 -  LIMA to LAD, SVG to Diag with Y graft to OM     Echo 5/14/18 - LVEF 30-35%, + WMA    Echo 5/24/18 - LVEF 45-50%, inferior HK, MELIZA       EKG 3/10/19 - NSR, LVH with repolarization changes, QTc 481      CXR 3/10/19 - Prior cardiac surgery. Mild to moderate CHF. Probable left basilar atelectasis and small pleural effusion superimposed on scarring. ASSESSMENT AND PLAN:     Assessment and Plan:    1.   Acute on chronic CHF (HFrEF)  - Had severe LV dysfunction before CABG, but this improved afterwards   - Recheck an echo   - continue IV Bumex as he remains SOB  - ACE-I and aldactone started. - Hold off Beta-blocker for now (see below)     2) CAD  - CABG 3 vessel on 5/8/18 -  LIMA to LAD, SVG to Diag with Y graft to OM   - ASA and statin restarted     4) Non-compliance  - says he will be compliant     5) Substance Abuse  - says he is giving up cigs and cocaine    - Regarding Beta-Blockers, Karl Says \" If beta blockers are given, they should be used in situations where benefits outweigh risks (eg, remote from cocaine exposure), and mixed alpha/beta blockers (eg, labetalol and carvedilol) should be used rather than nonselective beta blockers. Additionally, beta blockers should ideally be used in combination with an alpha antagonist or some other vasodilator without beta-blocking effects (eg, NTG or nitroprusside), and under close observation. Julieth Heredia \"  \"We suggest that patients with left ventricular (LV) systolic dysfunction or ventricular arrhythmias be considered for beta blockers at the time of discharge but only if the risk of recurrent cocaine use is low. \"  - Since Mr. Waldo Luna has been non-compliance with meds and follow-up and still was cocaine positive this admission, I don't think he meets the safety criteria above in starting a beta-blocker. If he can demonstrate compliance of staying off of cocaine, then we can consider adding a beta-blocker. Also would use Coreg when we can start one. Trever Gray MD, Dyan[place]elyn [address] [place] [address] Drive.  [address], [facility]  [place], [address] [place] [address]  Ph: [phone]   Ph [phone]

## 2019-03-11 NOTE — NURSE NAVIGATOR
Chart reviewed by Heart Failure Nurse Navigator. Heart Failure database completed. EF:  26 to 67% with RV systolic function reduced. ACEi/ARB/ARNi: Lisinopril 10 mg daily. BB: Contraindicated due to current cocaine use. Aldosterone Antagonist: Aldactone 25 mg daily. CRT Not currently indicated. NYHA Functional Class **. Heart Failure Teach Back in Patient Education. Heart Failure Avoiding Triggers on Discharge Instructions. Cardiologist: [facility] Cardiologist: Dr. Gabbie Piper. Patient has not been compliant with outpatient f/u after CABG in May 2018. Post discharge follow up phone call to be made within 48-72 hours of discharge.

## 2019-03-11 NOTE — PROGRESS NOTES
[facility]    Medical Progress Note      NAME: Luis M Mccullough   :  1960  MRM:  [id number]    Date/Time: 3/11/2019  1:10 PM       Assessment and Plan:     1. Acute on chronic systolic CHF exacerbation(congestive heart failure) ([facility] [address] (3/10/2019). Notable decrease in LVEF from 45% to 30-25% on repeat TTE. Check daily weight, I/O. Cont IV diuresis. Restarted ACE-I, ASA, and aldactone. Not on BB due to cocaine abuse. Appreciate cardiology consult     2. CAD (coronary artery disease) (2018)  S/P CABG x 3 (2018). Stopped taking ASA and BB 2 months ago and has no FU with cardiology. Holding BB due to cocaine abuse. Continue ASA, ACE-I , aldactone.     3. HTN (hypertension) (5/3/2018). Started lisinopril and aldactone     4. Tobacco use (5/3/2018). Advised to quit smoking      5. Medically noncompliant (3/10/2019). Needs further counseling. 6. Hyperlipidemia. LDL in 130s. Start atorvastatin 40 mg    7. Cocaine abuse. Literature is split on this but risk/benefit profile supports holding BB until he demonstrates cocaine abstinence          Subjective:     Chief Complaint:  Patient seen and examined. Chart reviewed. Patient minimally interactive with providers today, instead focusing attention on women in room feeding him ice cream.    ROS:  (bold if positive, if negative)      Tolerating PT  Tolerating Diet        Objective:     Last 24hrs VS reviewed since prior progress note.  Most recent are:    Visit Vitals  /70 (BP 1 Location: Right arm)   Pulse (!) 57   Temp 97.5 °F (36.4 °C)   Resp 16   Ht 5' 6\" (1.676 m)   Wt 80.9 kg (178 lb 6.4 oz)   SpO2 97%   BMI 28.79 kg/m²     SpO2 Readings from Last 6 Encounters:   19 97%   18 92%   18 95%   18 99%   18 95%   18 98%    O2 Flow Rate (L/min): 2 l/min       Intake/Output Summary (Last 24 hours) at 3/11/2019 1310  Last data filed at 3/11/2019 0900  Gross per 24 hour   Intake 1680 ml   Output 1300 ml Net 380 ml        Physical Exam:    Gen:  Well-developed, well-nourished, in no acute distress  HEENT:  Pink conjunctivae, PERRL, hearing intact to voice, moist mucous membranes  Neck:  Supple, without masses, thyroid non-tender  Resp:  No accessory muscle use, clear breath sounds without wheezes rales or rhonchi  Card:  No murmurs, normal S1, S2 without thrills, bruits or peripheral edema  Abd:  Soft, non-tender, non-distended, normoactive bowel sounds are present, no palpable organomegaly and no detectable hernias  Lymph:  No cervical or inguinal adenopathy  Musc:  No cyanosis or clubbing  Skin:  No rashes or ulcers, skin turgor is good  Neuro:  Cranial nerves are grossly intact, no focal motor weakness, follows commands appropriately  Psych:  Good insight, oriented to person, place and time, alert    Telemetry reviewed:   normal sinus rhythm  __________________________________________________________________  Medications Reviewed: (see below)  Medications:     Current Facility-Administered Medications   Medication Dose Route Frequency    atorvastatin (LIPITOR) tablet 40 mg  40 mg Oral QHS    sodium chloride (NS) flush 5-40 mL  5-40 mL IntraVENous Q8H    sodium chloride (NS) flush 5-40 mL  5-40 mL IntraVENous PRN    enoxaparin (LOVENOX) injection 40 mg  40 mg SubCUTAneous Q24H    bumetanide (BUMEX) injection 1 mg  1 mg IntraVENous Q12H    aspirin chewable tablet 81 mg  81 mg Oral DAILY    lisinopril (PRINIVIL, ZESTRIL) tablet 10 mg  10 mg Oral DAILY    spironolactone (ALDACTONE) tablet 25 mg  25 mg Oral DAILY    hydrALAZINE (APRESOLINE) 20 mg/mL injection 10 mg  10 mg IntraVENous Q2H PRN        Lab Data Reviewed: (see below)  Lab Review:     Recent Labs     03/11/19  0203 03/10/19  0743   WBC 6.3 5.7   HGB 14.3 13.3   HCT 44.6 43.0    264     Recent Labs     03/11/19  0203 03/10/19  0743    143   K 4.1 4.4    109*   CO2 26 26    92   BUN 13 10   CREA 1.34* 1.29   CA 8.5 8.6 ALB  --  3.2*   TBILI  --  0.7   SGOT  --  16   ALT  --  16     Lab Results   Component Value Date/Time    Glucose (POC) 156 (H) 05/12/2018 11:28 AM    Glucose (POC) 109 (H) 05/12/2018 07:39 AM    Glucose (POC) 111 (H) 05/11/2018 10:19 PM    Glucose (POC) 132 (H) 05/11/2018 05:34 PM    Glucose (POC) 127 (H) 05/11/2018 12:42 PM     No results for input(s): PH, PCO2, PO2, HCO3, FIO2 in the last 72 hours. No results for input(s): INR in the last 72 hours. No lab exists for component: INREXT  All Micro Results     Procedure Component Value Units Date/Time    INFLUENZA A & B AG (RAPID TEST) [[id number]] Collected:  03/10/19 0743    Order Status:  Completed Specimen:  Nasopharyngeal from Nasal washing Updated:  03/10/19 0815     Influenza A Antigen NEGATIVE         Influenza B Antigen NEGATIVE              I have reviewed notes of prior 24hr.     Other pertinent lab: None    Total time spent with patient: 35 min                  Care Plan discussed with: Patient, Nursing Staff and Consultant/Specialist    Discussed:  Care Plan and D/C Planning    Prophylaxis:  Lovenox    Disposition:  Home w/Family           ___________________________________________________    Attending Physician: Tari Combs DO

## 2019-03-12 LAB
ANION GAP SERPL CALC-SCNC: 5 MMOL/L (ref 5–15)
B PERT DNA SPEC QL NAA+PROBE: NOT DETECTED
BASOPHILS # BLD: 0 K/UL (ref 0–0.1)
BASOPHILS NFR BLD: 1 % (ref 0–1)
BUN SERPL-MCNC: 13 MG/DL (ref 6–20)
BUN/CREAT SERPL: 9 (ref 12–20)
C PNEUM DNA SPEC QL NAA+PROBE: NOT DETECTED
CALCIUM SERPL-MCNC: 8.6 MG/DL (ref 8.5–10.1)
CHLORIDE SERPL-SCNC: 108 MMOL/L (ref 97–108)
CO2 SERPL-SCNC: 28 MMOL/L (ref 21–32)
CREAT SERPL-MCNC: 1.4 MG/DL (ref 0.7–1.3)
DIFFERENTIAL METHOD BLD: ABNORMAL
EOSINOPHIL # BLD: 0.5 K/UL (ref 0–0.4)
EOSINOPHIL NFR BLD: 9 % (ref 0–7)
ERYTHROCYTE [DISTWIDTH] IN BLOOD BY AUTOMATED COUNT: 13.2 % (ref 11.5–14.5)
FLUAV H1 2009 PAND RNA SPEC QL NAA+PROBE: NOT DETECTED
FLUAV H1 RNA SPEC QL NAA+PROBE: NOT DETECTED
FLUAV H3 RNA SPEC QL NAA+PROBE: NOT DETECTED
FLUAV SUBTYP SPEC NAA+PROBE: NOT DETECTED
FLUBV RNA SPEC QL NAA+PROBE: NOT DETECTED
GLUCOSE SERPL-MCNC: 100 MG/DL (ref 65–100)
HADV DNA SPEC QL NAA+PROBE: NOT DETECTED
HCOV 229E RNA SPEC QL NAA+PROBE: NOT DETECTED
HCOV HKU1 RNA SPEC QL NAA+PROBE: NOT DETECTED
HCOV NL63 RNA SPEC QL NAA+PROBE: NOT DETECTED
HCOV OC43 RNA SPEC QL NAA+PROBE: NOT DETECTED
HCT VFR BLD AUTO: 43.2 % (ref 36.6–50.3)
HGB BLD-MCNC: 13.8 G/DL (ref 12.1–17)
HMPV RNA SPEC QL NAA+PROBE: NOT DETECTED
HPIV1 RNA SPEC QL NAA+PROBE: NOT DETECTED
HPIV2 RNA SPEC QL NAA+PROBE: NOT DETECTED
HPIV3 RNA SPEC QL NAA+PROBE: NOT DETECTED
HPIV4 RNA SPEC QL NAA+PROBE: NOT DETECTED
IMM GRANULOCYTES # BLD AUTO: 0 K/UL (ref 0–0.04)
IMM GRANULOCYTES NFR BLD AUTO: 0 % (ref 0–0.5)
LYMPHOCYTES # BLD: 1.5 K/UL (ref 0.8–3.5)
LYMPHOCYTES NFR BLD: 30 % (ref 12–49)
M PNEUMO DNA SPEC QL NAA+PROBE: NOT DETECTED
MAGNESIUM SERPL-MCNC: 2.3 MG/DL (ref 1.6–2.4)
MCH RBC QN AUTO: 28.3 PG (ref 26–34)
MCHC RBC AUTO-ENTMCNC: 31.9 G/DL (ref 30–36.5)
MCV RBC AUTO: 88.7 FL (ref 80–99)
MONOCYTES # BLD: 0.5 K/UL (ref 0–1)
MONOCYTES NFR BLD: 9 % (ref 5–13)
NEUTS SEG # BLD: 2.6 K/UL (ref 1.8–8)
NEUTS SEG NFR BLD: 51 % (ref 32–75)
NRBC # BLD: 0 K/UL (ref 0–0.01)
NRBC BLD-RTO: 0 PER 100 WBC
PHOSPHATE SERPL-MCNC: 3.4 MG/DL (ref 2.6–4.7)
PLATELET # BLD AUTO: 256 K/UL (ref 150–400)
PMV BLD AUTO: 10.3 FL (ref 8.9–12.9)
POTASSIUM SERPL-SCNC: 4.3 MMOL/L (ref 3.5–5.1)
RBC # BLD AUTO: 4.87 M/UL (ref 4.1–5.7)
RSV RNA SPEC QL NAA+PROBE: NOT DETECTED
RV+EV RNA SPEC QL NAA+PROBE: NOT DETECTED
SODIUM SERPL-SCNC: 141 MMOL/L (ref 136–145)
WBC # BLD AUTO: 5.2 K/UL (ref 4.1–11.1)

## 2019-03-12 PROCEDURE — 85025 COMPLETE CBC W/AUTO DIFF WBC: CPT

## 2019-03-12 PROCEDURE — 96372 THER/PROPH/DIAG INJ SC/IM: CPT

## 2019-03-12 PROCEDURE — 74011250637 HC RX REV CODE- 250/637: Performed by: INTERNAL MEDICINE

## 2019-03-12 PROCEDURE — 99218 HC RM OBSERVATION: CPT

## 2019-03-12 PROCEDURE — 65660000000 HC RM CCU STEPDOWN

## 2019-03-12 PROCEDURE — 80048 BASIC METABOLIC PNL TOTAL CA: CPT

## 2019-03-12 PROCEDURE — 87633 RESP VIRUS 12-25 TARGETS: CPT

## 2019-03-12 PROCEDURE — 36415 COLL VENOUS BLD VENIPUNCTURE: CPT

## 2019-03-12 PROCEDURE — 74011000250 HC RX REV CODE- 250: Performed by: INTERNAL MEDICINE

## 2019-03-12 PROCEDURE — 74011250636 HC RX REV CODE- 250/636: Performed by: INTERNAL MEDICINE

## 2019-03-12 PROCEDURE — 84100 ASSAY OF PHOSPHORUS: CPT

## 2019-03-12 PROCEDURE — 83735 ASSAY OF MAGNESIUM: CPT

## 2019-03-12 PROCEDURE — 96376 TX/PRO/DX INJ SAME DRUG ADON: CPT

## 2019-03-12 RX ORDER — OXYMETAZOLINE HCL 0.05 %
2 SPRAY, NON-AEROSOL (ML) NASAL
Status: DISCONTINUED | OUTPATIENT
Start: 2019-03-12 | End: 2019-03-13 | Stop reason: HOSPADM

## 2019-03-12 RX ORDER — BENZONATATE 100 MG/1
100 CAPSULE ORAL
Status: DISCONTINUED | OUTPATIENT
Start: 2019-03-12 | End: 2019-03-13 | Stop reason: HOSPADM

## 2019-03-12 RX ORDER — CODEINE PHOSPHATE AND GUAIFENESIN 10; 100 MG/5ML; MG/5ML
5 SOLUTION ORAL
Status: DISCONTINUED | OUTPATIENT
Start: 2019-03-12 | End: 2019-03-13 | Stop reason: HOSPADM

## 2019-03-12 RX ADMIN — GUAIFENESIN AND CODEINE PHOSPHATE 5 ML: 100; 10 SOLUTION ORAL at 21:59

## 2019-03-12 RX ADMIN — BUMETANIDE 1 MG: 0.25 INJECTION INTRAMUSCULAR; INTRAVENOUS at 04:00

## 2019-03-12 RX ADMIN — SPIRONOLACTONE 25 MG: 25 TABLET ORAL at 08:46

## 2019-03-12 RX ADMIN — BUMETANIDE 1 MG: 0.25 INJECTION INTRAMUSCULAR; INTRAVENOUS at 14:24

## 2019-03-12 RX ADMIN — Medication 10 ML: at 22:00

## 2019-03-12 RX ADMIN — GUAIFENESIN AND CODEINE PHOSPHATE 5 ML: 100; 10 SOLUTION ORAL at 18:10

## 2019-03-12 RX ADMIN — ENOXAPARIN SODIUM 40 MG: 100 INJECTION SUBCUTANEOUS at 11:48

## 2019-03-12 RX ADMIN — ATORVASTATIN CALCIUM 40 MG: 20 TABLET, FILM COATED ORAL at 21:59

## 2019-03-12 RX ADMIN — Medication 10 ML: at 14:00

## 2019-03-12 RX ADMIN — Medication 10 ML: at 08:45

## 2019-03-12 RX ADMIN — ASPIRIN 81 MG 81 MG: 81 TABLET ORAL at 08:45

## 2019-03-12 RX ADMIN — LISINOPRIL 10 MG: 5 TABLET ORAL at 08:53

## 2019-03-12 NOTE — PROGRESS NOTES
Shift Report    Bedside shift change report given to Lupe Adams (oncoming nurse) by Nataly Mercedes (offgoing nurse). Report included the following information SBAR, Kardex, Intake/Output, MAR, Recent Results and Cardiac Rhythm NSR. TRANSFER - OUT REPORT:    Verbal report given to [facility]) on Faviola Cabral  being transferred to 5th floor(unit) for routine progression of care       Report consisted of patients Situation, Background, Assessment and   Recommendations(SBAR). Information from the following report(s) SBAR, Kardex, MAR, Recent Results and Cardiac Rhythm NSR was reviewed with the receiving nurse. Lines:   Peripheral IV 03/10/19 Left Hand (Active)   Site Assessment Clean, dry, & intact 3/12/2019  8:56 AM   Phlebitis Assessment 0 3/12/2019  8:56 AM   Infiltration Assessment 0 3/12/2019  8:56 AM   Dressing Status Clean, dry, & intact 3/12/2019  8:56 AM   Dressing Type Transparent 3/12/2019  8:56 AM   Hub Color/Line Status Patent 3/12/2019  8:56 AM   Action Taken Open ports on tubing capped 3/12/2019  8:56 AM   Alcohol Cap Used Yes 3/12/2019  8:56 AM        Opportunity for questions and clarification was provided.       Patient transported with:   Registered Nurse

## 2019-03-12 NOTE — NURSE NAVIGATOR
Met with patient at bedside and introduced self and role of HF NN. Patient's nephew was at bedside. Patient gave permission to have discussion with nephew at bedside. Patient had been given the HF Education book by [facility]. Reviewed systolic heart failure disease process, chronic nature of disease, and symptoms of HF. Utilized teach back method. Patient states he had noticed increasing shortness of breath with exertion, dry cough, fatigue, and inability to sleep laying flat, although he has not slept laying flat for years. HF Education book also used for discussion. Reviewed HF zones, early recognition of symptoms and notification of provider. Discussed purpose of medications to prevent progression of disease and control symptoms. Reinforced seriousness of systolic heart failure and improved outcomes statistically for patients who are compliant with medications. Patient states he wants to be around for his future grandchildren. Motivation technique used in relation to discrepancy of his goal and past issues with compliance and lack of follow up. Patient did not take any responsibility for this, commenting that it was his doctor's fault he was lost to follow up. Encouraged patient to be more proactive, to write down questions for his providers, and utilize the space in his HF book for questions. Reviewed in detail additional HF self care measures including daily weight with parameters, low sodium 2000 mg or less diet, reading labels for sodium, avoidance of excessive fluid intake, and importance of early and regular follow up with cardiology. Patient states his daughter is encouraging him to change his diet. He called his daughter and she states she  has researched a plant based eating plan for chronic disease by a Cardiologist from [facility]. In the next moment, patient asking his daughter to bring him a thanh holly.   Encouraged patient to start by focusing on the sodium in his diet with thanh holly potentially  being a high sodium choice. Patient voices motivation but not identifying specific plans to change patterns. Patient and nephew were given opportunity for additional questions.

## 2019-03-12 NOTE — PROGRESS NOTES
Cardiology Progress Note         NAME:  Malcom Saleh   :   1960   MRN:   [id number]     Assessment/Plan:   1. Acute on Chronic CHF (HFrEF)/ ICM: LV dysfunction improved after CABG; Current LVEF 26-30%, RV global systolic func moderately reduced. Continue Bumex IV BID, diuresing well. Cont ACE-I and aldactone. Hold BB for now >   Since Mr. Celine Black has been non-compliant with meds and follow-up and still was cocaine positive this admission, I don't think he meets the safety criteria above in starting a beta-blocker. If he can demonstrate compliance of staying off of cocaine, then we can consider adding a beta-blocker. Also would use Coreg when we can start one. Padmini pBNP in am.   2. CAD s/p CABG: CABG x 3 on 18; LIMA to LAD, SVG to Diag w/ Y graft to OM. Continue ASA and statin. 3. Non-compliance: counseling on medication, diet and follow-up compliance. Pt agreeable. 4.   Substance Abuse: - says he is giving up cigs and cocaine.        CARDIOLOGY ATTENDING  Patient personally seen and examined. All the elements of history and examination were personally performed. Assessment and plan was discussed and agree as written above    Still says he has CONRAD, although doing better and off of O2. Will cont IV Bumex today and check a 6MWT. If O2 sats ambulating are OK, then will switch to PO loops tomorrow and plan for discharge. Ana Cha MD, [facility]            Subjective:   Mr. Smith is a 60 y.o.   male who is admitted with CHF exacerbation.  Mr. Smith with PMH of CHF, HTN, CAD s/p CABG, medically noncompliance presented to ER c/o SOB, which has worsened lately. Has had intermitted chest pain. Has had CONRAD walking across the room. He stopped all his meds a couple of months ago. Has not seen cardiology since his CABG. Admits to smoking and cocaine use - but says he now wants to live for the lord and will give up drug use.       Regarding Beta-Blockers, Karl Says \" If beta blockers are given, they should be used in situations where benefits outweigh risks (eg, remote from cocaine exposure), and mixed alpha/beta blockers (eg, labetalol and carvedilol) should be used rather than nonselective beta blockers. Additionally, beta blockers should ideally be used in combination with an alpha antagonist or some other vasodilator without beta-blocking effects (eg, NTG or nitroprusside), and under close observation. Ely James \"  \"We suggest that patients with left ventricular (LV) systolic dysfunction or ventricular arrhythmias be considered for beta blockers at the time of discharge but only if the risk of recurrent cocaine use is low. \"    Cardiac ROS: Patient denies any exertional chest pain, dyspnea, palpitations, syncope, orthopnea, edema or paroxysmal nocturnal dyspnea. Previous Cardiac Eval  03/10/19   ECHO ADULT COMPLETE 03/10/2019 3/10/2019    Narrative · Left ventricular severely decreased systolic function. Estimated left   ventricular ejection fraction is 26 - 30%. · Right ventricular global systolic function is moderately reduced. · There is a moderate left pleural effusion. Signed by: Apolinar Ta MD     EKG 3/10/19 - NSR, LVH with repolarization changes, QTc 481      CXR 3/10/19 - Prior cardiac surgery. Mild to moderate CHF.  Probable left basilar atelectasis and small pleural effusion superimposed on scarring.    5/24/18: ECHO- LVEF 45-50%, Inferior wall HK; RV mild dilated; LA/RA mild dilated    5/8/18: CABGx3; LIMA to LAD, SVG to Diag with Y graft to OM     5/4/18: CATH- LM: o> 50%; LAD: p 40-50%, 70% after D1, m80-90%, LCX: MLI, OM: 70-90%, RCA: small p50%, p/m 70-90%, d80%, LVEDP 30 mmHg, EF 15%    LAD: 40-50% proximal, 70% after first diagonal, 80-90% mid lesion    5/4/18: CAROTIDS- <50% stenosis b/l     5/3/18: ECHO- EF 30-35%, apical AK, lateral HK, severe DHK, mod-severe LAE, mild-mod MR, mod TR      Review of Systems: No nausea, indigestion, vomiting, pain, sputum. + cough w scant white sputum. No bleeding. Taking po. Appetite ok. Objective:     Visit Vitals  /81 (BP 1 Location: Left arm, BP Patient Position: At rest)   Pulse 68   Temp 98.6 °F (37 °C)   Resp 16   Ht 5' 6\" (1.676 m)   Wt 173 lb 1 oz (78.5 kg)   SpO2 98%   BMI 27.93 kg/m²    O2 Flow Rate (L/min): 2 l/min O2 Device: Room air    Temp (24hrs), Av.1 °F (36.7 °C), Min:97.5 °F (36.4 °C), Max:98.6 °F (37 °C)      701 - 1900  In: 240 [P.O.:240]  Out: -     03/10 1901 -  0700  In: 1440 [P.O.:1440]  Out: 650 [Urine:650]  TELE: NSR w some episodes of SB 40's overnight    General: AAOx3 cooperative, no acute distress. HEENT: Atraumatic. Pink and moist.  Anicteric sclerae. Neck : Supple, no thyromegaly. Lungs: CTA bilaterally. No wheezing/rhonchi/rales. Heart: Regular rhythm, no murmur, no rubs, no gallops. No JVD. Abdomen: Soft, non-distended, non-tender. + Bowel sounds. Extremities: No edema, no clubbing, no cyanosis. No calf tenderness  Neurologic: Grossly intact. Alert and oriented X 3. No acute neurological distress. Psych: Good insight. Not anxious or agitated. Care Plan discussed with:    Comments   Patient x    Family      RN x    Care Manager                    Consultant:          Data Review:     No lab exists for component: ITNL   Recent Labs     03/10/19  0743   TROIQ <0.05     Recent Labs     19  0158 19  0203 03/10/19  0743    140 143   K 4.3 4.1 4.4    107 109*   CO2 28 26 26   BUN 13 13 10   CREA 1.40* 1.34* 1.29    100 92   PHOS 3.4  --   --    MG 2.3  --   --    ALB  --   --  3.2*   WBC 5.2 6.3 5.7   HGB 13.8 14.3 13.3   HCT 43.2 44.6 43.0    280 264     No results for input(s): INR, PTP, APTT in the last 72 hours.     No lab exists for component: INREXT    Medications reviewed  Current Facility-Administered Medications   Medication Dose Route Frequency    atorvastatin (LIPITOR) tablet 40 mg  40 mg Oral QHS    sodium chloride (NS) flush 5-40 mL  5-40 mL IntraVENous Q8H    sodium chloride (NS) flush 5-40 mL  5-40 mL IntraVENous PRN    enoxaparin (LOVENOX) injection 40 mg  40 mg SubCUTAneous Q24H    bumetanide (BUMEX) injection 1 mg  1 mg IntraVENous Q12H    aspirin chewable tablet 81 mg  81 mg Oral DAILY    lisinopril (PRINIVIL, ZESTRIL) tablet 10 mg  10 mg Oral DAILY    spironolactone (ALDACTONE) tablet 25 mg  25 mg Oral DAILY    hydrALAZINE (APRESOLINE) 20 mg/mL injection 10 mg  10 mg IntraVENous Q2H PRN         PAOLO PfeifferP Student  Radha Garza MS FNP

## 2019-03-12 NOTE — PROGRESS NOTES
Bedside shift change report given to [facility] (oncoming nurse) by Emiliano Perez (offgoing nurse). Report included the following information SBAR, Kardex, Procedure Summary, MAR and Recent Results.

## 2019-03-12 NOTE — PROGRESS NOTES
[facility]    Medical Progress Note      NAME: Serena Deluna   :  1960  MRM:  [id number]    Date/Time: 3/12/2019  1:10 PM       Assessment and Plan:     1. Acute on chronic systolic CHF exacerbation(congestive heart failure) ([facility] [address] (3/10/2019). Notable decrease in LVEF from 45% to 30-25% on repeat TTE. daily weight, I/O. Cont IV diuresis with plans to de-escalate tomorrow morning after oxygen assessment. Restarted ACE-I, ASA, and aldactone. Not on BB due to cocaine abuse. Appreciate cardiology consult     2. CAD (coronary artery disease) (2018)  S/P CABG x 3 (2018). Stopped taking ASA and BB 2 months ago and has no FU with cardiology. Holding BB due to cocaine abuse. Continue ASA, ACE-I , aldactone.     3. HTN (hypertension) (5/3/2018). Started lisinopril and aldactone     4. Tobacco use (5/3/2018). Advised to quit smoking      5. Medically noncompliant (3/10/2019). Needs further counseling. 6. Hyperlipidemia. LDL in 130s. Start atorvastatin 40 mg    7. Cocaine abuse. Literature is split on this but risk/benefit profile supports holding BB until he demonstrates cocaine abstinence    8. Cough. I doubt this is related to CHF as hypoxia better and still coughing. Associated with rhinorrhea. Check viral panel, anti-tussives, supportive care. This alone does not warrant inpatient hospital admission           Subjective:     Chief Complaint:  Patient seen and examined. Chart reviewed. Patient reports ongoing cough and states that he isn't leaving the hospital until he stops coughing    ROS:  (bold if positive, if negative)      Tolerating PT  Tolerating Diet        Objective:     Last 24hrs VS reviewed since prior progress note.  Most recent are:    Visit Vitals  /76 (BP 1 Location: Left arm, BP Patient Position: At rest)   Pulse 63   Temp 98.7 °F (37.1 °C)   Resp 18   Ht 5' 6\" (1.676 m)   Wt 78.5 kg (173 lb 1 oz)   SpO2 92%   BMI 27.93 kg/m²     SpO2 Readings from Last 6 Encounters:   03/12/19 92%   12/30/18 92%   11/16/18 95%   07/16/18 99%   06/01/18 95%   05/30/18 98%    O2 Flow Rate (L/min): 2 l/min       Intake/Output Summary (Last 24 hours) at 3/12/2019 1649  Last data filed at 3/12/2019 0856  Gross per 24 hour   Intake 240 ml   Output    Net 240 ml        Physical Exam:    Gen:  Well-developed, well-nourished, in no acute distress  HEENT:  Pink conjunctivae, PERRL, hearing intact to voice, moist mucous membranes  Neck:  Supple, without masses, thyroid non-tender  Resp:  No accessory muscle use, clear breath sounds without wheezes rales or rhonchi  Card:  No murmurs, normal S1, S2 without thrills, bruits or peripheral edema  Abd:  Soft, non-tender, non-distended, normoactive bowel sounds are present, no palpable organomegaly and no detectable hernias  Lymph:  No cervical or inguinal adenopathy  Musc:  No cyanosis or clubbing  Skin:  No rashes or ulcers, skin turgor is good  Neuro:  Cranial nerves are grossly intact, no focal motor weakness, follows commands appropriately  Psych:  Good insight, oriented to person, place and time, alert    Telemetry reviewed:   normal sinus rhythm  __________________________________________________________________  Medications Reviewed: (see below)  Medications:     Current Facility-Administered Medications   Medication Dose Route Frequency    benzonatate (TESSALON) capsule 100 mg  100 mg Oral TID PRN    guaiFENesin-codeine (ROBITUSSIN AC) 100-10 mg/5 mL solution 5 mL  5 mL Oral Q4H PRN    atorvastatin (LIPITOR) tablet 40 mg  40 mg Oral QHS    sodium chloride (NS) flush 5-40 mL  5-40 mL IntraVENous Q8H    sodium chloride (NS) flush 5-40 mL  5-40 mL IntraVENous PRN    enoxaparin (LOVENOX) injection 40 mg  40 mg SubCUTAneous Q24H    bumetanide (BUMEX) injection 1 mg  1 mg IntraVENous Q12H    aspirin chewable tablet 81 mg  81 mg Oral DAILY    lisinopril (PRINIVIL, ZESTRIL) tablet 10 mg  10 mg Oral DAILY    spironolactone (ALDACTONE) tablet 25 mg  25 mg Oral DAILY    hydrALAZINE (APRESOLINE) 20 mg/mL injection 10 mg  10 mg IntraVENous Q2H PRN        Lab Data Reviewed: (see below)  Lab Review:     Recent Labs     03/12/19  0158 03/11/19  0203 03/10/19  0743   WBC 5.2 6.3 5.7   HGB 13.8 14.3 13.3   HCT 43.2 44.6 43.0    280 264     Recent Labs     03/12/19 0158 03/11/19 0203 03/10/19  0743    140 143   K 4.3 4.1 4.4    107 109*   CO2 28 26 26    100 92   BUN 13 13 10   CREA 1.40* 1.34* 1.29   CA 8.6 8.5 8.6   MG 2.3  --   --    PHOS 3.4  --   --    ALB  --   --  3.2*   TBILI  --   --  0.7   SGOT  --   --  16   ALT  --   --  16     Lab Results   Component Value Date/Time    Glucose (POC) 156 (H) 05/12/2018 11:28 AM    Glucose (POC) 109 (H) 05/12/2018 07:39 AM    Glucose (POC) 111 (H) 05/11/2018 10:19 PM    Glucose (POC) 132 (H) 05/11/2018 05:34 PM    Glucose (POC) 127 (H) 05/11/2018 12:42 PM     No results for input(s): PH, PCO2, PO2, HCO3, FIO2 in the last 72 hours. No results for input(s): INR in the last 72 hours. No lab exists for component: Matthew Rayo  All Micro Results     Procedure Component Value Units Date/Time    RESPIRATORY PANEL,PCR,NASOPHARYNGEAL [[id number]] Collected:  03/12/19 1558    Order Status:  Completed Specimen:  NASOPHARYNGEAL SWAB Updated:  03/12/19 1603    INFLUENZA A & B AG (RAPID TEST) [[id number]] Collected:  03/10/19 0743    Order Status:  Completed Specimen:  Nasopharyngeal from Nasal washing Updated:  03/10/19 0815     Influenza A Antigen NEGATIVE         Influenza B Antigen NEGATIVE              I have reviewed notes of prior 24hr.     Other pertinent lab: None    Total time spent with patient: 35 min                  Care Plan discussed with: Patient, Nursing Staff and Consultant/Specialist    Discussed:  Care Plan and D/C Planning    Prophylaxis:  Lovenox    Disposition:  Home w/Family           ___________________________________________________    Attending Physician: Candice Oneal [address] Farhana La DO

## 2019-03-12 NOTE — CDMP QUERY
Patient admitted with *Acute/chronic HFrEF, noted to have noncompliance with medical treatment & cocaine abuse. If possible, could you please document in progress notes and d/c summary if you are evaluating and/or treating any of the following:    => CKD 2  => CKD 3  => ELISEO  => Acute Renal Insufficiency  => Other explanation of clinical findings   => Clinically Undetermined (no explanation for clinical findings)    The medical record reflects the following:       Risk Factors: HTN, cocaine abuse; medical noncompliance       Clinical Indicators: 60 y.o.  male who is admitted with CHF exacerbation. Mr. Ginny Browning with PMH of CHF, HTN, CAD s/p CABG, medically noncompliance presented to ER c/o SOB, which has worsened lately. Has had intermitted chest pain. He stopped all his meds a couple of months ago.    3/10 CR 1.29 GFR 57  3/11 CR 1.34 GFR 55  3/12 CR 1.40 GFR 52  12/2018 CR 1.39 GFR 52  11/2018 CR 1.37 GFR 53  5/2018 CR 0.98 GFR > 60       Treatment: Monitor I/O, daily labs     Thank you,  Jose A Benton, MSN, [facility] [place] [address]

## 2019-03-13 ENCOUNTER — HOME HEALTH ADMISSION (OUTPATIENT)
Dept: HOME HEALTH SERVICES | Facility: HOME HEALTH | Age: 59
End: 2019-03-13

## 2019-03-13 ENCOUNTER — APPOINTMENT (OUTPATIENT)
Dept: GENERAL RADIOLOGY | Age: 59
End: 2019-03-13
Attending: INTERNAL MEDICINE
Payer: COMMERCIAL

## 2019-03-13 VITALS
BODY MASS INDEX: 28.21 KG/M2 | DIASTOLIC BLOOD PRESSURE: 79 MMHG | SYSTOLIC BLOOD PRESSURE: 136 MMHG | HEIGHT: 66 IN | WEIGHT: 175.5 LBS | HEART RATE: 67 BPM | TEMPERATURE: 98.1 F | OXYGEN SATURATION: 99 % | RESPIRATION RATE: 18 BRPM

## 2019-03-13 LAB
ANION GAP SERPL CALC-SCNC: 6 MMOL/L (ref 5–15)
BNP SERPL-MCNC: 3261 PG/ML
BUN SERPL-MCNC: 19 MG/DL (ref 6–20)
BUN/CREAT SERPL: 13 (ref 12–20)
CALCIUM SERPL-MCNC: 8.9 MG/DL (ref 8.5–10.1)
CHLORIDE SERPL-SCNC: 105 MMOL/L (ref 97–108)
CO2 SERPL-SCNC: 29 MMOL/L (ref 21–32)
CREAT SERPL-MCNC: 1.44 MG/DL (ref 0.7–1.3)
GLUCOSE SERPL-MCNC: 89 MG/DL (ref 65–100)
POTASSIUM SERPL-SCNC: 4 MMOL/L (ref 3.5–5.1)
SODIUM SERPL-SCNC: 140 MMOL/L (ref 136–145)

## 2019-03-13 PROCEDURE — 96372 THER/PROPH/DIAG INJ SC/IM: CPT

## 2019-03-13 PROCEDURE — 71045 X-RAY EXAM CHEST 1 VIEW: CPT

## 2019-03-13 PROCEDURE — 99218 HC RM OBSERVATION: CPT

## 2019-03-13 PROCEDURE — 83880 ASSAY OF NATRIURETIC PEPTIDE: CPT

## 2019-03-13 PROCEDURE — 80048 BASIC METABOLIC PNL TOTAL CA: CPT

## 2019-03-13 PROCEDURE — 36415 COLL VENOUS BLD VENIPUNCTURE: CPT

## 2019-03-13 PROCEDURE — 96376 TX/PRO/DX INJ SAME DRUG ADON: CPT

## 2019-03-13 PROCEDURE — 74011250637 HC RX REV CODE- 250/637: Performed by: INTERNAL MEDICINE

## 2019-03-13 PROCEDURE — 74011000250 HC RX REV CODE- 250: Performed by: INTERNAL MEDICINE

## 2019-03-13 PROCEDURE — 74011250636 HC RX REV CODE- 250/636: Performed by: INTERNAL MEDICINE

## 2019-03-13 RX ORDER — BUMETANIDE 1 MG/1
1 TABLET ORAL DAILY
Qty: 30 TAB | Refills: 0 | Status: SHIPPED | OUTPATIENT
Start: 2019-03-14

## 2019-03-13 RX ORDER — LISINOPRIL 10 MG/1
10 TABLET ORAL DAILY
Qty: 30 TAB | Refills: 0 | Status: SHIPPED | OUTPATIENT
Start: 2019-03-14

## 2019-03-13 RX ORDER — ATORVASTATIN CALCIUM 40 MG/1
40 TABLET, FILM COATED ORAL
Qty: 30 TAB | Refills: 0 | Status: SHIPPED | OUTPATIENT
Start: 2019-03-13

## 2019-03-13 RX ORDER — BUMETANIDE 1 MG/1
1 TABLET ORAL DAILY
Status: DISCONTINUED | OUTPATIENT
Start: 2019-03-13 | End: 2019-03-13 | Stop reason: HOSPADM

## 2019-03-13 RX ORDER — FLUTICASONE PROPIONATE 50 MCG
2 SPRAY, SUSPENSION (ML) NASAL DAILY
Qty: 1 BOTTLE | Refills: 0 | Status: SHIPPED | OUTPATIENT
Start: 2019-03-13 | End: 2019-09-18

## 2019-03-13 RX ORDER — CODEINE PHOSPHATE AND GUAIFENESIN 10; 100 MG/5ML; MG/5ML
5 SOLUTION ORAL
Qty: 50 ML | Refills: 0 | Status: SHIPPED | OUTPATIENT
Start: 2019-03-13 | End: 2019-03-16

## 2019-03-13 RX ORDER — SPIRONOLACTONE 25 MG/1
25 TABLET ORAL DAILY
Qty: 30 TAB | Refills: 0 | Status: SHIPPED | OUTPATIENT
Start: 2019-03-14

## 2019-03-13 RX ORDER — FLUTICASONE PROPIONATE 50 MCG
2 SPRAY, SUSPENSION (ML) NASAL DAILY
Status: DISCONTINUED | OUTPATIENT
Start: 2019-03-13 | End: 2019-03-13 | Stop reason: HOSPADM

## 2019-03-13 RX ORDER — GUAIFENESIN 100 MG/5ML
81 LIQUID (ML) ORAL DAILY
Qty: 30 TAB | Refills: 0 | Status: SHIPPED | OUTPATIENT
Start: 2019-03-14

## 2019-03-13 RX ADMIN — LISINOPRIL 10 MG: 5 TABLET ORAL at 09:17

## 2019-03-13 RX ADMIN — BUMETANIDE 1 MG: 1 TABLET ORAL at 09:18

## 2019-03-13 RX ADMIN — ASPIRIN 81 MG 81 MG: 81 TABLET ORAL at 09:18

## 2019-03-13 RX ADMIN — ENOXAPARIN SODIUM 40 MG: 100 INJECTION SUBCUTANEOUS at 10:55

## 2019-03-13 RX ADMIN — GUAIFENESIN AND CODEINE PHOSPHATE 5 ML: 100; 10 SOLUTION ORAL at 03:45

## 2019-03-13 RX ADMIN — Medication 10 ML: at 06:06

## 2019-03-13 RX ADMIN — SPIRONOLACTONE 25 MG: 25 TABLET ORAL at 09:18

## 2019-03-13 RX ADMIN — BUMETANIDE 1 MG: 0.25 INJECTION INTRAMUSCULAR; INTRAVENOUS at 03:49

## 2019-03-13 RX ADMIN — FLUTICASONE PROPIONATE 2 SPRAY: 50 SPRAY, METERED NASAL at 12:05

## 2019-03-13 NOTE — PROGRESS NOTES
Pt. Given discharge instructions. RN goes over all paperwork and answers questions. Pt. Donisayne Rick understanding. IV removed. Pt.'s wife will be driving pt. Home. Will continue to monitor.

## 2019-03-13 NOTE — PROGRESS NOTES
Pharmacist Discharge Medication Reconciliation    Discharge Provider:  Dr. Jeff Boateng       Discharge Medications:      My Medications        START taking these medications        Instructions Each Dose to Equal Morning Noon Evening Bedtime   aspirin 81 mg chewable tablet  Start taking on:  3/14/2019    Your last dose was: Your next dose is: Take 1 Tab by mouth daily. 81 mg                 atorvastatin 40 mg tablet  Commonly known as:  LIPITOR    Your last dose was: Your next dose is: Take 1 Tab by mouth nightly. 40 mg                 bumetanide 1 mg tablet  Commonly known as:  BUMEX  Start taking on:  3/14/2019    Your last dose was: Your next dose is: Take 1 Tab by mouth daily. 1 mg                 fluticasone propionate 50 mcg/actuation nasal spray  Commonly known as:  FLONASE    Your last dose was: Your next dose is: 2 Sprays by Both Nostrils route daily. 2 [place]                 guaiFENesin-codeine 100-10 mg/5 mL solution  Commonly known as:  ROBITUSSIN AC    Your last dose was: Your next dose is: Take 5 mL by mouth every four (4) hours as needed for Cough for up to 3 days. Max Daily Amount: 30 mL. 5 mL                 lisinopril 10 mg tablet  Commonly known as:  Helon Estrin  Start taking on:  3/14/2019    Your last dose was: Your next dose is: Take 1 Tab by mouth daily. 10 mg                 spironolactone 25 mg tablet  Commonly known as:  ALDACTONE  Start taking on:  3/14/2019    Your last dose was: Your next dose is: Take 1 Tab by mouth daily. 25 mg                           Where to Get Your Medications        Information on where to get these meds will be given to you by the nurse or doctor.     Ask your nurse or doctor about these medications  · aspirin 81 mg chewable tablet  · atorvastatin 40 mg tablet  · bumetanide 1 mg tablet  · fluticasone propionate 50 mcg/actuation nasal spray  · guaiFENesin-codeine 100-10 mg/5 mL solution  · lisinopril 10 mg tablet  · spironolactone 25 mg tablet            The patient's chart, MAR, and AVS were reviewed by   MICHEL Hendrix,   Contact: [phone]

## 2019-03-13 NOTE — NURSE NAVIGATOR
Met with patient at bedside in f/u from previous HF education. Patient states he has no questions. Asked patient what he plans to change. When asked, he states he will weigh himself and do \"everything else\" he is supposed to. Patient was not amenable to further education at present.

## 2019-03-13 NOTE — DISCHARGE SUMMARY
Physician Discharge Summary     Patient ID:  Dario Anguiano  [id number]  11 y.o.  1960    Admit date: 3/10/2019    Discharge date and time: 3/13/2019    Admission Diagnoses: CHF (congestive heart failure) ([facility] [I50.9]  CHF exacerbation ([facility] [I50.9]    Discharge Diagnoses:    Principal Diagnosis   CHF (congestive heart failure) ([facility]                                             Other Diagnoses  Principal Problem:    CHF (congestive heart failure) ([facility] (3/10/2019)    Active Problems:    Tobacco use (5/3/2018)      HTN (hypertension) (5/3/2018)      CAD (coronary artery disease) (5/4/2018)      S/P CABG x 3 (5/21/2018)      CHF exacerbation ([facility] (3/10/2019)      Medically noncompliant (3/10/2019)         Hospital Course:     1.  Acute on chronic systolic CHF exacerbation(congestive heart failure) ([facility] (3/10/2019). Notable decrease in LVEF from 45% to 30-25% on repeat TTE. daily weight, I/O. Cont IV diuresis with plans to de-escalate tomorrow morning after oxygen assessment. Restarted ACE-I, ASA, and aldactone. Not on BB due to cocaine abuse. Appreciate cardiology consult     2.  CAD (coronary artery disease) (5/4/2018)  S/P CABG x 3 (5/21/2018). Stopped taking ASA and BB 2 months ago and has no FU with cardiology. Holding BB due to cocaine abuse. Continue ASA, ACE-I , aldactone.     3.  HTN (hypertension) (5/3/2018). Started lisinopril and aldactone     4.  Tobacco use (5/3/2018). Advised to quit smoking      5.  Medically noncompliant (3/10/2019). Needs further counseling and follow-up     6. Hyperlipidemia. LDL in 130s. Start atorvastatin 40 mg     7. Cocaine abuse. Literature is split on this but risk/benefit profile supports holding BB until he demonstrates cocaine abstinence     8. Cough. I doubt this is related to CHF as hypoxia better and still coughing. Associated with rhinorrhea. viral panel negative, anti-tussives helped a bit, will discharge with small amount.  Also trial flonase as this could be post-nasal rhinitis causing chronic cough. This alone does not warrant inpatient hospital admission     9. Chronic kidney disease stage 3, likely due to all thee above. Stable. Monitor       PCP: Sen Mena MD    Consults: Cardiology    Significant Diagnostic Studies: See Hospital Course    Discharged home in improved condition. Discharge Exam:    Visit Vitals  /79 (BP 1 Location: Right arm, BP Patient Position: At rest)   Pulse 67   Temp 98.1 °F (36.7 °C)   Resp 18   Ht 5' 6\" (1.676 m)   Wt 79.6 kg (175 lb 8 oz)   SpO2 99%   BMI 28.33 kg/m²     Physical Exam:    Gen: Well-developed, well-nourished, in no acute distress  HEENT:  Pink conjunctivae, hearing intact to voice, moist mucous membranes  Neck: Supple  Resp: No accessory muscle use, clear breath sounds without wheezes rales or rhonchi  Card: No murmurs, normal S1, S2 without thrills or peripheral edema  Abd:  Soft, non-tender, non-distended, normoactive bowel sounds are present  Musc: No cyanosis  Skin: No rashes  Neuro: Face symmetric, tongue midline, speech fluent,  strength is 5/5 bilaterally, hip flexion is 5/5 bilaterally, follows commands appropriately  Psych:  Good insight, oriented to person, place and time, alert        Disposition: home    Patient Instructions:   Current Discharge Medication List      START taking these medications    Details   aspirin 81 mg chewable tablet Take 1 Tab by mouth daily. Qty: 30 Tab, Refills: 0      atorvastatin (LIPITOR) 40 mg tablet Take 1 Tab by mouth nightly. Qty: 30 Tab, Refills: 0      bumetanide (BUMEX) 1 mg tablet Take 1 Tab by mouth daily. Qty: 30 Tab, Refills: 0      fluticasone propionate (FLONASE) 50 mcg/actuation nasal spray 2 Sprays by Both Nostrils route daily. Qty: 1 Bottle, Refills: 0      guaiFENesin-codeine (ROBITUSSIN AC) 100-10 mg/5 mL solution Take 5 mL by mouth every four (4) hours as needed for Cough for up to 3 days. Max Daily Amount: 30 mL.   Qty: 50 mL, Refills: 0 Associated Diagnoses: Viral upper respiratory tract infection      lisinopril (PRINIVIL, ZESTRIL) 10 mg tablet Take 1 Tab by mouth daily. Qty: 30 Tab, Refills: 0      spironolactone (ALDACTONE) 25 mg tablet Take 1 Tab by mouth daily.   Qty: 30 Tab, Refills: 0           Activity: Activity as tolerated  Diet: Cardiac Diet  Wound Care: None needed    Follow-up with PCP/cardiology within 2 weeks    Signed:  Anuj Rodriguez DO  3/13/2019  11:29 AM    Greater than 30 mins was spent in coordination, counseling, and execution of this patient's discharge

## 2019-03-13 NOTE — PROGRESS NOTES
Bedside and Verbal shift change report given to Lisa Hunt RN (oncoming nurse) by Jayant Serna RN (offgoing nurse). Report included the following information SBAR, Kardex, Intake/Output, MAR, Recent Results and Med Rec Status.

## 2019-03-13 NOTE — PROGRESS NOTES
Cardiology Progress Note         NAME:  Luis M Mccullough   :   1960   MRN:   [id number]     Assessment/Plan:   1. Acute on Chronic CHF (HFrEF)/ ICM: LV dysfunction improved after CABG; Current LVEF 26-30%, RV global systolic func moderately reduced. Change Bumex to PO, diuresing well. Cont ACE-I and aldactone. Hold BB for now >   Since Mr. John Palmer has been non-compliant with meds and follow-up and still was cocaine positive this admission, I don't think he meets the safety criteria above in starting a beta-blocker. If he can demonstrate compliance of staying off of cocaine, then we can consider adding a beta-blocker. Also would use Coreg when we can start one. pBNP improved   2. CAD s/p CABG: CABG x 3 on 18; LIMA to LAD, SVG to Diag w/ Y graft to OM. Continue ASA and statin. 3. Non-compliance: counseling on medication, diet and follow-up compliance. Pt agreeable. 4.   Substance Abuse: - says he is giving up cigs and cocaine. Has decided to follow up in our office\OK for d/c , will arrange appt. D/W attending. Will see prn/pls call if needed. CARDIOLOGY ATTENDING  Patient was not seen by me today. Assessment and plan was discussed and agree as written above    Rene Fernandez MD, [facility] [place]          Subjective:   Mr. Smith is a 60 y.o.   male who is admitted with CHF exacerbation.  Mr. Smith with PMH of CHF, HTN, CAD s/p CABG, medically noncompliance presented to ER c/o SOB, which has worsened lately. Has had intermitted chest pain. Has had CONRAD walking across the room. He stopped all his meds a couple of months ago. Has not seen cardiology since his CABG. Admits to smoking and cocaine use - but says he now wants to live for the lord and will give up drug use.       Regarding Beta-Blockers, Karl Says \" If beta blockers are given, they should be used in situations where benefits outweigh risks (eg, remote from cocaine exposure), and mixed alpha/beta blockers (eg, labetalol and carvedilol) should be used rather than nonselective beta blockers. Additionally, beta blockers should ideally be used in combination with an alpha antagonist or some other vasodilator without beta-blocking effects (eg, NTG or nitroprusside), and under close observation. Arbutus Ring Arbutus Ring \"  \"We suggest that patients with left ventricular (LV) systolic dysfunction or ventricular arrhythmias be considered for beta blockers at the time of discharge but only if the risk of recurrent cocaine use is low. \"    Cardiac ROS: Patient denies any exertional chest pain, dyspnea, palpitations, syncope, orthopnea, edema or paroxysmal nocturnal dyspnea. Previous Cardiac Eval  03/10/19   ECHO ADULT COMPLETE 03/10/2019 3/10/2019    Narrative · Left ventricular severely decreased systolic function. Estimated left   ventricular ejection fraction is 26 - 30%. · Right ventricular global systolic function is moderately reduced. · There is a moderate left pleural effusion. Signed by: Madi Perry MD     EKG 3/10/19 - NSR, LVH with repolarization changes, QTc 481      CXR 3/10/19 - Prior cardiac surgery. Mild to moderate CHF. Probable left basilar atelectasis and small pleural effusion superimposed on scarring.    5/24/18: ECHO- LVEF 45-50%, Inferior wall HK; RV mild dilated; LA/RA mild dilated    5/8/18: CABGx3; LIMA to LAD, SVG to Diag with Y graft to OM     5/4/18: CATH- LM: o> 50%; LAD: p 40-50%, 70% after D1, m80-90%, LCX: MLI, OM: 70-90%, RCA: small p50%, p/m 70-90%, d80%, LVEDP 30 mmHg, EF 15%    LAD: 40-50% proximal, 70% after first diagonal, 80-90% mid lesion    5/4/18: CAROTIDS- <50% stenosis b/l     5/3/18: ECHO- EF 30-35%, apical AK, lateral HK, severe DHK, mod-severe LAE, mild-mod MR, mod TR      Review of Systems: No nausea, indigestion, vomiting, sputum. + R chest pain assoc w cough, non radiating. No bleeding. Taking po. Appetite ok.          Objective:     Visit Vitals  /79 (BP 1 Location: Right arm, BP Patient Position: At rest)   Pulse (!) 52 Comment: RN notified    Temp 98.1 °F (36.7 °C)   Resp 18   Ht 5' 6\" (1.676 m)   Wt 173 lb 1 oz (78.5 kg)   SpO2 100%   BMI 27.93 kg/m²    O2 Flow Rate (L/min): 2 l/min O2 Device: Room air    Temp (24hrs), Av.2 °F (36.8 °C), Min:97.5 °F (36.4 °C), Max:98.7 °F (37.1 °C)      No intake/output data recorded.  1901 -  0700  In: 240 [P.O.:240]  Out: -   TELE: NSR w some episodes of SB 40's overnight    General: AAOx3 cooperative, no acute distress. HEENT: Atraumatic. Pink and moist.  Anicteric sclerae. Neck : Supple, no thyromegaly. Lungs: CTA bilaterally. No wheezing/rhonchi/rales. + dry cough  Heart: Regular rhythm, no murmur, no rubs, no gallops. No JVD. Abdomen: Soft, non-distended, non-tender. + Bowel sounds. Extremities: No edema, no clubbing, no cyanosis. No calf tenderness  Neurologic: Grossly intact. Alert and oriented X 3. No acute neurological distress. Psych: Good insight. Not anxious or agitated. Care Plan discussed with:    Comments   Patient x    Family      RN x    Care Manager                    Consultant:  x        Data Review:     No lab exists for component: ITNL   No results for input(s): CPK, CKMB, TROIQ in the last 72 hours. Recent Labs     19  0328 19  0158 19  0203    141 140   K 4.0 4.3 4.1    108 107   CO2 29 28 26   BUN 19 13 13   CREA 1.44* 1.40* 1.34*   GLU 89 100 100   PHOS  --  3.4  --    MG  --  2.3  --    WBC  --  5.2 6.3   HGB  --  13.8 14.3   HCT  --  43.2 44.6   PLT  --  256 280     No results for input(s): INR, PTP, APTT in the last 72 hours.     No lab exists for component: INREXT, INREXT    Medications reviewed  Current Facility-Administered Medications   Medication Dose Route Frequency    bumetanide (BUMEX) tablet 1 mg  1 mg Oral DAILY    benzonatate (TESSALON) capsule 100 mg  100 mg Oral TID PRN    guaiFENesin-codeine (ROBITUSSIN AC) 100-10 mg/5 mL solution 5 mL  5 mL Oral Q4H PRN    oxymetazoline (AFRIN) 0.05 % nasal spray 2 Spray  2 Spray Both Nostrils BID PRN    atorvastatin (LIPITOR) tablet 40 mg  40 mg Oral QHS    sodium chloride (NS) flush 5-40 mL  5-40 mL IntraVENous Q8H    sodium chloride (NS) flush 5-40 mL  5-40 mL IntraVENous PRN    enoxaparin (LOVENOX) injection 40 mg  40 mg SubCUTAneous Q24H    aspirin chewable tablet 81 mg  81 mg Oral DAILY    lisinopril (PRINIVIL, ZESTRIL) tablet 10 mg  10 mg Oral DAILY    spironolactone (ALDACTONE) tablet 25 mg  25 mg Oral DAILY    hydrALAZINE (APRESOLINE) 20 mg/mL injection 10 mg  10 mg IntraVENous Q2H PRN         ALEKSANDAR Ann Student  Dano Links MS FNP

## 2019-09-18 ENCOUNTER — APPOINTMENT (OUTPATIENT)
Dept: GENERAL RADIOLOGY | Age: 59
End: 2019-09-18
Attending: EMERGENCY MEDICINE
Payer: COMMERCIAL

## 2019-09-18 ENCOUNTER — HOSPITAL ENCOUNTER (OUTPATIENT)
Age: 59
Setting detail: OBSERVATION
Discharge: LEFT BEFORE DISCHARGE | End: 2019-09-19
Attending: EMERGENCY MEDICINE | Admitting: INTERNAL MEDICINE
Payer: COMMERCIAL

## 2019-09-18 DIAGNOSIS — R07.9 CHEST PAIN, UNSPECIFIED TYPE: Primary | ICD-10-CM

## 2019-09-18 PROBLEM — I50.22 SYSTOLIC CHF, CHRONIC (HCC): Status: ACTIVE | Noted: 2019-09-18

## 2019-09-18 LAB
ALBUMIN SERPL-MCNC: 3 G/DL (ref 3.5–5)
ALBUMIN/GLOB SERPL: 0.8 {RATIO} (ref 1.1–2.2)
ALP SERPL-CCNC: 60 U/L (ref 45–117)
ALT SERPL-CCNC: 14 U/L (ref 12–78)
AMPHET UR QL SCN: NEGATIVE
ANION GAP SERPL CALC-SCNC: 3 MMOL/L (ref 5–15)
AST SERPL-CCNC: 24 U/L (ref 15–37)
ATRIAL RATE: 62 BPM
BARBITURATES UR QL SCN: NEGATIVE
BASOPHILS # BLD: 0 K/UL (ref 0–0.1)
BASOPHILS NFR BLD: 1 % (ref 0–1)
BENZODIAZ UR QL: NEGATIVE
BILIRUB SERPL-MCNC: 0.5 MG/DL (ref 0.2–1)
BNP SERPL-MCNC: 2528 PG/ML
BUN SERPL-MCNC: 10 MG/DL (ref 6–20)
BUN/CREAT SERPL: 7 (ref 12–20)
CALCIUM SERPL-MCNC: 8.5 MG/DL (ref 8.5–10.1)
CALCULATED P AXIS, ECG09: 37 DEGREES
CALCULATED R AXIS, ECG10: 22 DEGREES
CALCULATED T AXIS, ECG11: 126 DEGREES
CANNABINOIDS UR QL SCN: NEGATIVE
CHLORIDE SERPL-SCNC: 113 MMOL/L (ref 97–108)
CO2 SERPL-SCNC: 28 MMOL/L (ref 21–32)
COCAINE UR QL SCN: POSITIVE
COMMENT, HOLDF: NORMAL
CREAT SERPL-MCNC: 1.46 MG/DL (ref 0.7–1.3)
DIAGNOSIS, 93000: NORMAL
DIFFERENTIAL METHOD BLD: ABNORMAL
DRUG SCRN COMMENT,DRGCM: ABNORMAL
EOSINOPHIL # BLD: 0.5 K/UL (ref 0–0.4)
EOSINOPHIL NFR BLD: 9 % (ref 0–7)
ERYTHROCYTE [DISTWIDTH] IN BLOOD BY AUTOMATED COUNT: 14.6 % (ref 11.5–14.5)
GLOBULIN SER CALC-MCNC: 4 G/DL (ref 2–4)
GLUCOSE SERPL-MCNC: 90 MG/DL (ref 65–100)
HCT VFR BLD AUTO: 40.5 % (ref 36.6–50.3)
HGB BLD-MCNC: 12.6 G/DL (ref 12.1–17)
IMM GRANULOCYTES # BLD AUTO: 0 K/UL (ref 0–0.04)
IMM GRANULOCYTES NFR BLD AUTO: 1 % (ref 0–0.5)
LYMPHOCYTES # BLD: 1.1 K/UL (ref 0.8–3.5)
LYMPHOCYTES NFR BLD: 21 % (ref 12–49)
MCH RBC QN AUTO: 29.4 PG (ref 26–34)
MCHC RBC AUTO-ENTMCNC: 31.1 G/DL (ref 30–36.5)
MCV RBC AUTO: 94.4 FL (ref 80–99)
METHADONE UR QL: NEGATIVE
MONOCYTES # BLD: 0.4 K/UL (ref 0–1)
MONOCYTES NFR BLD: 9 % (ref 5–13)
NEUTS SEG # BLD: 3.1 K/UL (ref 1.8–8)
NEUTS SEG NFR BLD: 59 % (ref 32–75)
NRBC # BLD: 0 K/UL (ref 0–0.01)
NRBC BLD-RTO: 0 PER 100 WBC
OPIATES UR QL: NEGATIVE
P-R INTERVAL, ECG05: 150 MS
PCP UR QL: NEGATIVE
PLATELET # BLD AUTO: 248 K/UL (ref 150–400)
PMV BLD AUTO: 10.2 FL (ref 8.9–12.9)
POTASSIUM SERPL-SCNC: 4.7 MMOL/L (ref 3.5–5.1)
PROT SERPL-MCNC: 7 G/DL (ref 6.4–8.2)
Q-T INTERVAL, ECG07: 442 MS
QRS DURATION, ECG06: 100 MS
QTC CALCULATION (BEZET), ECG08: 448 MS
RBC # BLD AUTO: 4.29 M/UL (ref 4.1–5.7)
SAMPLES BEING HELD,HOLD: NORMAL
SODIUM SERPL-SCNC: 144 MMOL/L (ref 136–145)
TROPONIN I SERPL-MCNC: <0.05 NG/ML
UR CULT HOLD, URHOLD: NORMAL
VENTRICULAR RATE, ECG03: 62 BPM
WBC # BLD AUTO: 5.1 K/UL (ref 4.1–11.1)

## 2019-09-18 PROCEDURE — 74011250636 HC RX REV CODE- 250/636: Performed by: INTERNAL MEDICINE

## 2019-09-18 PROCEDURE — 84484 ASSAY OF TROPONIN QUANT: CPT

## 2019-09-18 PROCEDURE — 99285 EMERGENCY DEPT VISIT HI MDM: CPT

## 2019-09-18 PROCEDURE — 96372 THER/PROPH/DIAG INJ SC/IM: CPT

## 2019-09-18 PROCEDURE — 80307 DRUG TEST PRSMV CHEM ANLYZR: CPT

## 2019-09-18 PROCEDURE — 71045 X-RAY EXAM CHEST 1 VIEW: CPT

## 2019-09-18 PROCEDURE — 99218 HC RM OBSERVATION: CPT

## 2019-09-18 PROCEDURE — 93005 ELECTROCARDIOGRAM TRACING: CPT

## 2019-09-18 PROCEDURE — 80053 COMPREHEN METABOLIC PANEL: CPT

## 2019-09-18 PROCEDURE — 74011250637 HC RX REV CODE- 250/637: Performed by: INTERNAL MEDICINE

## 2019-09-18 PROCEDURE — 85025 COMPLETE CBC W/AUTO DIFF WBC: CPT

## 2019-09-18 PROCEDURE — 36415 COLL VENOUS BLD VENIPUNCTURE: CPT

## 2019-09-18 PROCEDURE — 83880 ASSAY OF NATRIURETIC PEPTIDE: CPT

## 2019-09-18 RX ORDER — NALOXONE HYDROCHLORIDE 0.4 MG/ML
0.4 INJECTION, SOLUTION INTRAMUSCULAR; INTRAVENOUS; SUBCUTANEOUS AS NEEDED
Status: DISCONTINUED | OUTPATIENT
Start: 2019-09-18 | End: 2019-09-19 | Stop reason: HOSPADM

## 2019-09-18 RX ORDER — SODIUM CHLORIDE 0.9 % (FLUSH) 0.9 %
5-40 SYRINGE (ML) INJECTION EVERY 8 HOURS
Status: DISCONTINUED | OUTPATIENT
Start: 2019-09-18 | End: 2019-09-19 | Stop reason: HOSPADM

## 2019-09-18 RX ORDER — BUMETANIDE 1 MG/1
1 TABLET ORAL DAILY
Status: DISCONTINUED | OUTPATIENT
Start: 2019-09-19 | End: 2019-09-19 | Stop reason: HOSPADM

## 2019-09-18 RX ORDER — MORPHINE SULFATE 4 MG/ML
2 INJECTION INTRAVENOUS
Status: DISCONTINUED | OUTPATIENT
Start: 2019-09-18 | End: 2019-09-19 | Stop reason: HOSPADM

## 2019-09-18 RX ORDER — SODIUM CHLORIDE 0.9 % (FLUSH) 0.9 %
5-40 SYRINGE (ML) INJECTION AS NEEDED
Status: DISCONTINUED | OUTPATIENT
Start: 2019-09-18 | End: 2019-09-19 | Stop reason: HOSPADM

## 2019-09-18 RX ORDER — ONDANSETRON 2 MG/ML
4 INJECTION INTRAMUSCULAR; INTRAVENOUS
Status: DISCONTINUED | OUTPATIENT
Start: 2019-09-18 | End: 2019-09-19 | Stop reason: HOSPADM

## 2019-09-18 RX ORDER — ACETAMINOPHEN 325 MG/1
650 TABLET ORAL
Status: DISCONTINUED | OUTPATIENT
Start: 2019-09-18 | End: 2019-09-19 | Stop reason: HOSPADM

## 2019-09-18 RX ORDER — GUAIFENESIN 100 MG/5ML
81 LIQUID (ML) ORAL DAILY
Status: DISCONTINUED | OUTPATIENT
Start: 2019-09-19 | End: 2019-09-19 | Stop reason: HOSPADM

## 2019-09-18 RX ORDER — FLUTICASONE PROPIONATE 50 MCG
2 SPRAY, SUSPENSION (ML) NASAL
COMMUNITY

## 2019-09-18 RX ORDER — ENOXAPARIN SODIUM 100 MG/ML
40 INJECTION SUBCUTANEOUS EVERY 24 HOURS
Status: DISCONTINUED | OUTPATIENT
Start: 2019-09-18 | End: 2019-09-19 | Stop reason: HOSPADM

## 2019-09-18 RX ORDER — LISINOPRIL 5 MG/1
10 TABLET ORAL DAILY
Status: DISCONTINUED | OUTPATIENT
Start: 2019-09-18 | End: 2019-09-19 | Stop reason: HOSPADM

## 2019-09-18 RX ORDER — ATORVASTATIN CALCIUM 20 MG/1
40 TABLET, FILM COATED ORAL
Status: DISCONTINUED | OUTPATIENT
Start: 2019-09-18 | End: 2019-09-19 | Stop reason: HOSPADM

## 2019-09-18 RX ORDER — SPIRONOLACTONE 25 MG/1
25 TABLET ORAL DAILY
Status: DISCONTINUED | OUTPATIENT
Start: 2019-09-19 | End: 2019-09-19 | Stop reason: HOSPADM

## 2019-09-18 RX ADMIN — Medication 10 ML: at 18:34

## 2019-09-18 RX ADMIN — ENOXAPARIN SODIUM 40 MG: 40 INJECTION SUBCUTANEOUS at 18:34

## 2019-09-18 RX ADMIN — LISINOPRIL 10 MG: 5 TABLET ORAL at 13:38

## 2019-09-18 RX ADMIN — ATORVASTATIN CALCIUM 40 MG: 20 TABLET, FILM COATED ORAL at 21:31

## 2019-09-18 RX ADMIN — Medication 10 ML: at 21:32

## 2019-09-18 NOTE — PROGRESS NOTES
TRANSFER - IN REPORT:    Verbal report received from Brigitte(name) on Betty Dinning  being received from ED(unit) for routine progression of care      Report consisted of patients Situation, Background, Assessment and   Recommendations(SBAR). Information from the following report(s) SBAR, Kardex, ED Summary, Intake/Output, MAR, Recent Results and Cardiac Rhythm NSR was reviewed with the receiving nurse. Opportunity for questions and clarification was provided. Assessment completed upon patients arrival to unit and care assumed. 1545 Pt arrived to the unit. 1605 Spoken to Dr Kendal Adkins. Start Cardiac diet. Bedside shift change report given to Sorin (oncoming nurse) by Gurpreet Davenport (offgoing nurse). Report included the following information SBAR, Kardex, ED Summary, Intake/Output, MAR, Recent Results and Cardiac Rhythm NSR.

## 2019-09-18 NOTE — ED NOTES
Pt Throughput: Charge Nurse on [facility]  made aware of patient's bed assignment. Vanessa Mireles RN  Emergency Dept Charge RN.

## 2019-09-18 NOTE — PROGRESS NOTES
9/18/2019  2:17 PM  Case management note    Reason for Admission:   Chest pain  Patient admitted for chest pain, patient angry that I woke up and asked questions that he had answered. [facility] [place]         OBS letter signed and placed in chart            RRAT Score:     15             Do you (patient/family) have any concerns for transition/discharge? No family at bedside, resistant to education and questions at bedside     Plan for utilizing home health:   Patient is independent and drives, most likely will not qualify for home health services    Current Advanced Directive/Advance Care Plan:  Does not have            Transition of Care Plan:          1. Home with family assistance  2. PCP follow up  3. Card follow up  4.  CM to follow until discharge    Care Management Interventions  PCP Verified by CM: Yes(dr. jesus maddox no nn)  Mode of Transport at Discharge: Self  Transition of Care Consult (CM Consult): Discharge Planning  Current Support Network: Lives with Spouse  Plan discussed with Pt/Family/Caregiver: Yes  Discharge Location  Discharge Placement: Home with family assistance  [place]

## 2019-09-18 NOTE — ED NOTES
Pt reports having sudden cp while walking this morning. Was given 1 SL nitro tab with relief. Denies any cp currently. States that he has SOB, which is new. Has not taken his bp meds in 1 week. Reports 2 lbs weight gain in the past week.

## 2019-09-18 NOTE — PROGRESS NOTES
[facility]: MED RECONCILIATION    Comments/Recommendations:   Patient admits to noncompliance to prescribed regimen. He frequently forgets to take his medications. Pharmacist counseled extensively on the importance of and tools for compliance. The patient has a pill box at home which he plans to start using. He also has a smart phone with an alarm function which he thinks will be helpful. Allergies: Patient has no known allergies. Prior to Admission Medications:   Prior to Admission Medications   Prescriptions Last Dose Informant Patient Reported? Taking?   aspirin 81 mg chewable tablet 2019 at Unknown time Self No Yes   Sig: Take 1 Tab by mouth daily. atorvastatin (LIPITOR) 40 mg tablet 2019 at Unknown time Self No Yes   Sig: Take 1 Tab by mouth nightly. bumetanide (BUMEX) 1 mg tablet 2019 at Unknown time Self No Yes   Sig: Take 1 Tab by mouth daily. fluticasone propionate (FLONASE ALLERGY RELIEF) 50 mcg/actuation nasal spray  Self Yes Yes   Si Sprays by Both Nostrils route daily as needed for Rhinitis. lisinopril (PRINIVIL, ZESTRIL) 10 mg tablet 2019 at Unknown time Self No Yes   Sig: Take 1 Tab by mouth daily. spironolactone (ALDACTONE) 25 mg tablet 2019 at Unknown time Self No Yes   Sig: Take 1 Tab by mouth daily.       Facility-Administered Medications: None      Thank you,      Arash Childress, PharmD, BCPS

## 2019-09-18 NOTE — H&P
[address]  [phone]    Admission History and Physical      NAME:              Greta Maciel   :   1960   MRN:  [id number]     PCP:  Dao Quiroz MD     Date:     2019     Chief  Complaint: Chest pain    History Of Presenting Illness:       Mr. Jose Rudd is a 60 y.o. male who is being observed in hospital for chest pain. Mr. Jose Rudd presented to our Emergency Department today complaining of chest pain. This was tight in nature, lasted for about an hour, worse with exertion and better after he took NTG and Asprin. Similar to chest discomfort he had felt previously with his CAD. Troponin neg in the ED. He still uses cocaine and he says his last use was two days ago. EKG showed lateral lead T wave inversions. He also says he had ran out of his medications and had not used them for the last week. He will be observed in hospital for further monitoring. No Known Allergies    Prior to Admission medications    Medication Sig Start Date End Date Taking? Authorizing Provider   fluticasone propionate (FLONASE ALLERGY RELIEF) 50 mcg/actuation nasal spray 2 Sprays by Both Nostrils route daily as needed for Rhinitis. Yes Provider, Historical   aspirin 81 mg chewable tablet Take 1 Tab by mouth daily. 3/14/19  Yes Pepe Vieira V, DO   atorvastatin (LIPITOR) 40 mg tablet Take 1 Tab by mouth nightly. 3/13/19  Yes Pepe Vieira V, DO   bumetanide (BUMEX) 1 mg tablet Take 1 Tab by mouth daily. 3/14/19  Yes Jose Jha V, DO   lisinopril (PRINIVIL, ZESTRIL) 10 mg tablet Take 1 Tab by mouth daily. 3/14/19  Yes Pepe Koroma, DO   spironolactone (ALDACTONE) 25 mg tablet Take 1 Tab by mouth daily.  3/14/19  Yes Margot Umana DO       Past Medical History:   Diagnosis Date    CAD (coronary artery disease)     MI x4    HFrEF (heart failure with reduced ejection fraction) (HCC)     History of cocaine abuse     Hx of CABG     CABG 3 vessel on 18 -  LIMA to LAD, SVG to Diag with Y graft to OM     Ischemic cardiomyopathy     Non-compliance         Past Surgical History:   Procedure Laterality Date    CARDIAC SURG PROCEDURE UNLIST  2018    triple bipass    HX ORTHOPAEDIC      left hip surgery       Social History     Tobacco Use    Smoking status: Current Some Day Smoker     Last attempt to quit: 5/3/2018     Years since quittin.3    Smokeless tobacco: Never Used    Tobacco comment: last use this past wednesday   Substance Use Topics    Alcohol use: No        Family History   Problem Relation Age of Onset    Diabetes Mother         Review of Systems:    Constitutional ROS: no fever, chills, rigors or night sweats  Respiratory ROS: no cough, sputum, hemoptysis, dyspnea or pleuritic pain. Cardiovascular ROS: no palpitations, orthopnea, PND or syncope  Endocrine ROS: no polydispsia, polyuria, heat or cold intolerance or major weight change. Gastrointestinal ROS: no dysphagia, abdominal pain, nausea, vomiting or diarrhea    Genito-Urinary ROS: no dysuria, frequency, hematuria, retention or flank pain  Musculoskeletal ROS: no joint pain, swelling or muscular tenderness  Neurological ROS: no headache, confusion, focal weakness or any other neurological symptoms  Psychiatric ROS: no depression, anxiety, mood swings  Dermatological ROS: no rash, pruritis, or urticaria  Heme-Lymph ROS: no swollen glands, bleeding    Examination:    Constitutional:    Visit Vitals  BP (!) 137/103   Pulse 68   Temp 97.9 °F (36.6 °C)   Resp 23   Ht 5' 6\" (1.676 m)   Wt 80.7 kg (178 lb)   SpO2 (!) 86%   BMI 28.73 kg/m²      General:  Weak and ill looking patient in no acute distress  Eyes: Pink conjunctivae, PERRLA with no discharge.  Normal eye movements  Ear, Nose, Mouth & Throat: No ottorrhea, rhinorrhea, non tender sinuses, dry mucous membranes  Respiratory:  No accessory muscle use, clear breath sounds without crackles or wheezes  Cardiovascular:  No JVD or murmurs, regular and normal S1, S2 without thrills, bruits or peripheral edema. GI & :  Soft abdomen, non-tender, non-distended, normoactive bowel sounds with no palpable organomegaly  Heme:  No cervical or axillary adenopathy. Musculoskeletal:  No cyanosis, clubbing, atrophy or deformities  Skin:  No rashes, bruising or ulcers   Neurological: Awake and alert, speech is clear, CNs 2-12 are grossly intact and otherwise non focal  Psychiatric:  Has a good insight and is oriented x 3  ________________________________________________________________________    Data Review:    Labs:    Recent Labs     09/18/19  1017   WBC 5.1   HGB 12.6   HCT 40.5        Recent Labs     09/18/19  1017      K 4.7   *   CO2 28   GLU 90   BUN 10   CREA 1.46*   CA 8.5   ALB 3.0*   SGOT 24   ALT 14     No components found for: GLPOC  No results for input(s): PH, PCO2, PO2, HCO3, FIO2 in the last 72 hours. No results for input(s): INR in the last 72 hours. No lab exists for component: INREXT    Radiological Studies:      Chest X-ray: Normal.    Other Medical tests:    Personally reviewed 12 lead EKG - sinus. T wave inversions lateral leads. I have also reviewed available old medical records. Assessment & Impression:     Mr. Yadira Berry is a 60 y.o. male being evaluated for:     Active Problems:    Cocaine use (5/3/2018)      HTN (hypertension) (5/3/2018)      CAD (coronary artery disease) (5/4/2018)      S/P CABG x 3 (5/21/2018)      Chest pain (3/51/4543)      Systolic CHF, chronic ([address] (9/18/2019)         Plan of management:    Chest pain (9/18/2019) / CAD (coronary artery disease) (5/4/2018) / S/P CABG x 3 (5/21/2018): Likely partly due to cocaine use. Observe in hospital. Serial troponin. No BB. Resume Asprin, Lipitor. Consult cardiology.  Echo to re-assess LV function    Cocaine use (5/3/2018): still using. UDS+. Counseled on cessation    Systolic CHF, chronic ([facility] (9/18/2019): Euvolemic. Resume Bumex. Spironolactone and Lisinopril. Monitor renal function    HTN (hypertension) (5/3/2018): resume Lisinopril.  No BB    Code Status:  Full    Surrogate decision maker: Family    Risk of deterioration: high      Total time spent for the care of the patient: [address] discussed with: Patient, Family, Nursing Staff and ED physician    Discussed:  Code Status, Care Plan and D/C Planning    Prophylaxis:  Lovenox    Probable Disposition:  Home w/Family           ___________________________________________________    Attending Physician: Rivera Camp MD

## 2019-09-18 NOTE — ED PROVIDER NOTES
60 y.o. male with past medical history significant for CAD (4 MI's), HFrEF, Ischemic Cardiomyopathy, hx of cocaine abuse, and CABG who presents from home via EMS with chief complaint of chest pain. PTA EMS gave pt sublingual nitroglycerine, and 324mg of ASA en route. Pt states he was on his way to go fishing, and was walking in the process of getting his fishing poles when he began with chest pain, and SOB. Pt states the pain lasted approximately 1 hour, but it was relieved with the nitro and ASA PTA. Pt states his pain resembled prior episodes of chest pain, and it felt like \"tightness\". Pt denies any current pain, leg swelling, nausea, and vomiting. There are no other acute medical concerns at this time. Social hx: Current Smoker, No EtOH use, last Cocaine use- approximately 3 weeks ago  PCP: Ana Cash MD  Cardiologist: Dr. Maggy Siddiqi MD      Note written by Yulia Hook, as dictated by Rodolfo Barker MD 10:05 AM      The history is provided by the patient. No  was used.         Past Medical History:   Diagnosis Date    CAD (coronary artery disease)     MI x4    HFrEF (heart failure with reduced ejection fraction) ([facility])     History of cocaine abuse     Hx of CABG     CABG 3 vessel on 5/8/18 -  LIMA to LAD, SVG to Diag with Y graft to OM     Ischemic cardiomyopathy     Non-compliance        Past Surgical History:   Procedure Laterality Date    CARDIAC SURG PROCEDURE UNLIST  2018    triple bipass    HX ORTHOPAEDIC      left hip surgery         Family History:   Problem Relation Age of Onset    Diabetes Mother        Social History     Socioeconomic History    Marital status:      Spouse name: Not on file    Number of children: Not on file    Years of education: Not on file    Highest education level: Not on file   Occupational History    Not on file   Social Needs    Financial resource strain: Not on file    Food insecurity:     Worry: Not on file Inability: Not on file    Transportation needs:     Medical: Not on file     Non-medical: Not on file   Tobacco Use    Smoking status: Current Some Day Smoker     Last attempt to quit: 5/3/2018     Years since quittin.3    Smokeless tobacco: Never Used    Tobacco comment: last use this past wednesday   Substance and Sexual Activity    Alcohol use: No    Drug use: Yes     Types: Cocaine     Comment: last use approximately 3 weeks    Sexual activity: Not on file   Lifestyle    Physical activity:     Days per week: Not on file     Minutes per session: Not on file    Stress: Not on file   Relationships    Social connections:     Talks on phone: Not on file     Gets together: Not on file     Attends [employer] service: Not on file     Active member of club or organization: Not on file     Attends meetings of clubs or organizations: Not on file     Relationship status: Not on file    Intimate partner violence:     Fear of current or ex partner: Not on file     Emotionally abused: Not on file     Physically abused: Not on file     Forced sexual activity: Not on file   Other Topics Concern    Not on file   Social History Narrative    Not on file         ALLERGIES: Patient has no known allergies. Review of Systems   Constitutional: Negative for fever. Eyes: Negative for visual disturbance. Respiratory: Positive for shortness of breath. Negative for cough and wheezing. Cardiovascular: Positive for chest pain. Negative for leg swelling. Gastrointestinal: Negative for abdominal pain, diarrhea, nausea and vomiting. Genitourinary: Negative for dysuria. Musculoskeletal: Negative. Negative for back pain and neck stiffness. Skin: Negative for rash. Neurological: Negative. Negative for syncope and headaches. Psychiatric/Behavioral: Negative for confusion. All other systems reviewed and are negative.       Vitals:    19 1003   BP: (!) 144/103   Pulse: 60   Resp: 16   Temp: 97.9 °F (36.6 °C)   SpO2: 93%   Weight: 80.7 kg (178 lb)   Height: 5' 6\" (1.676 m)            Physical Exam   Constitutional: He appears well-developed and well-nourished. No distress. HENT:   Head: Normocephalic. Eyes: Pupils are equal, round, and reactive to light. Neck: Normal range of motion. Cardiovascular: Normal rate. No murmur heard. Chest Wall Non-tender   Pulmonary/Chest: Effort normal and breath sounds normal.   Abdominal: Soft. There is no tenderness. Musculoskeletal: Normal range of motion. Neurological: He is alert. Skin: Skin is warm and dry. Capillary refill takes less than 2 seconds. Psychiatric: He has a normal mood and affect. His behavior is normal.      Note written by Yulia Hayes, as dictated by Deb Radford MD 10:05 AM    MDM       Procedures      ED EKG interpretation:   Rhythm: normal sinus rhythm; and regular . Rate (approx.): 62 bpm; Voltage Criteria LVH; ST/T wave: changes in the lateral leads; Compact with repolarization, or Ischemia     Note written by Yulia Hayes, as dictated by Deb Radford MD 10:10 AM       CONSULT NOTE:  11:50 AM Deb Radford MD spoke with Dr. Olayinka Sharma, Consult for Cardiology. Discussed available diagnostic tests and clinical findings. Dr. Olayinka Sharma recommends admission to the hospitalist and he will follow the patient.     PROGRESS NOTE:  11:51  Texted the hospitalist about admitting

## 2019-09-18 NOTE — ED TRIAGE NOTES
Pt arrived via EMS due to chest pain that began this morning. EMS administered nitro en-route which provided relief. Pt denies pain at this time. VSS in triage; pt in no acute distress. Pt does have a past hx of CABG.

## 2019-09-18 NOTE — ED NOTES
TRANSFER - OUT REPORT:    Verbal report given to Ezra(name) on [facility]  being transferred to tele(unit) for routine progression of care       Report consisted of patients Situation, Background, Assessment and   Recommendations(SBAR). Information from the following report(s) SBAR, Kardex, ED Summary, [facility] and Recent Results was reviewed with the receiving nurse. Lines:   Peripheral IV 09/18/19 Right; Inner Forearm (Active)   Phlebitis Assessment 0 9/18/2019 10:19 AM   Infiltration Assessment 0 9/18/2019 10:19 AM   Dressing Status Clean 9/18/2019 10:19 AM   Dressing Type Transparent;Tape 9/18/2019 10:19 AM   Hub Color/Line Status Pink;Capped; Infiltrated;Patent 9/18/2019 10:19 AM   Action Taken Blood drawn 9/18/2019 10:19 AM   Alcohol Cap Used Yes 9/18/2019 10:19 AM        Opportunity for questions and clarification was provided.       Patient transported with:   Monitor  Registered Nurse      Jose Bonilla RN

## 2019-09-18 NOTE — CONSULTS
Alexia Olivo MD    [address] [facility] [place], [address] [facility]    Office [phone],LIG [phone]  Pager [phone]    Date of  Admission: 9/18/2019 10:02 AM  PCP- Amish Best MD    Jaime Orozco is a 60 y.o. male admitted for Chest pain [R07.9]. Consult requested by Mellisa Goldberg MD    Assessment/Plan    Chest pain  CAD-status post CABG 5/2018  Ischemic cardiomyopathy-EF 26 to 30%  Acute on chronic systolic heart failure  Cocaine use  Noncompliance to medications    Plan:  Serial troponin  Restart home medications. Currently not on beta-blocker secondary ongoing cocaine use  Echocardiogram    Chest pain could be secondary to ongoing cocaine use. If recurrent chest pain-we can consider stress nuclear study. If positive troponins-cardiac catheterization. However patient is not keen on doing stress test or cardiac cath    Needs AICD but patient has not followed up with cardiology/has not been compliant with meds/has not stopped using cocaine         I appreciate the opportunity to be involved in Mr. Thomas. See below note for details. Please do not hesitate to contact us with questions or concerns. Alexia Olivo MD    Cardiac Testing/ Procedures: A. Cardiac Cath/PCI: 5/4/2018 L Main: >50% osteal     LAD: 40-50% proximal, 70% after first diagonal, 80-90% mid lesion     LCflex: Diffuse mild disease, OM1 small vessel difffuse 70-90% disease.     RCA: small dominant vessel, 50% proximal lesion, 70-90% tandem lesions proximal-md lesion, 80% distal lesion     LVEDP: 30 mHg     LVEF: 15% diffuse hypokinesis     No significant gradient across aortic valve.     PCI: None     Plan: surgical consult    B.ECHO/GHASSAN: 3/10/2019-LVEF 26 to 30%.     C.StressNuclear/Stress ECHO/Stress test:    D.Vascular:    E. EP:    F. Miscellaneous: 5/18/18 - LIMA to LAD; SVG to DIag with Y graft to OM    Care Plan discussed with: Patient and Nursing Staff    Subjective:    Patient is a 69-year-old male who had an episode of prolonged chest tightness this morning. No dyspnea/diaphoresis. Has not taken his medications for a week now. Ongoing cocaine use with last use approximately 2 days ago. Urine drug screen positive for cocaine. Troponin negative. NT proBNP 2528. Creatinine 1.4.  Echo 3/2019-EF 26 to 30%. Past Medical History:   Diagnosis Date    CAD (coronary artery disease)     MI x4    HFrEF (heart failure with reduced ejection fraction) ([facility])     History of cocaine abuse     Hx of CABG     CABG 3 vessel on 18 -  LIMA to LAD, SVG to Diag with Y graft to OM     Ischemic cardiomyopathy     Non-compliance       Past Surgical History:   Procedure Laterality Date    CARDIAC SURG PROCEDURE UNLIST  2018    triple bipass    HX ORTHOPAEDIC      left hip surgery     No Known Allergies  Family History   Problem Relation Age of Onset    Diabetes Mother       Social History     Tobacco Use    Smoking status: Current Some Day Smoker     Last attempt to quit: 5/3/2018     Years since quittin.3    Smokeless tobacco: Never Used    Tobacco comment: last use this past wednesday   Substance Use Topics    Alcohol use: No    Drug use: Yes     Types: Cocaine     Comment: last use approximately 3 weeks          Medications:    (Not in a hospital admission)  No current facility-administered medications for this encounter. Current Outpatient Medications   Medication Sig    fluticasone propionate (FLONASE ALLERGY RELIEF) 50 mcg/actuation nasal spray 2 Sprays by Both Nostrils route daily as needed for Rhinitis.  aspirin 81 mg chewable tablet Take 1 Tab by mouth daily.  atorvastatin (LIPITOR) 40 mg tablet Take 1 Tab by mouth nightly.  bumetanide (BUMEX) 1 mg tablet Take 1 Tab by mouth daily.  lisinopril (PRINIVIL, ZESTRIL) 10 mg tablet Take 1 Tab by mouth daily.  spironolactone (ALDACTONE) 25 mg tablet Take 1 Tab by mouth daily.          Review of Systems:  (bold if positive, if negative)    As in HPI-rest of review systems noncontributory        Physical Exam:  Visit Vitals  BP (!) 137/103   Pulse 68   Temp 97.9 °F (36.6 °C)   Resp 23   Ht 5' 6\" (1.676 m)   Wt 178 lb (80.7 kg)   SpO2 (!) 86%   BMI 28.73 kg/m²         Telemetry:     Gen: Well-developed, well-nourished, in no acute distress  HEENT:  Pink conjunctivae, hearing intact to voice, moist mucous membranes  Neck: Supple,No JVD, No Carotid Bruit, Thyroid- non tender  Resp: No accessory muscle use, Clear breath sounds, No rales or rhonchi  Card: Regular Rate,Rythm,Normal S1, S2, No murmurs, rubs or gallop. No thrills.    Abd:  Soft, non-tender, non-distended, normoactive bowel sounds are present,   MSK: No cyanosis or clubbing  Skin: No rashes or ulcers  Neuro: moving all four extremities, no focal deficit, follows commands appropriately  Psych:  Good insight, oriented to person, place and time, alert, Nml Affect  LE: No edema  Vascular:Radial Pulses 2+ and symmetric        EKG: Sinus rhythm, LVH with ST-T changes, PAC      Cxray:    LABS:        Lab Results   Component Value Date/Time    WBC 5.1 09/18/2019 10:17 AM    HGB 12.6 09/18/2019 10:17 AM    HCT 40.5 09/18/2019 10:17 AM    PLATELET 006 96/73/4289 10:17 AM    MCV 94.4 09/18/2019 10:17 AM     Lab Results   Component Value Date/Time    Sodium 144 09/18/2019 10:17 AM    Potassium 4.7 09/18/2019 10:17 AM    Chloride 113 (H) 09/18/2019 10:17 AM    CO2 28 09/18/2019 10:17 AM    Anion gap 3 (L) 09/18/2019 10:17 AM    Glucose 90 09/18/2019 10:17 AM    BUN 10 09/18/2019 10:17 AM    Creatinine 1.46 (H) 09/18/2019 10:17 AM    BUN/Creatinine ratio 7 (L) 09/18/2019 10:17 AM    GFR est AA 60 (L) 09/18/2019 10:17 AM    GFR est non-AA 49 (L) 09/18/2019 10:17 AM    Calcium 8.5 09/18/2019 10:17 AM     Lab Results   Component Value Date/Time     11/16/2018 05:08 PM    CK-MB Index 1.1 11/16/2018 05:08 PM    Troponin-I, Qt. <0.05 09/18/2019 12:26 PM     Lab Results   Component Value Date/Time    aPTT 25.6 05/12/2018 03:55 AM     Lab Results   Component Value Date/Time    INR 1.0 05/12/2018 03:55 AM    INR 1.3 (H) 05/10/2018 03:55 AM    INR 1.0 05/09/2018 04:02 AM    Prothrombin time 10.4 05/12/2018 03:55 AM    Prothrombin time 13.6 (H) 05/10/2018 03:55 AM    Prothrombin time 10.7 05/09/2018 04:02 AM     No results found for: BNP, BNPP, XBNPT      Michel Fitch MD

## 2019-09-19 ENCOUNTER — HOSPITAL ENCOUNTER (OUTPATIENT)
Dept: NON INVASIVE DIAGNOSTICS | Age: 59
Setting detail: OBSERVATION
Discharge: HOME | End: 2019-09-19
Attending: INTERNAL MEDICINE
Payer: COMMERCIAL

## 2019-09-19 ENCOUNTER — APPOINTMENT (OUTPATIENT)
Dept: NUCLEAR MEDICINE | Age: 59
End: 2019-09-19
Attending: INTERNAL MEDICINE
Payer: COMMERCIAL

## 2019-09-19 ENCOUNTER — APPOINTMENT (OUTPATIENT)
Dept: NON INVASIVE DIAGNOSTICS | Age: 59
End: 2019-09-19
Attending: INTERNAL MEDICINE
Payer: COMMERCIAL

## 2019-09-19 VITALS
TEMPERATURE: 97.7 F | HEIGHT: 66 IN | WEIGHT: 184.6 LBS | DIASTOLIC BLOOD PRESSURE: 78 MMHG | RESPIRATION RATE: 16 BRPM | OXYGEN SATURATION: 96 % | BODY MASS INDEX: 29.67 KG/M2 | HEART RATE: 59 BPM | SYSTOLIC BLOOD PRESSURE: 124 MMHG

## 2019-09-19 VITALS
HEIGHT: 66 IN | DIASTOLIC BLOOD PRESSURE: 89 MMHG | WEIGHT: 184 LBS | SYSTOLIC BLOOD PRESSURE: 145 MMHG | BODY MASS INDEX: 29.57 KG/M2

## 2019-09-19 LAB — STRESS TARGET HR: 160 BPM

## 2019-09-19 PROCEDURE — A9500 TC99M SESTAMIBI: HCPCS

## 2019-09-19 PROCEDURE — 94760 N-INVAS EAR/PLS OXIMETRY 1: CPT

## 2019-09-19 PROCEDURE — 99218 HC RM OBSERVATION: CPT

## 2019-09-19 RX ADMIN — Medication 10 ML: at 05:00

## 2019-09-19 NOTE — PROGRESS NOTES
1915 Bedside and Verbal shift change report given to Helen RN and Janie Kenyon RN (oncoming nurse) by [facility], RN (offgoing nurse). Report included the following information SBAR, Kardex, Intake/Output, Recent Results and Cardiac Rhythm NSR. Introduced self as primary RN. Initial assessment completed. VSS. Pt denies additional complaints at this time. Plan for the evening discussed with pt and he verbalized understanding. Bed locked and in low position with call bell within reach. Instructed pt to press call arias when needed. White board updated with this RN's name. 2130 Purposeful hourly rounding completed. Patient has no complaints at this time. Denies chest pain, any sob and no diaphoresis. Rates pain a 0/10. Does not have to use the restroom at this time. Patient in a position of comfort. Belongings secure in their room, side rails up x 2, bedside table within reach, and call bell within reach. Will continue to monitor and re-assess as appropriate. 2230 Pt resting quietly in bed with eyes closed, respirations even and unlabored. No acute distress noted. Call bell within reach. 2345 Troponin blood draw scheduled for midnight was verified with Dr. Patrick Morris. Per Dr. Patrick Morris a third troponin is not necessary at this time since last three troponins were negative. Pt notified of new status to be NPO at midnight and stress test for tomorrow. 0100 Pt resting quietly in bed with eyes closed, respirations even and unlabored. No acute distress noted. Call bell within reach. 0300 Purposeful hourly rounding completed. Patient has no complaints at this time. Rates pain a 0/10. Does not have to use the restroom at this time. Patient in a position of comfort. Belongings secure in their room, side rails up x 2, bedside table within reach, and call bell within reach. Will continue to monitor and re-assess as appropriate. 0415 Pt resting quietly in bed with eyes closed, respirations even and unlabored.  No acute distress noted. Easily aroused from sleep. He requested ice cream and was reminded of being NPO and the stress test recommended for later today. He acknowledged understanding. Call bell within reach. 0500 Pt weighed.    0600 Notified by monitor tech of 5bt of NSVT. 0715 Bedside and Verbal shift change report given to Silvia Ahmadi RN and Aspen Holloway RN (oncoming nurse) by Dayna Merino RN and Damion Santana (offgoing nurse). Report included the following information SBAR, Kardex, Intake/Output, MAR, Recent Results and Cardiac Rhythm NSR.

## 2019-09-19 NOTE — PROGRESS NOTES
Patient became agitated while waiting for the stress phase of the NM stress test to begin. (Resting images were acquired). Patient walked out of the department - staff caught up with him to get him in Wheelchair to take him back to his room.  Patient has refused to return to dept for completion of stress test.

## 2019-09-19 NOTE — PROGRESS NOTES
Patient reported agitated/verbally aggressive and making physical gestures to staff  He is refusing to complete stress test  Patient left unit -refusing to sign AMA form  D/w dr Jonah Gore and RN  Dr Kayy Smiley aware

## 2019-09-19 NOTE — DISCHARGE SUMMARY
Physician Discharge Summary     Patient ID:  Issac Welsh  [id number]  7 y.o.  1960    Admit date: 9/18/2019    Discharge date and time: 9/19/2019    Admission Diagnoses: Chest pain [R07.9]    Discharge Diagnoses:    Principal Diagnosis     Chest pain                                             Other Diagnoses    Cocaine use (5/3/2018)    HTN (hypertension) (5/3/2018)    CAD (coronary artery disease) (5/4/2018)    S/P CABG x 3 (5/21/2018)    Chest pain (6/34/6871)    Systolic CHF, chronic ([facility] [address] (9/18/2019)     Hospital Course:   Chest pain / CAD (coronary artery disease) S/P CABG x 3 / HTN (hypertension) - Pain POA, presumed partly due to cocaine use. Observe in hospital. Serial troponin unremarkable. No BB. Resumed Asprin, lisinopril, Lipitor. Consult cardiology. He became violent and belligerent during stress test.  He then left the floor AMA.     Cocaine use - POA, still using. UDS+. Counseled on cessation     Systolic CHF, chronic - Euvolemic on admit. Resume Bumex. Spironolactone and Lisinopril.  Monitor renal function     PCP: Rolando Lawler MD    Consults: Cardiology    Significant Diagnostic Studies: See Hospital Course    Discharged home AMA    Discharge Exam:  Pulse (!) 59   Temp 97.7 °F (36.5 °C)   Resp 16   Ht 5' 6\" (1.676 m)   Wt 83.7 kg (184 lb 9.6 oz)   SpO2 96%   BMI 29.80 kg/m²      Gen:  Well-developed, well-nourished, in no acute distress  HEENT:  Pink conjunctivae, PERRL, hearing intact to voice,   Neck:    Resp:  No accessory muscle use,  Card:    Abd:    Lymph:   Musc:  No cyanosis or clubbing  Skin:  No rashes or ulcers, skin turgor is good  Neuro:  Cranial nerves are grossly intact, no focal motor weakness, does not follow commands appropriately  Psych:  Poor insight, oriented to person, place and time, alert, angry    Patient Instructions:   Current Discharge Medication List      CONTINUE these medications which have NOT CHANGED    Details   fluticasone propionate (FLONASE ALLERGY RELIEF) 50 mcg/actuation nasal spray 2 Sprays by Both Nostrils route daily as needed for Rhinitis. aspirin 81 mg chewable tablet Take 1 Tab by mouth daily. Qty: 30 Tab, Refills: 0      atorvastatin (LIPITOR) 40 mg tablet Take 1 Tab by mouth nightly. Qty: 30 Tab, Refills: 0      bumetanide (BUMEX) 1 mg tablet Take 1 Tab by mouth daily. Qty: 30 Tab, Refills: 0      lisinopril (PRINIVIL, ZESTRIL) 10 mg tablet Take 1 Tab by mouth daily. Qty: 30 Tab, Refills: 0      spironolactone (ALDACTONE) 25 mg tablet Take 1 Tab by mouth daily. Qty: 30 Tab, Refills: 0           Activity: Activity as tolerated  Diet: Cardiac Diet and Low fat, Low cholesterol  Wound Care: None needed    Follow-up with your PCP and cardiology in a few weeks.   Follow-up tests/labs - none    Signed:  Brenda Brown MD  9/19/2019  11:26 AM

## 2019-09-19 NOTE — PROGRESS NOTES
Cardiology Progress Note                             [address], [place], [address] [facility]                                 Phone [phone]; Fax [phone]        2019 9:22 AM     Admit Date:           2019  Admit Diagnosis:  Chest pain [R07.9]  :          1960   MRN:          [id number]   ASSESSMENT/RECOMMENDATION:   Chest pain: serial troponin neg, nuclear stress test this AM- reviewed possible outcomes and rationale for test. He is agreeable to proceed. He understands he would need a cardiac cath is test is abnormal.   -Chest pain could be secondary to ongoing cocaine use. CAD-status post CABG 2018  -asa/statin    Acute on chronic systolic heart failure/Ischemic cardiomyopathy-EF 26 to 30%  -Currently not on beta-blocker secondary ongoing cocaine use  -echo pending   -Needs AICD but patient has not followed up with cardiology/has not been compliant with meds/has not stopped using cocaine  - bumex/aldactone/ACEI    Cocaine use: continues to use    Noncompliance to medications          CARDIOLOGY ATTENDING  Patient was sent home before I could see patient. Spoke to nurses / NP about what happened today. Apparently patient became upset waiting for his stress test. He became aggressive and walked out of his room and apparently at one point raised his fist.  He refused his stress test and was sent home. Stress test was not completed. Surinder Miguel MD, [facility]                     No intake/output data recorded. Last 3 Recorded Weights in this Encounter    19 1003 19 0502   Weight: 178 lb (80.7 kg) 184 lb 9.6 oz (83.7 kg)          1901 -  0700  In: 200 [P.O.:200]  Out: -     SUBJECTIVE               Viral Nose denies palpitations, irregular heart beat, SOB, chest pain or LE edema. No syncope.    No lightheadedness or dizziness  Agreeable to stress test         Current Facility-Administered Medications   Medication Dose Route Frequency    sodium chloride (NS) flush 5-40 mL  5-40 mL IntraVENous Q8H    sodium chloride (NS) flush 5-40 mL  5-40 mL IntraVENous PRN    acetaminophen (TYLENOL) tablet 650 mg  650 mg Oral Q4H PRN    naloxone (NARCAN) injection 0.4 mg  0.4 mg IntraVENous PRN    ondansetron (ZOFRAN) injection 4 mg  4 mg IntraVENous Q4H PRN    enoxaparin (LOVENOX) injection 40 mg  40 mg SubCUTAneous Q24H    morphine injection 2 mg  2 mg IntraVENous Q4H PRN    aspirin chewable tablet 81 mg  81 mg Oral DAILY    atorvastatin (LIPITOR) tablet 40 mg  40 mg Oral QHS    bumetanide (BUMEX) tablet 1 mg  1 mg Oral DAILY    lisinopril (PRINIVIL, ZESTRIL) tablet 10 mg  10 mg Oral DAILY    spironolactone (ALDACTONE) tablet 25 mg  25 mg Oral DAILY      OBJECTIVE               Intake/Output Summary (Last 24 hours) at 9/19/2019 2618  Last data filed at 9/18/2019 1700  Gross per 24 hour   Intake 200 ml   Output    Net 200 ml       Review of Systems - History obtained from the patient AS PER  HPI    Telemetry nSR-SA- 5 bts of NSVt    PHYSICAL EXAM        Visit Vitals  /78 (BP 1 Location: Right arm, BP Patient Position: At rest)   Pulse (!) 59   Temp 97.7 °F (36.5 °C)   Resp 16   Ht 5' 6\" (1.676 m)   Wt 184 lb 9.6 oz (83.7 kg)   SpO2 96%   BMI 29.80 kg/m²       Gen: Well-developed, well-nourished, in no acute distress  alert and oriented x 3  HEENT:  Pink conjunctivae, Hearing grossly normal.No scleral icterus or conjunctival, moist mucous membranes  Neck: Supple,No JVD  Resp: No accessory muscle use, Clear breath sounds, No rales or rhonchi  Card: Regular Rate,Rythm,No murmurs, rubs or gallop. No thrills. GI:          soft, non-tender   MSK: No cyanosis or clubbing, good capillary refill  Skin: No rashes or ulcers, no bruising.  Midline sternal scar unremarkable   Neuro:  Cranial nerves are grossly intact, moving all four extremities, no focal deficit, follows commands appropriately  Psych:  Good insight, oriented to person, place and time, alert, Nml Affect  LE: No edema       DATA REVIEW            Laboratory and Imaging have been reviewed by me and are notable for  Recent Labs     09/18/19  1803 09/18/19  1226 09/18/19  1017   TROIQ <0.05 <0.05 <0.05     Recent Labs     09/18/19  1017      K 4.7   CO2 28   BUN 10   CREA 1.46*   GLU 90   WBC 5.1   HGB 12.6   HCT 40.5   PLT 2001 [facility], NP

## 2019-09-19 NOTE — DISCHARGE INSTRUCTIONS
Patient Discharge Instructions    Alva German / [id number] : 1960    Admitted 2019 Discharged: 2019     Primary Diagnoses  Problem List as of 2019 Date Reviewed: 3/10/2019           Chest pain   Systolic CHF, chronic (HCC)   CHF (congestive heart failure) ([facility] [address]   CHF exacerbation (HCC)   Medically noncompliant   S/P CABG x 3   Elevated serum creatinine   CAD (coronary artery disease)   NSTEMI (non-ST elevated myocardial infarction) (HCC)   Cocaine use (Chronic)   Tobacco use (Chronic)   HTN (hypertension) (Chronic)          Take Home Medications     · It is important that you take the medication exactly as they are prescribed. · Keep your medication in the bottles provided by the pharmacist and keep a list of the medication names, dosages, and times to be taken in your wallet. · Do not take other medications without consulting your doctor. What to do at Home    Recommended diet: Cardiac Diet and Low fat, Low cholesterol    Recommended activity: Activity as tolerated    If you experience worse pain, please follow up with your PCP or go to ER. Follow-up with your PCP in a few weeks        Information obtained by :  I understand that if any problems occur once I am at home I am to contact my physician. I understand and acknowledge receipt of the instructions indicated above.                                                                                                                                            Physician's or R.N.'s Signature                                                                  Date/Time                                                                                                                                              Patient or Representative Signature                                                          Date/Time

## 2019-09-19 NOTE — PROGRESS NOTES
[facility]    Medical Progress Note      NAME: Westley Bosworth   :  1960  MRM:  [id number]    Date/Time: 2019  11:19 AM          Assessment and Plan:     Chest pain / CAD (coronary artery disease) S/P CABG x 3 / HTN (hypertension) - Pain POA, presumed partly due to cocaine use. Observe in hospital. Serial troponin unremarkable. No BB. Resumed Asprin, lisinopril, Lipitor. Consult cardiology. He became violent and belligerent during stress test.  He then left the floor AMA.     Cocaine use - POA, still using. UDS+. Counseled on cessation     Systolic CHF, chronic - Euvolemic on admit. Resume Bumex. Spironolactone and Lisinopril. Monitor renal function          Subjective:     Chief Complaint:  Angry, denies chest pain, walking away AMA    ROS:  (bold if positive, if negative)    Tolerating PT  Tolerating Diet        Objective:     Last 24hrs VS reviewed since prior progress note.  Most recent are:    Visit Vitals  /78 (BP 1 Location: Right arm, BP Patient Position: At rest)   Pulse (!) 59   Temp 97.7 °F (36.5 °C)   Resp 16   Ht 5' 6\" (1.676 m)   Wt 83.7 kg (184 lb 9.6 oz)   SpO2 96%   BMI 29.80 kg/m²     SpO2 Readings from Last 6 Encounters:   19 96%   19 99%   18 92%   18 95%   18 99%   18 95%            Intake/Output Summary (Last 24 hours) at 2019 1119  Last data filed at 2019 1700  Gross per 24 hour   Intake 200 ml   Output    Net 200 ml        Physical Exam:  Declined    Gen:  Well-developed, well-nourished, in no acute distress  HEENT:  Pink conjunctivae, PERRL, hearing intact to voice,   Neck:    Resp:  No accessory muscle use,  Card:    Abd:    Lymph:   Musc:  No cyanosis or clubbing  Skin:  No rashes or ulcers, skin turgor is good  Neuro:  Cranial nerves are grossly intact, no focal motor weakness, does not follow commands appropriately  Psych:  Poor insight, oriented to person, place and time, alert, angry    Telemetry reviewed: normal sinus rhythm  __________________________________________________________________  Medications Reviewed: (see below)  Medications:     Current Facility-Administered Medications   Medication Dose Route Frequency    sodium chloride (NS) flush 5-40 mL  5-40 mL IntraVENous Q8H    sodium chloride (NS) flush 5-40 mL  5-40 mL IntraVENous PRN    acetaminophen (TYLENOL) tablet 650 mg  650 mg Oral Q4H PRN    naloxone (NARCAN) injection 0.4 mg  0.4 mg IntraVENous PRN    ondansetron (ZOFRAN) injection 4 mg  4 mg IntraVENous Q4H PRN    enoxaparin (LOVENOX) injection 40 mg  40 mg SubCUTAneous Q24H    morphine injection 2 mg  2 mg IntraVENous Q4H PRN    aspirin chewable tablet 81 mg  81 mg Oral DAILY    atorvastatin (LIPITOR) tablet 40 mg  40 mg Oral QHS    bumetanide (BUMEX) tablet 1 mg  1 mg Oral DAILY    lisinopril (PRINIVIL, ZESTRIL) tablet 10 mg  10 mg Oral DAILY    spironolactone (ALDACTONE) tablet 25 mg  25 mg Oral DAILY     Facility-Administered Medications Ordered in Other Encounters   Medication Dose Route Frequency    regadenoson (LEXISCAN) injection 0.4 mg  0.4 mg IntraVENous RAD ONCE        Lab Data Reviewed: (see below)  Lab Review:     Recent Labs     09/18/19  1017   WBC 5.1   HGB 12.6   HCT 40.5        Recent Labs     09/18/19  1017      K 4.7   *   CO2 28   GLU 90   BUN 10   CREA 1.46*   CA 8.5   ALB 3.0*   TBILI 0.5   SGOT 24   ALT 14     Lab Results   Component Value Date/Time    Glucose (POC) 156 (H) 05/12/2018 11:28 AM    Glucose (POC) 109 (H) 05/12/2018 07:39 AM    Glucose (POC) 111 (H) 05/11/2018 10:19 PM    Glucose (POC) 132 (H) 05/11/2018 05:34 PM    Glucose (POC) 127 (H) 05/11/2018 12:42 PM     No results for input(s): PH, PCO2, PO2, HCO3, FIO2 in the last 72 hours. No results for input(s): INR in the last 72 hours.     No lab exists for component: INREXT  All Micro Results     Procedure Component Value Units Date/Time    URINE CULTURE HOLD SAMPLE [[id number]] Collected:  09/18/19 1119    Order Status:  Completed Specimen:  Serum Updated:  09/18/19 1132     Urine culture hold       URINE ON HOLD IN MICROBIOLOGY DEPT FOR 3 DAYS. IF UNPRESERVED URINE IS SUBMITTED, IT CANNOT BE USED FOR ADDITIONAL TESTING AFTER 24 HRS, RECOLLECTION WILL BE REQUIRED. Other pertinent lab: none    Total time spent with patient: 30 Minutes I reviewed chart, notes, data and current medications in the medical record. I have examined and treated the patient at bedside during this period.                  Care Plan discussed with: Patient, Care Manager, Nursing Staff, Consultant/Specialist and >50% of time spent in counseling and coordination of care    Discussed:  Care Plan and D/C Planning    Prophylaxis:  H2B/PPI    Disposition:  Home w/Family AMA           ___________________________________________________    Attending Physician: Aamir Fuller MD

## 2019-09-19 NOTE — PROGRESS NOTES
1110: Pt returned from stress lab without completing procedure. According to the transporter, pt got very angry and aggressive. PT refusing procedure and requesting to leave. Notified Dr. Emmett Oro, pt will leave AMA. 1120: PT left AMA, form signed.

## 2020-07-15 ENCOUNTER — HOSPITAL ENCOUNTER (EMERGENCY)
Age: 60
Discharge: HOME | End: 2020-07-16
Attending: EMERGENCY MEDICINE
Payer: COMMERCIAL

## 2020-07-15 VITALS
HEIGHT: 66 IN | HEART RATE: 88 BPM | RESPIRATION RATE: 88 BRPM | TEMPERATURE: 98.1 F | BODY MASS INDEX: 30.44 KG/M2 | WEIGHT: 189.38 LBS | DIASTOLIC BLOOD PRESSURE: 107 MMHG | OXYGEN SATURATION: 95 % | SYSTOLIC BLOOD PRESSURE: 137 MMHG

## 2020-07-15 DIAGNOSIS — T16.2XXA FB EAR, LEFT, INITIAL ENCOUNTER: Primary | ICD-10-CM

## 2020-07-15 PROCEDURE — 99283 EMERGENCY DEPT VISIT LOW MDM: CPT

## 2020-07-15 PROCEDURE — 75810000145 HC RMVL FB EAR W/O GEN ANES

## 2020-07-15 PROCEDURE — 74011000250 HC RX REV CODE- 250: Performed by: EMERGENCY MEDICINE

## 2020-07-15 RX ORDER — LIDOCAINE HYDROCHLORIDE 20 MG/ML
15 SOLUTION OROPHARYNGEAL
Status: COMPLETED | OUTPATIENT
Start: 2020-07-15 | End: 2020-07-15

## 2020-07-15 RX ADMIN — LIDOCAINE HYDROCHLORIDE 15 ML: 20 SOLUTION ORAL; TOPICAL at 23:42

## 2020-07-16 PROCEDURE — 75810000145 HC RMVL FB EAR W/O GEN ANES

## 2020-07-16 NOTE — ED NOTES
The patient was discharged home by Dr. Cee Reynolds and Fely Francis rn in stable condition. The patient is alert and oriented, is in no respiratory distress and has vital signs within normal limits . The patient's diagnosis, condition and treatment were explained to patient. The patient expressed understanding. No prescriptions given to pt. No work/school note given to pt. A discharge plan has been developed. A  was not involved in the process. Aftercare instructions were given to the patient. Pt will transport self home.

## 2020-07-16 NOTE — ED PROVIDER NOTES
Please note that this dictation was completed with [employer], the computer voice recognition software.  Quite often unanticipated grammatical, syntax, homophones, and other interpretive errors are inadvertently transcribed by the computer software.  Please disregard these errors.  Please excuse any errors that have escaped final proofreading. 58-year-old male past medical history markable for MI x4, heart failure without reduced ejection fraction, coronary artery disease, history of cocaine abuse, history of CABG three-vessel 5/8/2018, medication noncompliance presents the ER complaining of \"was standing on my porch next of a light and a mild fluid in my left ear.   This occurred approximately 1 hour ago, been doing everything I can try and get it out cannot seem to get it out.'    pt denies HA, vison changes, diff swallowing, CP, SOB, Abd pain, F/Ch, N/V, D/Cons or other current systemic complaints    Social/ PSH reviewed in EMR    EMR Chart Reviewed           Past Medical History:   Diagnosis Date    CAD (coronary artery disease)     MI x4    [facility] [address]     History of [facility] abuse ([facility] [address]     Hx of CABG     CABG 3 vessel on 5/8/18 -  LIMA to LAD, SVG to Diag with Y graft to OM     Ischemic cardiomyopathy     Non-compliance        Past Surgical History:   Procedure Laterality Date    CARDIAC SURG PROCEDURE UNLIST  2018    triple bipass    HX ORTHOPAEDIC      left hip surgery         Family History:   Problem Relation Age of Onset    Diabetes Mother        Social History     Socioeconomic History    Marital status:      Spouse name: Not on file    Number of children: Not on file    Years of education: Not on file    Highest education level: Not on file   Occupational History    Not on file   Social Needs    Financial resource strain: Not on file    Food insecurity     Worry: Not on file     Inability: Not on file   [employer] needs Medical: Not on file     Non-medical: Not on file   Tobacco Use    Smoking status: Current Some Day Smoker     Last attempt to quit: 5/3/2018     Years since quittin.2    Smokeless tobacco: Never Used    Tobacco comment: last use this past wednesday   Substance and Sexual Activity    Alcohol use: No    Drug use: Yes     Types: Cocaine     Comment: last use approximately 3 weeks    Sexual activity: Yes   Lifestyle    Physical activity     Days per week: Not on file     Minutes per session: Not on file    Stress: Not on file   Relationships    Social connections     Talks on phone: Not on file     Gets together: Not on file     Attends Restorationist service: Not on file     Active member of club or organization: Not on file     Attends meetings of clubs or organizations: Not on file     Relationship status: Not on file    Intimate partner violence     Fear of current or ex partner: Not on file     Emotionally abused: Not on file     Physically abused: Not on file     Forced sexual activity: Not on file   Other Topics Concern    Not on file   Social History Narrative    Not on file         ALLERGIES: Patient has no known allergies. Review of Systems   Constitutional: Negative for chills and fever. HENT: Positive for ear pain. Negative for dental problem, ear discharge, facial swelling, hearing loss, nosebleeds, trouble swallowing and voice change. Genitourinary: Negative for flank pain. Neurological: Negative for speech difficulty, numbness and headaches. All other systems reviewed and are negative. Vitals:    07/15/20 2335   BP: (!) 137/107   Pulse: 88   Resp: (!) 88   SpO2: 95%   Weight: 85.9 kg (189 lb 6 oz)   Height: 5' 6\" (1.676 m)            Physical Exam  Vitals signs and nursing note reviewed. Constitutional:       General: He is not in acute distress. Appearance: Normal appearance. He is well-developed.       Comments: NAD, AxOx4, speaking in complete sentences       HENT: Head: Normocephalic and atraumatic. Comments: Noted moth in L ear; TM intact/ no blood; Left Ear: Tympanic membrane and external ear normal. There is no impacted cerumen. Mouth/Throat:      Pharynx: No oropharyngeal exudate. Eyes:      General:         Right eye: No discharge. Left eye: No discharge. Extraocular Movements: Extraocular movements intact. Conjunctiva/sclera: Conjunctivae normal.      Pupils: Pupils are equal, round, and reactive to light. Neck:      Musculoskeletal: Normal range of motion and neck supple. Cardiovascular:      Rate and Rhythm: Normal rate and regular rhythm. Pulses: Normal pulses. Heart sounds: Normal heart sounds. No murmur. No friction rub. No gallop. Pulmonary:      Effort: Pulmonary effort is normal. No respiratory distress. Breath sounds: Normal breath sounds. No wheezing or rales. Chest:      Chest wall: No tenderness. Abdominal:      General: Bowel sounds are normal. There is no distension. Palpations: Abdomen is soft. There is no mass. Tenderness: There is no abdominal tenderness. There is no guarding or rebound. Musculoskeletal: Normal range of motion. General: No swelling, tenderness, deformity or signs of injury. Lymphadenopathy:      Cervical: No cervical adenopathy. Skin:     General: Skin is warm and dry. Capillary Refill: Capillary refill takes less than 2 seconds. Findings: No bruising, erythema or rash. Neurological:      General: No focal deficit present. Mental Status: He is alert and oriented to person, place, and time. Cranial Nerves: No cranial nerve deficit. Sensory: No sensory deficit. Motor: No weakness.       Coordination: Coordination normal.      Gait: Gait normal.      Deep Tendon Reflexes: Reflexes normal.          MDM       Foreign Body Removal    Date/Time: 7/16/2020 12:03 AM  Performed by: Kaiden Huynh MD  Authorized by: Kaiden Huynh MD     Consent:     Consent obtained:  Verbal    Consent given by:  Patient    Risks discussed:  Bleeding, incomplete removal, nerve damage, infection, pain, poor cosmetic result and worsening of condition    Alternatives discussed:  No treatment  Location:     Location:  Ear    Ear location:  L ear    Depth: in ear. Tendon involvement:  None  Pre-procedure details:     Imaging:  None    Neurovascular status: intact    Anesthesia (see MAR for exact dosages): Anesthesia method:  Topical application    Topical anesthetic:  Lidocaine gel  Procedure type:     Procedure complexity:  Simple  Procedure details:     Localization method:  Visualized    Removal mechanism:  Irrigation    Foreign bodies recovered:  1    Description:  Intact moth    Intact foreign body removal: yes    Post-procedure details:     Neurovascular status: intact      Confirmation:  No additional foreign bodies on visualization          12:03 AM  Ronnie Perking  results have been reviewed with him. He has been counseled regarding his diagnosis. He verbally conveys understanding and agreement of the signs, symptoms, diagnosis, treatment and prognosis and additionally agrees to Call/ Arrange follow up as recommended with Dr. Myron Rincon MD in 24 - 48 hours. He also agrees with the care-plan and conveys that all of his questions have been answered. I have also put together some discharge instructions for him that include: 1) educational information regarding their diagnosis, 2) how to care for their diagnosis at home, as well a 3) list of reasons why they would want to return to the ED prior to their follow-up appointment, should their condition change or for concerns.

## 2020-07-16 NOTE — DISCHARGE INSTRUCTIONS
Patient Education        Object in the Ear: Care Instructions  Your Care Instructions  An insect or an object in the ear usually does not damage the ear. But some objects in the ear can cause problems. For example, dry food can expand in the ear, and a battery can release chemicals. Objects that have been in the ear for longer than 24 hours are harder to remove and can cause pain, infection, or bleeding. If an object is pushed hard into the ear, it may damage the eardrum. Your doctor probably removed the object from your ear during your exam. Your ear may feel tender for a few days. Follow-up care is a key part of your treatment and safety. Be sure to make and go to all appointments, and call your doctor if you are having problems. It's also a good idea to know your test results and keep a list of the medicines you take. How can you care for yourself at home? · Your doctor may have used medicine to numb your ear. When it wears off, your ear pain may return. Take an over-the-counter pain medicine, such as acetaminophen (Tylenol), ibuprofen (Advil, Motrin), or naproxen (Aleve). Read and follow all instructions on the label. · Do not take two or more pain medicines at the same time unless the doctor told you to. Many pain medicines have acetaminophen, which is Tylenol. Too much acetaminophen (Tylenol) can be harmful. · If your doctor prescribed antibiotics, take them as directed. Do not stop taking them just because you feel better. You need to take the full course of antibiotics. · Your doctor may prescribe eardrops. To put in eardrops:  ? First warm the drops by rolling the container in your hands or placing it in your armpit for a few minutes. Putting cold eardrops in your ear can cause ear pain and dizziness. ? Lie down, with your ear facing up. ? Place the prescribed amount of drops on the inside wall of the ear canal. Gently wiggle the outer ear to help the drops move down into the ear.   ? It's important to keep the liquid in the ear canal for 3 to 5 minutes. · You can put heat on the ear to relieve pain. Use a warm washcloth or a heating pad set on low. · Do not put cotton swabs, benjy pins, or other objects in the ear. Do not put any liquids in the ear, unless your doctor directs you to. When should you call for help? Call your doctor now or seek immediate medical care if:  · You have symptoms of an ear infection, such as:  ? You have new or worse pain, swelling, warmth, or redness around or behind your ear.  ? You have a fever with a stiff neck or severe headache. Watch closely for changes in your health, and be sure to contact your doctor if:  · You are not getting better after 2 days (48 hours). · You have new or worse symptoms. Where can you learn more? Go to http://megan-gonzalo.info/  Enter C171 in the search box to learn more about \"Object in the Ear: Care Instructions. \"  Current as of: June 26, 2019               Content Version: 12.5  © 5786-6355 Healthwise, Incorporated. Care instructions adapted under license by [employer] (which disclaims liability or warranty for this information). If you have questions about a medical condition or this instruction, always ask your healthcare professional. [address] any warranty or liability for your use of this information.

## 2020-07-27 ENCOUNTER — HOSPITAL ENCOUNTER (EMERGENCY)
Age: 60
Discharge: HOME | End: 2020-07-27
Attending: EMERGENCY MEDICINE | Admitting: EMERGENCY MEDICINE
Payer: COMMERCIAL

## 2020-07-27 ENCOUNTER — APPOINTMENT (OUTPATIENT)
Dept: GENERAL RADIOLOGY | Age: 60
End: 2020-07-27
Attending: EMERGENCY MEDICINE
Payer: COMMERCIAL

## 2020-07-27 VITALS
SYSTOLIC BLOOD PRESSURE: 152 MMHG | HEIGHT: 66 IN | WEIGHT: 187 LBS | DIASTOLIC BLOOD PRESSURE: 92 MMHG | RESPIRATION RATE: 22 BRPM | OXYGEN SATURATION: 96 % | BODY MASS INDEX: 30.05 KG/M2 | HEART RATE: 76 BPM | TEMPERATURE: 97.9 F

## 2020-07-27 DIAGNOSIS — R07.89 OTHER CHEST PAIN: Primary | ICD-10-CM

## 2020-07-27 DIAGNOSIS — F14.10 COCAINE ABUSE (HCC): ICD-10-CM

## 2020-07-27 LAB
ALBUMIN SERPL-MCNC: 3.1 G/DL (ref 3.5–5)
ALBUMIN/GLOB SERPL: 0.7 {RATIO} (ref 1.1–2.2)
ALP SERPL-CCNC: 70 U/L (ref 45–117)
ALT SERPL-CCNC: 18 U/L (ref 12–78)
AMPHET UR QL SCN: NEGATIVE
ANION GAP SERPL CALC-SCNC: 3 MMOL/L (ref 5–15)
AST SERPL-CCNC: 18 U/L (ref 15–37)
BARBITURATES UR QL SCN: NEGATIVE
BASOPHILS # BLD: 0 K/UL (ref 0–0.1)
BASOPHILS NFR BLD: 1 % (ref 0–1)
BENZODIAZ UR QL: NEGATIVE
BILIRUB SERPL-MCNC: 0.6 MG/DL (ref 0.2–1)
BNP SERPL-MCNC: 4286 PG/ML
BUN SERPL-MCNC: 9 MG/DL (ref 6–20)
BUN/CREAT SERPL: 7 (ref 12–20)
CALCIUM SERPL-MCNC: 8.3 MG/DL (ref 8.5–10.1)
CANNABINOIDS UR QL SCN: NEGATIVE
CHLORIDE SERPL-SCNC: 113 MMOL/L (ref 97–108)
CO2 SERPL-SCNC: 24 MMOL/L (ref 21–32)
COCAINE UR QL SCN: POSITIVE
COMMENT, HOLDF: NORMAL
CREAT SERPL-MCNC: 1.36 MG/DL (ref 0.7–1.3)
DIFFERENTIAL METHOD BLD: ABNORMAL
DRUG SCRN COMMENT,DRGCM: ABNORMAL
EOSINOPHIL # BLD: 0.4 K/UL (ref 0–0.4)
EOSINOPHIL NFR BLD: 7 % (ref 0–7)
ERYTHROCYTE [DISTWIDTH] IN BLOOD BY AUTOMATED COUNT: 14 % (ref 11.5–14.5)
GLOBULIN SER CALC-MCNC: 4.5 G/DL (ref 2–4)
GLUCOSE SERPL-MCNC: 87 MG/DL (ref 65–100)
HCT VFR BLD AUTO: 43.1 % (ref 36.6–50.3)
HGB BLD-MCNC: 13.5 G/DL (ref 12.1–17)
IMM GRANULOCYTES # BLD AUTO: 0 K/UL (ref 0–0.04)
IMM GRANULOCYTES NFR BLD AUTO: 1 % (ref 0–0.5)
LIPASE SERPL-CCNC: 145 U/L (ref 73–393)
LYMPHOCYTES # BLD: 0.8 K/UL (ref 0.8–3.5)
LYMPHOCYTES NFR BLD: 15 % (ref 12–49)
MCH RBC QN AUTO: 29.2 PG (ref 26–34)
MCHC RBC AUTO-ENTMCNC: 31.3 G/DL (ref 30–36.5)
MCV RBC AUTO: 93.3 FL (ref 80–99)
METHADONE UR QL: NEGATIVE
MONOCYTES # BLD: 0.4 K/UL (ref 0–1)
MONOCYTES NFR BLD: 7 % (ref 5–13)
NEUTS SEG # BLD: 3.9 K/UL (ref 1.8–8)
NEUTS SEG NFR BLD: 69 % (ref 32–75)
NRBC # BLD: 0 K/UL (ref 0–0.01)
NRBC BLD-RTO: 0 PER 100 WBC
OPIATES UR QL: NEGATIVE
PCP UR QL: NEGATIVE
PLATELET # BLD AUTO: 284 K/UL (ref 150–400)
PMV BLD AUTO: 9.7 FL (ref 8.9–12.9)
POTASSIUM SERPL-SCNC: 4.9 MMOL/L (ref 3.5–5.1)
PROT SERPL-MCNC: 7.6 G/DL (ref 6.4–8.2)
RBC # BLD AUTO: 4.62 M/UL (ref 4.1–5.7)
SAMPLES BEING HELD,HOLD: NORMAL
SODIUM SERPL-SCNC: 140 MMOL/L (ref 136–145)
TROPONIN I SERPL-MCNC: <0.05 NG/ML
TROPONIN I SERPL-MCNC: <0.05 NG/ML
UR CULT HOLD, URHOLD: NORMAL
WBC # BLD AUTO: 5.6 K/UL (ref 4.1–11.1)

## 2020-07-27 PROCEDURE — 36415 COLL VENOUS BLD VENIPUNCTURE: CPT

## 2020-07-27 PROCEDURE — 80053 COMPREHEN METABOLIC PANEL: CPT

## 2020-07-27 PROCEDURE — 84484 ASSAY OF TROPONIN QUANT: CPT

## 2020-07-27 PROCEDURE — 80307 DRUG TEST PRSMV CHEM ANLYZR: CPT

## 2020-07-27 PROCEDURE — 71045 X-RAY EXAM CHEST 1 VIEW: CPT

## 2020-07-27 PROCEDURE — 83690 ASSAY OF LIPASE: CPT

## 2020-07-27 PROCEDURE — 93005 ELECTROCARDIOGRAM TRACING: CPT

## 2020-07-27 PROCEDURE — 99285 EMERGENCY DEPT VISIT HI MDM: CPT

## 2020-07-27 PROCEDURE — 83880 ASSAY OF NATRIURETIC PEPTIDE: CPT

## 2020-07-27 PROCEDURE — 85025 COMPLETE CBC W/AUTO DIFF WBC: CPT

## 2020-07-27 RX ORDER — BUMETANIDE 1 MG/1
1 TABLET ORAL
Status: DISCONTINUED | OUTPATIENT
Start: 2020-07-27 | End: 2020-07-27

## 2020-07-27 RX ORDER — LISINOPRIL 5 MG/1
10 TABLET ORAL
Status: DISCONTINUED | OUTPATIENT
Start: 2020-07-27 | End: 2020-07-27

## 2020-07-27 RX ORDER — SPIRONOLACTONE 25 MG/1
25 TABLET ORAL
Status: DISCONTINUED | OUTPATIENT
Start: 2020-07-27 | End: 2020-07-27

## 2020-07-27 NOTE — DISCHARGE INSTRUCTIONS
Patient Education        Chest Pain: Care Instructions  Your Care Instructions     There are many things that can cause chest pain. Some are not serious and will get better on their own in a few days. But some kinds of chest pain need more testing and treatment. Your doctor may have recommended a follow-up visit in the next 8 to 12 hours. If you are not getting better, you may need more tests or treatment. Even though your doctor has released you, you still need to watch for any problems. The doctor carefully checked you, but sometimes problems can develop later. If you have new symptoms or if your symptoms do not get better, get medical care right away. If you have worse or different chest pain or pressure that lasts more than 5 minutes or you passed out (lost consciousness), [id number] or seek other emergency help right away. A medical visit is only one step in your treatment. Even if you feel better, you still need to do what your doctor recommends, such as going to all suggested follow-up appointments and taking medicines exactly as directed. This will help you recover and help prevent future problems. How can you care for yourself at home? · Rest until you feel better. · Take your medicine exactly as prescribed. Call your doctor if you think you are having a problem with your medicine. · Do not drive after taking a prescription pain medicine. When should you call for help? [id number]:   · You passed out (lost consciousness). · You have severe difficulty breathing. · You have symptoms of a heart attack. These may include:  ? Chest pain or pressure, or a strange feeling in your chest.  ? Sweating. ? Shortness of breath. ? Nausea or vomiting. ? Pain, pressure, or a strange feeling in your back, neck, jaw, or upper belly or in one or both shoulders or arms. ? Lightheadedness or sudden weakness. ? A fast or irregular heartbeat.   After you call 911, the  may tell you to chew 1 adult-strength or 2 to 4 low-dose aspirin. Wait for an ambulance. Do not try to drive yourself. Call your doctor today if:   · You have any trouble breathing. · Your chest pain gets worse. · You are dizzy or lightheaded, or you feel like you may faint. · You are not getting better as expected. · You are having new or different chest pain. Where can you learn more? Go to http://www.lehman.com/  Enter A120 in the search box to learn more about \"Chest Pain: Care Instructions. \"  Current as of: June 26, 2019               Content Version: 12.5  © 1665-8175 Healthwise, Incorporated. Care instructions adapted under license by [employer] (which disclaims liability or warranty for this information). If you have questions about a medical condition or this instruction, always ask your healthcare professional. [address] any warranty or liability for your use of this information.

## 2020-07-27 NOTE — ED NOTES
Verbal shift change report given to [address] [facility] [address] (oncoming nurse) by Merrickruddy Ralph RN (offgoing nurse). Report included the following information SBAR, Kardex, ED Summary, [facility] and Recent Results.

## 2020-07-27 NOTE — ED PROVIDER NOTES
Please note that this dictation was completed with [employer], the computer voice recognition software.  Quite often unanticipated grammatical, syntax, homophones, and other interpretive errors are inadvertently transcribed by the computer software.  Please disregard these errors.  Please excuse any errors that have escaped final proofreading. 43-year-old male past medical history remarkable for coronary artery disease for previous MIs congestive heart failure with reduced ejection fraction history of cocaine abuse history of CABG x3 with Dr. Consuelo Hathc, ischemic cardiomyopathy and history of noncompliance presents the ER by EMS complaining of \"ate some sausage gravy and biscuits this morning about 730 started pain my door developed acute onset of an achy chest pain substernal nonradiating associated with sweating. Patient states last approximately 30 to 40 minutes he called 911 was given some aspirin ride the ER currently no chest pain. Patient states he has had this episode before and it was when he had his three-vessel bypass surgery. Patient denies recent COVID exposures recent illnesses other current systemic complaints.  Pt admits 'havent taken any of my medicines this am other than my asa/ the other medicine';       pt denies HA, vison changes, diff swallowing, SOB, Abd pain, F/Ch, N/V, D/Cons or other current systemic complaints      Social/ PSH reviewed in EMR    EMR Chart Reviewed           Past Medical History:   Diagnosis Date    CAD (coronary artery disease)     MI x4    [facility] failure with [facility] fraction) [facility] [address]     History of cocaine abuse ([facility] [address]     Hx of CABG     CABG 3 vessel on 5/8/18 -  LIMA to LAD, SVG to Diag with Y graft to OM     Ischemic cardiomyopathy     Non-compliance        Past Surgical History:   Procedure Laterality Date    CARDIAC SURG PROCEDURE UNLIST  2018    triple bipass    HX ORTHOPAEDIC      left hip surgery         Family History:   Problem Relation Age of Onset    Diabetes Mother        Social History     Socioeconomic History    Marital status:      Spouse name: Not on file    Number of children: Not on file    Years of education: Not on file    Highest education level: Not on file   Occupational History    Not on file   Social Needs    Financial resource strain: Not on file    Food insecurity     Worry: Not on file     Inability: Not on file    Transportation needs     Medical: Not on file     Non-medical: Not on file   Tobacco Use    Smoking status: Current Some Day Smoker     Last attempt to quit: 5/3/2018     Years since quittin.2    Smokeless tobacco: Never Used    Tobacco comment: last use this past wednesday   Substance and Sexual Activity    Alcohol use: No    Drug use: Yes     Types: Cocaine     Comment: last use approximately 3 weeks    Sexual activity: Yes   Lifestyle    Physical activity     Days per week: Not on file     Minutes per session: Not on file    Stress: Not on file   Relationships    Social connections     Talks on phone: Not on file     Gets together: Not on file     Attends [employer] service: Not on file     Active member of club or organization: Not on file     Attends meetings of clubs or organizations: Not on file     Relationship status: Not on file    Intimate partner violence     Fear of current or ex partner: Not on file     Emotionally abused: Not on file     Physically abused: Not on file     Forced sexual activity: Not on file   Other Topics Concern    Not on file   Social History Narrative    Not on file         ALLERGIES: Patient has no known allergies. Review of Systems   Constitutional: Negative for appetite change, chills and fever. HENT: Negative for trouble swallowing and voice change. Eyes: Negative for visual disturbance. Respiratory: Positive for chest tightness. Cardiovascular: Positive for chest pain. Negative for palpitations and leg swelling.    Gastrointestinal: Negative for abdominal pain, diarrhea, nausea and vomiting. Genitourinary: Negative for dysuria. Musculoskeletal: Negative for back pain. Skin: Negative for rash. Neurological: Negative for facial asymmetry and speech difficulty. All other systems reviewed and are negative. Vitals:    07/27/20 1058 07/27/20 1104   BP: (!) 168/125    Pulse: 76    Resp: 22    Temp: 97.9 °F (36.6 °C)    SpO2: 99% 100%   Weight: 84.8 kg (187 lb)    Height: 5' 6\" (1.676 m)             Physical Exam  Vitals signs and nursing note reviewed. Constitutional:       General: He is not in acute distress. Appearance: Normal appearance. He is well-developed. He is not ill-appearing, toxic-appearing or diaphoretic. Comments: NAD, AxOx4, speaking in complete sentences      gcs = 15    'no pain right now'; HENT:      Head: Normocephalic and atraumatic. Right Ear: External ear normal.      Left Ear: External ear normal.      Mouth/Throat:      Pharynx: No oropharyngeal exudate. Eyes:      General:         Right eye: No discharge. Left eye: No discharge. Extraocular Movements: Extraocular movements intact. Conjunctiva/sclera: Conjunctivae normal.      Pupils: Pupils are equal, round, and reactive to light. Neck:      Musculoskeletal: Normal range of motion and neck supple. No muscular tenderness. Cardiovascular:      Rate and Rhythm: Normal rate and regular rhythm. Pulses: Normal pulses. Heart sounds: Normal heart sounds. No murmur. No friction rub. No gallop. Comments: Noted well healed midline sternotomy scar;   Pulmonary:      Effort: Pulmonary effort is normal. No respiratory distress. Breath sounds: Normal breath sounds. No wheezing or rales. Chest:      Chest wall: No tenderness. Abdominal:      General: Bowel sounds are normal. There is no distension. Palpations: Abdomen is soft. There is no mass. Tenderness: There is no abdominal tenderness.  There is no guarding or rebound. Genitourinary:     Comments: Pt denies urinary/ Testicular/ scrotal or penile  complaints  Musculoskeletal: Normal range of motion. General: No swelling, tenderness, deformity or signs of injury. Right lower leg: No edema. Left lower leg: No edema. Lymphadenopathy:      Cervical: No cervical adenopathy. Skin:     General: Skin is warm and dry. Capillary Refill: Capillary refill takes less than 2 seconds. Findings: No bruising, erythema or rash. Neurological:      General: No focal deficit present. Mental Status: He is alert and oriented to person, place, and time. Cranial Nerves: No cranial nerve deficit. Sensory: No sensory deficit. Motor: No weakness. Coordination: Coordination normal.      Gait: Gait normal.      Deep Tendon Reflexes: Reflexes normal.      Comments: pt has motor/ CV/ Sensation grossly intact to all extremities, R = L in strength;          MDM       Procedures    No chief complaint on file. 10:55 AM  The patients presenting problems have been discussed, and they are in agreement with the care plan formulated and outlined with them. I have encouraged them to ask questions as they arise throughout their visit. MEDICATIONS GIVEN:  Medications - No data to display    LABS REVIEWED:  Labs Reviewed - No data to display    RADIOLOGY RESULTS:  The following have been ordered and reviewed:  _____________________________________________________________________  _____________________________________________________________________    EKG interpretation:   Rhythm: normal sinus rhythm; and regular . Rate (approx.): 70; Axis: normal; P wave: normal; QRS interval: normal ; ST/T wave: normal; Negative acute significant segmental elevations/ unchanged compared to study dated 09/18/2019    PROCEDURES:        CONSULTATIONS:       PROGRESS NOTES:      DIAGNOSIS:    1. Other chest pain    2.  Cocaine abuse ([address] PLAN:  1-Chest Pain / neg ed evaluation - will have pt follow-up with Cardiology;         ED COURSE: The patients hospital course has been uncomplicated. 11:51 AM  Pt told of need to treat BP/ 'needs to stop doing cocaine'; repeat trop now; he agees w/ plans; 'I used to do cocaine' per pt;      1:41 PM  Sean Eduardo  results have been reviewed with him. He has been counseled regarding his diagnosis. He verbally conveys understanding and agreement of the signs, symptoms, diagnosis, treatment and prognosis and additionally agrees to Call/ Arrange follow up as recommended with Dr. Susanna Apley, MD in 24 - 48 hours. He also agrees with the care-plan and conveys that all of his questions have been answered. I have also put together some discharge instructions for him that include: 1) educational information regarding their diagnosis, 2) how to care for their diagnosis at home, as well a 3) list of reasons why they would want to return to the ED prior to their follow-up appointment, should their condition change or for concerns.

## 2020-07-29 LAB
ATRIAL RATE: 70 BPM
CALCULATED P AXIS, ECG09: 19 DEGREES
CALCULATED R AXIS, ECG10: 3 DEGREES
CALCULATED T AXIS, ECG11: 133 DEGREES
DIAGNOSIS, 93000: NORMAL
P-R INTERVAL, ECG05: 150 MS
Q-T INTERVAL, ECG07: 412 MS
QRS DURATION, ECG06: 98 MS
QTC CALCULATION (BEZET), ECG08: 444 MS
VENTRICULAR RATE, ECG03: 70 BPM

## 2021-01-20 NOTE — PROGRESS NOTES
Bedside shift change report given to Lazaro Bosworth (oncoming nurse) by Tona De Jesus RN (offgoing nurse). Report included the following information SBAR, Kardex, Intake/Output, MAR and Recent Results. NOTED--ESTEE

## 2021-08-03 PROBLEM — I50.9 CHF (CONGESTIVE HEART FAILURE) (HCC): Status: RESOLVED | Noted: 2019-03-10 | Resolved: 2021-08-03

## 2022-02-24 NOTE — TELEPHONE ENCOUNTER
Please Review. Protocol Failed or has no protocol. Requested Prescriptions   Pending Prescriptions Disp Refills    ATORVASTATIN 10 MG Oral Tab [Pharmacy Med Name: ATORVASTATIN 10MG TABLETS] 90 tablet 0     Sig: TAKE 1 TABLET(10 MG) BY MOUTH EVERY NIGHT        Cholesterol Medication Protocol Failed - 2/24/2022  5:44 AM        Failed - Last LDL < 130     Lab Results   Component Value Date     (H) 05/18/2021               Passed - ALT in past 12 months        Passed - LDL in past 12 months        Passed - Last ALT < 80       Lab Results   Component Value Date    ALT 28 05/18/2021             Passed - Appointment in past 12 or next 3 months          Signed Prescriptions Disp Refills    PAROXETINE 30 MG Oral Tab 90 tablet 1     Sig: TAKE 1 TABLET(30 MG) BY MOUTH EVERY MORNING        Psychiatric Non-Scheduled (Anti-Anxiety) Passed - 2/24/2022  5:44 AM        Passed - Appointment in last 6 or next 3 months            Recent Outpatient Visits              1 week ago Family history of congenital heart disease    [facility], [address] Samantha Garner MD    Telemedicine    2 months ago Pre-diabetes    Era Felty, MD    Office Visit    5 months ago Mouth lesion    [address] [place], [address] [facility] [address] [place], [place], [employer]    Office Visit    5 months ago Binge eating disorder    Era Felty, MD    Office Visit    6 months ago Submental lymphadenopathy    [facility] [employer], [address], Doug Redmond MD    Office Visit          Future Appointments         Provider Department Appt Notes    In 1 week David Moore MD [address], [address], [facility] [employer]  NON SX F/U    In 2 months Landa Soulier, MD Jori Hibbs, [place] Been vomiting at least 5-6 days  a week .  Acid reflux is out of control Pt was not home for [facility] visit earlier this week. Asked the [facility] nurse to try to seem him again if he is agreeable to being seen by them. She said they will see him over the weekend.

## 2022-03-18 PROBLEM — Z72.0 TOBACCO USE: Status: ACTIVE | Noted: 2018-05-03

## 2022-03-18 PROBLEM — Z91.199 MEDICALLY NONCOMPLIANT: Status: ACTIVE | Noted: 2019-03-10

## 2022-03-19 PROBLEM — F14.90 COCAINE USE: Status: ACTIVE | Noted: 2018-05-03

## 2022-03-19 PROBLEM — I25.10 CAD (CORONARY ARTERY DISEASE): Status: ACTIVE | Noted: 2018-05-04

## 2022-03-19 PROBLEM — I50.22 SYSTOLIC CHF, CHRONIC (HCC): Status: ACTIVE | Noted: 2019-09-18

## 2022-03-19 PROBLEM — R79.89 ELEVATED SERUM CREATININE: Status: ACTIVE | Noted: 2018-05-04

## 2022-03-19 PROBLEM — Z95.1 S/P CABG X 3: Status: ACTIVE | Noted: 2018-05-21

## 2022-03-19 PROBLEM — I50.9 CHF EXACERBATION (HCC): Status: ACTIVE | Noted: 2019-03-10

## 2022-03-20 PROBLEM — I21.4 NSTEMI (NON-ST ELEVATED MYOCARDIAL INFARCTION) (HCC): Status: ACTIVE | Noted: 2018-05-03

## 2022-03-20 PROBLEM — R07.9 CHEST PAIN: Status: ACTIVE | Noted: 2019-09-18

## 2022-03-20 PROBLEM — I10 HTN (HYPERTENSION): Status: ACTIVE | Noted: 2018-05-03

## 2022-11-18 NOTE — PROGRESS NOTES
2000: Bedside shift change report given to  [address] (oncoming nurse) by Carola Lieberman (offgoing nurse). Report included the following information SBAR, Kardex, ED Summary, OR Summary, Procedure Summary, Intake/Output, MAR, Recent Results and Cardiac Rhythm Sinus [place] Resides. Pt currently sedated and intubated. HR 42. MAPs 90's, CVP 14 and PAP 50/30's. Titrating epi and dobutamine with Fiser at bedside. 2040: [facility] rep on phone for pt update. Notified about starting angiomax through [facility]; parameters given for ACT values between 160-180 (convert to ptt values)  2045: Angiomax started through [facility]. 2200: pre-op CHG bath done. 2225: Pt awake and not following commands. Trying to pull out ET tube and other lines. Versed and dilaudid given for sedation and pain. 0500: pt bathed with chlorhexidine. 0510: Pt daughter on the phone for update. Will notify wife of when to visit and if surgery will be happening today. 0800: Bedside shift change report given to Flor (oncoming nurse) by  Williams [address] (offgoing nurse). Report included the following information SBAR, Kardex, ED Summary, OR Summary, Procedure Summary, Intake/Output, MAR, Recent Results and Cardiac Rhythm Sinus [place] Resides. Problem: Falls - Risk of  Goal: *Absence of Falls  Document César Fall Risk and appropriate interventions in the flowsheet. Outcome: Progressing Towards Goal  Fall Risk Interventions:  Mobility Interventions: Patient to call before getting OOB  Medication Interventions: Assess postural VS orthostatic hypotension  Elimination Interventions: Toilet paper/wipes in reach  History of Falls Interventions: Bed/chair exit alarm  Problem: Pressure Injury - Risk of  Goal: *Prevention of pressure injury  Document Celestine Scale and appropriate interventions in the flowsheet.    Outcome: Progressing Towards Goal  Pressure Injury Interventions:  Sensory Interventions: Float heels  Moisture Interventions: Apply protective barrier, creams and emollients  Activity Interventions: Pressure redistribution bed/mattress(bed type)  Mobility Interventions: HOB 30 degrees or less  Nutrition Interventions: Document food/fluid/supplement intake  Friction and Shear Interventions: HOB 30 degrees or less  Problem: Unstable Angina/NSTEMI: Discharge Outcomes  Goal: *Stable cardiac rhythm  Outcome: Not Progressing Towards Goal  Sinus Santiago 44  Goal: *Lungs clear or at baseline  Outcome: Not Progressing Towards Goal  Intubated/sedated  Goal: *Anxiety reduced or absent  Outcome: Not Progressing Towards Goal  Propofol gtt; sedated  Problem: Non-Violent Restraints  Goal: *Removal from restraints as soon as assessed to be safe  Outcome: Progressing Towards Goal  Currently restrained and sedated 4 port laparoscopic cholecystectomy, critical view successfully achieved , no intraop complication

## 2022-12-22 NOTE — ED PROVIDER NOTES
HPI Comments: 61 y.o. male with past medical history significant for HTN and CHF who presents from home via EMS with chief complaint of CP. The pt reports that he started to have L sided CP that radiates to his LUE with SOB this morning at 0700 while driving. The pt reports that he used cocaine this morning prior to the onset of his CP. The pt has not been compliant with his blood pressure medications. The pt has a chronic cough. The pt denies prior cardiac intervention, hx of DM, fever, chills, and vomiting. There are no other acute medical concerns at this time. Social hx: former smoker, cocaine use  PCP: Tone Tiwari MD    Note written by Yulia Bello, as dictated by Marcela Huynh MD 1:01 PM      The history is provided by the patient. No  was used. No past medical history on file. No past surgical history on file. No family history on file. Social History     Social History    Marital status:      Spouse name: N/A    Number of children: N/A    Years of education: N/A     Occupational History    Not on file. Social History Main Topics    Smoking status: Not on file    Smokeless tobacco: Not on file    Alcohol use Not on file    Drug use: Not on file    Sexual activity: Not on file     Other Topics Concern    Not on file     Social History Narrative         ALLERGIES: Review of patient's allergies indicates not on file. Review of Systems   Constitutional: Negative. Negative for appetite change, fever and unexpected weight change. HENT: Negative. Negative for ear pain, hearing loss, nosebleeds, rhinorrhea, sore throat and trouble swallowing. Respiratory: Positive for cough (chronic). Negative for chest tightness and shortness of breath. Cardiovascular: Positive for chest pain. Negative for palpitations. Gastrointestinal: Negative. Negative for abdominal distention, abdominal pain, blood in stool and vomiting. Endocrine: Negative. Genitourinary: Negative for dysuria and hematuria. Musculoskeletal: Negative. Negative for back pain and myalgias. Skin: Negative. Negative for rash. Allergic/Immunologic: Negative. Neurological: Negative. Negative for dizziness, syncope, weakness and numbness. Hematological: Negative. Psychiatric/Behavioral: Negative. All other systems reviewed and are negative. There were no vitals filed for this visit. Physical Exam   Constitutional: He is oriented to person, place, and time. He appears well-developed and well-nourished. No distress. HENT:   Head: Normocephalic and atraumatic. Right Ear: External ear normal.   Left Ear: External ear normal.   Nose: Nose normal.   Mouth/Throat: Oropharynx is clear and moist.   Eyes: Conjunctivae and EOM are normal. Pupils are equal, round, and reactive to light. Neck: Normal range of motion. Neck supple. No JVD present. No thyromegaly present. Cardiovascular: Normal rate, regular rhythm, normal heart sounds and intact distal pulses. No murmur heard. Pulmonary/Chest: Effort normal and breath sounds normal. No respiratory distress. He has no wheezes. He has no rales. Abdominal: Soft. Bowel sounds are normal. He exhibits no distension. There is no tenderness. Musculoskeletal: Normal range of motion. He exhibits no edema. Neurological: He is alert and oriented to person, place, and time. No cranial nerve deficit. Skin: Skin is warm and dry. No rash noted. Psychiatric: He has a normal mood and affect. His behavior is normal. Thought content normal.   Nursing note and vitals reviewed. MDM  Number of Diagnoses or Management Options  NSTEMI (non-ST elevated myocardial infarction) [facility]):   Substance abuse:   Diagnosis management comments: Acute chest pain likely secondary to cocaine use with subsequent myocardial injury.  Will consult with cardiology and admit to the hospitalist.     Note written by Yulia Valverde, as dictated by Haritha Mei MD 1:02 PM      ED Course       Procedures  ED EKG interpretation:  Rhythm: normal sinus rhythm; and Irregular. Rate (approx.): 81; Axis: normal; ST/T wave: normal; LVH with repolarization abnormality; occasional PVC  Note written by Yulia Valverde, as dictated by Haritha Mei MD  12:55 PM    1:56 PM  The pt has a positive troponin of .0.37, pro-BNP of 6036, unremarkable chest xray, unremarkable CBC, and unremarkable chemistry. CONSULT NOTE:  1:58 PM Haritha Mei MD spoke with Dr. Priyanka Booth, Consult for Hospitalist.  Discussed available diagnostic tests and clinical findings. The provider will see and admit the pt. Total Treatment Time: 3:08

## 2023-05-12 RX ORDER — FLUTICASONE PROPIONATE 50 MCG
2 SPRAY, SUSPENSION (ML) NASAL DAILY PRN
COMMUNITY

## 2023-05-12 RX ORDER — SPIRONOLACTONE 25 MG/1
TABLET ORAL DAILY
COMMUNITY
Start: 2019-03-14

## 2023-05-12 RX ORDER — ASPIRIN 81 MG/1
TABLET, CHEWABLE ORAL DAILY
COMMUNITY
Start: 2019-03-14

## 2023-05-12 RX ORDER — BUMETANIDE 1 MG/1
TABLET ORAL DAILY
COMMUNITY
Start: 2019-03-14

## 2023-05-12 RX ORDER — ATORVASTATIN CALCIUM 40 MG/1
TABLET, FILM COATED ORAL
COMMUNITY
Start: 2019-03-13

## 2023-05-12 RX ORDER — LISINOPRIL 10 MG/1
TABLET ORAL DAILY
COMMUNITY
Start: 2019-03-14

## (undated) DEVICE — LUER-LOK 360°: Brand: CONNECTA, LUER-LOK

## (undated) DEVICE — SYS VSL HARV HEMOPRO2 VASOVIEW -- HARV SYS MINIMUM ORDER 5/EA

## (undated) DEVICE — MEDI-VAC NON-CONDUCTIVE SUCTION TUBING: Brand: CARDINAL HEALTH

## (undated) DEVICE — SYR 10ML LUER LOK 1/5ML GRAD --

## (undated) DEVICE — ANGIOGRAPHIC CATHETER: Brand: IMPULSE™

## (undated) DEVICE — 6 FOOT DISPOSABLE EXTENSION CABLE WITH SAFE CONNECT / SCREW-DOWN

## (undated) DEVICE — INTENDED FOR TISSUE SEPARATION, AND OTHER PROCEDURES THAT REQUIRE A SHARP SURGICAL BLADE TO PUNCTURE OR CUT.: Brand: BARD-PARKER ® CARBON RIB-BACK BLADES

## (undated) DEVICE — TISSEEL VHSD 10 ML KT 1504516] BAXTER BIOSURGERY]

## (undated) DEVICE — Device

## (undated) DEVICE — APPLICATOR BNDG 1MM ADH PREMIERPRO EXOFIN

## (undated) DEVICE — PLEDGET SURG W3/16XL0.25IN THK1.65MM PTFE OVL FELT FOR THE

## (undated) DEVICE — LUB SURG MEDC STRL 2OZ TUBE MC -- MEDICHOICE

## (undated) DEVICE — NEEDLE HYPO 18GA L1.5IN PNK S STL HUB POLYPR SHLD REG BVL

## (undated) DEVICE — STERILE POLYISOPRENE POWDER-FREE SURGICAL GLOVES WITH EMOLLIENT COATING: Brand: PROTEXIS

## (undated) DEVICE — TEMP PACING WIRE: Brand: MYO/WIRE

## (undated) DEVICE — FOGARTY - HYDRAGRIP SURGICAL - CLAMP INSERTS: Brand: FOGARTY SOFTJAW

## (undated) DEVICE — SUTURE PROL SZ 8-0 L24IN NONABSORBABLE BLU L6.5MM BV130-5 8732H

## (undated) DEVICE — SURGICAL PROCEDURE PACK BASIN MAJ SET CUST NO CAUT

## (undated) DEVICE — SHEET, T, LAPAROTOMY, STERILE: Brand: MEDLINE

## (undated) DEVICE — BLADE OPHTH W1.5MM 15DEG ORNG HNDL SHRP SHRP DEL FOR CATRCT

## (undated) DEVICE — SUT SLK 2 60IN TIE MP BLK --

## (undated) DEVICE — INTENDED TO STANDARDIZE OR CAMERAS TO ALLOW FOR THE USE OF THE OR CAMERA COVER: Brand: ASPEN® O.R. CAMERA COVER

## (undated) DEVICE — SYR LR LCK 1ML GRAD NSAF 30ML --

## (undated) DEVICE — STERILE POLYISOPRENE POWDER-FREE SURGICAL GLOVES: Brand: PROTEXIS

## (undated) DEVICE — BANDAGE COMPR ELASTIC 5 YDX6 IN

## (undated) DEVICE — SENSOR TEMP SKIN DISP

## (undated) DEVICE — CATHETER VENT ASST L PERC IMPELLA 5.0

## (undated) DEVICE — SUTURE MCRYL SZ 3-0 L27IN ABSRB UD L24MM PS-1 3/8 CIR PRIM Y936H

## (undated) DEVICE — SOLUTION IV 1000ML PH 7.4 INJ NRMSOL R

## (undated) DEVICE — SUT PROL 7-0 24IN BV1 DA BLU --

## (undated) DEVICE — SOL ANTI-FOG 6ML MEDC -- MEDICHOICE - CONVERT TO 358427

## (undated) DEVICE — SUTURE SZ 7 L18IN NONABSORBABLE SIL CCS L48MM 1/2 CIR STRNM M655G

## (undated) DEVICE — 40418 TRENDELENBURG ONE-STEP ARM PROTECTORS LARGE (1 PAIR): Brand: 40418 TRENDELENBURG ONE-STEP ARM PROTECTORS LARGE (1 PAIR)

## (undated) DEVICE — CONNECTOR DRNGE W3/8-0.5XH3/16XL3/16IN 2:1 SIL CARD STR

## (undated) DEVICE — SUTURE PERMA-HAND 0 L18IN NONABSORBABLE BLK CT-1 L36MM 1/2 C021D

## (undated) DEVICE — SUTURE PROL SZ 5-0 L30IN NONABSORBABLE BLU L13MM RB-2 1/2 8710H

## (undated) DEVICE — STAPLER SKIN L320MM 35MM VASC TISS 12 FIRING B FRM PWR

## (undated) DEVICE — GUIDEWIRE VASC L260CM DIA0.035IN L7CM DIA3MM J TIP PTFE S

## (undated) DEVICE — AORTIC PUNCHES ARE USED TO CREATE A UNIFORM OPENING IN BLOOD VESSELS DURING CARDIOVASCULAR SURGERY. THE VESSEL GRAFT IS INSERTED INTO THE CREATED OPENING AND SUTURED TO THE VESSEL WALL. AORTIC LANCETS ARE USED TO MAKE A SMALL UNIFORM CUT IN A BLOOD VESSEL TO FACILITATE INSERTION OF AN AORTIC PUNCH.  PUNCHES COME IN VARIOUS LENGTHS, DIAMETERS AND TIP CONFIGURATIONS.: Brand: CLEANCUT ROTATING AORTIC PUNCH

## (undated) DEVICE — DRAIN SURG 24FR L5/16IN DIA8MM SIL RND HUBLESS FULL FLUT

## (undated) DEVICE — SYSTEM TISS GLUE 5ML CONTAIN SYR PLUNG STD SYR TIP 12MM 5PKS/EA

## (undated) DEVICE — CLIP INT SM WIDE RED TI TRNSVRS GRV CHEVRON SHP W PRECIS

## (undated) DEVICE — FOGARTY ARTERIAL EMBOLECTOMY CATHETER 4F 80CM: Brand: FOGARTY

## (undated) DEVICE — STERNUM BLADE, OFFSET (31.7 X 0.64 X 6.3MM)

## (undated) DEVICE — DEVON™ KNEE AND BODY STRAP 60" X 3" (1.5 M X 7.6 CM): Brand: DEVON

## (undated) DEVICE — CATHETER IV 14GA L2IN POLYUR STR ORNG HUB SFTY RADPQ DISP

## (undated) DEVICE — GUIDEWIRE VASC L150CM DIA0.035IN FLX END L7CM J 3MM PTFE

## (undated) DEVICE — SUTURE PROL SZ 7-0 L24IN NONABSORBABLE BLU L8MM BV175-6 3/8 8735H

## (undated) DEVICE — SUT PROL 3-0 30IN SH1 DA BLU --

## (undated) DEVICE — TOWEL SURG W17XL27IN STD BLU COT NONFENESTRATED PREWASHED

## (undated) DEVICE — SUT PROL 4-0 30IN SH1 DA BLU --

## (undated) DEVICE — TUBING INSUFLTN 10FT LUER -- CONVERT TO ITEM 368568

## (undated) DEVICE — TRANSFER PK BLD PROD 300ML --

## (undated) DEVICE — DRAPE FLD WRM W44XL66IN C6L FOR INTRATEMP SYS THERMABASIN

## (undated) DEVICE — BAG PRESSURE INFUSION 1000ML -- CONVERT TO ITEM 360122

## (undated) DEVICE — DRAPE SURG W41XL74IN CLR FULL SZ C ARM 3 ADH POLY STRP E

## (undated) DEVICE — GAUZE SPONGES,12 PLY: Brand: CURITY

## (undated) DEVICE — LEAD PCMKR MYOCARDL BPLR TEMP. --

## (undated) DEVICE — SOLUTION IV 1000ML 0.9% SOD CHL

## (undated) DEVICE — DRAPE PRB US TRNSDCR 6X96IN --

## (undated) DEVICE — Z CONVERTED USE 2271365 TRAY SKIN W3XL3IN S STL STPL REMV GZ PD DISP MEDI PK

## (undated) DEVICE — BAG RED 3PLY 2MIL 30X40 IN

## (undated) DEVICE — SUTURE VCRL SZ 0 L18IN ABSRB VLT L40MM CT 1/2 CIR J752D

## (undated) DEVICE — SUTURE SZ 0 27IN 5/8 CIR UR-6  TAPER PT VIOLET ABSRB VICRYL J603H

## (undated) DEVICE — ZINACTIVE USE 2641837 CLIP LIG M BLU TI HRT SHP WIRE HORZ 600 PER BX

## (undated) DEVICE — GOWN,SIRUS,NONRNF,SETINSLV,XL,20/CS: Brand: MEDLINE

## (undated) DEVICE — SOLUTION IV 500ML 0.9% SOD CHL FLX CONT

## (undated) DEVICE — REM POLYHESIVE ADULT PATIENT RETURN ELECTRODE: Brand: VALLEYLAB

## (undated) DEVICE — 1200 GUARD II KIT W/5MM TUBE W/O VAC TUBE: Brand: GUARDIAN

## (undated) DEVICE — KIT BLWR MISTER 5P 15L W/ TBNG SET IRRIG MIST TO IMPROVE

## (undated) DEVICE — INFECTION CONTROL KIT SYS

## (undated) DEVICE — HOOK LOCK LATEX FREE ELASTIC BANDAGE D/L 6INX10YD

## (undated) DEVICE — PRESSURE MONITORING SET: Brand: TRUWAVE

## (undated) DEVICE — ELECTROSURGICAL DEVICE HOLSTER;FOR USE WITH MAXIMUM PEAK VOLTAGE OF 4000 V: Brand: FORCE TRIVERSE

## (undated) DEVICE — SLIM BODY SKIN STAPLER: Brand: APPOSE ULC

## (undated) DEVICE — LIGHT HANDLE: Brand: DEVON

## (undated) DEVICE — SYR 3ML LL TIP 1/10ML GRAD --

## (undated) DEVICE — DRESSING AQUACEL ADVANTAGE ALG W4XL5IN RECT GREATER ABSRB HYDRFBR TECHNOLOGY

## (undated) DEVICE — AORTIC PERFUSION CANNULA: Brand: EDWARDS LIFESCIENCES AORTIC PERFUSION CANNULA

## (undated) DEVICE — SOLUTION IRRIG 1000ML H2O STRL BLT

## (undated) DEVICE — CATH ANGI 5F STRD145DPTAIL 110 -- SITESEER

## (undated) DEVICE — PLEDGET SUT SFT OVL 3 8X5 16IN

## (undated) DEVICE — (D)PREP SKN CHLRAPRP APPL 26ML -- CONVERT TO ITEM 371833

## (undated) DEVICE — AVID DUAL STAGE VENOUS DRAINAGE CANNULA: Brand: AVID DUAL STAGE VENOUS DRAINAGE CANNULA

## (undated) DEVICE — BLADE OPHTH 180DEG CUT SURF BLU STR SHRP DBL BVL GRINDLESS

## (undated) DEVICE — SYR 50ML LR LCK 1ML GRAD NSAF --

## (undated) DEVICE — Z CONVERTED USE 2107985 COVER FLROSCP W36XL28IN 4 SIDE ADH